# Patient Record
Sex: FEMALE | Race: WHITE | Employment: OTHER | URBAN - METROPOLITAN AREA
[De-identification: names, ages, dates, MRNs, and addresses within clinical notes are randomized per-mention and may not be internally consistent; named-entity substitution may affect disease eponyms.]

---

## 2017-01-26 ENCOUNTER — OFFICE VISIT (OUTPATIENT)
Dept: DERMATOLOGY | Age: 67
End: 2017-01-26

## 2017-01-26 DIAGNOSIS — L30.9 DERMATITIS: Primary | ICD-10-CM

## 2017-01-26 PROCEDURE — 99214 OFFICE O/P EST MOD 30 MIN: CPT | Performed by: DERMATOLOGY

## 2017-02-02 ENCOUNTER — TELEPHONE (OUTPATIENT)
Dept: PULMONOLOGY | Age: 67
End: 2017-02-02

## 2017-03-03 ENCOUNTER — OFFICE VISIT (OUTPATIENT)
Dept: INTERNAL MEDICINE CLINIC | Age: 67
End: 2017-03-03

## 2017-03-03 VITALS
WEIGHT: 145 LBS | BODY MASS INDEX: 28.47 KG/M2 | SYSTOLIC BLOOD PRESSURE: 120 MMHG | RESPIRATION RATE: 14 BRPM | HEIGHT: 60 IN | DIASTOLIC BLOOD PRESSURE: 60 MMHG | HEART RATE: 95 BPM | OXYGEN SATURATION: 95 %

## 2017-03-03 DIAGNOSIS — J44.9 OBSTRUCTIVE CHRONIC BRONCHITIS WITHOUT EXACERBATION (HCC): ICD-10-CM

## 2017-03-03 DIAGNOSIS — F32.9 MAJOR DEPRESSION, CHRONIC: ICD-10-CM

## 2017-03-03 DIAGNOSIS — M85.80 STEROID-INDUCED OSTEOPENIA: ICD-10-CM

## 2017-03-03 DIAGNOSIS — J44.9 CHRONIC OBSTRUCTIVE PULMONARY DISEASE, UNSPECIFIED COPD TYPE (HCC): Primary | ICD-10-CM

## 2017-03-03 DIAGNOSIS — T38.0X5A STEROID-INDUCED OSTEOPENIA: ICD-10-CM

## 2017-03-03 DIAGNOSIS — F33.41 MAJOR DEPRESSIVE DISORDER, RECURRENT, IN PARTIAL REMISSION (HCC): ICD-10-CM

## 2017-03-03 DIAGNOSIS — K21.9 GASTROESOPHAGEAL REFLUX DISEASE WITHOUT ESOPHAGITIS: ICD-10-CM

## 2017-03-03 LAB
A/G RATIO: 1.6 (ref 1.1–2.2)
ALBUMIN SERPL-MCNC: 4.2 G/DL (ref 3.4–5)
ALP BLD-CCNC: 69 U/L (ref 40–129)
ALT SERPL-CCNC: 13 U/L (ref 10–40)
ANION GAP SERPL CALCULATED.3IONS-SCNC: 17 MMOL/L (ref 3–16)
AST SERPL-CCNC: 21 U/L (ref 15–37)
BASOPHILS ABSOLUTE: 0 K/UL (ref 0–0.2)
BASOPHILS RELATIVE PERCENT: 0.4 %
BILIRUB SERPL-MCNC: 0.3 MG/DL (ref 0–1)
BUN BLDV-MCNC: 20 MG/DL (ref 7–20)
CALCIUM SERPL-MCNC: 9.4 MG/DL (ref 8.3–10.6)
CHLORIDE BLD-SCNC: 102 MMOL/L (ref 99–110)
CHOLESTEROL, TOTAL: 184 MG/DL (ref 0–199)
CO2: 25 MMOL/L (ref 21–32)
CREAT SERPL-MCNC: 0.6 MG/DL (ref 0.6–1.2)
EOSINOPHILS ABSOLUTE: 0 K/UL (ref 0–0.6)
EOSINOPHILS RELATIVE PERCENT: 0.3 %
GFR AFRICAN AMERICAN: >60
GFR NON-AFRICAN AMERICAN: >60
GLOBULIN: 2.7 G/DL
GLUCOSE BLD-MCNC: 133 MG/DL (ref 70–99)
HCT VFR BLD CALC: 42.1 % (ref 36–48)
HDLC SERPL-MCNC: 64 MG/DL (ref 40–60)
HEMOGLOBIN: 13.3 G/DL (ref 12–16)
LDL CHOLESTEROL CALCULATED: 96 MG/DL
LYMPHOCYTES ABSOLUTE: 1.2 K/UL (ref 1–5.1)
LYMPHOCYTES RELATIVE PERCENT: 12.8 %
MCH RBC QN AUTO: 29 PG (ref 26–34)
MCHC RBC AUTO-ENTMCNC: 31.6 G/DL (ref 31–36)
MCV RBC AUTO: 91.8 FL (ref 80–100)
MONOCYTES ABSOLUTE: 0.5 K/UL (ref 0–1.3)
MONOCYTES RELATIVE PERCENT: 4.9 %
NEUTROPHILS ABSOLUTE: 7.8 K/UL (ref 1.7–7.7)
NEUTROPHILS RELATIVE PERCENT: 81.6 %
PDW BLD-RTO: 14.7 % (ref 12.4–15.4)
PLATELET # BLD: 230 K/UL (ref 135–450)
PMV BLD AUTO: 9.6 FL (ref 5–10.5)
POTASSIUM SERPL-SCNC: 4.7 MMOL/L (ref 3.5–5.1)
RBC # BLD: 4.59 M/UL (ref 4–5.2)
SODIUM BLD-SCNC: 144 MMOL/L (ref 136–145)
TOTAL PROTEIN: 6.9 G/DL (ref 6.4–8.2)
TRIGL SERPL-MCNC: 119 MG/DL (ref 0–150)
TSH SERPL DL<=0.05 MIU/L-ACNC: 0.76 UIU/ML (ref 0.27–4.2)
URIC ACID, SERUM: 5.9 MG/DL (ref 2.6–6)
VLDLC SERPL CALC-MCNC: 24 MG/DL
WBC # BLD: 9.6 K/UL (ref 4–11)

## 2017-03-03 PROCEDURE — 99214 OFFICE O/P EST MOD 30 MIN: CPT | Performed by: INTERNAL MEDICINE

## 2017-03-03 ASSESSMENT — ENCOUNTER SYMPTOMS
NAUSEA: 0
COUGH: 0
ABDOMINAL PAIN: 0
WHEEZING: 0
BACK PAIN: 1
VOMITING: 0
EYE DISCHARGE: 0
RHINORRHEA: 0
SHORTNESS OF BREATH: 1

## 2017-03-06 ENCOUNTER — OFFICE VISIT (OUTPATIENT)
Dept: NEUROLOGY | Age: 67
End: 2017-03-06

## 2017-03-06 VITALS
SYSTOLIC BLOOD PRESSURE: 110 MMHG | BODY MASS INDEX: 28.66 KG/M2 | DIASTOLIC BLOOD PRESSURE: 65 MMHG | OXYGEN SATURATION: 97 % | HEART RATE: 87 BPM | HEIGHT: 60 IN | WEIGHT: 146 LBS

## 2017-03-06 DIAGNOSIS — F32.89 DEPRESSIVE DISORDER, NOT ELSEWHERE CLASSIFIED: ICD-10-CM

## 2017-03-06 DIAGNOSIS — G44.221 CHRONIC TENSION-TYPE HEADACHE, INTRACTABLE: Primary | ICD-10-CM

## 2017-03-06 DIAGNOSIS — F51.01 PRIMARY INSOMNIA: ICD-10-CM

## 2017-03-06 DIAGNOSIS — E78.2 MIXED HYPERLIPIDEMIA: ICD-10-CM

## 2017-03-06 PROCEDURE — 99213 OFFICE O/P EST LOW 20 MIN: CPT | Performed by: PSYCHIATRY & NEUROLOGY

## 2017-03-07 RX ORDER — SIMVASTATIN 20 MG
TABLET ORAL
Qty: 90 TABLET | Refills: 0 | Status: SHIPPED | OUTPATIENT
Start: 2017-03-07 | End: 2017-05-10 | Stop reason: SDUPTHER

## 2017-03-13 RX ORDER — AMOXICILLIN AND CLAVULANATE POTASSIUM 875; 125 MG/1; MG/1
1 TABLET, FILM COATED ORAL 2 TIMES DAILY
Qty: 20 TABLET | Refills: 0 | Status: SHIPPED | OUTPATIENT
Start: 2017-03-13 | End: 2017-03-23

## 2017-03-16 ENCOUNTER — OFFICE VISIT (OUTPATIENT)
Dept: PULMONOLOGY | Age: 67
End: 2017-03-16

## 2017-03-16 ENCOUNTER — HOSPITAL ENCOUNTER (OUTPATIENT)
Dept: OTHER | Age: 67
Discharge: HOME | End: 2017-03-16
Attending: INTERNAL MEDICINE | Admitting: INTERNAL MEDICINE

## 2017-03-16 VITALS
OXYGEN SATURATION: 94 % | HEART RATE: 74 BPM | BODY MASS INDEX: 28.54 KG/M2 | TEMPERATURE: 94.5 F | WEIGHT: 145.4 LBS | SYSTOLIC BLOOD PRESSURE: 102 MMHG | DIASTOLIC BLOOD PRESSURE: 62 MMHG | RESPIRATION RATE: 16 BRPM | HEIGHT: 60 IN

## 2017-03-16 DIAGNOSIS — R06.02 SOB (SHORTNESS OF BREATH): ICD-10-CM

## 2017-03-16 DIAGNOSIS — J44.9 COPD, VERY SEVERE (HCC): ICD-10-CM

## 2017-03-16 DIAGNOSIS — R91.8 PULMONARY NODULES: ICD-10-CM

## 2017-03-16 DIAGNOSIS — J44.1 COPD WITH ACUTE EXACERBATION (HCC): Primary | ICD-10-CM

## 2017-03-16 DIAGNOSIS — J44.1 COPD WITH ACUTE EXACERBATION (HCC): ICD-10-CM

## 2017-03-16 PROCEDURE — 99214 OFFICE O/P EST MOD 30 MIN: CPT | Performed by: INTERNAL MEDICINE

## 2017-03-16 RX ORDER — PREDNISONE 10 MG/1
TABLET ORAL
Qty: 30 TABLET | Refills: 0 | Status: SHIPPED | OUTPATIENT
Start: 2017-03-16 | End: 2017-03-26

## 2017-03-16 RX ORDER — PREDNISONE 10 MG/1
10 TABLET ORAL DAILY
Qty: 30 TABLET | Refills: 6 | Status: SHIPPED | OUTPATIENT
Start: 2017-03-16 | End: 2017-06-05 | Stop reason: SDUPTHER

## 2017-03-22 ENCOUNTER — TELEPHONE (OUTPATIENT)
Dept: PULMONOLOGY | Age: 67
End: 2017-03-22

## 2017-03-22 DIAGNOSIS — J44.9 CHRONIC OBSTRUCTIVE PULMONARY DISEASE, UNSPECIFIED COPD TYPE (HCC): Primary | ICD-10-CM

## 2017-03-29 ENCOUNTER — HOSPITAL ENCOUNTER (OUTPATIENT)
Dept: PULMONOLOGY | Age: 67
Discharge: HOME | End: 2017-03-29
Attending: INTERNAL MEDICINE | Admitting: INTERNAL MEDICINE

## 2017-03-29 DIAGNOSIS — R91.8 OTHER NONSPECIFIC ABNORMAL FINDING OF LUNG FIELD: ICD-10-CM

## 2017-03-29 RX ORDER — ALBUTEROL SULFATE 2.5 MG/3ML
2.5 SOLUTION RESPIRATORY (INHALATION) ONCE
Status: COMPLETED | OUTPATIENT
Start: 2017-03-29 | End: 2017-03-29

## 2017-03-29 RX ADMIN — ALBUTEROL SULFATE 2.5 MG: 2.5 SOLUTION RESPIRATORY (INHALATION) at 12:05

## 2017-04-19 ENCOUNTER — TELEPHONE (OUTPATIENT)
Dept: PULMONOLOGY | Age: 67
End: 2017-04-19

## 2017-04-19 DIAGNOSIS — J44.9 CHRONIC OBSTRUCTIVE PULMONARY DISEASE, UNSPECIFIED COPD TYPE (HCC): Primary | ICD-10-CM

## 2017-04-20 DIAGNOSIS — J44.9 CHRONIC OBSTRUCTIVE PULMONARY DISEASE, UNSPECIFIED COPD TYPE (HCC): Primary | ICD-10-CM

## 2017-05-10 RX ORDER — SIMVASTATIN 20 MG
TABLET ORAL
Qty: 90 TABLET | Refills: 0 | Status: SHIPPED | OUTPATIENT
Start: 2017-05-10 | End: 2017-09-11 | Stop reason: SDUPTHER

## 2017-06-05 RX ORDER — VENLAFAXINE HYDROCHLORIDE 75 MG/1
CAPSULE, EXTENDED RELEASE ORAL
Qty: 90 CAPSULE | Refills: 0 | Status: SHIPPED | OUTPATIENT
Start: 2017-06-05 | End: 2017-06-19 | Stop reason: SDUPTHER

## 2017-06-05 RX ORDER — TOPIRAMATE 25 MG/1
TABLET ORAL
Qty: 360 TABLET | Refills: 0 | Status: SHIPPED | OUTPATIENT
Start: 2017-06-05 | End: 2017-06-19 | Stop reason: SDUPTHER

## 2017-06-05 RX ORDER — TIOTROPIUM BROMIDE 18 UG/1
CAPSULE ORAL; RESPIRATORY (INHALATION)
Qty: 90 CAPSULE | Refills: 0 | Status: SHIPPED | OUTPATIENT
Start: 2017-06-05 | End: 2017-06-19 | Stop reason: SDUPTHER

## 2017-06-05 RX ORDER — PREDNISONE 10 MG/1
TABLET ORAL
Qty: 90 TABLET | Refills: 0 | Status: SHIPPED | OUTPATIENT
Start: 2017-06-05 | End: 2017-08-09

## 2017-06-19 ENCOUNTER — OFFICE VISIT (OUTPATIENT)
Dept: INTERNAL MEDICINE CLINIC | Age: 67
End: 2017-06-19

## 2017-06-19 VITALS
HEIGHT: 60 IN | HEART RATE: 80 BPM | BODY MASS INDEX: 29.64 KG/M2 | WEIGHT: 151 LBS | DIASTOLIC BLOOD PRESSURE: 80 MMHG | RESPIRATION RATE: 14 BRPM | SYSTOLIC BLOOD PRESSURE: 130 MMHG

## 2017-06-19 DIAGNOSIS — J44.9 CHRONIC OBSTRUCTIVE PULMONARY DISEASE, UNSPECIFIED COPD TYPE (HCC): Primary | ICD-10-CM

## 2017-06-19 DIAGNOSIS — F33.41 MAJOR DEPRESSIVE DISORDER, RECURRENT, IN PARTIAL REMISSION (HCC): ICD-10-CM

## 2017-06-19 DIAGNOSIS — E78.2 MIXED HYPERLIPIDEMIA: ICD-10-CM

## 2017-06-19 DIAGNOSIS — F32.89 DEPRESSIVE DISORDER, NOT ELSEWHERE CLASSIFIED: ICD-10-CM

## 2017-06-19 DIAGNOSIS — R60.0 LOCALIZED EDEMA: ICD-10-CM

## 2017-06-19 DIAGNOSIS — K21.9 GASTROESOPHAGEAL REFLUX DISEASE WITHOUT ESOPHAGITIS: ICD-10-CM

## 2017-06-19 DIAGNOSIS — F51.01 PRIMARY INSOMNIA: ICD-10-CM

## 2017-06-19 DIAGNOSIS — F32.9 MAJOR DEPRESSION, CHRONIC: ICD-10-CM

## 2017-06-19 PROCEDURE — 99214 OFFICE O/P EST MOD 30 MIN: CPT | Performed by: INTERNAL MEDICINE

## 2017-06-19 RX ORDER — TOPIRAMATE 25 MG/1
TABLET ORAL
Qty: 360 TABLET | Refills: 0 | Status: SHIPPED | OUTPATIENT
Start: 2017-06-19 | End: 2017-08-24 | Stop reason: SDUPTHER

## 2017-06-19 RX ORDER — ALBUTEROL SULFATE 90 UG/1
AEROSOL, METERED RESPIRATORY (INHALATION)
Qty: 54 G | Refills: 0 | Status: SHIPPED | OUTPATIENT
Start: 2017-06-19 | End: 2017-08-12 | Stop reason: SDUPTHER

## 2017-06-19 RX ORDER — VENLAFAXINE HYDROCHLORIDE 75 MG/1
CAPSULE, EXTENDED RELEASE ORAL
Qty: 90 CAPSULE | Refills: 0 | Status: SHIPPED | OUTPATIENT
Start: 2017-06-19 | End: 2017-08-22 | Stop reason: SDUPTHER

## 2017-06-19 ASSESSMENT — ENCOUNTER SYMPTOMS
VOMITING: 0
ABDOMINAL PAIN: 0
BACK PAIN: 1
SHORTNESS OF BREATH: 1
RHINORRHEA: 0
WHEEZING: 0
COUGH: 0
NAUSEA: 0
EYE DISCHARGE: 0

## 2017-07-14 ENCOUNTER — HOSPITAL ENCOUNTER (OUTPATIENT)
Dept: CT IMAGING | Age: 67
Discharge: HOME | End: 2017-07-14
Attending: INTERNAL MEDICINE | Admitting: INTERNAL MEDICINE

## 2017-07-14 DIAGNOSIS — R91.8 OTHER NONSPECIFIC ABNORMAL FINDING OF LUNG FIELD: ICD-10-CM

## 2017-07-14 DIAGNOSIS — R91.8 PULMONARY NODULES: ICD-10-CM

## 2017-07-18 ENCOUNTER — TELEPHONE (OUTPATIENT)
Dept: PULMONOLOGY | Age: 67
End: 2017-07-18

## 2017-07-18 RX ORDER — AZITHROMYCIN 250 MG/1
TABLET, FILM COATED ORAL
Qty: 6 TABLET | Refills: 0 | Status: SHIPPED | OUTPATIENT
Start: 2017-07-18 | End: 2017-09-20

## 2017-07-26 RX ORDER — ROFLUMILAST 500 UG/1
TABLET ORAL
Qty: 90 TABLET | Refills: 0 | Status: SHIPPED | OUTPATIENT
Start: 2017-07-26 | End: 2017-10-02 | Stop reason: SDUPTHER

## 2017-08-03 ENCOUNTER — OFFICE VISIT (OUTPATIENT)
Dept: PULMONOLOGY | Age: 67
End: 2017-08-03

## 2017-08-03 VITALS
HEIGHT: 60 IN | TEMPERATURE: 98.1 F | RESPIRATION RATE: 16 BRPM | BODY MASS INDEX: 29.45 KG/M2 | WEIGHT: 150 LBS | DIASTOLIC BLOOD PRESSURE: 75 MMHG | OXYGEN SATURATION: 96 % | HEART RATE: 89 BPM | SYSTOLIC BLOOD PRESSURE: 127 MMHG

## 2017-08-03 DIAGNOSIS — J44.9 COPD, VERY SEVERE (HCC): Primary | ICD-10-CM

## 2017-08-03 DIAGNOSIS — R09.02 HYPOXIA: ICD-10-CM

## 2017-08-03 DIAGNOSIS — R91.8 PULMONARY NODULES: ICD-10-CM

## 2017-08-03 DIAGNOSIS — R93.89 ABNORMAL CT SCAN, CHEST: ICD-10-CM

## 2017-08-03 PROCEDURE — 99214 OFFICE O/P EST MOD 30 MIN: CPT | Performed by: INTERNAL MEDICINE

## 2017-08-03 RX ORDER — PREDNISONE 10 MG/1
TABLET ORAL
Qty: 30 TABLET | Refills: 0 | Status: SHIPPED | OUTPATIENT
Start: 2017-08-03 | End: 2017-08-09 | Stop reason: SDUPTHER

## 2017-08-09 RX ORDER — PREDNISONE 10 MG/1
TABLET ORAL
Qty: 90 TABLET | Refills: 0 | Status: SHIPPED | OUTPATIENT
Start: 2017-08-09 | End: 2017-10-02 | Stop reason: SDUPTHER

## 2017-08-22 RX ORDER — VENLAFAXINE HYDROCHLORIDE 75 MG/1
CAPSULE, EXTENDED RELEASE ORAL
Qty: 90 CAPSULE | Refills: 0 | Status: SHIPPED | OUTPATIENT
Start: 2017-08-22 | End: 2017-09-20 | Stop reason: SDUPTHER

## 2017-08-22 RX ORDER — TIOTROPIUM BROMIDE 18 UG/1
CAPSULE ORAL; RESPIRATORY (INHALATION)
Qty: 90 CAPSULE | Refills: 0 | Status: SHIPPED | OUTPATIENT
Start: 2017-08-22 | End: 2017-11-20 | Stop reason: SDUPTHER

## 2017-08-24 RX ORDER — TOPIRAMATE 25 MG/1
TABLET ORAL
Qty: 360 TABLET | Refills: 0 | Status: SHIPPED | OUTPATIENT
Start: 2017-08-24 | End: 2017-10-31 | Stop reason: SDUPTHER

## 2017-09-20 ENCOUNTER — OFFICE VISIT (OUTPATIENT)
Dept: INTERNAL MEDICINE CLINIC | Age: 67
End: 2017-09-20

## 2017-09-20 VITALS
WEIGHT: 152 LBS | DIASTOLIC BLOOD PRESSURE: 80 MMHG | HEIGHT: 60 IN | RESPIRATION RATE: 14 BRPM | HEART RATE: 70 BPM | BODY MASS INDEX: 29.84 KG/M2 | SYSTOLIC BLOOD PRESSURE: 130 MMHG

## 2017-09-20 DIAGNOSIS — Z00.00 ROUTINE GENERAL MEDICAL EXAMINATION AT A HEALTH CARE FACILITY: ICD-10-CM

## 2017-09-20 DIAGNOSIS — E78.2 MIXED HYPERLIPIDEMIA: ICD-10-CM

## 2017-09-20 DIAGNOSIS — Z23 NEED FOR INFLUENZA VACCINATION: Primary | ICD-10-CM

## 2017-09-20 LAB
BASOPHILS ABSOLUTE: 0 K/UL (ref 0–0.2)
BASOPHILS RELATIVE PERCENT: 0.3 %
EOSINOPHILS ABSOLUTE: 0.1 K/UL (ref 0–0.6)
EOSINOPHILS RELATIVE PERCENT: 0.6 %
HCT VFR BLD CALC: 41.7 % (ref 36–48)
HEMOGLOBIN: 13.4 G/DL (ref 12–16)
LYMPHOCYTES ABSOLUTE: 1.2 K/UL (ref 1–5.1)
LYMPHOCYTES RELATIVE PERCENT: 11.7 %
MCH RBC QN AUTO: 30.2 PG (ref 26–34)
MCHC RBC AUTO-ENTMCNC: 32 G/DL (ref 31–36)
MCV RBC AUTO: 94.4 FL (ref 80–100)
MONOCYTES ABSOLUTE: 0.6 K/UL (ref 0–1.3)
MONOCYTES RELATIVE PERCENT: 5.7 %
NEUTROPHILS ABSOLUTE: 8.1 K/UL (ref 1.7–7.7)
NEUTROPHILS RELATIVE PERCENT: 81.7 %
PDW BLD-RTO: 15.4 % (ref 12.4–15.4)
PLATELET # BLD: 220 K/UL (ref 135–450)
PMV BLD AUTO: 9.8 FL (ref 5–10.5)
RBC # BLD: 4.42 M/UL (ref 4–5.2)
WBC # BLD: 9.9 K/UL (ref 4–11)

## 2017-09-20 PROCEDURE — 90662 IIV NO PRSV INCREASED AG IM: CPT | Performed by: INTERNAL MEDICINE

## 2017-09-20 PROCEDURE — G0008 ADMIN INFLUENZA VIRUS VAC: HCPCS | Performed by: INTERNAL MEDICINE

## 2017-09-20 PROCEDURE — G0438 PPPS, INITIAL VISIT: HCPCS | Performed by: INTERNAL MEDICINE

## 2017-09-20 RX ORDER — AZITHROMYCIN 250 MG/1
TABLET, FILM COATED ORAL
Qty: 1 PACKET | Refills: 0 | Status: SHIPPED | OUTPATIENT
Start: 2017-09-20 | End: 2017-09-30

## 2017-09-20 RX ORDER — VENLAFAXINE HYDROCHLORIDE 75 MG/1
CAPSULE, EXTENDED RELEASE ORAL
Qty: 90 CAPSULE | Refills: 0 | Status: SHIPPED | OUTPATIENT
Start: 2017-09-20 | End: 2017-11-20 | Stop reason: SDUPTHER

## 2017-09-20 ASSESSMENT — ANXIETY QUESTIONNAIRES: GAD7 TOTAL SCORE: 4

## 2017-09-20 ASSESSMENT — LIFESTYLE VARIABLES: HOW OFTEN DO YOU HAVE A DRINK CONTAINING ALCOHOL: 0

## 2017-09-21 ENCOUNTER — TELEPHONE (OUTPATIENT)
Dept: INTERNAL MEDICINE CLINIC | Age: 67
End: 2017-09-21

## 2017-09-21 DIAGNOSIS — R73.09 HIGH GLUCOSE: Primary | ICD-10-CM

## 2017-09-21 LAB
A/G RATIO: 1.3 (ref 1.1–2.2)
ALBUMIN SERPL-MCNC: 3.9 G/DL (ref 3.4–5)
ALP BLD-CCNC: 97 U/L (ref 40–129)
ALT SERPL-CCNC: 18 U/L (ref 10–40)
ANION GAP SERPL CALCULATED.3IONS-SCNC: 16 MMOL/L (ref 3–16)
AST SERPL-CCNC: 16 U/L (ref 15–37)
BILIRUB SERPL-MCNC: <0.2 MG/DL (ref 0–1)
BUN BLDV-MCNC: 25 MG/DL (ref 7–20)
CALCIUM SERPL-MCNC: 9.9 MG/DL (ref 8.3–10.6)
CHLORIDE BLD-SCNC: 102 MMOL/L (ref 99–110)
CO2: 26 MMOL/L (ref 21–32)
CREAT SERPL-MCNC: 0.8 MG/DL (ref 0.6–1.2)
ESTIMATED AVERAGE GLUCOSE: 197.3 MG/DL
GFR AFRICAN AMERICAN: >60
GFR NON-AFRICAN AMERICAN: >60
GLOBULIN: 3 G/DL
GLUCOSE BLD-MCNC: 397 MG/DL (ref 70–99)
HBA1C MFR BLD: 8.5 %
POTASSIUM SERPL-SCNC: 4.2 MMOL/L (ref 3.5–5.1)
SODIUM BLD-SCNC: 144 MMOL/L (ref 136–145)
TOTAL PROTEIN: 6.9 G/DL (ref 6.4–8.2)
URIC ACID, SERUM: 5 MG/DL (ref 2.6–6)

## 2017-09-28 RX ORDER — ALBUTEROL SULFATE 2.5 MG/3ML
SOLUTION RESPIRATORY (INHALATION)
Qty: 360 VIAL | Refills: 3 | Status: SHIPPED | OUTPATIENT
Start: 2017-09-28 | End: 2018-08-29 | Stop reason: SDUPTHER

## 2017-10-03 RX ORDER — ALENDRONATE SODIUM 35 MG/1
TABLET ORAL
Qty: 12 TABLET | Refills: 1 | Status: SHIPPED | OUTPATIENT
Start: 2017-10-03 | End: 2018-02-15 | Stop reason: SDUPTHER

## 2017-10-03 RX ORDER — DESVENLAFAXINE 100 MG/1
TABLET, EXTENDED RELEASE ORAL
Qty: 90 TABLET | Refills: 0 | Status: SHIPPED | OUTPATIENT
Start: 2017-10-03 | End: 2017-11-21

## 2017-10-03 RX ORDER — FUROSEMIDE 40 MG/1
TABLET ORAL
Qty: 90 TABLET | Refills: 0 | Status: SHIPPED | OUTPATIENT
Start: 2017-10-03 | End: 2017-11-20 | Stop reason: SDUPTHER

## 2017-10-03 RX ORDER — PREDNISONE 10 MG/1
TABLET ORAL
Qty: 90 TABLET | Refills: 0 | Status: SHIPPED | OUTPATIENT
Start: 2017-10-03 | End: 2018-03-20

## 2017-10-03 RX ORDER — ROFLUMILAST 500 UG/1
TABLET ORAL
Qty: 90 TABLET | Refills: 0 | Status: SHIPPED | OUTPATIENT
Start: 2017-10-03 | End: 2017-12-13 | Stop reason: SDUPTHER

## 2017-10-03 RX ORDER — NAPROXEN 375 MG/1
TABLET ORAL
Qty: 180 TABLET | Refills: 0 | Status: SHIPPED | OUTPATIENT
Start: 2017-10-03 | End: 2017-11-20 | Stop reason: SDUPTHER

## 2017-10-05 ENCOUNTER — TELEPHONE (OUTPATIENT)
Dept: PULMONOLOGY | Age: 67
End: 2017-10-05

## 2017-10-05 NOTE — TELEPHONE ENCOUNTER
Patient called stating that she is not able to receive her portable oxygen concentrator. Spoke with [facility], office notes from 8/3/17 need to state that patient is mobile within the home, per medicare guidelines. Assessment:8/3/17      · Very severe COPD   · Abnormal CT chest 7/14/2017 with ASD- treated for PNA   · Hypoxia on exertion   · Pulmonary nodules   · 20 pack years. Quit smoking 1996. · Chronic steroid use since 12/2015                           Plan:       · Prednisone taper to 10 mg/day -  Risks, benefits and side effects were discussed with patient. · O2 2LPM on exertion. Advised to titrate O2 using her pulse oximeter- target O2 sat 90-92%. · Order for portable oxygen concentrator    · Fosamax 35 mg once a week, Vit D 800 IU daily and Ca 1200 mg daily   · Advair 250/50 BID, Spiriva INH daily and Albuterol INH/Neb PRN  · Daliresp daily   · CT chest 10/2017   · Flonase 2 sprays/nostril daily PRN  · Patient is up to date with Pneumococcal vaccine and influenza vaccine    · Acapella QID to mobilize respiratory secretions.

## 2017-10-06 RX ORDER — METFORMIN HYDROCHLORIDE 500 MG/1
TABLET, EXTENDED RELEASE ORAL
Qty: 360 TABLET | Refills: 0 | Status: SHIPPED | OUTPATIENT
Start: 2017-10-06 | End: 2018-02-09 | Stop reason: SDUPTHER

## 2017-10-06 RX ORDER — METFORMIN HYDROCHLORIDE 500 MG/1
1000 TABLET, EXTENDED RELEASE ORAL 2 TIMES DAILY
Qty: 120 TABLET | Refills: 2 | Status: SHIPPED | OUTPATIENT
Start: 2017-10-06 | End: 2017-10-06 | Stop reason: SDUPTHER

## 2017-10-17 ENCOUNTER — HOSPITAL ENCOUNTER (OUTPATIENT)
Dept: CT IMAGING | Age: 67
Discharge: HOME | End: 2017-10-17
Attending: INTERNAL MEDICINE | Admitting: INTERNAL MEDICINE

## 2017-10-17 DIAGNOSIS — R91.8 OTHER NONSPECIFIC ABNORMAL FINDING OF LUNG FIELD: ICD-10-CM

## 2017-10-17 DIAGNOSIS — R91.8 OTHER NONSPECIFIC ABNORMAL FINDING OF LUNG FIELD (CODE): ICD-10-CM

## 2017-10-17 DIAGNOSIS — R93.89 ABNORMAL CT SCAN, CHEST: ICD-10-CM

## 2017-10-17 DIAGNOSIS — R91.8 PULMONARY NODULES: ICD-10-CM

## 2017-10-18 ENCOUNTER — TELEPHONE (OUTPATIENT)
Dept: PULMONOLOGY | Age: 67
End: 2017-10-18

## 2017-10-18 ENCOUNTER — OFFICE VISIT (OUTPATIENT)
Dept: PULMONOLOGY | Age: 67
End: 2017-10-18

## 2017-10-18 VITALS
WEIGHT: 147 LBS | OXYGEN SATURATION: 93 % | DIASTOLIC BLOOD PRESSURE: 80 MMHG | HEART RATE: 81 BPM | BODY MASS INDEX: 28.86 KG/M2 | RESPIRATION RATE: 16 BRPM | HEIGHT: 60 IN | SYSTOLIC BLOOD PRESSURE: 131 MMHG | TEMPERATURE: 97.7 F

## 2017-10-18 DIAGNOSIS — J40 TRACHEOBRONCHITIS: ICD-10-CM

## 2017-10-18 DIAGNOSIS — R09.02 HYPOXIA: ICD-10-CM

## 2017-10-18 DIAGNOSIS — R93.89 ABNORMAL CT OF THE CHEST: ICD-10-CM

## 2017-10-18 DIAGNOSIS — J44.1 COPD WITH ACUTE EXACERBATION (HCC): Primary | ICD-10-CM

## 2017-10-18 PROCEDURE — 99214 OFFICE O/P EST MOD 30 MIN: CPT | Performed by: INTERNAL MEDICINE

## 2017-10-18 RX ORDER — PREDNISONE 10 MG/1
TABLET ORAL
Qty: 30 TABLET | Refills: 0 | Status: SHIPPED | OUTPATIENT
Start: 2017-10-18 | End: 2017-10-28

## 2017-10-18 RX ORDER — LEVOFLOXACIN 500 MG/1
500 TABLET, FILM COATED ORAL DAILY
Qty: 7 TABLET | Refills: 0 | Status: SHIPPED | OUTPATIENT
Start: 2017-10-18 | End: 2017-10-25

## 2017-10-18 NOTE — PROGRESS NOTES
P Pulmonary, Critical Care [facility] Sleep Specialists                                                            Outpatient Follow Up Note      CHIEF COMPLAINT: Follow up COPD        HPI:   CT chest reviewed by me and noted below.  Results were dicussed with patient and multiple good questions were answered    Cough with green sputum several times/day for few days     No smoking     On Prednisone 10 mg/day    Past Medical History:   Diagnosis Date    Chronic airway obstruction, not elsewhere classified 3/4/08    Chronic kidney disease     incontinent    COPD (chronic obstructive pulmonary disease) ([address]     Depression     Displacement of lumbar intervertebral disc without myelopathy 8/13/07    Edema 9/13/07    Emphysema of lung ([address]     Enthesopathy of hip region     Enthesopathy of hip region 3/5/07    Esophageal reflux 3/4/08    Hemorrhage of rectum and anus 11/14/07    Major depression, chronic 8/12/2015    Osteoporosis, unspecified 12/4/07    Other and unspecified hyperlipidemia 3/4/08    Pain in joint, shoulder region 3/4/08    Pneumonia     Pulmonary nodule     Rheumatic fever     S/P ileostomy ([address] 8/20/2011    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 Ryley Oneil; right ear pressure point cyst removal    EYE SURGERY Left 6/18/13    cataract with lens implant    HERNIA REPAIR  6/27/14    Open Vental Hernia Repair    HIP SURGERY      HYSTERECTOMY 1973    subtotal    NECK SURGERY      incision of windpipe, repair of windpipe defect    OTHER SURGICAL HISTORY  8/19/2011    hand assisted laparoscopic ileostomy reversal    OVARY REMOVAL      TRACHEAL SURGERY  2005    hx of trach     UMBILICAL HERNIA REPAIR  6/27/2014       Allergies:  has No Known Allergies. Social History:    TOBACCO:   reports that she quit smoking about 21 years ago. Her smoking use included Cigarettes. She has a 30.00 pack-year smoking history. She has never used smokeless tobacco.  ETOH:   reports that she does not drink alcohol.       Family History:       Problem Relation Age of Onset    Asthma Daughter     Diabetes Daughter     Cancer Daughter      Uterine    Asthma Daughter     Diabetes Mother     Heart Failure Mother     Hypertension Mother     Heart Failure Brother     Heart Failure Sister     Diabetes Sister     Heart Failure Brother     Emphysema Neg Hx        Current Medications:    Current Outpatient Prescriptions:     metFORMIN (GLUCOPHAGE-XR) 500 MG extended release tablet, TAKE 2 TABLETS BY MOUTH TWICE DAILY, Disp: 360 tablet, Rfl: 0    desvenlafaxine succinate (PRISTIQ) 100 MG TB24 extended release tablet, TAKE 1 TABLET EVERY DAY, Disp: 90 tablet, Rfl: 0    alendronate (FOSAMAX) 35 MG tablet, TAKE 1 TABLET BY MOUTH EVERY 7 DAYS, Disp: 12 tablet, Rfl: 1    naproxen (NAPROSYN) 375 MG tablet, TAKE 1 TABLET TWICE DAILY, Disp: 180 tablet, Rfl: 0    furosemide (LASIX) 40 MG tablet, TAKE 1 TABLET EVERY DAY, Disp: 90 tablet, Rfl: 0    DALIRESP 500 MCG tablet, TAKE 1 TABLET EVERY DAY, Disp: 90 tablet, Rfl: 0    predniSONE (DELTASONE) 10 MG tablet, TAKE 1 TABLET EVERY DAY, Disp: 90 tablet, Rfl: 0    VENTOLIN  (90 Base) MCG/ACT inhaler, INHALE 2 PUFFS EVERY 6 HOURS AS NEEDED FOR WHEEZING, Disp: 54 g, Rfl: 0    albuterol (PROVENTIL) (2.5 MG/3ML) 0.083% nebulizer solution, INHALE THE CONTENTS OF 1 VIAL (3 MLS) BY NEBULIZATION EVERY 6 HOURS AS NEEDED FOR WHEEZING OR SHORTNESS OF BREATH, Disp: 360 vial, Rfl: 3    venlafaxine (EFFEXOR XR) 75 MG extended release capsule, TAKE 1 CAPSULE EVERY DAY, Disp: 90 capsule, Rfl: 0    potassium chloride (KLOR-CON M) 20 MEQ extended release tablet, TAKE 1 TABLET EVERY DAY, Disp: 90 tablet, Rfl: 0    simvastatin (ZOCOR) 20 MG tablet, TAKE 1 TABLET EVERY EVENING, Disp: 90 tablet, Rfl: 0    esomeprazole (NEXIUM) 40 MG delayed release capsule, TAKE 1 CAPSULE BY MOUTH EVERY MORNING BEFORE BREAKFAST, Disp: 90 capsule, Rfl: 0    topiramate (TOPAMAX) 25 MG tablet, TAKE 2 TABLETS TWICE DAILY, Disp: 360 tablet, Rfl: 0    SPIRIVA HANDIHALER 18 MCG inhalation capsule, INHALE THE CONTENTS OF 1 CAPSULE EVERY DAY, Disp: 90 capsule, Rfl: 0    fluticasone-salmeterol (ADVAIR DISKUS) 250-50 MCG/DOSE AEPB, Inhale 1 puff into the lungs every 12 hours, Disp: 180 each, Rfl: 3    theophylline (THEOCHRON) 200 MG extended release tablet, Take 1 tablet by mouth 2 times daily, Disp: 180 tablet, Rfl: 0    QUEtiapine (SEROQUEL) 25 MG tablet, TAKE 1 TO 2 TABLETS BY MOUTH AT BEDTIME, Disp: 180 tablet, Rfl: 2    vitamin D (CHOLECALCIFEROL) 400 UNITS TABS tablet, Take 2 tablets by mouth daily, Disp: 180 tablet, Rfl: 1    calcium carbonate (CALCIUM 600) 600 MG TABS tablet, Take 2 tablets by mouth daily, Disp: 180 tablet, Rfl: 1    Respiratory Therapy Supplies (NEBULIZER/TUBING/MOUTHPIECE) KIT, 1 kit by Does not apply route daily as needed DX COPD J44.9, Disp: 1 kit, Rfl: 0    Nebulizers (COMPRESSOR/NEBULIZER) MISC, 1 Device by Does not apply route as needed DX COPD J44.9, Disp: 1 each, Rfl: 0    fluticasone (FLONASE) 50 MCG/ACT nasal spray, 2 sprays by Nasal route daily. , Disp: 3 Bottle, Rfl: 0    Misc. Devices (ACAPELLA) MISC, by Does not apply route 4 times daily. , Disp: 1 each, Rfl: 0           Objective:     /80 (Site: Left Arm, Position: Sitting, Cuff Size: Medium Adult)   Pulse 81   Temp 97.7 °F (36.5 °C) (Oral)   Resp 16   Ht 5'

## 2017-10-19 ENCOUNTER — TELEPHONE (OUTPATIENT)
Dept: PULMONOLOGY | Age: 67
End: 2017-10-19

## 2017-10-19 DIAGNOSIS — R16.0 LIVER MASS: Primary | ICD-10-CM

## 2017-10-21 ENCOUNTER — HOSPITAL ENCOUNTER (OUTPATIENT)
Dept: CT IMAGING | Age: 67
Discharge: HOME | End: 2017-10-21
Attending: INTERNAL MEDICINE | Admitting: INTERNAL MEDICINE

## 2017-10-21 DIAGNOSIS — R93.89 ABNORMAL FINDINGS ON DIAGNOSTIC IMAGING OF OTHER SPECIFIED BODY STRUCTURES: ICD-10-CM

## 2017-10-21 DIAGNOSIS — R16.0 LIVER MASS: ICD-10-CM

## 2017-10-21 LAB
CREAT SERPL-MCNC: 0.8 MG/DL (ref 0.6–1.2)
GFR AFRICAN AMERICAN: >60
GFR NON-AFRICAN AMERICAN: >60

## 2017-10-23 ENCOUNTER — HOSPITAL ENCOUNTER (OUTPATIENT)
Dept: SPEECH THERAPY | Age: 67
Discharge: HOME | End: 2017-10-31
Admitting: INTERNAL MEDICINE

## 2017-10-23 NOTE — PROGRESS NOTES
Speech Language Pathology  Facility/Department: [facility] SPEECH THERAPY   BEDSIDE SWALLOW EVALUATION    NAME: Marianne Dancer  : 1950  MRN: [id number]    ADMISSION DATE: 10/20/2017    Visit Diagnoses    None. Past Medical History:  has a past medical history of Chronic airway obstruction, not elsewhere classified; Chronic kidney disease; COPD (chronic obstructive pulmonary disease) ([address] Depression; Displacement of lumbar intervertebral disc without myelopathy; Edema; Emphysema of lung ([address] Enthesopathy of hip region; Enthesopathy of hip region; Esophageal reflux; Hemorrhage of rectum and anus; Major depression, chronic; Osteoporosis, unspecified; Other and unspecified hyperlipidemia; Pain in joint, shoulder region; Pneumonia; Pulmonary nodule; Rheumatic fever; S/P [address]ostomy [address] [address] Steroid-induced osteopenia; Tension headache; Unspecified asthma(493.90); Unspecified chronic bronchitis; and Ventral hernia. Past Surgical History:  has a past surgical history that includes Hysterectomy (); Ovary removal; Carpal tunnel release; hip surgery; Neck surgery; Tracheal surgery (); Abdomen surgery (11); other surgical history (2011); colectomy; eye surgery (Left, 13); Cataract removal with implant (Right, 13); Cataract removal (Bilateral); cyst removal (Right, 14); hernia repair (14); Umbilical hernia repair (2014); and Colonoscopy (, 2011, 10/28/15). Recent Chest Xray/CT of Chest (10/17/17): Treatment Dx (ICD 10 code): Aspiration (S11.7)  Insurance/Certification Information: [facility]  Plan of Care Submitted: Yes; faxed 10/23/17    Date of Eval: 10/23/2017  Evaluating Therapist: Tyler Price    Current Diet level:  Current Diet : Regular  Current Liquid Diet : Thin    Primary Complaint:   Patient Complaint: Pt reports eating softer foods 2/2 edentulous status; she reported occasional dysphagia with saliva.     Pain:   Pain Assessment  Patient Recommendation: Dysphagia III Advanced pending MBSS  Liquid: Liquid Consistency Recommendation: Thin pending MBSS  Medication:Recommended Form of Meds: PO pending MBSS  Therapeutic Interventions: Diet tolerance monitoring, Patient/Family education    Compensatory Swallowing Strategies  Compensatory Swallowing Strategies: Alternate solids and liquids;Small bites/sips;Upright as possible for all oral intake;Remain upright for 30-45 minutes after meals;Eat/Feed slowly    Treatment/Goals  TBD pending results and recs of MBSS    General  Chart Reviewed: Yes  Visit Information  SLP Insurance Information: [employer]  Comments: Dr. Miguel Ángel Skinner ordered SLP Eval 2/2 possible aspiration per recent CXR. Behavior/Cognition  Behavior/Cognition: Alert;Pleasant mood; Cooperative  Respiratory Status: Room air  O2 Device: None (Room air)  Communication Observation: Functional  Follows Directions: Simple  Dentition: Edentulous  Patient Positioning: Upright in chair  Baseline Vocal Quality: Weak (Hoarse)  Volitional Cough: Strong  Prior Dysphagia History: No hx of dysphagia per chart review. Consistencies Trialed: Reg solid;Puree; Thin - cup; Thin - straw    Vision/Hearing  Vision  Vision: Within Functional Limits  Hearing  Hearing: Within functional limits    Oral Motor Deficits  Oral/Motor  Oral Motor: Within functional limits    Oral Phase Dysfunction  Oral Phase  Oral Phase: Exceptions  Oral Phase Dysfunction  Impaired Mastication: Reg Solid  Decreased Anterior to Posterior Transit: Reg solid     Indicators of Pharyngeal Phase Dysfunction   Pharyngeal Phase  Pharyngeal Phase: Exceptions  Indicators of Pharyngeal Phase Dysfunction  Decreased Laryngeal Elevation: All  Throat Clearing - Delayed:  Thin - straw (x1)    Prognosis  Prognosis  Prognosis for safe diet advancement: good  Individuals consulted  Consulted and agree with results and recommendations: Patient    Education  Patient Education: Pt educated on reason for referral, role of , assessment results and recommendations. Patient Education Response: Verbalizes understanding    G-Code  SLP G-Codes  Functional Limitations: Swallowing  Swallow Current Status (): At least 20 percent but less than 40 percent impaired, limited or restricted  Swallow Goal Status ():  At least 1 percent but less than 20 percent impaired, limited or restricted    Therapy Time  SLP Individual Minutes  Time In: 3958  Time Out: 1330  Minutes: 23 minutes; dysphagia kee Ricardo M.S. [id number] [facility]  Speech-language pathologist  [phone]

## 2017-10-25 ENCOUNTER — TELEPHONE (OUTPATIENT)
Dept: INTERNAL MEDICINE CLINIC | Age: 67
End: 2017-10-25

## 2017-10-25 NOTE — TELEPHONE ENCOUNTER
Gualberto sent fax over verifying if patient is taking Pristiq or Venlafaxine. Patient is taking Pristiq only, fax has been filled out and faxed back to [facility].

## 2017-10-31 RX ORDER — TOPIRAMATE 25 MG/1
TABLET ORAL
Qty: 360 TABLET | Refills: 0 | Status: SHIPPED | OUTPATIENT
Start: 2017-10-31 | End: 2017-12-12

## 2017-11-01 ENCOUNTER — HOSPITAL ENCOUNTER (OUTPATIENT)
Dept: OTHER | Age: 67
Discharge: HOME | End: 2017-11-30
Attending: INTERNAL MEDICINE | Admitting: INTERNAL MEDICINE

## 2017-11-06 ENCOUNTER — HOSPITAL ENCOUNTER (OUTPATIENT)
Dept: SPEECH THERAPY | Age: 67
Discharge: HOME | End: 2017-11-06
Attending: INTERNAL MEDICINE | Admitting: INTERNAL MEDICINE

## 2017-11-06 DIAGNOSIS — R13.10 DYSPHAGIA, UNSPECIFIED TYPE: ICD-10-CM

## 2017-11-06 NOTE — PROCEDURES
INSTRUMENTAL SWALLOW REPORT  MODIFIED BARIUM SWALLOW    NAME: Don Alberts   : 1950  MRN: [id number]       Date of Eval: 2017     Ordering Physician: Dr. Cricket Mccarthy  Radiologist: Dr. Omar Hwang  Referring Diagnosis(es): Referring Diagnosis: Dysphagia, unspecified (R13.10), Aspiration (R93.8)    Past Medical History:  has a past medical history of Chronic airway obstruction, not elsewhere classified; Chronic kidney disease; COPD (chronic obstructive pulmonary disease) ([address] Depression; Displacement of lumbar intervertebral disc without myelopathy; Edema; Emphysema of lung ([address] Enthesopathy of hip region; Enthesopathy of hip region; Esophageal reflux; Hemorrhage of rectum and anus; Major depression, chronic; Osteoporosis, unspecified; Other and unspecified hyperlipidemia; Pain in joint, shoulder region; Pneumonia; Pulmonary nodule; Rheumatic fever; S/P ileostomy ([address] Steroid-induced osteopenia; Tension headache; Unspecified asthma(493.90); Unspecified chronic bronchitis; and Ventral hernia. Past Surgical History:  has a past surgical history that includes Hysterectomy (); Ovary removal; Carpal tunnel release; hip surgery; Neck surgery; Tracheal surgery (); Abdomen surgery (11); other surgical history (2011); colectomy; eye surgery (Left, 13); Cataract removal with implant (Right, 13); Cataract removal (Bilateral); cyst removal (Right, 14); hernia repair (14); Umbilical hernia repair (2014); and Colonoscopy (, 2011, 10/28/15).     Current Diet Solid Consistency: Dysphagia III Advanced  Current Diet Liquid Consistency: Nectar    Date of Prior Study: N/A  Type of Study: Initial MBS  Results of Prior Study: N/A     Recent CXR/CT of Chest: N/A    Pain   Patient Currently in Pain: No    Patient Complaints/Reason for Referral:  Don Alberts was referred for a MBS to assess the efficiency of his/her swallow function, rule out aspiration, and to make dysphagia tx. Oral Preparation / Oral Phase  Impaired  Oral Phase - Major Contributing Deficits  · Poor Mastication: Reg solid  · Weak Lingual Manipulation: Reg solid  · Reduced Posterior Propulsion: Reg solid  · Reduced Bolus Control: Nectar cup, Nectar teaspoon, Nectar straw, Thin teaspoon, Thin cup  · Premature Bolus Loss to Pharynx: Nectar cup, Nectar teaspoon, Nectar straw, Thin teaspoon, Thin cup    Pharyngeal Phase  Impaired  Pharyngeal Phase - Major Contributing Deficits  · Delayed Swallow Initiation: All  · Premature Spillage to Pyriform: Nectar cup, Nectar teaspoon, Nectar straw, Thin teaspoon, Thin cup  · Reduced Laryngeal Elevation: All  · Reduced Anterior Laryngeal Movement: All  · Reduced Airway/laryngeal Closure: Thin teaspoon, Thin cup, Nectar straw  · Shallow Penetration Before: Thin cup, Nectar straw  · Complete Self-clearing (shallow): Thin cup, Nectar straw  · Deep Penetration Before: Thin cup, Thin teaspoon  · Complete Retrieval (deep): Thin teaspoon, Thin cup  · No Cough Reflex: Nectar straw, Thin teaspoon, Thin cup    Esophageal Phase  Appears WFL when viewed at the cervical level throughout the duration of the study    Patient Position: Lateral and Patient Degrees: Pt seated upright in chair  Consistencies Trialled: Reg solid, Puree, Thin teaspoon, Thin cup, Nectar cup, Nectar  teaspoon, Nectar straw  Postural Changes and/or Swallow Maneuvers Trialled: Chin tuck    Behavior/Cognition/Vision/Hearing:  Behavior/Cognition: Alert, Cooperative, Pleasant mood  Vision: Within Functional Limits  Hearing: Within functional limits    Dysphagia Outcome Severity Scale: Level 4: Mild moderate dysphagia- Intermittent supervision/cueing. One - two diet consistencies restricted  Penetration-Aspiration Scale (PAS): 2 - Material enters the airway, remains above the vocal folds, and is ejected from the airway    Safe Swallow Protocol:  Supervision: Independent  Compensatory Swallowing Strategies:  Alternate

## 2017-11-16 ENCOUNTER — HOSPITAL ENCOUNTER (OUTPATIENT)
Dept: OTHER | Age: 67
Discharge: HOME | End: 2017-11-16
Attending: INTERNAL MEDICINE | Admitting: INTERNAL MEDICINE

## 2017-11-16 ENCOUNTER — OFFICE VISIT (OUTPATIENT)
Dept: PULMONOLOGY | Age: 67
End: 2017-11-16

## 2017-11-16 VITALS
HEIGHT: 60 IN | TEMPERATURE: 97.9 F | RESPIRATION RATE: 16 BRPM | WEIGHT: 145 LBS | OXYGEN SATURATION: 95 % | HEART RATE: 108 BPM | SYSTOLIC BLOOD PRESSURE: 131 MMHG | DIASTOLIC BLOOD PRESSURE: 83 MMHG | BODY MASS INDEX: 28.47 KG/M2

## 2017-11-16 DIAGNOSIS — R93.89 ABNORMAL CT OF THE CHEST: ICD-10-CM

## 2017-11-16 DIAGNOSIS — J44.1 COPD WITH ACUTE EXACERBATION (HCC): ICD-10-CM

## 2017-11-16 DIAGNOSIS — R09.02 HYPOXIA: ICD-10-CM

## 2017-11-16 DIAGNOSIS — J44.9 STAGE 4 VERY SEVERE COPD BY GOLD CLASSIFICATION (HCC): Primary | ICD-10-CM

## 2017-11-16 DIAGNOSIS — J40 TRACHEOBRONCHITIS: ICD-10-CM

## 2017-11-16 DIAGNOSIS — Z79.52 CURRENT CHRONIC USE OF SYSTEMIC STEROIDS: ICD-10-CM

## 2017-11-16 PROCEDURE — 1090F PRES/ABSN URINE INCON ASSESS: CPT | Performed by: INTERNAL MEDICINE

## 2017-11-16 PROCEDURE — 1036F TOBACCO NON-USER: CPT | Performed by: INTERNAL MEDICINE

## 2017-11-16 PROCEDURE — 1123F ACP DISCUSS/DSCN MKR DOCD: CPT | Performed by: INTERNAL MEDICINE

## 2017-11-16 PROCEDURE — G8417 CALC BMI ABV UP PARAM F/U: HCPCS | Performed by: INTERNAL MEDICINE

## 2017-11-16 PROCEDURE — G8484 FLU IMMUNIZE NO ADMIN: HCPCS | Performed by: INTERNAL MEDICINE

## 2017-11-16 PROCEDURE — 3017F COLORECTAL CA SCREEN DOC REV: CPT | Performed by: INTERNAL MEDICINE

## 2017-11-16 PROCEDURE — G8427 DOCREV CUR MEDS BY ELIG CLIN: HCPCS | Performed by: INTERNAL MEDICINE

## 2017-11-16 PROCEDURE — 4040F PNEUMOC VAC/ADMIN/RCVD: CPT | Performed by: INTERNAL MEDICINE

## 2017-11-16 PROCEDURE — G8926 SPIRO NO PERF OR DOC: HCPCS | Performed by: INTERNAL MEDICINE

## 2017-11-16 PROCEDURE — 3014F SCREEN MAMMO DOC REV: CPT | Performed by: INTERNAL MEDICINE

## 2017-11-16 PROCEDURE — 3023F SPIROM DOC REV: CPT | Performed by: INTERNAL MEDICINE

## 2017-11-16 PROCEDURE — G8399 PT W/DXA RESULTS DOCUMENT: HCPCS | Performed by: INTERNAL MEDICINE

## 2017-11-16 PROCEDURE — 99214 OFFICE O/P EST MOD 30 MIN: CPT | Performed by: INTERNAL MEDICINE

## 2017-11-16 RX ORDER — PREDNISONE 1 MG/1
5 TABLET ORAL DAILY
Qty: 90 TABLET | Refills: 1 | Status: SHIPPED | OUTPATIENT
Start: 2017-11-16 | End: 2018-04-09 | Stop reason: SDUPTHER

## 2017-11-16 NOTE — PROGRESS NOTES
P Pulmonary, Critical Care and Sleep Specialists                                                            Outpatient Follow Up Note      CHIEF COMPLAINT: Follow up COPD        HPI:   Completed Abx and steroid taper. Doing much better. No cough or sputum. No hemoptysis. Patient is compliant with inhaled bronchodilators.       No smoking     Still on Prednisone 10 mg/day    Has not followed up with speech pathology     Past Medical History:   Diagnosis Date    Chronic airway obstruction, not elsewhere classified 3/4/08    Chronic kidney disease     incontinent    COPD (chronic obstructive pulmonary disease) ([address]     Depression     Displacement of lumbar intervertebral disc without myelopathy 8/13/07    Edema 9/13/07    Emphysema of lung ([address]     Enthesopathy of hip region     Enthesopathy of hip region 3/5/07    Esophageal reflux 3/4/08    Hemorrhage of rectum and anus 11/14/07    Major depression, chronic 8/12/2015    Osteoporosis, unspecified 12/4/07    Other and unspecified hyperlipidemia 3/4/08    Pain in joint, shoulder region 3/4/08    Pneumonia     Pulmonary nodule     Rheumatic fever     S/P ileostomy ([address] 8/20/2011    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 Garcia Aid; right ear pressure point cyst removal    EYE SURGERY Left 6/18/13    cataract with lens implant    HERNIA REPAIR  6/27/14    Open Vental Hernia Repair    HIP SURGERY      HYSTERECTOMY  1973    subtotal    NECK SURGERY      incision of windpipe, repair of windpipe defect    OTHER SURGICAL HISTORY  8/19/2011    hand assisted laparoscopic ileostomy reversal    OVARY REMOVAL      TRACHEAL SURGERY  2005    hx of trach     UMBILICAL HERNIA REPAIR  6/27/2014       Allergies:  has No Known Allergies. Social History:    TOBACCO:   reports that she quit smoking about 21 years ago. Her smoking use included Cigarettes. She has a 30.00 pack-year smoking history. She has never used smokeless tobacco.  ETOH:   reports that she does not drink alcohol.       Family History:       Problem Relation Age of Onset    Asthma Daughter     Diabetes Daughter     Cancer Daughter      Uterine    Asthma Daughter     Diabetes Mother     Heart Failure Mother     Hypertension Mother     Heart Failure Brother     Heart Failure Sister     Diabetes Sister     Heart Failure Brother     Emphysema Neg Hx        Current Medications:    Current Outpatient Prescriptions:     topiramate (TOPAMAX) 25 MG tablet, TAKE 2 TABLETS TWICE DAILY, Disp: 360 tablet, Rfl: 0    metFORMIN (GLUCOPHAGE-XR) 500 MG extended release tablet, TAKE 2 TABLETS BY MOUTH TWICE DAILY, Disp: 360 tablet, Rfl: 0    desvenlafaxine succinate (PRISTIQ) 100 MG TB24 extended release tablet, TAKE 1 TABLET EVERY DAY, Disp: 90 tablet, Rfl: 0    alendronate (FOSAMAX) 35 MG tablet, TAKE 1 TABLET BY MOUTH EVERY 7 DAYS, Disp: 12 tablet, Rfl: 1    naproxen (NAPROSYN) 375 MG tablet, TAKE 1 TABLET TWICE DAILY, Disp: 180 tablet, Rfl: 0    furosemide (LASIX) 40 MG tablet, TAKE 1 TABLET EVERY DAY, Disp: 90 tablet, Rfl: 0    DALIRESP 500 MCG tablet, TAKE 1 TABLET EVERY DAY, Disp: 90 tablet, Rfl: 0    predniSONE (DELTASONE) 10 MG tablet, TAKE 1 TABLET EVERY DAY, Disp: 90 tablet, Rfl: 0    VENTOLIN  (90 Base) MCG/ACT inhaler, INHALE 2 PUFFS EVERY 6 HOURS AS NEEDED FOR WHEEZING, Disp: 54 g, Rfl: 0    albuterol (PROVENTIL) (2.5 MG/3ML) 0.083% Ht 5' (1.524 m)   Wt 145 lb (65.8 kg)   SpO2 95% Comment: RA  BMI 28.32 kg/m²    Gen: No distress. Eyes: PERRL. No sclera icterus. No conjunctival injection. ENT: No discharge. Pharynx clear. Scar from prior trach. Neck: Trachea midline. No obvious mass. Resp: No accessory muscle use. No crackles. No wheezes. No rhonchi. No dullness on percussion. Decreased air entry. CV: Regular rate. Regular rhythm. No murmur or rub. No edema. GI: Non-tender. Non-distended. No hernia. Skin: Warm and dry. No nodule on exposed extremities. Lymph: No cervical LAD. No supraclavicular LAD. M/S: No cyanosis. No joint deformity. No clubbing. Neuro: Awake. Alert. Moves all four extremities. Psych: Oriented x 3. No anxiety. DATA reviewed by me:   PFTs 03/29/2017 FEV1 0.55L(27%) TLC 5.21L(117%) DLCO 8.47(42%) 6MW 770 2L exertion   PFTs 07/07/2015 FEV1 0.63L(31%) TLC 5.12L(114%) DLCO 9.6(48%)   PFTs 12/06/2011 FEV1 0.83L(45%) TLC 5.14L(125%) DLCO 8.6 (49%)   PFTs 07/10/2008 FEV1 0.77L           TLC 5.41L            DLCO 7.62  PFTs 01/15/2007 FEV1 0.59L           TLC 5.15L            DLCO 10.82      CT chest 10/17/2017  images reviewed by me and showed: Interval increase in perihilar and dependent opacities bilaterally most compatible with an inflammatory or infectious process. Consider the possibility of aspiration given distribution. Stable noncalcified nodules in the right lung. Heterogeneous appearance of ill-defined low attenuation noted within the liver    CXR 11/16/2017  images reviewed by me and showed: Clear lungs. Mild emphysematous changes. No pulmonary nodule appreciated. No acute abnormality. No significant change from the prior study. DEXA 3/3/16 Osteopenia        Assessment:      · Very severe COPD   · Abnormal CT chest 10/17/2017- favor infection related. We'll follow radiographically for resolution  · Hypoxia on exertion  · 20 pack years. Quit smoking 1996.    · Chronic steroid use since 12/2015       Plan:       · Decrease Prednisone 5 mg/day  · O2 2LPM on exertion. Advised to titrate O2 using her pulse oximeter- target O2 sat 90-92%. · Advair 250/50 BID, Spiriva INH daily and Albuterol INH/Neb PRN  · Daliresp daily   · CT chest 1/2018   · Speech pathology follow up   · Flonase 2 sprays/nostril daily PRN  · Patient is up to date with Pneumococcal vaccine and influenza vaccine   · Acapella QID to mobilize respiratory secretions.

## 2017-11-21 RX ORDER — NAPROXEN 375 MG/1
TABLET ORAL
Qty: 180 TABLET | Refills: 0 | Status: SHIPPED | OUTPATIENT
Start: 2017-11-21 | End: 2018-02-12 | Stop reason: SDUPTHER

## 2017-11-21 RX ORDER — SIMVASTATIN 20 MG
TABLET ORAL
Qty: 90 TABLET | Refills: 0 | Status: SHIPPED | OUTPATIENT
Start: 2017-11-21 | End: 2018-01-30 | Stop reason: SDUPTHER

## 2017-11-21 RX ORDER — FUROSEMIDE 40 MG/1
TABLET ORAL
Qty: 90 TABLET | Refills: 0 | Status: SHIPPED | OUTPATIENT
Start: 2017-11-21 | End: 2018-02-12 | Stop reason: SDUPTHER

## 2017-11-21 RX ORDER — VENLAFAXINE HYDROCHLORIDE 75 MG/1
CAPSULE, EXTENDED RELEASE ORAL
Qty: 90 CAPSULE | Refills: 0 | Status: SHIPPED | OUTPATIENT
Start: 2017-11-21 | End: 2018-02-21 | Stop reason: SDUPTHER

## 2017-11-21 RX ORDER — TIOTROPIUM BROMIDE 18 UG/1
CAPSULE ORAL; RESPIRATORY (INHALATION)
Qty: 90 CAPSULE | Refills: 0 | Status: SHIPPED | OUTPATIENT
Start: 2017-11-21 | End: 2018-01-30 | Stop reason: SDUPTHER

## 2017-11-21 RX ORDER — POTASSIUM CHLORIDE 20 MEQ/1
TABLET, EXTENDED RELEASE ORAL
Qty: 90 TABLET | Refills: 0 | Status: SHIPPED | OUTPATIENT
Start: 2017-11-21 | End: 2018-01-30 | Stop reason: SDUPTHER

## 2017-11-21 RX ORDER — ESOMEPRAZOLE MAGNESIUM 40 MG/1
CAPSULE, DELAYED RELEASE ORAL
Qty: 90 CAPSULE | Refills: 0 | Status: SHIPPED | OUTPATIENT
Start: 2017-11-21 | End: 2018-01-30 | Stop reason: SDUPTHER

## 2017-11-22 ENCOUNTER — HOSPITAL ENCOUNTER (OUTPATIENT)
Dept: SPEECH THERAPY | Age: 67
Discharge: HOME | End: 2017-11-30
Attending: INTERNAL MEDICINE | Admitting: INTERNAL MEDICINE

## 2017-12-01 ENCOUNTER — TELEPHONE (OUTPATIENT)
Dept: INTERNAL MEDICINE CLINIC | Age: 67
End: 2017-12-01

## 2017-12-01 ENCOUNTER — HOSPITAL ENCOUNTER (OUTPATIENT)
Dept: OTHER | Age: 67
Discharge: HOME | End: 2017-12-31
Attending: INTERNAL MEDICINE | Admitting: INTERNAL MEDICINE

## 2017-12-01 RX ORDER — QUETIAPINE FUMARATE 25 MG/1
TABLET, FILM COATED ORAL
Qty: 180 TABLET | Refills: 1 | Status: SHIPPED | OUTPATIENT
Start: 2017-12-01 | End: 2018-12-07 | Stop reason: SDUPTHER

## 2017-12-01 NOTE — TELEPHONE ENCOUNTER
----- Message from Karrie Wolf MD sent at 11/30/2017  9:01 PM EST -----  It was started by Dr Yara Andrews her neurologist  She will have to contact him for the PA  ----- Message -----  From: Jamin Lobo  Sent: 11/30/2017   3:46 PM  To: Karrie Wolf MD    Attempted pa for pt's topiramate. It is stating an appeal would need to be sent. Do you want to do appeal or is there an alternative?

## 2017-12-11 ENCOUNTER — OFFICE VISIT (OUTPATIENT)
Dept: INTERNAL MEDICINE CLINIC | Age: 67
End: 2017-12-11

## 2017-12-11 ENCOUNTER — TELEPHONE (OUTPATIENT)
Dept: NEUROLOGY | Age: 67
End: 2017-12-11

## 2017-12-11 VITALS
HEART RATE: 80 BPM | DIASTOLIC BLOOD PRESSURE: 80 MMHG | BODY MASS INDEX: 28.86 KG/M2 | WEIGHT: 147 LBS | SYSTOLIC BLOOD PRESSURE: 130 MMHG | HEIGHT: 60 IN | RESPIRATION RATE: 14 BRPM

## 2017-12-11 DIAGNOSIS — E11.65 TYPE 2 DIABETES MELLITUS WITH HYPERGLYCEMIA, WITHOUT LONG-TERM CURRENT USE OF INSULIN (HCC): ICD-10-CM

## 2017-12-11 DIAGNOSIS — K21.9 GASTROESOPHAGEAL REFLUX DISEASE WITHOUT ESOPHAGITIS: ICD-10-CM

## 2017-12-11 DIAGNOSIS — G44.221 CHRONIC TENSION-TYPE HEADACHE, INTRACTABLE: ICD-10-CM

## 2017-12-11 DIAGNOSIS — E11.65 TYPE 2 DIABETES MELLITUS WITH HYPERGLYCEMIA, WITHOUT LONG-TERM CURRENT USE OF INSULIN (HCC): Primary | ICD-10-CM

## 2017-12-11 DIAGNOSIS — E78.5 HYPERLIPIDEMIA ASSOCIATED WITH TYPE 2 DIABETES MELLITUS (HCC): ICD-10-CM

## 2017-12-11 DIAGNOSIS — F32.9 MAJOR DEPRESSION, CHRONIC: ICD-10-CM

## 2017-12-11 DIAGNOSIS — F51.01 PRIMARY INSOMNIA: ICD-10-CM

## 2017-12-11 DIAGNOSIS — E11.69 HYPERLIPIDEMIA ASSOCIATED WITH TYPE 2 DIABETES MELLITUS (HCC): ICD-10-CM

## 2017-12-11 DIAGNOSIS — J44.9 CHRONIC OBSTRUCTIVE PULMONARY DISEASE, UNSPECIFIED COPD TYPE (HCC): ICD-10-CM

## 2017-12-11 PROBLEM — E78.2 MIXED HYPERLIPIDEMIA: Status: RESOLVED | Noted: 2017-03-06 | Resolved: 2017-12-11

## 2017-12-11 PROCEDURE — 1036F TOBACCO NON-USER: CPT | Performed by: INTERNAL MEDICINE

## 2017-12-11 PROCEDURE — 3023F SPIROM DOC REV: CPT | Performed by: INTERNAL MEDICINE

## 2017-12-11 PROCEDURE — G8926 SPIRO NO PERF OR DOC: HCPCS | Performed by: INTERNAL MEDICINE

## 2017-12-11 PROCEDURE — 99214 OFFICE O/P EST MOD 30 MIN: CPT | Performed by: INTERNAL MEDICINE

## 2017-12-11 PROCEDURE — 4040F PNEUMOC VAC/ADMIN/RCVD: CPT | Performed by: INTERNAL MEDICINE

## 2017-12-11 PROCEDURE — 81002 URINALYSIS NONAUTO W/O SCOPE: CPT | Performed by: INTERNAL MEDICINE

## 2017-12-11 PROCEDURE — 3017F COLORECTAL CA SCREEN DOC REV: CPT | Performed by: INTERNAL MEDICINE

## 2017-12-11 PROCEDURE — 3014F SCREEN MAMMO DOC REV: CPT | Performed by: INTERNAL MEDICINE

## 2017-12-11 PROCEDURE — G8427 DOCREV CUR MEDS BY ELIG CLIN: HCPCS | Performed by: INTERNAL MEDICINE

## 2017-12-11 PROCEDURE — G8484 FLU IMMUNIZE NO ADMIN: HCPCS | Performed by: INTERNAL MEDICINE

## 2017-12-11 PROCEDURE — 3045F PR MOST RECENT HEMOGLOBIN A1C LEVEL 7.0-9.0%: CPT | Performed by: INTERNAL MEDICINE

## 2017-12-11 PROCEDURE — G8399 PT W/DXA RESULTS DOCUMENT: HCPCS | Performed by: INTERNAL MEDICINE

## 2017-12-11 PROCEDURE — G8417 CALC BMI ABV UP PARAM F/U: HCPCS | Performed by: INTERNAL MEDICINE

## 2017-12-11 PROCEDURE — 1090F PRES/ABSN URINE INCON ASSESS: CPT | Performed by: INTERNAL MEDICINE

## 2017-12-11 PROCEDURE — 1123F ACP DISCUSS/DSCN MKR DOCD: CPT | Performed by: INTERNAL MEDICINE

## 2017-12-11 ASSESSMENT — ENCOUNTER SYMPTOMS
WHEEZING: 0
VOMITING: 0
NAUSEA: 0
ABDOMINAL PAIN: 0
COUGH: 0
RHINORRHEA: 0
EYE DISCHARGE: 0
SHORTNESS OF BREATH: 1
BACK PAIN: 1

## 2017-12-11 NOTE — PROGRESS NOTES
Subjective:      Patient ID: Marilee Frances is a 75 y.o. female. HPI    Patient is here for follow up on her diabetes, asthma, chronic respiratory failure, hyperlipidemia and arthritis. She is back on oxygen. She was diagnosed with diabetes recently. Her sugars are improving on metformin. She has severe COPD and asthma. Recent testing showed low oxygen. She is back on oxygen. She does get some dyspnea off and on. Has intermittent coughing. Her pulmonary nodule is stable. No changes. Her hyperlipidemia is controlled. No myalgias. Her arthritis has been stable. Her tension HA are controlled. She is on Topamax. She has GERD. She is on Nexium. No heartburn. She has depression. She is on Pristiq. It is stable. Review of Systems   Constitutional: Positive for fatigue. Negative for appetite change, chills and fever. HENT: Negative for ear pain, postnasal drip and rhinorrhea. Eyes: Negative for discharge. Respiratory: Positive for shortness of breath. Negative for cough and wheezing. Cardiovascular: Positive for chest pain. Negative for palpitations. Gastrointestinal: Negative for abdominal pain, nausea and vomiting. Endocrine: Negative for polydipsia and polyphagia. Musculoskeletal: Positive for back pain. Skin: Negative for rash. Psychiatric/Behavioral: Positive for sleep disturbance. The patient is nervous/anxious. Current Outpatient Prescriptions on File Prior to Visit   Medication Sig Dispense Refill    fluticasone-salmeterol (ADVAIR DISKUS) 250-50 MCG/DOSE AEPB Inhale 1 puff into the lungs every 12 hours 180 each 3    VENTOLIN  (90 Base) MCG/ACT inhaler INHALE 2 PUFFS EVERY 6 HOURS AS NEEDED FOR WHEEZING 54 g 0    QUEtiapine (SEROQUEL) 25 MG tablet TAKE 1 TO 2 TABLETS BY MOUTH AT BEDTIME.  180 tablet 1    venlafaxine (EFFEXOR XR) 75 MG extended release capsule TAKE 1 CAPSULE EVERY DAY 90 capsule 0    furosemide (LASIX) 40 MG tablet TAKE 1 TABLET EVERY DAY 90 tablet 0    naproxen (NAPROSYN) 375 MG tablet TAKE 1 TABLET TWICE DAILY 180 tablet 0    SPIRIVA HANDIHALER 18 MCG inhalation capsule INHALE THE CONTENTS OF 1 CAPSULE EVERY DAY 90 capsule 0    simvastatin (ZOCOR) 20 MG tablet TAKE 1 TABLET EVERY EVENING 90 tablet 0    esomeprazole (NEXIUM) 40 MG delayed release capsule TAKE 1 CAPSULE EVERY MORNING BEFORE BREAKFAST 90 capsule 0    potassium chloride (KLOR-CON M) 20 MEQ extended release tablet TAKE 1 TABLET EVERY DAY 90 tablet 0    predniSONE (DELTASONE) 5 MG tablet Take 1 tablet by mouth daily 90 tablet 1    topiramate (TOPAMAX) 25 MG tablet TAKE 2 TABLETS TWICE DAILY 360 tablet 0    metFORMIN (GLUCOPHAGE-XR) 500 MG extended release tablet TAKE 2 TABLETS BY MOUTH TWICE DAILY 360 tablet 0    alendronate (FOSAMAX) 35 MG tablet TAKE 1 TABLET BY MOUTH EVERY 7 DAYS 12 tablet 1    DALIRESP 500 MCG tablet TAKE 1 TABLET EVERY DAY 90 tablet 0    predniSONE (DELTASONE) 10 MG tablet TAKE 1 TABLET EVERY DAY 90 tablet 0    albuterol (PROVENTIL) (2.5 MG/3ML) 0.083% nebulizer solution INHALE THE CONTENTS OF 1 VIAL (3 MLS) BY NEBULIZATION EVERY 6 HOURS AS NEEDED FOR WHEEZING OR SHORTNESS OF BREATH 360 vial 3    theophylline (THEOCHRON) 200 MG extended release tablet Take 1 tablet by mouth 2 times daily 180 tablet 0    vitamin D (CHOLECALCIFEROL) 400 UNITS TABS tablet Take 2 tablets by mouth daily 180 tablet 1    calcium carbonate (CALCIUM 600) 600 MG TABS tablet Take 2 tablets by mouth daily 180 tablet 1    Respiratory Therapy Supplies (NEBULIZER/TUBING/MOUTHPIECE) KIT 1 kit by Does not apply route daily as needed DX COPD J44.9 1 kit 0    Nebulizers (COMPRESSOR/NEBULIZER) MISC 1 Device by Does not apply route as needed DX COPD J44.9 1 each 0    fluticasone (FLONASE) 50 MCG/ACT nasal spray 2 sprays by Nasal route daily. 3 Bottle 0    Misc. Devices (ACAPELLA) MISC by Does not apply route 4 times daily.  1 each 0     No current facility-administered medications on file is normal. There is air trapping. 3. DIFFUSING CAPACITY: DLCO is 9.6 mL/min/mmHg, which is 48% of predicted. 4. FLOW VOLUME LOOP: Shows an obstructive pattern. A 6-minute walk test per [email or web address]. The baseline oxygen saturation was  97%. The lowest oxygen saturation was 96%. The patient stopped ambulating  after 3 minutes and 14 seconds due to dizziness. She ambulated a total of  420 feet. ECHO done 12/30/16  Conclusions      Summary   Normal left ventricle systolic function with an estimated ejection fraction   of 55%.   No regional wall motion abnormalities are seen.   Normal left ventricle diastolic filing pressure.     Stress test 12/30/16  Conclusions        Summary    Probably normal myocardial perfusion study.    There is a mild anterior defect consistent with attenuation artifact. There    is no clear evidence for ischemia despite polar map concerns for distal    anterior reversibility.    Left ventricular wall motion and function are normal.    The estimated left ventricular function is 76%.           Assessment:      1. Type 2 diabetes mellitus with hyperglycemia, without long-term current use of insulin (HCC)  Hemoglobin A1C    Microalbumin / Creatinine Urine Ratio    POCT Urinalysis no Micro   2. Chronic obstructive pulmonary disease, unspecified COPD type ([address]     3. Gastroesophageal reflux disease without esophagitis     4. Chronic tension-type headache, intractable     5. Major depression, chronic     6. Hyperlipidemia associated with type 2 diabetes mellitus ([address]     7. Primary insomnia            Plan:        DM type 2: check labs. COPD:  On oxygen again. Dyspnea likely related to her lungs. HA are stable. On topamax. Depression is stable on meds. GERD is stale. Edema is stable. 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 Had recent DEXA. Decrease calorie intake. Exercise,weight loss recommended.   The current medical regimen is effective;  continue present plan and

## 2017-12-12 LAB
CREATININE URINE: 7.2 MG/DL (ref 28–259)
ESTIMATED AVERAGE GLUCOSE: 180 MG/DL
HBA1C MFR BLD: 7.9 %
MICROALBUMIN UR-MCNC: <1.2 MG/DL
MICROALBUMIN/CREAT UR-RTO: ABNORMAL MG/G (ref 0–30)

## 2017-12-12 RX ORDER — SITAGLIPTIN 100 MG/1
100 TABLET, FILM COATED ORAL DAILY
Qty: 90 TABLET | Refills: 0 | Status: SHIPPED | OUTPATIENT
Start: 2017-12-12 | End: 2018-03-09 | Stop reason: SDUPTHER

## 2017-12-12 RX ORDER — TOPIRAMATE 50 MG/1
50 TABLET, FILM COATED ORAL 2 TIMES DAILY
Qty: 60 TABLET | Refills: 3 | Status: SHIPPED | OUTPATIENT
Start: 2017-12-12 | End: 2017-12-12 | Stop reason: SDUPTHER

## 2017-12-12 RX ORDER — TOPIRAMATE 50 MG/1
TABLET, FILM COATED ORAL
Qty: 180 TABLET | Refills: 3 | Status: SHIPPED | OUTPATIENT
Start: 2017-12-12 | End: 2018-09-19 | Stop reason: SDUPTHER

## 2017-12-12 NOTE — TELEPHONE ENCOUNTER
Spoke with patient, she needs to contact PCP. PCP has been the physician prescribing her Topamax since 2015. Patient verbalized understanding.

## 2017-12-12 NOTE — TELEPHONE ENCOUNTER
----- Message from Marco A Wilcox MD sent at 12/12/2017  7:03 AM EST -----  Sugars are still elevated.

## 2017-12-14 RX ORDER — ROFLUMILAST 500 UG/1
TABLET ORAL
Qty: 90 TABLET | Refills: 0 | Status: SHIPPED | OUTPATIENT
Start: 2017-12-14 | End: 2018-02-23 | Stop reason: SDUPTHER

## 2018-01-01 ENCOUNTER — HOSPITAL ENCOUNTER (OUTPATIENT)
Dept: OTHER | Age: 68
Discharge: HOME | End: 2018-01-31
Attending: INTERNAL MEDICINE | Admitting: INTERNAL MEDICINE

## 2018-01-17 ENCOUNTER — HOSPITAL ENCOUNTER (OUTPATIENT)
Dept: SPEECH THERAPY | Age: 68
Discharge: HOME | End: 2018-01-17
Attending: INTERNAL MEDICINE | Admitting: INTERNAL MEDICINE

## 2018-01-17 DIAGNOSIS — R93.89 ABNORMAL CT OF THE CHEST: Primary | ICD-10-CM

## 2018-01-17 DIAGNOSIS — R13.12 OROPHARYNGEAL DYSPHAGIA: ICD-10-CM

## 2018-01-17 NOTE — PLAN OF CARE
[address] [address] Phone: [phone] Hospital Fax: [phone]    To: Dr. Jose Emery                                                   From: [address], [address] [facility]                                          Patient: Troy Arevalo                                  : 1950  Medical Diagnosis: Aspiration                      Date: 18  ICD 10: R93.8  Treatment Diagnosis:  Mild oropharyngeal dysphagia    Speech Therapy Notification of Discharge  Dear Dr. Jose Emery,  The following patient has been evaluated and seen for speech therapy services from 17 to 17 with repeat MBSS completed this date. Pt has met all short-term goals at this time. Per recent MBSS results, a Dysphagia III (advanced) diet with thin liquids/no straws is recommended. The pt will be discharged from [address] [place] [address] at this time. Plan of Care/Treatment to date:   [x] Dysphagia Evaluation/Treatment        [x] Modified Barium Swallowing Study          Frequency/Duration: Pt completed 4 of 4 recommended sessions and repeat MBSS completed on 18. Goal Status: [ X ] Achieved [ ] Partially Achieved [ ] Not Achieved     Patient Status:   [ ] Continue per initial plan of Care   [X] Patient now discharged  [ ] Additional visits requested, Please re-certify for additional visits:      Electronically signed by:    Ethan Villa M.S. [id number] [place] [address]  Speech-language pathologist  [id number]      If you have any questions or concerns, please don't hesitate to call.   Thank you for your referral.      Physician Signature:________________________________Date:__________________  By signing above, therapists plan is approved by physician

## 2018-01-17 NOTE — COMMUNICATION BODY
(advanced) diet with nectar-thick liquids/no straws recommended; pt had deep and shallow penetration before the swallow with thin liquid trials and NTL trials via straw.     Recent CXR/CT of Chest: n/a      Pain   Patient Currently in Pain: No     Patient Complaints/Reason for Referral:  Kacy Kramer was referred for a MBS to assess the efficiency of his/her swallow function, assess for aspiration, and to make recommendations regarding safe dietary consistencies, effective compensatory strategies, and safe eating environment. Patient complaints: n/a     Behavior/Cognition/Vision/Hearing:  Behavior/Cognition: Alert, Pleasant mood, Cooperative  Vision: Within Functional Limits  Hearing: Within functional limits     Recommended Diet:  Solid consistency: Dysphagia III Advanced  Liquid consistency: Thin (Avoid straws, *small sips)  Liquid administration via: Cup, Spoon  Medication administration: PO     Impressions:  Treatment Dx and ICD 10: Dysphagia, abnormal CXR  Pt demonstrates with mild oropharyngeal dysphagia  · Pt's oral deficits characterized by reduced bolus control with all liquid trials resulting in premature bolus loss to the pharynx. Pt had mildly reduced A-P bolus transit with regular solid trials and piecemeal deglutition noted with all solid PO trials. Pt's pharyngeal deficits characterized by delayed swallow initiation, reduced laryngeal elevation, and reduced airway/laryngeal closure with episodes of deep and shallow completely self-clearing penetration before the swallow with both thin and nectar-thick liquid trials. Deep penetration (again completely self-clearing) was noted with TL trials via tsp and straw. When using the chin tuck strategy with thin liquid trials via cup sip, pt had very shallow, completely self-clearing penetration before the swallow. Minimal-no residue noted in the pharynx post-swallow with any consistencies trialed.   · SLP encouraged pt to take small sips, avoid straws, and to use chin tuck strategy when drinking. SLP also encouraged pt to continue to choose softer foods. Further ST is not warranted at this time (pt was seen for OP dysphagia tx from 11/22/17-12/11/17 and pt is able to independently complete all trained exercises).     Oral Preparation / Oral Phase  Impaired  Oral Phase - Major Contributing Deficits  · Reduced Posterior Propulsion: Reg solid  · Reduced Bolus Control: Nectar cup, Thin teaspoon, Thin cup, Thin straw  · Piecemeal Deglutition: Reg solid, Puree  · Premature Bolus Loss to Pharynx: Thin teaspoon, Thin cup, Thin straw, Nectar cup     Pharyngeal Phase  Impaired  Pharyngeal Phase - Major Contributing Deficits  · Delayed Swallow Initiation: All  · Premature Spillage to Pyriform: Thin teaspoon, Thin cup, Thin straw, Nectar cup  · Reduced Laryngeal Elevation: All  · Reduced Airway/laryngeal Closure: Nectar cup, Thin teaspoon, Thin cup, Thin straw  · Shallow Penetration Before: Thin cup, Nectar cup  · Complete Self-clearing (shallow): Thin cup, Nectar cup  · Deep Penetration Before: Thin teaspoon, Thin straw  · Complete Retrieval (deep): Thin teaspoon, Thin straw  · No Cough Reflex: Nectar cup, Thin teaspoon, Thin cup, Thin straw     Esophageal Phase  Appears WFL when viewed at the cervical level throughout the duration of the study     Patient Position: Lateral and Patient Degrees: Pt seated upright in MBSS chair  Consistencies Administered: Reg solid, Puree, Thin straw, Thin teaspoon, Thin cup, Nectar cup     Dysphagia Outcome Severity Scale: Level 5: Mild dysphagia- Distant supervision. May need one diet consistency restricted  Penetration-Aspiration Scale (PAS): 2 - Material enters the airway, remains above the vocal folds, and is ejected from the airway     Previous MBSS results (11/6/17):  · Per SLP report: \"Pt demonstrates mild-moderate oropharyngeal dysphagia.   Pt's oral deficits characterized by reduced bolus control and weak lingual manipulation

## 2018-01-17 NOTE — LETTER
Pt's pharyngeal deficits characterized by delayed swallow initiation, reduced laryngeal elevation, and reduced airway/laryngeal closure with episodes of deep and shallow completely self-clearing penetration before the swallow with both thin and nectar-thick liquid trials. Deep penetration (again completely self-clearing) was noted with TL trials via tsp and straw. When using the chin tuck strategy with thin liquid trials via cup sip, pt had very shallow, completely self-clearing penetration before the swallow. Minimal-no residue noted in the pharynx post-swallow with any consistencies trialed. · SLP encouraged pt to take small sips, avoid straws, and to use chin tuck strategy when drinking. SLP also encouraged pt to continue to choose softer foods. Further ST is not warranted at this time (pt was seen for OP dysphagia tx from 11/22/17-12/11/17 and pt is able to independently complete all trained exercises).     Oral Preparation / Oral Phase  Impaired  Oral Phase - Major Contributing Deficits  · Reduced Posterior Propulsion: Reg solid  · Reduced Bolus Control: Nectar cup, Thin teaspoon, Thin cup, Thin straw  · Piecemeal Deglutition: Reg solid, Puree  · Premature Bolus Loss to Pharynx: Thin teaspoon, Thin cup, Thin straw, Nectar cup     Pharyngeal Phase  Impaired  Pharyngeal Phase - Major Contributing Deficits  · Delayed Swallow Initiation: All  · Premature Spillage to Pyriform: Thin teaspoon, Thin cup, Thin straw, Nectar cup  · Reduced Laryngeal Elevation: All  · Reduced Airway/laryngeal Closure: Nectar cup, Thin teaspoon, Thin cup, Thin straw  · Shallow Penetration Before: Thin cup, Nectar cup  · Complete Self-clearing (shallow): Thin cup, Nectar cup  · Deep Penetration Before: Thin teaspoon, Thin straw  · Complete Retrieval (deep):  Thin teaspoon, Thin straw  · No Cough Reflex: Nectar cup, Thin teaspoon, Thin cup, Thin straw     Esophageal Phase Appears WFL when viewed at the cervical level throughout the duration of the study     Patient Position: Lateral and Patient Degrees: Pt seated upright in MBSS chair  Consistencies Administered: Reg solid, Puree, Thin straw, Thin teaspoon, Thin cup, Nectar cup     Dysphagia Outcome Severity Scale: Level 5: Mild dysphagia- Distant supervision. May need one diet consistency restricted  Penetration-Aspiration Scale (PAS): 2 - Material enters the airway, remains above the vocal folds, and is ejected from the airway     Previous MBSS results (11/6/17):  · Per SLP report: \"Pt demonstrates mild-moderate oropharyngeal dysphagia. Pt's oral deficits characterized by reduced bolus control and weak lingual manipulation resulting in premature bolus loss to the pharynx with both nectar-thick and thin liquid trials (tsp, cup, straw).  Pt is edentulous and had reduced A-P bolus transit with regular solid trial, resulting in mildly prolonged mastication. Pt's pharyngeal deficits characterized by delayed swallow initiation, reduced laryngeal elevation, and reduced airway/laryngeal closure with premature spillage and pooling at valleculae and episodes of shallow and deep, transient penetration before the swallow with thin liquid trials via tsp and cup.  A chin tuck maneuver was trialed with thin liquid trials, however, this did not eliminate episodes of penetration.  With trials of nectar-thick liquid via tsp and cup, no aspiration/penetration observed.  With NTL trials via straw, pt had episodes of shallow, transient penetration before the swallow.  No cough reflex noted with any episodes of penetration this date.  Minimal-no residue noted in the pharynx post-swallow with any consistencies trialed. Pt may benefit from dysphagia tx as an outpatient or through home ST for training of laryngeal/pharyngeal strengthening and bolus control exercises.  Repeat MBSS would be beneficial after 4-6 weeks of dysphagia tx\".

## 2018-01-30 ENCOUNTER — OFFICE VISIT (OUTPATIENT)
Dept: PULMONOLOGY | Age: 68
End: 2018-01-30

## 2018-01-30 ENCOUNTER — HOSPITAL ENCOUNTER (OUTPATIENT)
Dept: CT IMAGING | Age: 68
Discharge: HOME | End: 2018-01-30
Attending: INTERNAL MEDICINE | Admitting: INTERNAL MEDICINE

## 2018-01-30 VITALS
DIASTOLIC BLOOD PRESSURE: 82 MMHG | WEIGHT: 137 LBS | HEART RATE: 74 BPM | HEIGHT: 60 IN | SYSTOLIC BLOOD PRESSURE: 128 MMHG | OXYGEN SATURATION: 93 % | RESPIRATION RATE: 20 BRPM | TEMPERATURE: 98.4 F | BODY MASS INDEX: 26.9 KG/M2

## 2018-01-30 DIAGNOSIS — R93.89 ABNORMAL FINDINGS ON DIAGNOSTIC IMAGING OF OTHER SPECIFIED BODY STRUCTURES: ICD-10-CM

## 2018-01-30 DIAGNOSIS — J44.9 STAGE 3 SEVERE COPD BY GOLD CLASSIFICATION (HCC): Primary | ICD-10-CM

## 2018-01-30 DIAGNOSIS — R09.02 HYPOXIA: ICD-10-CM

## 2018-01-30 DIAGNOSIS — R93.89 ABNORMAL CT OF THE CHEST: ICD-10-CM

## 2018-01-30 PROCEDURE — G8926 SPIRO NO PERF OR DOC: HCPCS | Performed by: INTERNAL MEDICINE

## 2018-01-30 PROCEDURE — 1123F ACP DISCUSS/DSCN MKR DOCD: CPT | Performed by: INTERNAL MEDICINE

## 2018-01-30 PROCEDURE — 99214 OFFICE O/P EST MOD 30 MIN: CPT | Performed by: INTERNAL MEDICINE

## 2018-01-30 PROCEDURE — 4040F PNEUMOC VAC/ADMIN/RCVD: CPT | Performed by: INTERNAL MEDICINE

## 2018-01-30 PROCEDURE — 1090F PRES/ABSN URINE INCON ASSESS: CPT | Performed by: INTERNAL MEDICINE

## 2018-01-30 PROCEDURE — 3023F SPIROM DOC REV: CPT | Performed by: INTERNAL MEDICINE

## 2018-01-30 PROCEDURE — 1036F TOBACCO NON-USER: CPT | Performed by: INTERNAL MEDICINE

## 2018-01-30 PROCEDURE — 3017F COLORECTAL CA SCREEN DOC REV: CPT | Performed by: INTERNAL MEDICINE

## 2018-01-30 PROCEDURE — G8484 FLU IMMUNIZE NO ADMIN: HCPCS | Performed by: INTERNAL MEDICINE

## 2018-01-30 PROCEDURE — G8427 DOCREV CUR MEDS BY ELIG CLIN: HCPCS | Performed by: INTERNAL MEDICINE

## 2018-01-30 PROCEDURE — G8417 CALC BMI ABV UP PARAM F/U: HCPCS | Performed by: INTERNAL MEDICINE

## 2018-01-30 PROCEDURE — G8399 PT W/DXA RESULTS DOCUMENT: HCPCS | Performed by: INTERNAL MEDICINE

## 2018-01-30 PROCEDURE — 3014F SCREEN MAMMO DOC REV: CPT | Performed by: INTERNAL MEDICINE

## 2018-01-30 NOTE — PROGRESS NOTES
0           Objective:     /82 (Site: Left Arm, Position: Sitting, Cuff Size: Small Adult)   Pulse 74   Temp 98.4 °F (36.9 °C) (Oral)   Resp 20   Ht 5' (1.524 m)   Wt 137 lb (62.1 kg)   SpO2 93% Comment: RA  BMI 26.76 kg/m²  RA  Gen: No distress. Eyes: PERRL. No sclera icterus. No conjunctival injection. ENT: No discharge. Pharynx clear. Scar from prior trach. Neck: Trachea midline. No obvious mass. Resp: No accessory muscle use. No crackles. No wheezes. No rhonchi. No dullness on percussion. Decreased air entry. CV: Regular rate. Regular rhythm. No murmur or rub. No edema. GI: Non-tender. Non-distended. No hernia. Skin: Warm and dry. No nodule on exposed extremities. Lymph: No cervical LAD. No supraclavicular LAD. M/S: No cyanosis. No joint deformity. No clubbing. Neuro: Awake. Alert. Moves all four extremities. Psych: Oriented x 3. No anxiety. DATA reviewed by me:   PFTs 03/29/2017 FEV1 0.55L(27%) TLC 5.21L(117%) DLCO 8.47(42%) 6MW 770 2L exertion   PFTs 07/07/2015 FEV1 0.63L(31%) TLC 5.12L(114%) DLCO 9.6(48%)   PFTs 12/06/2011 FEV1 0.83L(45%) TLC 5.14L(125%) DLCO 8.6 (49%)   PFTs 07/10/2008 FEV1 0.77L           TLC 5.41L            DLCO 7.62  PFTs 01/15/2007 FEV1 0.59L           TLC 5.15L            DLCO 10.82      CT chest 1/30/2018  images reviewed by me and showed:   1. 3 pulmonary nodules in the right lung are stable over a period of greater than 2 years and can be considered benign. 2. Improved aeration of the lower lobes from prior imaging indicating a resolved infectious or inflammatory process. 3. Centrilobular emphysema and fibrotic changes are otherwise stable. 4. Prior imaging described heterogeneous attenuation in the liver which is not well visualized on the current study        DEXA 3/3/16 Osteopenia        Assessment:      · Very severe COPD   · Abnormal CT chest 10/17/2017- favor infection related.  Resolved on repeat CT chest   · Hypoxia on exertion  · 20 pack years. Quit smoking 1996. · Chronic steroid use since 12/2015       Plan:       · Prednisone 5 mg/day- change to every other day   · O2 2LPM on exertion. Advised to titrate O2 using her pulse oximeter- target O2 sat 90-92%. · Advair 250/50 BID, Spiriva INH daily and Albuterol INH/Neb PRN  · Daliresp daily   · Flonase 2 sprays/nostril daily PRN  · Patient is up to date with Pneumococcal vaccine and influenza vaccine   · Acapella QID to mobilize respiratory secretions.

## 2018-01-30 NOTE — PATIENT INSTRUCTIONS
Please keep all of your future appointments scheduled by [facility] Pulmonary office.

## 2018-01-31 RX ORDER — ESOMEPRAZOLE MAGNESIUM 40 MG/1
CAPSULE, DELAYED RELEASE ORAL
Qty: 90 CAPSULE | Refills: 0 | Status: SHIPPED | OUTPATIENT
Start: 2018-01-31 | End: 2018-04-09 | Stop reason: SDUPTHER

## 2018-01-31 RX ORDER — POTASSIUM CHLORIDE 20 MEQ/1
TABLET, EXTENDED RELEASE ORAL
Qty: 90 TABLET | Refills: 0 | Status: SHIPPED | OUTPATIENT
Start: 2018-01-31 | End: 2018-04-09 | Stop reason: SDUPTHER

## 2018-01-31 RX ORDER — SIMVASTATIN 20 MG
TABLET ORAL
Qty: 90 TABLET | Refills: 0 | Status: SHIPPED | OUTPATIENT
Start: 2018-01-31 | End: 2018-04-09 | Stop reason: SDUPTHER

## 2018-01-31 RX ORDER — TIOTROPIUM BROMIDE 18 UG/1
CAPSULE ORAL; RESPIRATORY (INHALATION)
Qty: 90 CAPSULE | Refills: 0 | Status: SHIPPED | OUTPATIENT
Start: 2018-01-31 | End: 2018-04-09 | Stop reason: SDUPTHER

## 2018-02-09 RX ORDER — METFORMIN HYDROCHLORIDE 500 MG/1
TABLET, EXTENDED RELEASE ORAL
Qty: 360 TABLET | Refills: 0 | Status: SHIPPED | OUTPATIENT
Start: 2018-02-09 | End: 2018-05-09 | Stop reason: SDUPTHER

## 2018-02-13 RX ORDER — FUROSEMIDE 40 MG/1
TABLET ORAL
Qty: 90 TABLET | Refills: 0 | Status: SHIPPED | OUTPATIENT
Start: 2018-02-13 | End: 2018-04-18 | Stop reason: SDUPTHER

## 2018-02-13 RX ORDER — NAPROXEN 375 MG/1
TABLET ORAL
Qty: 180 TABLET | Refills: 0 | Status: SHIPPED | OUTPATIENT
Start: 2018-02-13 | End: 2018-04-18 | Stop reason: SDUPTHER

## 2018-02-16 RX ORDER — ALENDRONATE SODIUM 35 MG/1
TABLET ORAL
Qty: 12 TABLET | Refills: 1 | Status: SHIPPED | OUTPATIENT
Start: 2018-02-16 | End: 2018-10-29 | Stop reason: SDUPTHER

## 2018-02-21 ENCOUNTER — OFFICE VISIT (OUTPATIENT)
Dept: INTERNAL MEDICINE CLINIC | Age: 68
End: 2018-02-21

## 2018-02-21 ENCOUNTER — HOSPITAL ENCOUNTER (OUTPATIENT)
Dept: OTHER | Age: 68
Discharge: HOME | End: 2018-02-21
Attending: NURSE PRACTITIONER | Admitting: NURSE PRACTITIONER

## 2018-02-21 VITALS
TEMPERATURE: 97.7 F | HEIGHT: 60 IN | RESPIRATION RATE: 16 BRPM | BODY MASS INDEX: 25.13 KG/M2 | HEART RATE: 110 BPM | DIASTOLIC BLOOD PRESSURE: 70 MMHG | WEIGHT: 128 LBS | OXYGEN SATURATION: 94 % | SYSTOLIC BLOOD PRESSURE: 122 MMHG

## 2018-02-21 DIAGNOSIS — J06.9 UPPER RESPIRATORY TRACT INFECTION, UNSPECIFIED TYPE: Primary | ICD-10-CM

## 2018-02-21 LAB
RAPID INFLUENZA  B AGN: NEGATIVE
RAPID INFLUENZA A AGN: NEGATIVE

## 2018-02-21 PROCEDURE — 1090F PRES/ABSN URINE INCON ASSESS: CPT | Performed by: NURSE PRACTITIONER

## 2018-02-21 PROCEDURE — 4040F PNEUMOC VAC/ADMIN/RCVD: CPT | Performed by: NURSE PRACTITIONER

## 2018-02-21 PROCEDURE — G8417 CALC BMI ABV UP PARAM F/U: HCPCS | Performed by: NURSE PRACTITIONER

## 2018-02-21 PROCEDURE — 1123F ACP DISCUSS/DSCN MKR DOCD: CPT | Performed by: NURSE PRACTITIONER

## 2018-02-21 PROCEDURE — 99212 OFFICE O/P EST SF 10 MIN: CPT | Performed by: NURSE PRACTITIONER

## 2018-02-21 PROCEDURE — 3017F COLORECTAL CA SCREEN DOC REV: CPT | Performed by: NURSE PRACTITIONER

## 2018-02-21 PROCEDURE — 1036F TOBACCO NON-USER: CPT | Performed by: NURSE PRACTITIONER

## 2018-02-21 PROCEDURE — 3014F SCREEN MAMMO DOC REV: CPT | Performed by: NURSE PRACTITIONER

## 2018-02-21 PROCEDURE — G8427 DOCREV CUR MEDS BY ELIG CLIN: HCPCS | Performed by: NURSE PRACTITIONER

## 2018-02-21 PROCEDURE — G8399 PT W/DXA RESULTS DOCUMENT: HCPCS | Performed by: NURSE PRACTITIONER

## 2018-02-21 PROCEDURE — G8484 FLU IMMUNIZE NO ADMIN: HCPCS | Performed by: NURSE PRACTITIONER

## 2018-02-21 RX ORDER — VENLAFAXINE HYDROCHLORIDE 75 MG/1
CAPSULE, EXTENDED RELEASE ORAL
Qty: 90 CAPSULE | Refills: 0 | Status: SHIPPED | OUTPATIENT
Start: 2018-02-21 | End: 2018-04-25 | Stop reason: SDUPTHER

## 2018-02-21 RX ORDER — AZITHROMYCIN 250 MG/1
TABLET, FILM COATED ORAL
Qty: 6 TABLET | Refills: 0 | Status: SHIPPED | OUTPATIENT
Start: 2018-02-21 | End: 2018-03-03

## 2018-02-21 ASSESSMENT — ENCOUNTER SYMPTOMS
VOMITING: 1
DIARRHEA: 1
SHORTNESS OF BREATH: 1
WHEEZING: 0
COUGH: 1
HEMOPTYSIS: 0
SORE THROAT: 0
NAUSEA: 1
ABDOMINAL PAIN: 0
RHINORRHEA: 1

## 2018-02-21 NOTE — PROGRESS NOTES
Chief Complaint:   Tai Saavedra is a 75 y.o. female who presents for   Chief Complaint   Patient presents with    Cough       HPI:    Tai Saavedra is a 75 y.o. who presents for initial evaluation of     Cough   This is a new problem. Episode onset: 5 days ago. The problem has been unchanged. The problem occurs every few minutes. The cough is non-productive. Associated symptoms include a fever (low grade), rhinorrhea and shortness of breath. Pertinent negatives include no chest pain, chills, hemoptysis, myalgias, sore throat or wheezing. Exacerbated by: exertion. Treatments tried: Pepto-bismol. The treatment provided no relief. Her past medical history is significant for COPD. She is not throwing up food or water. She is throwing up phlegm. She states she is shaking. Review of Systems  Review of Systems   Constitutional: Positive for appetite change, fatigue and fever (low grade). Negative for chills. HENT: Positive for congestion and rhinorrhea. Negative for sore throat. Respiratory: Positive for cough and shortness of breath. Negative for hemoptysis and wheezing. Cardiovascular: Negative for chest pain. Gastrointestinal: Positive for diarrhea, nausea and vomiting (phlegm). Negative for abdominal pain. Musculoskeletal: Negative for myalgias. Neurological: Positive for weakness. Allergies  Review of patient's allergies indicates no known allergies.       Vitals  /70 (Site: Right Arm, Position: Sitting)   Pulse 110   Temp 97.7 °F (36.5 °C) (Oral)   Resp 16   Ht 5' (1.524 m)   Wt 128 lb (58.1 kg)   SpO2 94%   BMI 25.00 kg/m²     Current Medications  Current Outpatient Prescriptions   Medication Sig Dispense Refill    alendronate (FOSAMAX) 35 MG tablet TAKE 1 TABLET EVERY 7 DAYS 12 tablet 1    furosemide (LASIX) 40 MG tablet TAKE 1 TABLET EVERY DAY 90 tablet 0    naproxen (NAPROSYN) 375 MG tablet TAKE 1 TABLET TWICE DAILY 180 tablet 0    metFORMIN (GLUCOPHAGE-XR) 500

## 2018-02-23 RX ORDER — ROFLUMILAST 500 UG/1
TABLET ORAL
Qty: 90 TABLET | Refills: 0 | Status: SHIPPED | OUTPATIENT
Start: 2018-02-23 | End: 2018-04-25 | Stop reason: SDUPTHER

## 2018-03-01 ENCOUNTER — TELEPHONE (OUTPATIENT)
Dept: INTERNAL MEDICINE CLINIC | Age: 68
End: 2018-03-01

## 2018-03-01 NOTE — TELEPHONE ENCOUNTER
PA for onetouch ultra test strips was denied. Pt states she uses accu-chek. Exactech strips cancelled at pharmacy and accu-chek strips sent in.

## 2018-03-09 RX ORDER — SITAGLIPTIN 100 MG/1
100 TABLET, FILM COATED ORAL DAILY
Qty: 90 TABLET | Refills: 0 | Status: SHIPPED | OUTPATIENT
Start: 2018-03-09 | End: 2018-06-10 | Stop reason: SDUPTHER

## 2018-03-12 ENCOUNTER — HOSPITAL ENCOUNTER (OUTPATIENT)
Dept: OTHER | Age: 68
Discharge: HOME | End: 2018-03-12
Attending: INTERNAL MEDICINE | Admitting: INTERNAL MEDICINE

## 2018-03-12 LAB
ANION GAP SERPL CALCULATED.3IONS-SCNC: 17 MMOL/L (ref 3–16)
BUN BLDV-MCNC: 27 MG/DL (ref 7–20)
CALCIUM SERPL-MCNC: 9.4 MG/DL (ref 8.3–10.6)
CHLORIDE BLD-SCNC: 104 MMOL/L (ref 99–110)
CO2: 24 MMOL/L (ref 21–32)
CREAT SERPL-MCNC: 0.8 MG/DL (ref 0.6–1.2)
GFR AFRICAN AMERICAN: >60
GFR NON-AFRICAN AMERICAN: >60
GLUCOSE BLD-MCNC: 149 MG/DL (ref 70–99)
POTASSIUM SERPL-SCNC: 4.2 MMOL/L (ref 3.5–5.1)
SODIUM BLD-SCNC: 145 MMOL/L (ref 136–145)

## 2018-03-14 LAB — AFP: 5 UG/L

## 2018-03-16 ENCOUNTER — HOSPITAL ENCOUNTER (OUTPATIENT)
Dept: CT IMAGING | Age: 68
Discharge: HOME | End: 2018-03-16
Attending: INTERNAL MEDICINE | Admitting: INTERNAL MEDICINE

## 2018-03-16 DIAGNOSIS — R10.31 RIGHT LOWER QUADRANT PAIN: ICD-10-CM

## 2018-03-16 DIAGNOSIS — R10.31 ABDOMINAL PAIN, RIGHT LOWER QUADRANT: ICD-10-CM

## 2018-03-16 LAB
BUN BLDV-MCNC: 19 MG/DL (ref 7–20)
CREAT SERPL-MCNC: 1 MG/DL (ref 0.6–1.2)
GFR AFRICAN AMERICAN: >60
GFR NON-AFRICAN AMERICAN: 55

## 2018-03-20 ENCOUNTER — OFFICE VISIT (OUTPATIENT)
Dept: NEUROLOGY | Age: 68
End: 2018-03-20

## 2018-03-20 VITALS
SYSTOLIC BLOOD PRESSURE: 117 MMHG | HEIGHT: 60 IN | DIASTOLIC BLOOD PRESSURE: 70 MMHG | BODY MASS INDEX: 24.54 KG/M2 | HEART RATE: 93 BPM | WEIGHT: 125 LBS | OXYGEN SATURATION: 97 %

## 2018-03-20 DIAGNOSIS — G44.221 CHRONIC TENSION-TYPE HEADACHE, INTRACTABLE: Primary | ICD-10-CM

## 2018-03-20 DIAGNOSIS — F34.1 DYSTHYMIA: ICD-10-CM

## 2018-03-20 DIAGNOSIS — E11.65 TYPE 2 DIABETES MELLITUS WITH HYPERGLYCEMIA, WITHOUT LONG-TERM CURRENT USE OF INSULIN (HCC): ICD-10-CM

## 2018-03-20 DIAGNOSIS — F51.01 PRIMARY INSOMNIA: ICD-10-CM

## 2018-03-20 DIAGNOSIS — E78.2 MIXED HYPERLIPIDEMIA: ICD-10-CM

## 2018-03-20 PROCEDURE — 3046F HEMOGLOBIN A1C LEVEL >9.0%: CPT | Performed by: PSYCHIATRY & NEUROLOGY

## 2018-03-20 PROCEDURE — 1036F TOBACCO NON-USER: CPT | Performed by: PSYCHIATRY & NEUROLOGY

## 2018-03-20 PROCEDURE — 1123F ACP DISCUSS/DSCN MKR DOCD: CPT | Performed by: PSYCHIATRY & NEUROLOGY

## 2018-03-20 PROCEDURE — 3014F SCREEN MAMMO DOC REV: CPT | Performed by: PSYCHIATRY & NEUROLOGY

## 2018-03-20 PROCEDURE — G8420 CALC BMI NORM PARAMETERS: HCPCS | Performed by: PSYCHIATRY & NEUROLOGY

## 2018-03-20 PROCEDURE — G8399 PT W/DXA RESULTS DOCUMENT: HCPCS | Performed by: PSYCHIATRY & NEUROLOGY

## 2018-03-20 PROCEDURE — 1090F PRES/ABSN URINE INCON ASSESS: CPT | Performed by: PSYCHIATRY & NEUROLOGY

## 2018-03-20 PROCEDURE — 3017F COLORECTAL CA SCREEN DOC REV: CPT | Performed by: PSYCHIATRY & NEUROLOGY

## 2018-03-20 PROCEDURE — 99214 OFFICE O/P EST MOD 30 MIN: CPT | Performed by: PSYCHIATRY & NEUROLOGY

## 2018-03-20 PROCEDURE — 4040F PNEUMOC VAC/ADMIN/RCVD: CPT | Performed by: PSYCHIATRY & NEUROLOGY

## 2018-03-20 PROCEDURE — G8482 FLU IMMUNIZE ORDER/ADMIN: HCPCS | Performed by: PSYCHIATRY & NEUROLOGY

## 2018-03-20 PROCEDURE — G8427 DOCREV CUR MEDS BY ELIG CLIN: HCPCS | Performed by: PSYCHIATRY & NEUROLOGY

## 2018-03-20 NOTE — PROGRESS NOTES
The patient came today for follow up regarding: Chronic tension headaches    Since the patient's last visit, she continues to take Topamax 50 mg twice daily. No side effect from such medication. Her headaches are infrequent. Degree could be moderate and duration is minutes. Headaches are now once every few weeks but not daily. Location in the holocranial regional bifrontal and can be dull achy pain. No triggers or other associated symptoms. No nausea or vomiting or photophobia or phonophobia. She takes Advil or ibuprofen as needed. The patient was diagnosed with diabetes three month ago. A1c was 7.9. She is trying to lose weight. She takes metformin daily. No changes with her chronic insomnia. No severe EDS. She denies any parasomnia or symptoms of sleep apnea  History of depression on different SSRI. No recent worsening of her depression. No recent falling or injury or blackout. Other review of system was unremarkable.       Past Medical History:   Diagnosis Date    Chronic airway obstruction, not elsewhere classified 3/4/08    Chronic kidney disease     incontinent    COPD (chronic obstructive pulmonary disease) (HCC)     Depression     Displacement of lumbar intervertebral disc without myelopathy 8/13/07    Edema 9/13/07    Emphysema of lung ([address]     Enthesopathy of hip region     Enthesopathy of hip region 3/5/07    Esophageal reflux 3/4/08    Hemorrhage of rectum and anus 11/14/07    Hyperlipidemia associated with type 2 diabetes mellitus ([address] 12/11/2017    Major depression, chronic 8/12/2015    Osteoporosis, unspecified 12/4/07    Other and unspecified hyperlipidemia 3/4/08    Pain in joint, shoulder region 3/4/08    Pneumonia     Pulmonary nodule     Rheumatic fever     S/P ileostomy ([address] 8/20/2011    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    Tension headache     Type 2 diabetes mellitus with hyperglycemia, without long-term current use of insulin ([address]    

## 2018-04-10 RX ORDER — POTASSIUM CHLORIDE 20 MEQ/1
TABLET, EXTENDED RELEASE ORAL
Qty: 90 TABLET | Refills: 0 | Status: SHIPPED | OUTPATIENT
Start: 2018-04-10 | End: 2018-06-19 | Stop reason: SDUPTHER

## 2018-04-10 RX ORDER — TIOTROPIUM BROMIDE 18 UG/1
CAPSULE ORAL; RESPIRATORY (INHALATION)
Qty: 90 CAPSULE | Refills: 0 | Status: SHIPPED | OUTPATIENT
Start: 2018-04-10 | End: 2018-06-19 | Stop reason: SDUPTHER

## 2018-04-10 RX ORDER — SIMVASTATIN 20 MG
TABLET ORAL
Qty: 90 TABLET | Refills: 0 | Status: SHIPPED | OUTPATIENT
Start: 2018-04-10 | End: 2018-06-19 | Stop reason: SDUPTHER

## 2018-04-10 RX ORDER — PREDNISONE 1 MG/1
TABLET ORAL
Qty: 90 TABLET | Refills: 1 | Status: SHIPPED | OUTPATIENT
Start: 2018-04-10 | End: 2018-08-13 | Stop reason: DRUGHIGH

## 2018-04-10 RX ORDER — ESOMEPRAZOLE MAGNESIUM 40 MG/1
CAPSULE, DELAYED RELEASE ORAL
Qty: 90 CAPSULE | Refills: 0 | Status: SHIPPED | OUTPATIENT
Start: 2018-04-10 | End: 2018-06-19 | Stop reason: SDUPTHER

## 2018-04-18 RX ORDER — FUROSEMIDE 40 MG/1
TABLET ORAL
Qty: 90 TABLET | Refills: 0 | Status: SHIPPED | OUTPATIENT
Start: 2018-04-18 | End: 2018-10-24 | Stop reason: SDUPTHER

## 2018-04-18 RX ORDER — NAPROXEN 375 MG/1
TABLET ORAL
Qty: 180 TABLET | Refills: 0 | Status: SHIPPED | OUTPATIENT
Start: 2018-04-18 | End: 2018-10-24 | Stop reason: SDUPTHER

## 2018-04-26 RX ORDER — ROFLUMILAST 500 UG/1
TABLET ORAL
Qty: 90 TABLET | Refills: 0 | Status: SHIPPED | OUTPATIENT
Start: 2018-04-26 | End: 2018-07-03 | Stop reason: SDUPTHER

## 2018-04-26 RX ORDER — VENLAFAXINE HYDROCHLORIDE 75 MG/1
CAPSULE, EXTENDED RELEASE ORAL
Qty: 90 CAPSULE | Refills: 0 | Status: SHIPPED | OUTPATIENT
Start: 2018-04-26 | End: 2018-07-03 | Stop reason: SDUPTHER

## 2018-04-30 ENCOUNTER — TELEPHONE (OUTPATIENT)
Dept: INTERNAL MEDICINE CLINIC | Age: 68
End: 2018-04-30

## 2018-05-01 ENCOUNTER — OFFICE VISIT (OUTPATIENT)
Dept: ORTHOPEDIC SURGERY | Age: 68
End: 2018-05-01

## 2018-05-01 VITALS — HEIGHT: 60 IN | WEIGHT: 125 LBS | BODY MASS INDEX: 24.54 KG/M2

## 2018-05-01 DIAGNOSIS — M25.552 PAIN OF BOTH HIP JOINTS: ICD-10-CM

## 2018-05-01 DIAGNOSIS — M54.50 LUMBAR SPINE PAIN: ICD-10-CM

## 2018-05-01 DIAGNOSIS — M47.816 LUMBAR SPONDYLOSIS: ICD-10-CM

## 2018-05-01 DIAGNOSIS — M70.62 GREATER TROCHANTERIC BURSITIS OF BOTH HIPS: Primary | ICD-10-CM

## 2018-05-01 DIAGNOSIS — M25.551 PAIN OF BOTH HIP JOINTS: ICD-10-CM

## 2018-05-01 DIAGNOSIS — M70.61 GREATER TROCHANTERIC BURSITIS OF BOTH HIPS: Primary | ICD-10-CM

## 2018-05-01 PROCEDURE — 3017F COLORECTAL CA SCREEN DOC REV: CPT | Performed by: ORTHOPAEDIC SURGERY

## 2018-05-01 PROCEDURE — 99203 OFFICE O/P NEW LOW 30 MIN: CPT | Performed by: ORTHOPAEDIC SURGERY

## 2018-05-01 PROCEDURE — 20610 DRAIN/INJ JOINT/BURSA W/O US: CPT | Performed by: ORTHOPAEDIC SURGERY

## 2018-05-01 PROCEDURE — 1090F PRES/ABSN URINE INCON ASSESS: CPT | Performed by: ORTHOPAEDIC SURGERY

## 2018-05-01 PROCEDURE — G8427 DOCREV CUR MEDS BY ELIG CLIN: HCPCS | Performed by: ORTHOPAEDIC SURGERY

## 2018-05-01 PROCEDURE — G8420 CALC BMI NORM PARAMETERS: HCPCS | Performed by: ORTHOPAEDIC SURGERY

## 2018-05-04 ENCOUNTER — OFFICE VISIT (OUTPATIENT)
Dept: INTERNAL MEDICINE CLINIC | Age: 68
End: 2018-05-04

## 2018-05-04 VITALS
BODY MASS INDEX: 23.16 KG/M2 | RESPIRATION RATE: 14 BRPM | WEIGHT: 118 LBS | SYSTOLIC BLOOD PRESSURE: 118 MMHG | HEART RATE: 80 BPM | DIASTOLIC BLOOD PRESSURE: 66 MMHG | HEIGHT: 60 IN

## 2018-05-04 DIAGNOSIS — F33.41 RECURRENT MAJOR DEPRESSIVE DISORDER, IN PARTIAL REMISSION (HCC): ICD-10-CM

## 2018-05-04 DIAGNOSIS — Z72.89 OTHER PROBLEMS RELATED TO LIFESTYLE: ICD-10-CM

## 2018-05-04 DIAGNOSIS — F33.41 RECURRENT MAJOR DEPRESSIVE DISORDER IN PARTIAL REMISSION (HCC): ICD-10-CM

## 2018-05-04 DIAGNOSIS — E78.5 HYPERLIPIDEMIA ASSOCIATED WITH TYPE 2 DIABETES MELLITUS (HCC): ICD-10-CM

## 2018-05-04 DIAGNOSIS — E11.65 TYPE 2 DIABETES MELLITUS WITH HYPERGLYCEMIA, WITHOUT LONG-TERM CURRENT USE OF INSULIN (HCC): ICD-10-CM

## 2018-05-04 DIAGNOSIS — Z00.00 ROUTINE GENERAL MEDICAL EXAMINATION AT A HEALTH CARE FACILITY: ICD-10-CM

## 2018-05-04 DIAGNOSIS — E11.65 TYPE 2 DIABETES MELLITUS WITH HYPERGLYCEMIA, WITHOUT LONG-TERM CURRENT USE OF INSULIN (HCC): Primary | ICD-10-CM

## 2018-05-04 DIAGNOSIS — E11.69 HYPERLIPIDEMIA ASSOCIATED WITH TYPE 2 DIABETES MELLITUS (HCC): ICD-10-CM

## 2018-05-04 PROBLEM — F32.89 DEPRESSIVE DISORDER, NOT ELSEWHERE CLASSIFIED: Status: RESOLVED | Noted: 2017-03-06 | Resolved: 2018-05-04

## 2018-05-04 LAB
A/G RATIO: 2 (ref 1.1–2.2)
ALBUMIN SERPL-MCNC: 4.1 G/DL (ref 3.4–5)
ALP BLD-CCNC: 44 U/L (ref 40–129)
ALT SERPL-CCNC: 7 U/L (ref 10–40)
ANION GAP SERPL CALCULATED.3IONS-SCNC: 14 MMOL/L (ref 3–16)
AST SERPL-CCNC: 10 U/L (ref 15–37)
BASOPHILS ABSOLUTE: 0.1 K/UL (ref 0–0.2)
BASOPHILS RELATIVE PERCENT: 0.4 %
BILIRUB SERPL-MCNC: 0.3 MG/DL (ref 0–1)
BUN BLDV-MCNC: 23 MG/DL (ref 7–20)
CALCIUM SERPL-MCNC: 9.3 MG/DL (ref 8.3–10.6)
CHLORIDE BLD-SCNC: 109 MMOL/L (ref 99–110)
CHOLESTEROL, TOTAL: 164 MG/DL (ref 0–199)
CO2: 22 MMOL/L (ref 21–32)
CREAT SERPL-MCNC: 0.7 MG/DL (ref 0.6–1.2)
EOSINOPHILS ABSOLUTE: 0.1 K/UL (ref 0–0.6)
EOSINOPHILS RELATIVE PERCENT: 0.8 %
GFR AFRICAN AMERICAN: >60
GFR NON-AFRICAN AMERICAN: >60
GLOBULIN: 2.1 G/DL
GLUCOSE BLD-MCNC: 89 MG/DL (ref 70–99)
HCT VFR BLD CALC: 40 % (ref 36–48)
HDLC SERPL-MCNC: 55 MG/DL (ref 40–60)
HEMOGLOBIN: 12.9 G/DL (ref 12–16)
HEPATITIS C ANTIBODY INTERPRETATION: NORMAL
LDL CHOLESTEROL CALCULATED: 82 MG/DL
LYMPHOCYTES ABSOLUTE: 1.8 K/UL (ref 1–5.1)
LYMPHOCYTES RELATIVE PERCENT: 16.3 %
MCH RBC QN AUTO: 30.8 PG (ref 26–34)
MCHC RBC AUTO-ENTMCNC: 32.2 G/DL (ref 31–36)
MCV RBC AUTO: 95.7 FL (ref 80–100)
MONOCYTES ABSOLUTE: 1 K/UL (ref 0–1.3)
MONOCYTES RELATIVE PERCENT: 9.3 %
NEUTROPHILS ABSOLUTE: 8.2 K/UL (ref 1.7–7.7)
NEUTROPHILS RELATIVE PERCENT: 73.2 %
PDW BLD-RTO: 14.8 % (ref 12.4–15.4)
PLATELET # BLD: 225 K/UL (ref 135–450)
PMV BLD AUTO: 10.4 FL (ref 5–10.5)
POTASSIUM SERPL-SCNC: 4.1 MMOL/L (ref 3.5–5.1)
RBC # BLD: 4.18 M/UL (ref 4–5.2)
SODIUM BLD-SCNC: 145 MMOL/L (ref 136–145)
TOTAL PROTEIN: 6.2 G/DL (ref 6.4–8.2)
TRIGL SERPL-MCNC: 135 MG/DL (ref 0–150)
URIC ACID, SERUM: 4.4 MG/DL (ref 2.6–6)
VLDLC SERPL CALC-MCNC: 27 MG/DL
WBC # BLD: 11.2 K/UL (ref 4–11)

## 2018-05-04 PROCEDURE — 4040F PNEUMOC VAC/ADMIN/RCVD: CPT | Performed by: INTERNAL MEDICINE

## 2018-05-04 PROCEDURE — 99214 OFFICE O/P EST MOD 30 MIN: CPT | Performed by: INTERNAL MEDICINE

## 2018-05-04 PROCEDURE — 3046F HEMOGLOBIN A1C LEVEL >9.0%: CPT | Performed by: INTERNAL MEDICINE

## 2018-05-04 ASSESSMENT — ANXIETY QUESTIONNAIRES: GAD7 TOTAL SCORE: 3

## 2018-05-04 ASSESSMENT — LIFESTYLE VARIABLES: HOW OFTEN DO YOU HAVE A DRINK CONTAINING ALCOHOL: 0

## 2018-05-05 LAB
ESTIMATED AVERAGE GLUCOSE: 111.2 MG/DL
HBA1C MFR BLD: 5.5 %

## 2018-05-10 RX ORDER — METFORMIN HYDROCHLORIDE 500 MG/1
TABLET, EXTENDED RELEASE ORAL
Qty: 360 TABLET | Refills: 0 | Status: SHIPPED | OUTPATIENT
Start: 2018-05-10 | End: 2018-08-05 | Stop reason: SDUPTHER

## 2018-05-16 ENCOUNTER — HOSPITAL ENCOUNTER (OUTPATIENT)
Dept: MRI IMAGING | Age: 68
Discharge: HOME | End: 2018-05-16
Attending: ORTHOPAEDIC SURGERY | Admitting: ORTHOPAEDIC SURGERY

## 2018-05-16 DIAGNOSIS — M54.50 LUMBAR SPINE PAIN: ICD-10-CM

## 2018-05-16 DIAGNOSIS — M25.552 PAIN OF BOTH HIP JOINTS: ICD-10-CM

## 2018-05-16 DIAGNOSIS — M25.551 PAIN IN RIGHT HIP: ICD-10-CM

## 2018-05-16 DIAGNOSIS — M25.551 PAIN OF BOTH HIP JOINTS: ICD-10-CM

## 2018-05-22 ENCOUNTER — OFFICE VISIT (OUTPATIENT)
Dept: ORTHOPEDIC SURGERY | Age: 68
End: 2018-05-22

## 2018-05-22 ENCOUNTER — TELEPHONE (OUTPATIENT)
Dept: ORTHOPEDIC SURGERY | Age: 68
End: 2018-05-22

## 2018-05-22 VITALS
BODY MASS INDEX: 22.58 KG/M2 | HEIGHT: 60 IN | DIASTOLIC BLOOD PRESSURE: 68 MMHG | WEIGHT: 115 LBS | SYSTOLIC BLOOD PRESSURE: 127 MMHG

## 2018-05-22 DIAGNOSIS — M70.61 TROCHANTERIC BURSITIS OF BOTH HIPS: ICD-10-CM

## 2018-05-22 DIAGNOSIS — M70.62 TROCHANTERIC BURSITIS OF BOTH HIPS: ICD-10-CM

## 2018-05-22 DIAGNOSIS — M54.16 LUMBAR RADICULOPATHY: ICD-10-CM

## 2018-05-22 DIAGNOSIS — M67.431 GANGLION CYST OF WRIST, RIGHT: ICD-10-CM

## 2018-05-22 DIAGNOSIS — M48.061 LUMBAR FORAMINAL STENOSIS: Primary | ICD-10-CM

## 2018-05-22 PROCEDURE — G8420 CALC BMI NORM PARAMETERS: HCPCS | Performed by: PHYSICAL MEDICINE & REHABILITATION

## 2018-05-22 PROCEDURE — 1090F PRES/ABSN URINE INCON ASSESS: CPT | Performed by: PHYSICAL MEDICINE & REHABILITATION

## 2018-05-22 PROCEDURE — 99204 OFFICE O/P NEW MOD 45 MIN: CPT | Performed by: PHYSICAL MEDICINE & REHABILITATION

## 2018-05-22 PROCEDURE — G8427 DOCREV CUR MEDS BY ELIG CLIN: HCPCS | Performed by: PHYSICAL MEDICINE & REHABILITATION

## 2018-05-22 PROCEDURE — 3017F COLORECTAL CA SCREEN DOC REV: CPT | Performed by: PHYSICAL MEDICINE & REHABILITATION

## 2018-05-25 ENCOUNTER — TELEPHONE (OUTPATIENT)
Dept: ORTHOPEDIC SURGERY | Age: 68
End: 2018-05-25

## 2018-05-29 RX ORDER — BLOOD SUGAR DIAGNOSTIC
STRIP MISCELLANEOUS
Qty: 100 STRIP | Refills: 2 | Status: SHIPPED | OUTPATIENT
Start: 2018-05-29 | End: 2020-05-26

## 2018-06-04 ENCOUNTER — OFFICE VISIT (OUTPATIENT)
Dept: ORTHOPEDIC SURGERY | Age: 68
End: 2018-06-04

## 2018-06-04 VITALS
HEIGHT: 60 IN | WEIGHT: 115.08 LBS | SYSTOLIC BLOOD PRESSURE: 100 MMHG | BODY MASS INDEX: 22.59 KG/M2 | HEART RATE: 95 BPM | DIASTOLIC BLOOD PRESSURE: 64 MMHG

## 2018-06-04 DIAGNOSIS — M25.531 RIGHT WRIST PAIN: Primary | ICD-10-CM

## 2018-06-04 DIAGNOSIS — D17.21 LIPOMA OF RIGHT UPPER EXTREMITY: ICD-10-CM

## 2018-06-04 DIAGNOSIS — M67.431 GANGLION CYST OF VOLAR ASPECT OF RIGHT WRIST: ICD-10-CM

## 2018-06-04 PROCEDURE — 99213 OFFICE O/P EST LOW 20 MIN: CPT | Performed by: ORTHOPAEDIC SURGERY

## 2018-06-04 PROCEDURE — 1090F PRES/ABSN URINE INCON ASSESS: CPT | Performed by: ORTHOPAEDIC SURGERY

## 2018-06-04 PROCEDURE — 1036F TOBACCO NON-USER: CPT | Performed by: ORTHOPAEDIC SURGERY

## 2018-06-04 PROCEDURE — G8427 DOCREV CUR MEDS BY ELIG CLIN: HCPCS | Performed by: ORTHOPAEDIC SURGERY

## 2018-06-04 PROCEDURE — G8399 PT W/DXA RESULTS DOCUMENT: HCPCS | Performed by: ORTHOPAEDIC SURGERY

## 2018-06-04 PROCEDURE — 3017F COLORECTAL CA SCREEN DOC REV: CPT | Performed by: ORTHOPAEDIC SURGERY

## 2018-06-04 PROCEDURE — G8420 CALC BMI NORM PARAMETERS: HCPCS | Performed by: ORTHOPAEDIC SURGERY

## 2018-06-04 PROCEDURE — 4040F PNEUMOC VAC/ADMIN/RCVD: CPT | Performed by: ORTHOPAEDIC SURGERY

## 2018-06-04 PROCEDURE — 1123F ACP DISCUSS/DSCN MKR DOCD: CPT | Performed by: ORTHOPAEDIC SURGERY

## 2018-06-08 ENCOUNTER — PAT TELEPHONE (OUTPATIENT)
Dept: PREADMISSION TESTING | Age: 68
End: 2018-06-08

## 2018-06-12 ENCOUNTER — HOSPITAL ENCOUNTER (OUTPATIENT)
Dept: PAIN MANAGEMENT | Age: 68
Discharge: HOME | End: 2018-06-12
Attending: PHYSICAL MEDICINE & REHABILITATION | Admitting: PHYSICAL MEDICINE & REHABILITATION

## 2018-06-12 VITALS
HEART RATE: 87 BPM | WEIGHT: 115 LBS | RESPIRATION RATE: 16 BRPM | SYSTOLIC BLOOD PRESSURE: 128 MMHG | OXYGEN SATURATION: 97 % | HEIGHT: 60 IN | BODY MASS INDEX: 22.58 KG/M2 | TEMPERATURE: 97.2 F | DIASTOLIC BLOOD PRESSURE: 77 MMHG

## 2018-06-12 LAB
GLUCOSE BLD-MCNC: 85 MG/DL (ref 70–99)
PERFORMED ON: NORMAL

## 2018-06-12 ASSESSMENT — ACTIVITIES OF DAILY LIVING (ADL): EFFECT OF PAIN ON DAILY ACTIVITIES: STANDING

## 2018-06-12 ASSESSMENT — PAIN DESCRIPTION - DESCRIPTORS: DESCRIPTORS: ACHING

## 2018-06-12 ASSESSMENT — PAIN - FUNCTIONAL ASSESSMENT: PAIN_FUNCTIONAL_ASSESSMENT: 0-10

## 2018-06-19 RX ORDER — SIMVASTATIN 20 MG
TABLET ORAL
Qty: 90 TABLET | Refills: 0 | Status: SHIPPED | OUTPATIENT
Start: 2018-06-19 | End: 2018-08-22 | Stop reason: SDUPTHER

## 2018-06-19 RX ORDER — ESOMEPRAZOLE MAGNESIUM 40 MG/1
CAPSULE, DELAYED RELEASE ORAL
Qty: 90 CAPSULE | Refills: 0 | Status: SHIPPED | OUTPATIENT
Start: 2018-06-19 | End: 2018-08-22 | Stop reason: SDUPTHER

## 2018-06-19 RX ORDER — TIOTROPIUM BROMIDE 18 UG/1
CAPSULE ORAL; RESPIRATORY (INHALATION)
Qty: 90 CAPSULE | Refills: 0 | Status: SHIPPED | OUTPATIENT
Start: 2018-06-19 | End: 2018-08-22 | Stop reason: SDUPTHER

## 2018-06-19 RX ORDER — POTASSIUM CHLORIDE 1500 MG/1
TABLET, FILM COATED, EXTENDED RELEASE ORAL
Qty: 90 TABLET | Refills: 0 | Status: SHIPPED | OUTPATIENT
Start: 2018-06-19 | End: 2018-10-25

## 2018-06-26 ENCOUNTER — OFFICE VISIT (OUTPATIENT)
Dept: ORTHOPEDIC SURGERY | Age: 68
End: 2018-06-26

## 2018-06-26 VITALS
BODY MASS INDEX: 21.99 KG/M2 | DIASTOLIC BLOOD PRESSURE: 84 MMHG | SYSTOLIC BLOOD PRESSURE: 134 MMHG | WEIGHT: 112 LBS | HEIGHT: 60 IN | HEART RATE: 94 BPM

## 2018-06-26 DIAGNOSIS — M70.62 TROCHANTERIC BURSITIS OF BOTH HIPS: ICD-10-CM

## 2018-06-26 DIAGNOSIS — M48.061 LUMBAR FORAMINAL STENOSIS: Primary | ICD-10-CM

## 2018-06-26 DIAGNOSIS — M70.61 TROCHANTERIC BURSITIS OF BOTH HIPS: ICD-10-CM

## 2018-06-26 PROCEDURE — 3017F COLORECTAL CA SCREEN DOC REV: CPT | Performed by: PHYSICAL MEDICINE & REHABILITATION

## 2018-06-26 PROCEDURE — 1090F PRES/ABSN URINE INCON ASSESS: CPT | Performed by: PHYSICAL MEDICINE & REHABILITATION

## 2018-06-26 PROCEDURE — 99213 OFFICE O/P EST LOW 20 MIN: CPT | Performed by: PHYSICAL MEDICINE & REHABILITATION

## 2018-06-26 PROCEDURE — 1123F ACP DISCUSS/DSCN MKR DOCD: CPT | Performed by: PHYSICAL MEDICINE & REHABILITATION

## 2018-06-26 PROCEDURE — G8420 CALC BMI NORM PARAMETERS: HCPCS | Performed by: PHYSICAL MEDICINE & REHABILITATION

## 2018-06-26 PROCEDURE — G8427 DOCREV CUR MEDS BY ELIG CLIN: HCPCS | Performed by: PHYSICAL MEDICINE & REHABILITATION

## 2018-06-26 PROCEDURE — G8399 PT W/DXA RESULTS DOCUMENT: HCPCS | Performed by: PHYSICAL MEDICINE & REHABILITATION

## 2018-06-26 PROCEDURE — 4040F PNEUMOC VAC/ADMIN/RCVD: CPT | Performed by: PHYSICAL MEDICINE & REHABILITATION

## 2018-06-26 PROCEDURE — 1036F TOBACCO NON-USER: CPT | Performed by: PHYSICAL MEDICINE & REHABILITATION

## 2018-06-29 ENCOUNTER — OFFICE VISIT (OUTPATIENT)
Dept: INTERNAL MEDICINE CLINIC | Age: 68
End: 2018-06-29

## 2018-06-29 VITALS
BODY MASS INDEX: 21.99 KG/M2 | WEIGHT: 112 LBS | RESPIRATION RATE: 14 BRPM | DIASTOLIC BLOOD PRESSURE: 80 MMHG | HEART RATE: 80 BPM | HEIGHT: 60 IN | SYSTOLIC BLOOD PRESSURE: 110 MMHG

## 2018-06-29 DIAGNOSIS — F33.41 RECURRENT MAJOR DEPRESSIVE DISORDER, IN PARTIAL REMISSION (HCC): ICD-10-CM

## 2018-06-29 DIAGNOSIS — E11.69 HYPERLIPIDEMIA ASSOCIATED WITH TYPE 2 DIABETES MELLITUS (HCC): ICD-10-CM

## 2018-06-29 DIAGNOSIS — E11.65 TYPE 2 DIABETES MELLITUS WITH HYPERGLYCEMIA, WITHOUT LONG-TERM CURRENT USE OF INSULIN (HCC): ICD-10-CM

## 2018-06-29 DIAGNOSIS — E78.5 HYPERLIPIDEMIA ASSOCIATED WITH TYPE 2 DIABETES MELLITUS (HCC): ICD-10-CM

## 2018-06-29 DIAGNOSIS — Z23 NEED FOR PNEUMOCOCCAL VACCINATION: ICD-10-CM

## 2018-06-29 DIAGNOSIS — J44.9 CHRONIC OBSTRUCTIVE PULMONARY DISEASE, UNSPECIFIED COPD TYPE (HCC): Primary | ICD-10-CM

## 2018-06-29 PROCEDURE — G0009 ADMIN PNEUMOCOCCAL VACCINE: HCPCS | Performed by: INTERNAL MEDICINE

## 2018-06-29 PROCEDURE — G8399 PT W/DXA RESULTS DOCUMENT: HCPCS | Performed by: INTERNAL MEDICINE

## 2018-06-29 PROCEDURE — 1036F TOBACCO NON-USER: CPT | Performed by: INTERNAL MEDICINE

## 2018-06-29 PROCEDURE — 1123F ACP DISCUSS/DSCN MKR DOCD: CPT | Performed by: INTERNAL MEDICINE

## 2018-06-29 PROCEDURE — 2022F DILAT RTA XM EVC RTNOPTHY: CPT | Performed by: INTERNAL MEDICINE

## 2018-06-29 PROCEDURE — G8420 CALC BMI NORM PARAMETERS: HCPCS | Performed by: INTERNAL MEDICINE

## 2018-06-29 PROCEDURE — G8428 CUR MEDS NOT DOCUMENT: HCPCS | Performed by: INTERNAL MEDICINE

## 2018-06-29 PROCEDURE — 4040F PNEUMOC VAC/ADMIN/RCVD: CPT | Performed by: INTERNAL MEDICINE

## 2018-06-29 PROCEDURE — 3044F HG A1C LEVEL LT 7.0%: CPT | Performed by: INTERNAL MEDICINE

## 2018-06-29 PROCEDURE — 90732 PPSV23 VACC 2 YRS+ SUBQ/IM: CPT | Performed by: INTERNAL MEDICINE

## 2018-06-29 PROCEDURE — 3017F COLORECTAL CA SCREEN DOC REV: CPT | Performed by: INTERNAL MEDICINE

## 2018-06-29 PROCEDURE — G8926 SPIRO NO PERF OR DOC: HCPCS | Performed by: INTERNAL MEDICINE

## 2018-06-29 PROCEDURE — 1090F PRES/ABSN URINE INCON ASSESS: CPT | Performed by: INTERNAL MEDICINE

## 2018-06-29 PROCEDURE — 99213 OFFICE O/P EST LOW 20 MIN: CPT | Performed by: INTERNAL MEDICINE

## 2018-06-29 PROCEDURE — 3023F SPIROM DOC REV: CPT | Performed by: INTERNAL MEDICINE

## 2018-06-29 ASSESSMENT — ENCOUNTER SYMPTOMS
SHORTNESS OF BREATH: 0
NAUSEA: 0
ABDOMINAL PAIN: 0
WHEEZING: 0
RHINORRHEA: 0
VOMITING: 0

## 2018-07-03 RX ORDER — ROFLUMILAST 500 UG/1
TABLET ORAL
Qty: 90 TABLET | Refills: 0 | Status: SHIPPED | OUTPATIENT
Start: 2018-07-03 | End: 2018-09-10 | Stop reason: SDUPTHER

## 2018-07-03 RX ORDER — VENLAFAXINE HYDROCHLORIDE 75 MG/1
CAPSULE, EXTENDED RELEASE ORAL
Qty: 90 CAPSULE | Refills: 0 | Status: SHIPPED | OUTPATIENT
Start: 2018-07-03 | End: 2018-09-10 | Stop reason: SDUPTHER

## 2018-08-06 ENCOUNTER — TELEPHONE (OUTPATIENT)
Dept: INTERNAL MEDICINE CLINIC | Age: 68
End: 2018-08-06

## 2018-08-06 RX ORDER — METFORMIN HYDROCHLORIDE 500 MG/1
TABLET, EXTENDED RELEASE ORAL
Qty: 360 TABLET | Refills: 0 | Status: SHIPPED | OUTPATIENT
Start: 2018-08-06 | End: 2018-10-31 | Stop reason: SDUPTHER

## 2018-08-06 NOTE — TELEPHONE ENCOUNTER
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

## 2018-08-13 ENCOUNTER — OFFICE VISIT (OUTPATIENT)
Dept: PULMONOLOGY | Age: 68
End: 2018-08-13

## 2018-08-13 VITALS
HEIGHT: 60 IN | BODY MASS INDEX: 21.87 KG/M2 | DIASTOLIC BLOOD PRESSURE: 72 MMHG | SYSTOLIC BLOOD PRESSURE: 110 MMHG | OXYGEN SATURATION: 96 % | HEART RATE: 89 BPM | TEMPERATURE: 97.6 F | WEIGHT: 111.4 LBS | RESPIRATION RATE: 20 BRPM

## 2018-08-13 DIAGNOSIS — R09.02 HYPOXIA: ICD-10-CM

## 2018-08-13 DIAGNOSIS — R93.89 ABNORMAL CT OF THE CHEST: ICD-10-CM

## 2018-08-13 DIAGNOSIS — R91.8 PULMONARY NODULES: ICD-10-CM

## 2018-08-13 DIAGNOSIS — J44.9 COPD, VERY SEVERE (HCC): Primary | ICD-10-CM

## 2018-08-13 PROCEDURE — 3017F COLORECTAL CA SCREEN DOC REV: CPT | Performed by: INTERNAL MEDICINE

## 2018-08-13 PROCEDURE — G8427 DOCREV CUR MEDS BY ELIG CLIN: HCPCS | Performed by: INTERNAL MEDICINE

## 2018-08-13 PROCEDURE — G8420 CALC BMI NORM PARAMETERS: HCPCS | Performed by: INTERNAL MEDICINE

## 2018-08-13 PROCEDURE — G8926 SPIRO NO PERF OR DOC: HCPCS | Performed by: INTERNAL MEDICINE

## 2018-08-13 PROCEDURE — 1123F ACP DISCUSS/DSCN MKR DOCD: CPT | Performed by: INTERNAL MEDICINE

## 2018-08-13 PROCEDURE — G8399 PT W/DXA RESULTS DOCUMENT: HCPCS | Performed by: INTERNAL MEDICINE

## 2018-08-13 PROCEDURE — 99214 OFFICE O/P EST MOD 30 MIN: CPT | Performed by: INTERNAL MEDICINE

## 2018-08-13 PROCEDURE — 1101F PT FALLS ASSESS-DOCD LE1/YR: CPT | Performed by: INTERNAL MEDICINE

## 2018-08-13 PROCEDURE — 3023F SPIROM DOC REV: CPT | Performed by: INTERNAL MEDICINE

## 2018-08-13 PROCEDURE — 4040F PNEUMOC VAC/ADMIN/RCVD: CPT | Performed by: INTERNAL MEDICINE

## 2018-08-13 PROCEDURE — 1036F TOBACCO NON-USER: CPT | Performed by: INTERNAL MEDICINE

## 2018-08-13 PROCEDURE — 1090F PRES/ABSN URINE INCON ASSESS: CPT | Performed by: INTERNAL MEDICINE

## 2018-08-13 RX ORDER — GUAIFENESIN 600 MG/1
600 TABLET, EXTENDED RELEASE ORAL 2 TIMES DAILY PRN
Qty: 60 TABLET | Refills: 2 | Status: ON HOLD | OUTPATIENT
Start: 2018-08-13 | End: 2020-01-06 | Stop reason: HOSPADM

## 2018-08-13 RX ORDER — PREDNISONE 1 MG/1
TABLET ORAL
Qty: 42 TABLET | Refills: 0 | Status: SHIPPED | OUTPATIENT
Start: 2018-08-13 | End: 2018-11-05 | Stop reason: ALTCHOICE

## 2018-08-13 NOTE — PROGRESS NOTES
DAY, Disp: 90 tablet, Rfl: 0    esomeprazole (NEXIUM) 40 MG delayed release capsule, TAKE 1 CAPSULE EVERY MORNING BEFORE BREAKFAST, Disp: 90 capsule, Rfl: 0    simvastatin (ZOCOR) 20 MG tablet, TAKE 1 TABLET EVERY EVENING, Disp: 90 tablet, Rfl: 0    SPIRIVA HANDIHALER 18 MCG inhalation capsule, INHALE THE CONTENTS OF 1 CAPSULE EVERY DAY, Disp: 90 capsule, Rfl: 0    JANUVIA 100 MG tablet, TAKE 1 TABLET BY MOUTH DAILY, Disp: 90 tablet, Rfl: 0    ACCU-CHEK JOSIE PLUS strip, USE TO TEST DAILY, Disp: 100 strip, Rfl: 2    furosemide (LASIX) 40 MG tablet, TAKE 1 TABLET EVERY DAY, Disp: 90 tablet, Rfl: 0    naproxen (NAPROSYN) 375 MG tablet, TAKE 1 TABLET TWICE DAILY, Disp: 180 tablet, Rfl: 0    predniSONE (DELTASONE) 5 MG tablet, TAKE 1 TABLET EVERY DAY, Disp: 90 tablet, Rfl: 1    alendronate (FOSAMAX) 35 MG tablet, TAKE 1 TABLET EVERY 7 DAYS, Disp: 12 tablet, Rfl: 1    topiramate (TOPAMAX) 50 MG tablet, TAKE 1 TABLET BY MOUTH TWICE DAILY, Disp: 180 tablet, Rfl: 3    QUEtiapine (SEROQUEL) 25 MG tablet, TAKE 1 TO 2 TABLETS BY MOUTH AT BEDTIME., Disp: 180 tablet, Rfl: 1    albuterol (PROVENTIL) (2.5 MG/3ML) 0.083% nebulizer solution, INHALE THE CONTENTS OF 1 VIAL (3 MLS) BY NEBULIZATION EVERY 6 HOURS AS NEEDED FOR WHEEZING OR SHORTNESS OF BREATH, Disp: 360 vial, Rfl: 3    theophylline (THEOCHRON) 200 MG extended release tablet, Take 1 tablet by mouth 2 times daily, Disp: 180 tablet, Rfl: 0    vitamin D (CHOLECALCIFEROL) 400 UNITS TABS tablet, Take 2 tablets by mouth daily, Disp: 180 tablet, Rfl: 1    calcium carbonate (CALCIUM 600) 600 MG TABS tablet, Take 2 tablets by mouth daily, Disp: 180 tablet, Rfl: 1    Respiratory Therapy Supplies (NEBULIZER/TUBING/MOUTHPIECE) KIT, 1 kit by Does not apply route daily as needed DX COPD J44.9, Disp: 1 kit, Rfl: 0    Nebulizers (COMPRESSOR/NEBULIZER) MISC, 1 Device by Does not apply route as needed DX COPD J44.9, Disp: 1 each, Rfl: 0    fluticasone (FLONASE) 50 MCG/ACT nasal spray, 2 sprays by Nasal route daily. , Disp: 3 Bottle, Rfl: 0    Misc. Devices (ACAPELLA) MISC, by Does not apply route 4 times daily. , Disp: 1 each, Rfl: 0           Objective:     /72 (Site: Right Arm, Position: Sitting, Cuff Size: Small Adult)   Pulse 89   Temp 97.6 °F (36.4 °C) (Oral)   Resp 20   Ht 5' (1.524 m)   Wt 111 lb 6.4 oz (50.5 kg)   SpO2 96% Comment: RA  BMI 21.76 kg/m²  RA  Gen: No distress. Pleasant  Eyes: PERRL. No sclera icterus. No conjunctival injection. ENT: No discharge. Pharynx clear. Scar from prior trach. Neck: Trachea midline. No obvious mass. Resp: No accessory muscle use. No crackles. No wheezes. No rhonchi. No dullness on percussion. Decreased air entry. CV: Regular rate. Regular rhythm. No murmur or rub. No edema. GI: Non-tender. Non-distended. No hernia. Skin: Warm and dry. No nodule on exposed extremities. Lymph: No cervical LAD. No supraclavicular LAD. M/S: No cyanosis. No joint deformity. No clubbing. Neuro: Awake. Alert. Moves all four extremities. Psych: Oriented x 3. No anxiety. DATA reviewed by me:   PFTs 03/29/2017 FEV1 0.55L(27%) TLC 5.21L(117%) DLCO 8.47(42%) 6MW 770 2L exertion   PFTs 07/07/2015 FEV1 0.63L(31%) TLC 5.12L(114%) DLCO 9.6(48%)   PFTs 12/06/2011 FEV1 0.83L(45%) TLC 5.14L(125%) DLCO 8.6 (49%)   PFTs 07/10/2008 FEV1 0.77L           TLC 5.41L            DLCO 7.62  PFTs 01/15/2007 FEV1 0.59L           TLC 5.15L            DLCO 10.82      CT chest 1/30/2018    1. 3 pulmonary nodules in the right lung are stable over a period of greater than 2 years and can be considered benign. 2. Improved aeration of the lower lobes from prior imaging indicating a resolved infectious or inflammatory process. 3. Centrilobular emphysema and fibrotic changes are otherwise stable.    4. Prior imaging described heterogeneous attenuation in the liver which is not well visualized on the current study        DEXA 3/3/16 Osteopenia

## 2018-08-14 ENCOUNTER — OFFICE VISIT (OUTPATIENT)
Dept: INTERNAL MEDICINE CLINIC | Age: 68
End: 2018-08-14

## 2018-08-14 VITALS
HEIGHT: 60 IN | BODY MASS INDEX: 21.6 KG/M2 | RESPIRATION RATE: 14 BRPM | DIASTOLIC BLOOD PRESSURE: 80 MMHG | HEART RATE: 80 BPM | SYSTOLIC BLOOD PRESSURE: 120 MMHG | WEIGHT: 110 LBS

## 2018-08-14 DIAGNOSIS — M85.80 STEROID-INDUCED OSTEOPENIA: ICD-10-CM

## 2018-08-14 DIAGNOSIS — F32.9 MAJOR DEPRESSION, CHRONIC: ICD-10-CM

## 2018-08-14 DIAGNOSIS — E11.69 HYPERLIPIDEMIA ASSOCIATED WITH TYPE 2 DIABETES MELLITUS (HCC): ICD-10-CM

## 2018-08-14 DIAGNOSIS — K21.9 GASTROESOPHAGEAL REFLUX DISEASE WITHOUT ESOPHAGITIS: ICD-10-CM

## 2018-08-14 DIAGNOSIS — Z12.39 BREAST CANCER SCREENING: ICD-10-CM

## 2018-08-14 DIAGNOSIS — E78.5 HYPERLIPIDEMIA ASSOCIATED WITH TYPE 2 DIABETES MELLITUS (HCC): ICD-10-CM

## 2018-08-14 DIAGNOSIS — E11.65 TYPE 2 DIABETES MELLITUS WITH HYPERGLYCEMIA, WITHOUT LONG-TERM CURRENT USE OF INSULIN (HCC): Primary | ICD-10-CM

## 2018-08-14 DIAGNOSIS — J44.9 CHRONIC OBSTRUCTIVE PULMONARY DISEASE, UNSPECIFIED COPD TYPE (HCC): ICD-10-CM

## 2018-08-14 DIAGNOSIS — F33.41 RECURRENT MAJOR DEPRESSIVE DISORDER, IN PARTIAL REMISSION (HCC): ICD-10-CM

## 2018-08-14 DIAGNOSIS — T38.0X5A STEROID-INDUCED OSTEOPENIA: ICD-10-CM

## 2018-08-14 PROCEDURE — G8427 DOCREV CUR MEDS BY ELIG CLIN: HCPCS | Performed by: INTERNAL MEDICINE

## 2018-08-14 PROCEDURE — 1036F TOBACCO NON-USER: CPT | Performed by: INTERNAL MEDICINE

## 2018-08-14 PROCEDURE — 1090F PRES/ABSN URINE INCON ASSESS: CPT | Performed by: INTERNAL MEDICINE

## 2018-08-14 PROCEDURE — 3023F SPIROM DOC REV: CPT | Performed by: INTERNAL MEDICINE

## 2018-08-14 PROCEDURE — G8399 PT W/DXA RESULTS DOCUMENT: HCPCS | Performed by: INTERNAL MEDICINE

## 2018-08-14 PROCEDURE — G8420 CALC BMI NORM PARAMETERS: HCPCS | Performed by: INTERNAL MEDICINE

## 2018-08-14 PROCEDURE — 2022F DILAT RTA XM EVC RTNOPTHY: CPT | Performed by: INTERNAL MEDICINE

## 2018-08-14 PROCEDURE — 1123F ACP DISCUSS/DSCN MKR DOCD: CPT | Performed by: INTERNAL MEDICINE

## 2018-08-14 PROCEDURE — 1101F PT FALLS ASSESS-DOCD LE1/YR: CPT | Performed by: INTERNAL MEDICINE

## 2018-08-14 PROCEDURE — 3044F HG A1C LEVEL LT 7.0%: CPT | Performed by: INTERNAL MEDICINE

## 2018-08-14 PROCEDURE — 3017F COLORECTAL CA SCREEN DOC REV: CPT | Performed by: INTERNAL MEDICINE

## 2018-08-14 PROCEDURE — 4040F PNEUMOC VAC/ADMIN/RCVD: CPT | Performed by: INTERNAL MEDICINE

## 2018-08-14 PROCEDURE — 99214 OFFICE O/P EST MOD 30 MIN: CPT | Performed by: INTERNAL MEDICINE

## 2018-08-14 PROCEDURE — G8926 SPIRO NO PERF OR DOC: HCPCS | Performed by: INTERNAL MEDICINE

## 2018-08-14 ASSESSMENT — ENCOUNTER SYMPTOMS
BACK PAIN: 1
VOMITING: 0
EYE DISCHARGE: 0
WHEEZING: 0
RHINORRHEA: 0
ABDOMINAL PAIN: 0
NAUSEA: 0
COUGH: 0
SHORTNESS OF BREATH: 1

## 2018-08-14 NOTE — PROGRESS NOTES
MCG/ACT inhaler INHALE 2 PUFFS EVERY 6 HOURS AS NEEDED FOR WHEEZING 54 g 0    fluticasone-salmeterol (ADVAIR DISKUS) 250-50 MCG/DOSE AEPB Inhale 1 puff into the lungs every 12 hours 180 each 1    venlafaxine (EFFEXOR XR) 75 MG extended release capsule TAKE 1 CAPSULE EVERY DAY 90 capsule 0    DALIRESP 500 MCG tablet TAKE 1 TABLET EVERY DAY 90 tablet 0    potassium chloride (KLOR-CON M) 20 MEQ TBCR extended release tablet TAKE 1 TABLET EVERY DAY 90 tablet 0    esomeprazole (NEXIUM) 40 MG delayed release capsule TAKE 1 CAPSULE EVERY MORNING BEFORE BREAKFAST 90 capsule 0    simvastatin (ZOCOR) 20 MG tablet TAKE 1 TABLET EVERY EVENING 90 tablet 0    SPIRIVA HANDIHALER 18 MCG inhalation capsule INHALE THE CONTENTS OF 1 CAPSULE EVERY DAY 90 capsule 0    JANUVIA 100 MG tablet TAKE 1 TABLET BY MOUTH DAILY 90 tablet 0    ACCU-CHEK JOSIE PLUS strip USE TO TEST DAILY 100 strip 2    furosemide (LASIX) 40 MG tablet TAKE 1 TABLET EVERY DAY 90 tablet 0    naproxen (NAPROSYN) 375 MG tablet TAKE 1 TABLET TWICE DAILY 180 tablet 0    alendronate (FOSAMAX) 35 MG tablet TAKE 1 TABLET EVERY 7 DAYS 12 tablet 1    topiramate (TOPAMAX) 50 MG tablet TAKE 1 TABLET BY MOUTH TWICE DAILY 180 tablet 3    QUEtiapine (SEROQUEL) 25 MG tablet TAKE 1 TO 2 TABLETS BY MOUTH AT BEDTIME.  180 tablet 1    albuterol (PROVENTIL) (2.5 MG/3ML) 0.083% nebulizer solution INHALE THE CONTENTS OF 1 VIAL (3 MLS) BY NEBULIZATION EVERY 6 HOURS AS NEEDED FOR WHEEZING OR SHORTNESS OF BREATH 360 vial 3    theophylline (THEOCHRON) 200 MG extended release tablet Take 1 tablet by mouth 2 times daily 180 tablet 0    vitamin D (CHOLECALCIFEROL) 400 UNITS TABS tablet Take 2 tablets by mouth daily 180 tablet 1    calcium carbonate (CALCIUM 600) 600 MG TABS tablet Take 2 tablets by mouth daily 180 tablet 1    Respiratory Therapy Supplies (NEBULIZER/TUBING/MOUTHPIECE) KIT 1 kit by Does not apply route daily as needed DX COPD J44.9 1 kit 0    Nebulizers Psychiatric: She has a normal mood and affect. Her behavior is normal. Judgment and thought content normal.     PFT  1. SPIROMETRY: The FEV1 is 0.63 L which is 31% of predicted. The FEV1/FVC  ratio is reduced. Inhaled bronchodilators are given. There is no  significant improvement. 2. LUNG VOLUMES: Total lung capacity is normal. There is air trapping. 3. DIFFUSING CAPACITY: DLCO is 9.6 mL/min/mmHg, which is 48% of predicted. 4. FLOW VOLUME LOOP: Shows an obstructive pattern. A 6-minute walk test per [employer]. The baseline oxygen saturation was  97%. The lowest oxygen saturation was 96%. The patient stopped ambulating  after 3 minutes and 14 seconds due to dizziness. She ambulated a total of  420 feet. ECHO done 12/30/16  Conclusions      Summary   Normal left ventricle systolic function with an estimated ejection fraction   of 55%.   No regional wall motion abnormalities are seen.   Normal left ventricle diastolic filing pressure.     Stress test 12/30/16  Conclusions        Summary    Probably normal myocardial perfusion study.    There is a mild anterior defect consistent with attenuation artifact. There    is no clear evidence for ischemia despite polar map concerns for distal    anterior reversibility.    Left ventricular wall motion and function are normal.    The estimated left ventricular function is 76%.           Assessment:       Diagnosis Orders   1. Type 2 diabetes mellitus with hyperglycemia, without long-term current use of insulin ([address]     2. Chronic obstructive pulmonary disease, unspecified COPD type ([address]     3. Hyperlipidemia associated with type 2 diabetes mellitus ([address]     4. Recurrent major depressive disorder, in partial remission ([address]     5. Gastroesophageal reflux disease without esophagitis     6. Major depression, chronic     7. Steroid-induced osteopenia     8.  Breast cancer screening  Mammography screening bilateral          Plan:        DM type 2: well depression

## 2018-08-16 ENCOUNTER — HOSPITAL ENCOUNTER (OUTPATIENT)
Dept: CT IMAGING | Age: 68
Discharge: HOME | End: 2018-08-16
Payer: MEDICARE

## 2018-08-16 DIAGNOSIS — R91.8 PULMONARY NODULES: ICD-10-CM

## 2018-08-16 DIAGNOSIS — R93.89 ABNORMAL CT OF THE CHEST: ICD-10-CM

## 2018-08-16 PROCEDURE — 71250 CT THORAX DX C-: CPT

## 2018-08-23 RX ORDER — POTASSIUM CHLORIDE 1500 MG/1
TABLET, EXTENDED RELEASE ORAL
Qty: 90 TABLET | Refills: 0 | Status: SHIPPED | OUTPATIENT
Start: 2018-08-23 | End: 2018-10-24 | Stop reason: SDUPTHER

## 2018-08-23 RX ORDER — ESOMEPRAZOLE MAGNESIUM 40 MG/1
CAPSULE, DELAYED RELEASE ORAL
Qty: 90 CAPSULE | Refills: 0 | Status: SHIPPED | OUTPATIENT
Start: 2018-08-23 | End: 2018-10-24 | Stop reason: SDUPTHER

## 2018-08-23 RX ORDER — TIOTROPIUM BROMIDE 18 UG/1
CAPSULE ORAL; RESPIRATORY (INHALATION)
Qty: 90 CAPSULE | Refills: 0 | Status: SHIPPED | OUTPATIENT
Start: 2018-08-23 | End: 2018-10-24 | Stop reason: SDUPTHER

## 2018-08-23 RX ORDER — SIMVASTATIN 20 MG
TABLET ORAL
Qty: 90 TABLET | Refills: 0 | Status: SHIPPED | OUTPATIENT
Start: 2018-08-23 | End: 2018-10-24 | Stop reason: SDUPTHER

## 2018-08-28 ENCOUNTER — HOSPITAL ENCOUNTER (OUTPATIENT)
Dept: MAMMOGRAPHY | Age: 68
Discharge: HOME | End: 2018-08-28
Payer: MEDICARE

## 2018-08-28 DIAGNOSIS — Z12.39 BREAST CANCER SCREENING: ICD-10-CM

## 2018-08-28 PROCEDURE — 77067 SCR MAMMO BI INCL CAD: CPT

## 2018-08-29 NOTE — TELEPHONE ENCOUNTER
Patient called with message left for patient to call back to office. Patient confirm with patient/pharmacy request on Prednisone. Per 's note 8/13/18 Prednisone 2 mg/day every other day for 4 weeks then 1 mg every other day for 4 weeks then stop.

## 2018-08-30 RX ORDER — PREDNISONE 1 MG/1
TABLET ORAL
Qty: 90 TABLET | Refills: 1 | Status: SHIPPED | OUTPATIENT
Start: 2018-08-30 | End: 2018-09-19

## 2018-08-30 RX ORDER — ALBUTEROL SULFATE 2.5 MG/3ML
SOLUTION RESPIRATORY (INHALATION)
Qty: 1080 ML | Refills: 3 | Status: SHIPPED | OUTPATIENT
Start: 2018-08-30 | End: 2020-02-12

## 2018-09-05 ENCOUNTER — HOSPITAL ENCOUNTER (OUTPATIENT)
Dept: CARDIAC REHAB | Age: 68
Setting detail: THERAPIES SERIES
Discharge: HOME | End: 2018-09-05
Payer: MEDICARE

## 2018-09-05 PROCEDURE — G0424 PULMONARY REHAB W EXER: HCPCS

## 2018-09-07 ENCOUNTER — HOSPITAL ENCOUNTER (OUTPATIENT)
Dept: CARDIAC REHAB | Age: 68
Setting detail: THERAPIES SERIES
Discharge: HOME | End: 2018-09-07
Payer: MEDICARE

## 2018-09-07 PROCEDURE — G0424 PULMONARY REHAB W EXER: HCPCS

## 2018-09-10 ENCOUNTER — HOSPITAL ENCOUNTER (OUTPATIENT)
Dept: CARDIAC REHAB | Age: 68
Setting detail: THERAPIES SERIES
Discharge: HOME | End: 2018-09-10
Payer: MEDICARE

## 2018-09-10 PROCEDURE — G0424 PULMONARY REHAB W EXER: HCPCS

## 2018-09-11 RX ORDER — ROFLUMILAST 500 UG/1
TABLET ORAL
Qty: 90 TABLET | Refills: 0 | Status: SHIPPED | OUTPATIENT
Start: 2018-09-11 | End: 2018-11-14 | Stop reason: SDUPTHER

## 2018-09-11 RX ORDER — VENLAFAXINE HYDROCHLORIDE 75 MG/1
CAPSULE, EXTENDED RELEASE ORAL
Qty: 90 CAPSULE | Refills: 0 | Status: SHIPPED | OUTPATIENT
Start: 2018-09-11 | End: 2018-11-12 | Stop reason: SDUPTHER

## 2018-09-17 ENCOUNTER — HOSPITAL ENCOUNTER (OUTPATIENT)
Dept: CARDIAC REHAB | Age: 68
Setting detail: THERAPIES SERIES
Discharge: HOME | End: 2018-09-17
Payer: MEDICARE

## 2018-09-17 PROCEDURE — G0424 PULMONARY REHAB W EXER: HCPCS

## 2018-09-19 ENCOUNTER — HOSPITAL ENCOUNTER (OUTPATIENT)
Dept: CARDIAC REHAB | Age: 68
Setting detail: THERAPIES SERIES
Discharge: HOME | End: 2018-09-19
Payer: MEDICARE

## 2018-09-19 ENCOUNTER — OFFICE VISIT (OUTPATIENT)
Dept: NEUROLOGY | Age: 68
End: 2018-09-19

## 2018-09-19 VITALS
DIASTOLIC BLOOD PRESSURE: 65 MMHG | SYSTOLIC BLOOD PRESSURE: 106 MMHG | OXYGEN SATURATION: 97 % | BODY MASS INDEX: 21.79 KG/M2 | WEIGHT: 111 LBS | HEART RATE: 90 BPM | HEIGHT: 60 IN

## 2018-09-19 DIAGNOSIS — E11.65 TYPE 2 DIABETES MELLITUS WITH HYPERGLYCEMIA, WITHOUT LONG-TERM CURRENT USE OF INSULIN (HCC): ICD-10-CM

## 2018-09-19 DIAGNOSIS — E78.2 MIXED HYPERLIPIDEMIA: ICD-10-CM

## 2018-09-19 DIAGNOSIS — F34.1 DYSTHYMIA: ICD-10-CM

## 2018-09-19 DIAGNOSIS — F51.01 PRIMARY INSOMNIA: ICD-10-CM

## 2018-09-19 DIAGNOSIS — G44.221 CHRONIC TENSION-TYPE HEADACHE, INTRACTABLE: Primary | ICD-10-CM

## 2018-09-19 PROCEDURE — 2022F DILAT RTA XM EVC RTNOPTHY: CPT | Performed by: PSYCHIATRY & NEUROLOGY

## 2018-09-19 PROCEDURE — 3017F COLORECTAL CA SCREEN DOC REV: CPT | Performed by: PSYCHIATRY & NEUROLOGY

## 2018-09-19 PROCEDURE — G0424 PULMONARY REHAB W EXER: HCPCS

## 2018-09-19 PROCEDURE — 4040F PNEUMOC VAC/ADMIN/RCVD: CPT | Performed by: PSYCHIATRY & NEUROLOGY

## 2018-09-19 PROCEDURE — 99213 OFFICE O/P EST LOW 20 MIN: CPT | Performed by: PSYCHIATRY & NEUROLOGY

## 2018-09-19 PROCEDURE — 1036F TOBACCO NON-USER: CPT | Performed by: PSYCHIATRY & NEUROLOGY

## 2018-09-19 PROCEDURE — 1123F ACP DISCUSS/DSCN MKR DOCD: CPT | Performed by: PSYCHIATRY & NEUROLOGY

## 2018-09-19 PROCEDURE — G8427 DOCREV CUR MEDS BY ELIG CLIN: HCPCS | Performed by: PSYCHIATRY & NEUROLOGY

## 2018-09-19 PROCEDURE — G8420 CALC BMI NORM PARAMETERS: HCPCS | Performed by: PSYCHIATRY & NEUROLOGY

## 2018-09-19 PROCEDURE — 1101F PT FALLS ASSESS-DOCD LE1/YR: CPT | Performed by: PSYCHIATRY & NEUROLOGY

## 2018-09-19 PROCEDURE — 1090F PRES/ABSN URINE INCON ASSESS: CPT | Performed by: PSYCHIATRY & NEUROLOGY

## 2018-09-19 PROCEDURE — G8399 PT W/DXA RESULTS DOCUMENT: HCPCS | Performed by: PSYCHIATRY & NEUROLOGY

## 2018-09-19 PROCEDURE — 3044F HG A1C LEVEL LT 7.0%: CPT | Performed by: PSYCHIATRY & NEUROLOGY

## 2018-09-19 RX ORDER — TOPIRAMATE 50 MG/1
TABLET, FILM COATED ORAL
Qty: 180 TABLET | Refills: 1 | Status: SHIPPED | OUTPATIENT
Start: 2018-09-19 | End: 2019-03-15 | Stop reason: SDUPTHER

## 2018-09-19 NOTE — PROGRESS NOTES
The patient came today for follow up regarding: chronic headaches    Since the patient's last visit, she was doing well with less headaches till last month when she ran out of Topamax. She is now having more headaches. Could be few times per week. Pain can be throbbing or sharp holocranial pain. No triggers. Degree is moderate and can last for hours. No other associated symptoms. She takes OTC PRN for these headaches. No recent falling or LOC. No SE from Topamax. She used to take 50 mg bid. Hx of DM. Her BSL has been controlled. Last A1c was 5.5. She denies recent falling or injury. History of hyperlipidemia on statin. She takes aspirin daily. History of depression on Effexor. No subsided ideation or thoughts. No other new symptoms today. Other review of system was unremarkable.       Past Medical History:   Diagnosis Date    Chronic airway obstruction, not elsewhere classified 3/4/08    Chronic kidney disease     incontinent    COPD (chronic obstructive pulmonary disease) (HCC)     Depression     Displacement of lumbar intervertebral disc without myelopathy 8/13/07    Edema 9/13/07    Emphysema of lung ([address]     Enthesopathy of hip region     Enthesopathy of hip region 3/5/07    Esophageal reflux 3/4/08    Hemorrhage of rectum and anus 11/14/07    Hyperlipidemia associated with type 2 diabetes mellitus ([address] 12/11/2017    Lumbar spondylosis 5/1/2018    Major depression, chronic 8/12/2015    Osteoporosis, unspecified 12/4/07    Other and unspecified hyperlipidemia 3/4/08    Pain in joint, shoulder region 3/4/08    Pneumonia     Pulmonary nodule     Recurrent major depressive disorder, in partial remission ([address] 5/4/2018    Rheumatic fever     S/P ileostomy ([address] 8/20/2011    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    Tension headache     Type 2 diabetes mellitus with hyperglycemia, without long-term current use of insulin ([address]     Unspecified asthma(493.90) 8/13/07    Unspecified ACCU-CHEK JOSIE PLUS strip USE TO TEST DAILY Yes Brigitte Gutierrez MD   furosemide (LASIX) 40 MG tablet TAKE 1 TABLET EVERY DAY Yes Brigitte Gutierrez MD   naproxen (NAPROSYN) 375 MG tablet TAKE 1 TABLET TWICE DAILY Yes Brigitte Gutierrez MD   alendronate (FOSAMAX) 35 MG tablet TAKE 1 TABLET EVERY 7 DAYS Yes Sabrnia Angeles MD   QUEtiapine (SEROQUEL) 25 MG tablet TAKE 1 TO 2 TABLETS BY MOUTH AT BEDTIME. Yes Brigitte Gutierrez MD   theophylline (THEOCHRON) 200 MG extended release tablet Take 1 tablet by mouth 2 times daily Yes Brigitte Gutierrez MD   vitamin D (CHOLECALCIFEROL) 400 UNITS TABS tablet Take 2 tablets by mouth daily Yes Sabrina Angeles MD   calcium carbonate (CALCIUM 600) 600 MG TABS tablet Take 2 tablets by mouth daily Yes Sabrina Angeles MD   Respiratory Therapy Supplies (NEBULIZER/TUBING/MOUTHPIECE) KIT 1 kit by Does not apply route daily as needed DX COPD J44.9 Yes Sabrina Angeles MD   Nebulizers (COMPRESSOR/NEBULIZER) MISC 1 Device by Does not apply route as needed DX COPD J44.9 Yes Sabrina Angeles MD   fluticasone (FLONASE) 50 MCG/ACT nasal spray 2 sprays by Nasal route daily. Yes Brigitte Gutierrez MD   Misc. Devices (ACAPELLA) MISC by Does not apply route 4 times daily.  Yes Sabrina Angeles MD     No Known Allergies  Social History   Substance Use Topics    Smoking status: Former Smoker     Packs/day: 1.00     Years: 30.00     Types: Cigarettes     Quit date: 1/1/1996    Smokeless tobacco: Never Used    Alcohol use No     Family History   Problem Relation Age of Onset    Asthma Daughter     Diabetes Daughter     Cancer Daughter         Uterine    Asthma Daughter     Diabetes Mother     Heart Failure Mother     Hypertension Mother     Heart Failure Brother     Diabetes Sister     Heart Failure Brother     Emphysema Neg Hx      Past Surgical History:   Procedure Laterality Date    ABDOMEN SURGERY  5-19-11    total abdominal colectomy iliostomy    CARPAL TUNNEL RELEASE right    CATARACT REMOVAL Bilateral     L 6/18/2013, R 07/01/2013    CATARACT REMOVAL WITH IMPLANT Right 7/1/13    COLECTOMY      and reversal     COLONOSCOPY  2009, 11/4/2011, 10/28/15    normal    CYST REMOVAL Right 6/20/14    Dr. Darius Ga; right ear pressure point cyst removal    EYE SURGERY Left 6/18/13    cataract with lens implant    HERNIA REPAIR  6/27/14    Open Vental Hernia Repair    HIP SURGERY      HYSTERECTOMY  1973    subtotal    NECK SURGERY      incision of windpipe, repair of windpipe defect    OTHER SURGICAL HISTORY  8/19/2011    hand assisted laparoscopic ileostomy reversal    OVARY REMOVAL      TRACHEAL SURGERY  2005    hx of trach     UMBILICAL HERNIA REPAIR  6/27/2014         Exam:   Constitutional:   Vitals:    09/19/18 1030   BP: 106/65   Pulse: 90   SpO2: 97%   Weight: 111 lb (50.3 kg)   Height: 5' (1.524 m)       General appearance: well-nourished. Eye: No icterus. No blurring of optic disc. Neck: supple  Cardiovascular: No carotid bruit. Heart: S1, S2         No lower leg edema with good pulsation. Mental Status: Oriented to person, place, problem, and time. Fluent speech. Good fund of knowledge. Normal attention span and concentration. Cranial Nerves:   II: Visual fields: Full to confrontation  III: Pupils: equal, round, reactive to light  III,IV,VI: Extra Ocular Movements are intact. No nystagmus  V: Facial sensation is intact to pin prick and light touch  VII: Facial strength and movements: intact and symmetric  VIII: Hearing: Intact to finger rub bilaterally  IX: Palate elevation is symmetric  XI: Shoulder shrug is intact  XII: Tongue movements are normal  Musculoskeletal: 5/5 in all 4 extremities. Normal tone. Reflexes: Bilateral biceps 2/4, triceps 2/4, brachial radialis 2/4, knee 2/4 and ankle 2/4. Planters: flexor bilaterally. Coordination: no pronator drift, no dysmetria with FNF testing. No tremors.   Sensation: normal to all

## 2018-09-21 ENCOUNTER — HOSPITAL ENCOUNTER (OUTPATIENT)
Dept: CARDIAC REHAB | Age: 68
Setting detail: THERAPIES SERIES
Discharge: HOME | End: 2018-09-21
Payer: MEDICARE

## 2018-09-21 PROCEDURE — G0424 PULMONARY REHAB W EXER: HCPCS

## 2018-09-24 ENCOUNTER — HOSPITAL ENCOUNTER (OUTPATIENT)
Dept: CARDIAC REHAB | Age: 68
Setting detail: THERAPIES SERIES
Discharge: HOME | End: 2018-09-24
Payer: MEDICARE

## 2018-09-24 PROCEDURE — G0424 PULMONARY REHAB W EXER: HCPCS

## 2018-09-26 ENCOUNTER — HOSPITAL ENCOUNTER (OUTPATIENT)
Dept: CARDIAC REHAB | Age: 68
Setting detail: THERAPIES SERIES
Discharge: HOME | End: 2018-09-26
Payer: MEDICARE

## 2018-09-26 PROCEDURE — G0424 PULMONARY REHAB W EXER: HCPCS

## 2018-09-28 ENCOUNTER — HOSPITAL ENCOUNTER (OUTPATIENT)
Dept: CARDIAC REHAB | Age: 68
Setting detail: THERAPIES SERIES
Discharge: HOME | End: 2018-09-28
Payer: MEDICARE

## 2018-09-28 PROCEDURE — G0424 PULMONARY REHAB W EXER: HCPCS

## 2018-10-01 ENCOUNTER — HOSPITAL ENCOUNTER (OUTPATIENT)
Dept: CARDIAC REHAB | Age: 68
Setting detail: THERAPIES SERIES
Discharge: HOME | End: 2018-10-01
Payer: MEDICARE

## 2018-10-01 PROCEDURE — G0424 PULMONARY REHAB W EXER: HCPCS

## 2018-10-03 ENCOUNTER — HOSPITAL ENCOUNTER (OUTPATIENT)
Dept: CARDIAC REHAB | Age: 68
Setting detail: THERAPIES SERIES
Discharge: HOME | End: 2018-10-03
Payer: MEDICARE

## 2018-10-03 PROCEDURE — G0424 PULMONARY REHAB W EXER: HCPCS

## 2018-10-05 ENCOUNTER — HOSPITAL ENCOUNTER (OUTPATIENT)
Dept: CARDIAC REHAB | Age: 68
Setting detail: THERAPIES SERIES
Discharge: HOME | End: 2018-10-05
Payer: MEDICARE

## 2018-10-05 PROCEDURE — G0424 PULMONARY REHAB W EXER: HCPCS

## 2018-10-08 ENCOUNTER — HOSPITAL ENCOUNTER (OUTPATIENT)
Dept: CARDIAC REHAB | Age: 68
Setting detail: THERAPIES SERIES
Discharge: HOME | End: 2018-10-08
Payer: MEDICARE

## 2018-10-08 PROCEDURE — G0424 PULMONARY REHAB W EXER: HCPCS

## 2018-10-10 ENCOUNTER — TELEPHONE (OUTPATIENT)
Dept: INTERNAL MEDICINE CLINIC | Age: 68
End: 2018-10-10

## 2018-10-10 RX ORDER — AZITHROMYCIN 250 MG/1
TABLET, FILM COATED ORAL
Qty: 1 PACKET | Refills: 0 | Status: SHIPPED | OUTPATIENT
Start: 2018-10-10 | End: 2018-11-05 | Stop reason: ALTCHOICE

## 2018-10-15 ENCOUNTER — HOSPITAL ENCOUNTER (OUTPATIENT)
Dept: CARDIAC REHAB | Age: 68
Setting detail: THERAPIES SERIES
Discharge: HOME | End: 2018-10-15
Payer: MEDICARE

## 2018-10-15 PROCEDURE — G0424 PULMONARY REHAB W EXER: HCPCS

## 2018-10-17 ENCOUNTER — HOSPITAL ENCOUNTER (OUTPATIENT)
Dept: CARDIAC REHAB | Age: 68
Setting detail: THERAPIES SERIES
Discharge: HOME | End: 2018-10-17
Payer: MEDICARE

## 2018-10-17 PROCEDURE — G0424 PULMONARY REHAB W EXER: HCPCS

## 2018-10-19 ENCOUNTER — HOSPITAL ENCOUNTER (OUTPATIENT)
Dept: CARDIAC REHAB | Age: 68
Setting detail: THERAPIES SERIES
Discharge: HOME | End: 2018-10-19
Payer: MEDICARE

## 2018-10-19 PROCEDURE — G0424 PULMONARY REHAB W EXER: HCPCS

## 2018-10-25 RX ORDER — FUROSEMIDE 40 MG/1
TABLET ORAL
Qty: 90 TABLET | Refills: 0 | Status: SHIPPED | OUTPATIENT
Start: 2018-10-25 | End: 2019-01-07 | Stop reason: SDUPTHER

## 2018-10-25 RX ORDER — POTASSIUM CHLORIDE 1500 MG/1
TABLET, EXTENDED RELEASE ORAL
Qty: 90 TABLET | Refills: 0 | Status: SHIPPED | OUTPATIENT
Start: 2018-10-25 | End: 2019-01-07 | Stop reason: SDUPTHER

## 2018-10-25 RX ORDER — ESOMEPRAZOLE MAGNESIUM 40 MG/1
CAPSULE, DELAYED RELEASE ORAL
Qty: 90 CAPSULE | Refills: 0 | Status: SHIPPED | OUTPATIENT
Start: 2018-10-25 | End: 2019-01-07 | Stop reason: SDUPTHER

## 2018-10-25 RX ORDER — TIOTROPIUM BROMIDE 18 UG/1
CAPSULE ORAL; RESPIRATORY (INHALATION)
Qty: 90 CAPSULE | Refills: 0 | Status: SHIPPED | OUTPATIENT
Start: 2018-10-25 | End: 2019-01-07 | Stop reason: SDUPTHER

## 2018-10-25 RX ORDER — NAPROXEN 375 MG/1
TABLET ORAL
Qty: 180 TABLET | Refills: 0 | Status: ON HOLD | OUTPATIENT
Start: 2018-10-25 | End: 2018-11-06 | Stop reason: HOSPADM

## 2018-10-25 RX ORDER — SIMVASTATIN 20 MG
TABLET ORAL
Qty: 90 TABLET | Refills: 0 | Status: SHIPPED | OUTPATIENT
Start: 2018-10-25 | End: 2019-01-07 | Stop reason: SDUPTHER

## 2018-10-29 RX ORDER — ALENDRONATE SODIUM 35 MG/1
TABLET ORAL
Qty: 12 TABLET | Refills: 0 | Status: SHIPPED | OUTPATIENT
Start: 2018-10-29 | End: 2018-12-27 | Stop reason: SDUPTHER

## 2018-10-31 ENCOUNTER — APPOINTMENT (OUTPATIENT)
Dept: CARDIAC REHAB | Age: 68
End: 2018-10-31
Payer: MEDICARE

## 2018-10-31 RX ORDER — METFORMIN HYDROCHLORIDE 500 MG/1
TABLET, EXTENDED RELEASE ORAL
Qty: 360 TABLET | Refills: 0 | Status: SHIPPED | OUTPATIENT
Start: 2018-10-31 | End: 2019-01-26 | Stop reason: SDUPTHER

## 2018-11-05 ENCOUNTER — HOSPITAL ENCOUNTER (OUTPATIENT)
Age: 68
Setting detail: OBSERVATION
Discharge: HOME | End: 2018-11-06
Attending: EMERGENCY MEDICINE | Admitting: INTERNAL MEDICINE
Payer: MEDICARE

## 2018-11-05 ENCOUNTER — APPOINTMENT (OUTPATIENT)
Dept: GENERAL RADIOLOGY | Age: 68
End: 2018-11-05
Payer: MEDICARE

## 2018-11-05 DIAGNOSIS — R07.9 CHEST PAIN, UNSPECIFIED TYPE: Primary | ICD-10-CM

## 2018-11-05 DIAGNOSIS — B02.9 HERPES ZOSTER WITHOUT COMPLICATION: ICD-10-CM

## 2018-11-05 PROBLEM — E11.9 TYPE 2 DIABETES MELLITUS WITHOUT COMPLICATION, WITHOUT LONG-TERM CURRENT USE OF INSULIN (HCC): Status: ACTIVE | Noted: 2017-12-11

## 2018-11-05 LAB
A/G RATIO: 1.5 (ref 1.1–2.2)
ALBUMIN SERPL-MCNC: 4.3 G/DL (ref 3.4–5)
ALP BLD-CCNC: 62 U/L (ref 40–129)
ALT SERPL-CCNC: <5 U/L (ref 10–40)
ANION GAP SERPL CALCULATED.3IONS-SCNC: 9 MMOL/L (ref 3–16)
AST SERPL-CCNC: 12 U/L (ref 15–37)
BASOPHILS ABSOLUTE: 0.1 K/UL (ref 0–0.2)
BASOPHILS RELATIVE PERCENT: 0.7 %
BILIRUB SERPL-MCNC: <0.2 MG/DL (ref 0–1)
BILIRUBIN URINE: NEGATIVE
BLOOD, URINE: NEGATIVE
BUN BLDV-MCNC: 25 MG/DL (ref 7–20)
CALCIUM SERPL-MCNC: 9.2 MG/DL (ref 8.3–10.6)
CHLORIDE BLD-SCNC: 109 MMOL/L (ref 99–110)
CLARITY: CLEAR
CO2: 23 MMOL/L (ref 21–32)
COLOR: NORMAL
CREAT SERPL-MCNC: 0.8 MG/DL (ref 0.6–1.2)
D DIMER: 220 NG/ML DDU (ref 0–229)
EKG ATRIAL RATE: 107 BPM
EKG DIAGNOSIS: NORMAL
EKG P AXIS: 77 DEGREES
EKG P-R INTERVAL: 168 MS
EKG Q-T INTERVAL: 322 MS
EKG QRS DURATION: 80 MS
EKG QTC CALCULATION (BAZETT): 429 MS
EKG R AXIS: 84 DEGREES
EKG T AXIS: 61 DEGREES
EKG VENTRICULAR RATE: 107 BPM
EOSINOPHILS ABSOLUTE: 0.2 K/UL (ref 0–0.6)
EOSINOPHILS RELATIVE PERCENT: 2.4 %
GFR AFRICAN AMERICAN: >60
GFR NON-AFRICAN AMERICAN: >60
GLOBULIN: 2.9 G/DL
GLUCOSE BLD-MCNC: 105 MG/DL (ref 70–99)
GLUCOSE BLD-MCNC: 74 MG/DL (ref 70–99)
GLUCOSE BLD-MCNC: 95 MG/DL (ref 70–99)
GLUCOSE URINE: NEGATIVE MG/DL
HCT VFR BLD CALC: 42.1 % (ref 36–48)
HEMOGLOBIN: 13.7 G/DL (ref 12–16)
KETONES, URINE: NEGATIVE MG/DL
LACTIC ACID, SEPSIS: 1 MMOL/L (ref 0.4–1.9)
LACTIC ACID, SEPSIS: 1.1 MMOL/L (ref 0.4–1.9)
LEUKOCYTE ESTERASE, URINE: NEGATIVE
LYMPHOCYTES ABSOLUTE: 1.2 K/UL (ref 1–5.1)
LYMPHOCYTES RELATIVE PERCENT: 12.6 %
MCH RBC QN AUTO: 30 PG (ref 26–34)
MCHC RBC AUTO-ENTMCNC: 32.6 G/DL (ref 31–36)
MCV RBC AUTO: 92 FL (ref 80–100)
MICROSCOPIC EXAMINATION: NORMAL
MONOCYTES ABSOLUTE: 0.7 K/UL (ref 0–1.3)
MONOCYTES RELATIVE PERCENT: 7.7 %
NEUTROPHILS ABSOLUTE: 7 K/UL (ref 1.7–7.7)
NEUTROPHILS RELATIVE PERCENT: 76.6 %
NITRITE, URINE: NEGATIVE
PDW BLD-RTO: 14.4 % (ref 12.4–15.4)
PERFORMED ON: NORMAL
PERFORMED ON: NORMAL
PH UA: 6.5
PLATELET # BLD: 277 K/UL (ref 135–450)
PMV BLD AUTO: 9.3 FL (ref 5–10.5)
POTASSIUM SERPL-SCNC: 4.2 MMOL/L (ref 3.5–5.1)
PRO-BNP: 68 PG/ML (ref 0–124)
PROTEIN UA: NEGATIVE MG/DL
RBC # BLD: 4.57 M/UL (ref 4–5.2)
SODIUM BLD-SCNC: 141 MMOL/L (ref 136–145)
SPECIFIC GRAVITY UA: 1.01
TOTAL PROTEIN: 7.2 G/DL (ref 6.4–8.2)
TROPONIN: <0.01 NG/ML
URINE REFLEX TO CULTURE: NORMAL
URINE TYPE: NORMAL
UROBILINOGEN, URINE: 0.2 E.U./DL
WBC # BLD: 9.1 K/UL (ref 4–11)

## 2018-11-05 PROCEDURE — 93005 ELECTROCARDIOGRAM TRACING: CPT | Performed by: NURSE PRACTITIONER

## 2018-11-05 PROCEDURE — 96365 THER/PROPH/DIAG IV INF INIT: CPT

## 2018-11-05 PROCEDURE — 99285 EMERGENCY DEPT VISIT HI MDM: CPT

## 2018-11-05 PROCEDURE — G0378 HOSPITAL OBSERVATION PER HR: HCPCS

## 2018-11-05 PROCEDURE — 85025 COMPLETE CBC W/AUTO DIFF WBC: CPT

## 2018-11-05 PROCEDURE — 6360000002 HC RX W HCPCS: Performed by: NURSE PRACTITIONER

## 2018-11-05 PROCEDURE — 85379 FIBRIN DEGRADATION QUANT: CPT

## 2018-11-05 PROCEDURE — 83605 ASSAY OF LACTIC ACID: CPT

## 2018-11-05 PROCEDURE — 2060000000 HC ICU INTERMEDIATE R&B

## 2018-11-05 PROCEDURE — 94761 N-INVAS EAR/PLS OXIMETRY MLT: CPT

## 2018-11-05 PROCEDURE — 96367 TX/PROPH/DG ADDL SEQ IV INF: CPT

## 2018-11-05 PROCEDURE — 6370000000 HC RX 637 (ALT 250 FOR IP): Performed by: INTERNAL MEDICINE

## 2018-11-05 PROCEDURE — 71045 X-RAY EXAM CHEST 1 VIEW: CPT

## 2018-11-05 PROCEDURE — 2580000003 HC RX 258: Performed by: NURSE PRACTITIONER

## 2018-11-05 PROCEDURE — 84484 ASSAY OF TROPONIN QUANT: CPT

## 2018-11-05 PROCEDURE — 36415 COLL VENOUS BLD VENIPUNCTURE: CPT

## 2018-11-05 PROCEDURE — 6370000000 HC RX 637 (ALT 250 FOR IP): Performed by: NURSE PRACTITIONER

## 2018-11-05 PROCEDURE — 96372 THER/PROPH/DIAG INJ SC/IM: CPT

## 2018-11-05 PROCEDURE — 2580000003 HC RX 258: Performed by: INTERNAL MEDICINE

## 2018-11-05 PROCEDURE — 87040 BLOOD CULTURE FOR BACTERIA: CPT

## 2018-11-05 PROCEDURE — 6360000002 HC RX W HCPCS: Performed by: INTERNAL MEDICINE

## 2018-11-05 PROCEDURE — 83880 ASSAY OF NATRIURETIC PEPTIDE: CPT

## 2018-11-05 PROCEDURE — 81003 URINALYSIS AUTO W/O SCOPE: CPT

## 2018-11-05 PROCEDURE — 94664 DEMO&/EVAL PT USE INHALER: CPT

## 2018-11-05 PROCEDURE — 80053 COMPREHEN METABOLIC PANEL: CPT

## 2018-11-05 PROCEDURE — 99219 PR INITIAL OBSERVATION CARE/DAY 50 MINUTES: CPT | Performed by: INTERNAL MEDICINE

## 2018-11-05 PROCEDURE — 93010 ELECTROCARDIOGRAM REPORT: CPT | Performed by: INTERNAL MEDICINE

## 2018-11-05 PROCEDURE — 94640 AIRWAY INHALATION TREATMENT: CPT

## 2018-11-05 RX ORDER — ONDANSETRON 2 MG/ML
4 INJECTION INTRAMUSCULAR; INTRAVENOUS EVERY 6 HOURS PRN
Status: DISCONTINUED | OUTPATIENT
Start: 2018-11-05 | End: 2018-11-06 | Stop reason: HOSPADM

## 2018-11-05 RX ORDER — THEOPHYLLINE ANHYDROUS 200 MG
200 TABLET, EXTENDED RELEASE 12 HR ORAL 2 TIMES DAILY
Status: DISCONTINUED | OUTPATIENT
Start: 2018-11-05 | End: 2018-11-05 | Stop reason: SDUPTHER

## 2018-11-05 RX ORDER — SODIUM CHLORIDE 0.9 % (FLUSH) 0.9 %
10 SYRINGE (ML) INJECTION PRN
Status: DISCONTINUED | OUTPATIENT
Start: 2018-11-05 | End: 2018-11-05

## 2018-11-05 RX ORDER — TOPIRAMATE 25 MG/1
50 TABLET ORAL 2 TIMES DAILY
Status: DISCONTINUED | OUTPATIENT
Start: 2018-11-05 | End: 2018-11-06 | Stop reason: HOSPADM

## 2018-11-05 RX ORDER — SODIUM CHLORIDE 0.9 % (FLUSH) 0.9 %
10 SYRINGE (ML) INJECTION PRN
Status: DISCONTINUED | OUTPATIENT
Start: 2018-11-05 | End: 2018-11-06 | Stop reason: HOSPADM

## 2018-11-05 RX ORDER — ASPIRIN 81 MG/1
81 TABLET, CHEWABLE ORAL DAILY
Status: DISCONTINUED | OUTPATIENT
Start: 2018-11-06 | End: 2018-11-06 | Stop reason: HOSPADM

## 2018-11-05 RX ORDER — SIMVASTATIN 10 MG
20 TABLET ORAL NIGHTLY
Status: DISCONTINUED | OUTPATIENT
Start: 2018-11-05 | End: 2018-11-06 | Stop reason: HOSPADM

## 2018-11-05 RX ORDER — GABAPENTIN 100 MG/1
200 CAPSULE ORAL 3 TIMES DAILY
Status: DISCONTINUED | OUTPATIENT
Start: 2018-11-05 | End: 2018-11-06 | Stop reason: HOSPADM

## 2018-11-05 RX ORDER — NITROGLYCERIN 0.4 MG/1
0.4 TABLET SUBLINGUAL EVERY 5 MIN PRN
Status: DISCONTINUED | OUTPATIENT
Start: 2018-11-05 | End: 2018-11-06 | Stop reason: HOSPADM

## 2018-11-05 RX ORDER — DEXTROSE MONOHYDRATE 50 MG/ML
100 INJECTION, SOLUTION INTRAVENOUS PRN
Status: DISCONTINUED | OUTPATIENT
Start: 2018-11-05 | End: 2018-11-06 | Stop reason: HOSPADM

## 2018-11-05 RX ORDER — ATORVASTATIN CALCIUM 40 MG/1
40 TABLET, FILM COATED ORAL NIGHTLY
Status: DISCONTINUED | OUTPATIENT
Start: 2018-11-05 | End: 2018-11-05

## 2018-11-05 RX ORDER — SODIUM CHLORIDE 0.9 % (FLUSH) 0.9 %
10 SYRINGE (ML) INJECTION EVERY 12 HOURS SCHEDULED
Status: DISCONTINUED | OUTPATIENT
Start: 2018-11-05 | End: 2018-11-05

## 2018-11-05 RX ORDER — HYDROCODONE BITARTRATE AND ACETAMINOPHEN 5; 325 MG/1; MG/1
1 TABLET ORAL EVERY 6 HOURS PRN
Status: DISCONTINUED | OUTPATIENT
Start: 2018-11-05 | End: 2018-11-06 | Stop reason: HOSPADM

## 2018-11-05 RX ORDER — POTASSIUM CHLORIDE 20 MEQ/1
20 TABLET, EXTENDED RELEASE ORAL
Status: DISCONTINUED | OUTPATIENT
Start: 2018-11-06 | End: 2018-11-06 | Stop reason: HOSPADM

## 2018-11-05 RX ORDER — SODIUM CHLORIDE 0.9 % (FLUSH) 0.9 %
10 SYRINGE (ML) INJECTION EVERY 12 HOURS SCHEDULED
Status: DISCONTINUED | OUTPATIENT
Start: 2018-11-05 | End: 2018-11-06 | Stop reason: HOSPADM

## 2018-11-05 RX ORDER — VENLAFAXINE HYDROCHLORIDE 75 MG/1
75 CAPSULE, EXTENDED RELEASE ORAL
Status: DISCONTINUED | OUTPATIENT
Start: 2018-11-06 | End: 2018-11-06 | Stop reason: HOSPADM

## 2018-11-05 RX ORDER — QUETIAPINE FUMARATE 25 MG/1
25 TABLET, FILM COATED ORAL NIGHTLY
Status: DISCONTINUED | OUTPATIENT
Start: 2018-11-05 | End: 2018-11-06 | Stop reason: HOSPADM

## 2018-11-05 RX ORDER — 0.9 % SODIUM CHLORIDE 0.9 %
30 INTRAVENOUS SOLUTION INTRAVENOUS ONCE
Status: COMPLETED | OUTPATIENT
Start: 2018-11-05 | End: 2018-11-05

## 2018-11-05 RX ORDER — FUROSEMIDE 40 MG/1
40 TABLET ORAL DAILY
Status: DISCONTINUED | OUTPATIENT
Start: 2018-11-05 | End: 2018-11-06 | Stop reason: HOSPADM

## 2018-11-05 RX ORDER — PANTOPRAZOLE SODIUM 40 MG/1
40 TABLET, DELAYED RELEASE ORAL
Status: DISCONTINUED | OUTPATIENT
Start: 2018-11-06 | End: 2018-11-06 | Stop reason: HOSPADM

## 2018-11-05 RX ORDER — NICOTINE POLACRILEX 4 MG
15 LOZENGE BUCCAL PRN
Status: DISCONTINUED | OUTPATIENT
Start: 2018-11-05 | End: 2018-11-06 | Stop reason: HOSPADM

## 2018-11-05 RX ORDER — DEXTROSE MONOHYDRATE 25 G/50ML
12.5 INJECTION, SOLUTION INTRAVENOUS PRN
Status: DISCONTINUED | OUTPATIENT
Start: 2018-11-05 | End: 2018-11-06 | Stop reason: HOSPADM

## 2018-11-05 RX ORDER — ACYCLOVIR 800 MG/1
800 TABLET ORAL
Status: DISCONTINUED | OUTPATIENT
Start: 2018-11-05 | End: 2018-11-05

## 2018-11-05 RX ORDER — THEOPHYLLINE 400 MG/1
200 TABLET, EXTENDED RELEASE ORAL 2 TIMES DAILY
Status: DISCONTINUED | OUTPATIENT
Start: 2018-11-05 | End: 2018-11-06 | Stop reason: HOSPADM

## 2018-11-05 RX ADMIN — TOPIRAMATE 50 MG: 25 TABLET, FILM COATED ORAL at 21:44

## 2018-11-05 RX ADMIN — PIPERACILLIN SODIUM AND TAZOBACTAM SODIUM 3.38 G: 3; .375 INJECTION, POWDER, LYOPHILIZED, FOR SOLUTION INTRAVENOUS at 12:12

## 2018-11-05 RX ADMIN — SIMVASTATIN 20 MG: 10 TABLET, FILM COATED ORAL at 21:44

## 2018-11-05 RX ADMIN — QUETIAPINE FUMARATE 25 MG: 25 TABLET ORAL at 21:44

## 2018-11-05 RX ADMIN — ACYCLOVIR 800 MG: 800 TABLET ORAL at 15:04

## 2018-11-05 RX ADMIN — GABAPENTIN 200 MG: 100 CAPSULE ORAL at 17:09

## 2018-11-05 RX ADMIN — SODIUM CHLORIDE 1416 ML: 9 INJECTION, SOLUTION INTRAVENOUS at 12:12

## 2018-11-05 RX ADMIN — Medication 2 PUFF: at 19:19

## 2018-11-05 RX ADMIN — ACYCLOVIR 800 MG: 800 TABLET ORAL at 12:47

## 2018-11-05 RX ADMIN — ENOXAPARIN SODIUM 40 MG: 40 INJECTION SUBCUTANEOUS at 16:10

## 2018-11-05 RX ADMIN — THEOPHYLLINE 200 MG: 400 TABLET, EXTENDED RELEASE ORAL at 21:44

## 2018-11-05 RX ADMIN — Medication 10 ML: at 21:43

## 2018-11-05 RX ADMIN — GABAPENTIN 200 MG: 100 CAPSULE ORAL at 21:44

## 2018-11-05 RX ADMIN — VANCOMYCIN HYDROCHLORIDE 750 MG: 1 INJECTION, POWDER, LYOPHILIZED, FOR SOLUTION INTRAVENOUS at 12:28

## 2018-11-05 ASSESSMENT — ENCOUNTER SYMPTOMS
SHORTNESS OF BREATH: 0
COUGH: 1
ABDOMINAL PAIN: 0

## 2018-11-05 ASSESSMENT — PAIN DESCRIPTION - PAIN TYPE
TYPE: ACUTE PAIN

## 2018-11-05 ASSESSMENT — PAIN DESCRIPTION - LOCATION
LOCATION: CHEST
LOCATION: CHEST
LOCATION: BACK;CHEST

## 2018-11-05 ASSESSMENT — PAIN DESCRIPTION - DESCRIPTORS
DESCRIPTORS: SHARP;SHOOTING
DESCRIPTORS: SHOOTING

## 2018-11-05 ASSESSMENT — PAIN SCALES - GENERAL
PAINLEVEL_OUTOF10: 4
PAINLEVEL_OUTOF10: 0
PAINLEVEL_OUTOF10: 7

## 2018-11-05 ASSESSMENT — PAIN DESCRIPTION - DIRECTION
RADIATING_TOWARDS: RIGHT SIDE
RADIATING_TOWARDS: TO RIGHT SIDE

## 2018-11-05 ASSESSMENT — PAIN DESCRIPTION - ONSET: ONSET: ON-GOING

## 2018-11-05 ASSESSMENT — HEART SCORE: ECG: 0

## 2018-11-05 ASSESSMENT — PAIN DESCRIPTION - FREQUENCY
FREQUENCY: CONTINUOUS
FREQUENCY: CONTINUOUS

## 2018-11-05 ASSESSMENT — PAIN DESCRIPTION - ORIENTATION
ORIENTATION: OUTER
ORIENTATION: OUTER

## 2018-11-05 ASSESSMENT — PAIN DESCRIPTION - PROGRESSION: CLINICAL_PROGRESSION: NOT CHANGED

## 2018-11-05 NOTE — ED PROVIDER NOTES
diagnostic, treatment, and disposition decisions were made by myself in conjunction with the MARIA ISABEL/resident. For further details of the patient's emergency department visit, please see MARIA ISABEL/resident documentation. Initial history and physical exam information was obtained by the MARIA ISABEL/resident, also dictated a record of this visit. I independently examined and evaluated this patient and participated in the diagnostic, treatment and disposition decisions. The note was completed using Dragon voice recognition transcription. Every effort was made to ensure accuracy; however, inadvertent transcription errors may be present despite my best efforts to edit errors.     Munira Llanes MD  [address] [place] [address]        Munira Llanes MD  11/06/18 2730

## 2018-11-05 NOTE — ED NOTES
Bedside report given to PCU RN, Pt transported to PCU via wheelchair. Pt vitals stable at this time.       Deven Back RN  11/05/18 0045

## 2018-11-05 NOTE — ED PROVIDER NOTES
[address] ED  eMERGENCY dEPARTMENT eNCOUnter        Pt Name: Eric Lebron  MRN: [id number]  [place] 1950  Date of evaluation: 11/5/2018  Provider: SAUL Hollingsworth - DARLEEN  PCP: Taylor Ndiaye MD  ED Attending: No att. providers found    [facility]       Chief Complaint   Patient presents with    Chest Pain     pain right chest to right shoulder and into back started last thursdah       HISTORY OF PRESENT ILLNESS   (Location/Symptom, Timing/Onset, Context/Setting, Quality, Duration, Modifying Factors, Severity)  Note limiting factors. Eric Lebron is a 70 y.o. female for Right-sided chest pain. Onset was 4 days ago. Durationhas been progressively getting worse since the onset. Context includes patient reports that she started having right-sided chest pain that radiates to her shoulder blades on Thursday. Patient states that it is worse with movement and deep breath. It is reproducible. Alleviating factors include nothing. Aggravating factors include nothing. Pain is 7/10. Nothing has been used for pain today. Nursing Notes were all reviewed and agreed with or any disagreements were addressed  in the HPI. REVIEW OF SYSTEMS  (2-9 systems for level 4, 10 or more for level 5)     Review of Systems   Constitutional: Negative for fever. Respiratory: Positive for cough. Negative for shortness of breath. Cardiovascular: Positive for chest pain. Gastrointestinal: Negative for abdominal pain. Genitourinary: Negative for difficulty urinating. All other systems reviewed and are negative. Positivesand Pertinent negatives as per HPI. Except as noted above in the ROS, all other systems were reviewed and negative.        PAST MEDICAL HISTORY     Past Medical History:   Diagnosis Date    Chronic airway obstruction, not elsewhere classified 3/4/08    Chronic kidney disease     incontinent    COPD (chronic obstructive pulmonary disease) [facility]     had improvement of her heart. Patient does have a rash to her privates consistent with shingles. Patient was given IV fluids acyclovir and IV antibiotics. Left eye is Prateek Moll reviewed and interpreted. Consult was placed to the hospitalist for admission. Hospitalist initially declined admitting the patient however Dr. Tawanda Cuevas talked to the hospitalist and he has not accepted the patient. Patient's care was transitioned to the hospitalist at this time. The patient tolerated their visit well. They were seen and evaluated by the attendingphysician, No att. providers found who agreed with the assessment and plan. The patient and / or the family were informed of the results of any tests, a time was given to answer questions, a plan was proposed and they agreed Adam Wilson. FINAL IMPRESSION      1. Chest pain, unspecified type    2. Herpes zoster without complication          DISPOSITION/PLAN   DISPOSITION Admitted 11/05/2018 02:14:32 PM      PATIENT REFERRED TO:  Arnel Scott MD  [address], [address] [facility]  [phone]            DISCHARGE MEDICATIONS:  New Prescriptions    No medications on file       DISCONTINUED MEDICATIONS:  Discontinued Medications    AZITHROMYCIN (ZITHROMAX) 250 MG TABLET    Take 2 tabs (500 mg) on Day 1, and take 1 tab (250 mg) on days 2 through 5.     PREDNISONE (DELTASONE) 1 MG TABLET    Prednisone 2 mg/day every other day for 4 weeks then 1 mg every other day for 4 weeks then stop              (Please note that portions of this note were completed with a voice recognition program.  Efforts were made to edit the dictations but occasionally words are mis-transcribed.)    SAUL Wise CNP (electronically signed)     SAUL Wise CNP  11/05/18 8393

## 2018-11-06 VITALS
BODY MASS INDEX: 20.47 KG/M2 | HEIGHT: 60 IN | OXYGEN SATURATION: 96 % | WEIGHT: 104.28 LBS | HEART RATE: 86 BPM | RESPIRATION RATE: 16 BRPM | SYSTOLIC BLOOD PRESSURE: 122 MMHG | DIASTOLIC BLOOD PRESSURE: 78 MMHG | TEMPERATURE: 97.6 F

## 2018-11-06 LAB
CHOLESTEROL, TOTAL: 118 MG/DL (ref 0–199)
EKG ATRIAL RATE: 91 BPM
EKG DIAGNOSIS: NORMAL
EKG P AXIS: 78 DEGREES
EKG P-R INTERVAL: 202 MS
EKG Q-T INTERVAL: 346 MS
EKG QRS DURATION: 92 MS
EKG QTC CALCULATION (BAZETT): 425 MS
EKG R AXIS: 79 DEGREES
EKG T AXIS: 66 DEGREES
EKG VENTRICULAR RATE: 91 BPM
GLUCOSE BLD-MCNC: 86 MG/DL (ref 70–99)
GLUCOSE BLD-MCNC: 93 MG/DL (ref 70–99)
HCT VFR BLD CALC: 35.3 % (ref 36–48)
HDLC SERPL-MCNC: 38 MG/DL (ref 40–60)
HEMOGLOBIN: 11.3 G/DL (ref 12–16)
LDL CHOLESTEROL CALCULATED: 58 MG/DL
MCH RBC QN AUTO: 29.9 PG (ref 26–34)
MCHC RBC AUTO-ENTMCNC: 32 G/DL (ref 31–36)
MCV RBC AUTO: 93.3 FL (ref 80–100)
PDW BLD-RTO: 14.5 % (ref 12.4–15.4)
PERFORMED ON: NORMAL
PERFORMED ON: NORMAL
PLATELET # BLD: 212 K/UL (ref 135–450)
PMV BLD AUTO: 9.6 FL (ref 5–10.5)
RBC # BLD: 3.79 M/UL (ref 4–5.2)
TRIGL SERPL-MCNC: 110 MG/DL (ref 0–150)
VLDLC SERPL CALC-MCNC: 22 MG/DL
WBC # BLD: 6.4 K/UL (ref 4–11)

## 2018-11-06 PROCEDURE — 93005 ELECTROCARDIOGRAM TRACING: CPT | Performed by: INTERNAL MEDICINE

## 2018-11-06 PROCEDURE — 2580000003 HC RX 258: Performed by: INTERNAL MEDICINE

## 2018-11-06 PROCEDURE — 94761 N-INVAS EAR/PLS OXIMETRY MLT: CPT

## 2018-11-06 PROCEDURE — 96372 THER/PROPH/DIAG INJ SC/IM: CPT

## 2018-11-06 PROCEDURE — 6370000000 HC RX 637 (ALT 250 FOR IP): Performed by: INTERNAL MEDICINE

## 2018-11-06 PROCEDURE — 94640 AIRWAY INHALATION TREATMENT: CPT

## 2018-11-06 PROCEDURE — 99217 PR OBSERVATION CARE DISCHARGE MANAGEMENT: CPT | Performed by: INTERNAL MEDICINE

## 2018-11-06 PROCEDURE — G0378 HOSPITAL OBSERVATION PER HR: HCPCS

## 2018-11-06 PROCEDURE — 6360000002 HC RX W HCPCS: Performed by: INTERNAL MEDICINE

## 2018-11-06 PROCEDURE — 36415 COLL VENOUS BLD VENIPUNCTURE: CPT

## 2018-11-06 PROCEDURE — 93010 ELECTROCARDIOGRAM REPORT: CPT | Performed by: INTERNAL MEDICINE

## 2018-11-06 PROCEDURE — 80061 LIPID PANEL: CPT

## 2018-11-06 PROCEDURE — 85027 COMPLETE CBC AUTOMATED: CPT

## 2018-11-06 RX ORDER — GABAPENTIN 100 MG/1
200 CAPSULE ORAL 3 TIMES DAILY
Qty: 60 CAPSULE | Refills: 0 | Status: SHIPPED | OUTPATIENT
Start: 2018-11-06 | End: 2019-09-19

## 2018-11-06 RX ORDER — ACYCLOVIR 800 MG/1
800 TABLET ORAL
Status: DISCONTINUED | OUTPATIENT
Start: 2018-11-06 | End: 2018-11-06 | Stop reason: HOSPADM

## 2018-11-06 RX ORDER — ACYCLOVIR 800 MG/1
800 TABLET ORAL
Qty: 25 TABLET | Refills: 0 | Status: SHIPPED | OUTPATIENT
Start: 2018-11-06 | End: 2018-11-11

## 2018-11-06 RX ADMIN — VENLAFAXINE HYDROCHLORIDE 75 MG: 75 CAPSULE, EXTENDED RELEASE ORAL at 08:59

## 2018-11-06 RX ADMIN — PANTOPRAZOLE SODIUM 40 MG: 40 TABLET, DELAYED RELEASE ORAL at 05:55

## 2018-11-06 RX ADMIN — ASPIRIN 81 MG 81 MG: 81 TABLET ORAL at 11:56

## 2018-11-06 RX ADMIN — LINAGLIPTIN 5 MG: 5 TABLET, FILM COATED ORAL at 08:59

## 2018-11-06 RX ADMIN — ENOXAPARIN SODIUM 40 MG: 40 INJECTION SUBCUTANEOUS at 08:59

## 2018-11-06 RX ADMIN — TIOTROPIUM BROMIDE 18 MCG: 18 CAPSULE ORAL; RESPIRATORY (INHALATION) at 08:16

## 2018-11-06 RX ADMIN — POTASSIUM CHLORIDE 20 MEQ: 20 TABLET, EXTENDED RELEASE ORAL at 08:59

## 2018-11-06 RX ADMIN — GABAPENTIN 200 MG: 100 CAPSULE ORAL at 13:50

## 2018-11-06 RX ADMIN — ROFLUMILAST 500 MCG: 500 TABLET ORAL at 09:00

## 2018-11-06 RX ADMIN — ACYCLOVIR 800 MG: 800 TABLET ORAL at 11:54

## 2018-11-06 RX ADMIN — THEOPHYLLINE 200 MG: 400 TABLET, EXTENDED RELEASE ORAL at 09:00

## 2018-11-06 RX ADMIN — ACYCLOVIR 800 MG: 800 TABLET ORAL at 13:50

## 2018-11-06 RX ADMIN — TOPIRAMATE 50 MG: 25 TABLET, FILM COATED ORAL at 08:59

## 2018-11-06 RX ADMIN — Medication 10 ML: at 08:59

## 2018-11-06 RX ADMIN — Medication 2 PUFF: at 08:16

## 2018-11-06 RX ADMIN — GABAPENTIN 200 MG: 100 CAPSULE ORAL at 08:58

## 2018-11-06 RX ADMIN — FUROSEMIDE 40 MG: 40 TABLET ORAL at 09:01

## 2018-11-06 ASSESSMENT — PAIN SCALES - GENERAL: PAINLEVEL_OUTOF10: 0

## 2018-11-06 NOTE — DISCHARGE SUMMARY
tablet  Commonly known as:  CHOLECALCIFEROL  Take 2 tablets by mouth daily        * This list has 2 medication(s) that are the same as other medications prescribed for you. Read the directions carefully, and ask your doctor or other care provider to review them with you. STOP taking these medications    azithromycin 250 MG tablet  Commonly known as:  ZITHROMAX     naproxen 375 MG tablet  Commonly known as:  NAPROSYN     predniSONE 1 MG tablet  Commonly known as:  Sandy Cure           Where to Get Your Medications      These medications were sent to [facility], [address] [place] [address]  [id number], [facility]    Phone:  [phone]   · acyclovir 800 MG tablet  · gabapentin 100 MG capsule           Discharged in stable condition to home. Follow Up: Follow up with PCP in 1 week. ADELE Conde.

## 2018-11-06 NOTE — PROGRESS NOTES
Bedside shift report given to [facility] transferred

## 2018-11-06 NOTE — PROGRESS NOTES
Patient awake and quiet in bed. No s/s of distress noted. Patient rates chest pain when assessed a 4/10 & states it is tolerable, pt states \"the same pain she has had since admission\". Call light within reach. Shift assessment completed at this time. Will continue to monitor.

## 2018-11-07 ENCOUNTER — CARE COORDINATION (OUTPATIENT)
Dept: CASE MANAGEMENT | Age: 68
End: 2018-11-07

## 2018-11-10 LAB
BLOOD CULTURE, ROUTINE: NORMAL
CULTURE, BLOOD 2: NORMAL

## 2018-11-13 ENCOUNTER — CARE COORDINATION (OUTPATIENT)
Dept: CASE MANAGEMENT | Age: 68
End: 2018-11-13

## 2018-11-13 RX ORDER — VENLAFAXINE HYDROCHLORIDE 75 MG/1
CAPSULE, EXTENDED RELEASE ORAL
Qty: 90 CAPSULE | Refills: 0 | Status: SHIPPED | OUTPATIENT
Start: 2018-11-13 | End: 2019-01-14 | Stop reason: SDUPTHER

## 2018-11-13 NOTE — CARE COORDINATION
[facility] [facility] Transitions Follow Up Call    2018    Patient: Stefanie Stallworth  Patient : 1950   MRN: [id number]  Reason for Admission: CP  Discharge Date: 18 RARS: Readmission Risk Score: 23       Spoke with: Walter Hicks (patient)    Care Transitions Subsequent and Final Call    Schedule Follow Up Appointment with PCP:  Completed  Subsequent and Final Calls  Do you have any ongoing symptoms?:  No  Have your medications changed?:  No  Do you have any questions related to your medications?:  No  Do you currently have any active services?:  No  Do you have any needs or concerns that I can assist you with?:  No  Identified Barriers:  None  Care Transitions Interventions  No Identified Needs  Other Interventions:          Denies CP, palpitations, SOB. She c/o soreness but feels she is doing well. Sees PCP on Monday. Denies needs. Care transition will follow up after TCM visit.     Follow Up  Future Appointments  Date Time Provider Estefani Ng   2018 3:00 PM MD [facility] None   2019 9:30 AM MD CHERY Keene [facility]   3/21/2019 10:00 AM Bhumika Woodruff MD [facility]       Shreya Darling RN

## 2018-11-15 RX ORDER — ROFLUMILAST 500 UG/1
TABLET ORAL
Qty: 90 TABLET | Refills: 0 | Status: SHIPPED | OUTPATIENT
Start: 2018-11-15 | End: 2019-01-17 | Stop reason: SDUPTHER

## 2018-11-21 ENCOUNTER — CARE COORDINATION (OUTPATIENT)
Dept: CASE MANAGEMENT | Age: 68
End: 2018-11-21

## 2018-11-30 RX ORDER — SITAGLIPTIN 100 MG/1
100 TABLET, FILM COATED ORAL DAILY
Qty: 90 TABLET | Refills: 0 | Status: SHIPPED | OUTPATIENT
Start: 2018-11-30 | End: 2019-02-24 | Stop reason: SDUPTHER

## 2018-12-04 RX ORDER — GABAPENTIN 300 MG/1
300 CAPSULE ORAL 3 TIMES DAILY
Qty: 90 CAPSULE | Refills: 0 | Status: SHIPPED | OUTPATIENT
Start: 2018-12-04 | End: 2019-04-25 | Stop reason: SDUPTHER

## 2018-12-07 RX ORDER — QUETIAPINE FUMARATE 25 MG/1
TABLET, FILM COATED ORAL
Qty: 180 TABLET | Refills: 0 | Status: SHIPPED | OUTPATIENT
Start: 2018-12-07 | End: 2019-03-05 | Stop reason: SDUPTHER

## 2018-12-27 RX ORDER — ALENDRONATE SODIUM 35 MG/1
TABLET ORAL
Qty: 12 TABLET | Refills: 0 | Status: SHIPPED | OUTPATIENT
Start: 2018-12-27 | End: 2019-02-27 | Stop reason: SDUPTHER

## 2019-01-08 RX ORDER — TIOTROPIUM BROMIDE 18 UG/1
CAPSULE ORAL; RESPIRATORY (INHALATION)
Qty: 90 CAPSULE | Refills: 0 | Status: SHIPPED | OUTPATIENT
Start: 2019-01-08 | End: 2019-03-11 | Stop reason: SDUPTHER

## 2019-01-08 RX ORDER — NAPROXEN 375 MG/1
TABLET ORAL
Qty: 180 TABLET | Refills: 0 | Status: SHIPPED | OUTPATIENT
Start: 2019-01-08 | End: 2019-03-11 | Stop reason: SDUPTHER

## 2019-01-08 RX ORDER — ESOMEPRAZOLE MAGNESIUM 40 MG/1
CAPSULE, DELAYED RELEASE ORAL
Qty: 90 CAPSULE | Refills: 0 | Status: SHIPPED | OUTPATIENT
Start: 2019-01-08 | End: 2019-03-11 | Stop reason: SDUPTHER

## 2019-01-08 RX ORDER — POTASSIUM CHLORIDE 1500 MG/1
TABLET, EXTENDED RELEASE ORAL
Qty: 90 TABLET | Refills: 0 | Status: SHIPPED | OUTPATIENT
Start: 2019-01-08 | End: 2019-03-11 | Stop reason: SDUPTHER

## 2019-01-08 RX ORDER — FUROSEMIDE 40 MG/1
TABLET ORAL
Qty: 90 TABLET | Refills: 0 | Status: SHIPPED | OUTPATIENT
Start: 2019-01-08 | End: 2019-03-11 | Stop reason: SDUPTHER

## 2019-01-08 RX ORDER — SIMVASTATIN 20 MG
TABLET ORAL
Qty: 90 TABLET | Refills: 0 | Status: SHIPPED | OUTPATIENT
Start: 2019-01-08 | End: 2019-03-11 | Stop reason: SDUPTHER

## 2019-01-12 RX ORDER — LANCING DEVICE/LANCETS
KIT MISCELLANEOUS
Qty: 1 KIT | Refills: 2 | Status: SHIPPED | OUTPATIENT
Start: 2019-01-12

## 2019-01-12 RX ORDER — BLOOD-GLUCOSE METER
EACH MISCELLANEOUS
Qty: 1 KIT | Refills: 0 | Status: SHIPPED | OUTPATIENT
Start: 2019-01-12

## 2019-01-12 RX ORDER — ALBUTEROL SULFATE 90 UG/1
AEROSOL, METERED RESPIRATORY (INHALATION)
Qty: 3 INHALER | Refills: 3 | Status: SHIPPED | OUTPATIENT
Start: 2019-01-12 | End: 2019-09-21 | Stop reason: SDUPTHER

## 2019-01-16 ENCOUNTER — OFFICE VISIT (OUTPATIENT)
Dept: INTERNAL MEDICINE CLINIC | Age: 69
End: 2019-01-16

## 2019-01-16 VITALS
WEIGHT: 100 LBS | BODY MASS INDEX: 19.63 KG/M2 | DIASTOLIC BLOOD PRESSURE: 60 MMHG | RESPIRATION RATE: 14 BRPM | HEART RATE: 70 BPM | HEIGHT: 60 IN | SYSTOLIC BLOOD PRESSURE: 110 MMHG

## 2019-01-16 DIAGNOSIS — R63.4 WEIGHT LOSS: ICD-10-CM

## 2019-01-16 DIAGNOSIS — E78.5 HYPERLIPIDEMIA ASSOCIATED WITH TYPE 2 DIABETES MELLITUS (HCC): ICD-10-CM

## 2019-01-16 DIAGNOSIS — E11.9 TYPE 2 DIABETES MELLITUS WITHOUT COMPLICATION, WITHOUT LONG-TERM CURRENT USE OF INSULIN (HCC): ICD-10-CM

## 2019-01-16 DIAGNOSIS — E11.9 TYPE 2 DIABETES MELLITUS WITHOUT COMPLICATION, WITHOUT LONG-TERM CURRENT USE OF INSULIN (HCC): Primary | ICD-10-CM

## 2019-01-16 DIAGNOSIS — K21.9 GASTROESOPHAGEAL REFLUX DISEASE WITHOUT ESOPHAGITIS: ICD-10-CM

## 2019-01-16 DIAGNOSIS — E11.69 HYPERLIPIDEMIA ASSOCIATED WITH TYPE 2 DIABETES MELLITUS (HCC): ICD-10-CM

## 2019-01-16 DIAGNOSIS — J44.9 CHRONIC OBSTRUCTIVE PULMONARY DISEASE, UNSPECIFIED COPD TYPE (HCC): ICD-10-CM

## 2019-01-16 DIAGNOSIS — F51.01 PRIMARY INSOMNIA: ICD-10-CM

## 2019-01-16 DIAGNOSIS — F33.41 RECURRENT MAJOR DEPRESSIVE DISORDER, IN PARTIAL REMISSION (HCC): ICD-10-CM

## 2019-01-16 DIAGNOSIS — G44.221 CHRONIC TENSION-TYPE HEADACHE, INTRACTABLE: ICD-10-CM

## 2019-01-16 PROCEDURE — 3017F COLORECTAL CA SCREEN DOC REV: CPT | Performed by: INTERNAL MEDICINE

## 2019-01-16 PROCEDURE — 1036F TOBACCO NON-USER: CPT | Performed by: INTERNAL MEDICINE

## 2019-01-16 PROCEDURE — 1101F PT FALLS ASSESS-DOCD LE1/YR: CPT | Performed by: INTERNAL MEDICINE

## 2019-01-16 PROCEDURE — 1123F ACP DISCUSS/DSCN MKR DOCD: CPT | Performed by: INTERNAL MEDICINE

## 2019-01-16 PROCEDURE — 3023F SPIROM DOC REV: CPT | Performed by: INTERNAL MEDICINE

## 2019-01-16 PROCEDURE — 3046F HEMOGLOBIN A1C LEVEL >9.0%: CPT | Performed by: INTERNAL MEDICINE

## 2019-01-16 PROCEDURE — G8399 PT W/DXA RESULTS DOCUMENT: HCPCS | Performed by: INTERNAL MEDICINE

## 2019-01-16 PROCEDURE — G8427 DOCREV CUR MEDS BY ELIG CLIN: HCPCS | Performed by: INTERNAL MEDICINE

## 2019-01-16 PROCEDURE — G8926 SPIRO NO PERF OR DOC: HCPCS | Performed by: INTERNAL MEDICINE

## 2019-01-16 PROCEDURE — 99214 OFFICE O/P EST MOD 30 MIN: CPT | Performed by: INTERNAL MEDICINE

## 2019-01-16 PROCEDURE — G8484 FLU IMMUNIZE NO ADMIN: HCPCS | Performed by: INTERNAL MEDICINE

## 2019-01-16 PROCEDURE — 1090F PRES/ABSN URINE INCON ASSESS: CPT | Performed by: INTERNAL MEDICINE

## 2019-01-16 PROCEDURE — 2022F DILAT RTA XM EVC RTNOPTHY: CPT | Performed by: INTERNAL MEDICINE

## 2019-01-16 PROCEDURE — 4040F PNEUMOC VAC/ADMIN/RCVD: CPT | Performed by: INTERNAL MEDICINE

## 2019-01-16 PROCEDURE — G8420 CALC BMI NORM PARAMETERS: HCPCS | Performed by: INTERNAL MEDICINE

## 2019-01-16 RX ORDER — VENLAFAXINE HYDROCHLORIDE 75 MG/1
CAPSULE, EXTENDED RELEASE ORAL
Qty: 90 CAPSULE | Refills: 0 | Status: SHIPPED | OUTPATIENT
Start: 2019-01-16 | End: 2019-03-18 | Stop reason: SDUPTHER

## 2019-01-16 ASSESSMENT — ENCOUNTER SYMPTOMS
WHEEZING: 0
EYE DISCHARGE: 0
ABDOMINAL PAIN: 0
BACK PAIN: 1
COUGH: 0
NAUSEA: 0
SHORTNESS OF BREATH: 1
VOMITING: 0
RHINORRHEA: 0

## 2019-01-17 LAB
ESTIMATED AVERAGE GLUCOSE: 108.3 MG/DL
HBA1C MFR BLD: 5.4 %
TSH SERPL DL<=0.05 MIU/L-ACNC: 2.19 UIU/ML (ref 0.27–4.2)

## 2019-01-18 RX ORDER — ROFLUMILAST 500 UG/1
TABLET ORAL
Qty: 90 TABLET | Refills: 0 | Status: SHIPPED | OUTPATIENT
Start: 2019-01-18 | End: 2019-02-13 | Stop reason: SINTOL

## 2019-01-28 RX ORDER — METFORMIN HYDROCHLORIDE 500 MG/1
TABLET, EXTENDED RELEASE ORAL
Qty: 360 TABLET | Refills: 0 | Status: SHIPPED | OUTPATIENT
Start: 2019-01-28 | End: 2019-04-25 | Stop reason: SDUPTHER

## 2019-02-13 ENCOUNTER — OFFICE VISIT (OUTPATIENT)
Dept: PULMONOLOGY | Age: 69
End: 2019-02-13
Payer: MEDICARE

## 2019-02-13 VITALS
OXYGEN SATURATION: 93 % | RESPIRATION RATE: 16 BRPM | SYSTOLIC BLOOD PRESSURE: 116 MMHG | HEART RATE: 65 BPM | BODY MASS INDEX: 19.63 KG/M2 | HEIGHT: 60 IN | TEMPERATURE: 97.7 F | WEIGHT: 100 LBS | DIASTOLIC BLOOD PRESSURE: 62 MMHG

## 2019-02-13 DIAGNOSIS — J44.1 COPD WITH ACUTE EXACERBATION (HCC): ICD-10-CM

## 2019-02-13 DIAGNOSIS — R09.02 HYPOXIA: ICD-10-CM

## 2019-02-13 DIAGNOSIS — R63.4 WEIGHT LOSS: Primary | ICD-10-CM

## 2019-02-13 PROCEDURE — G8420 CALC BMI NORM PARAMETERS: HCPCS | Performed by: INTERNAL MEDICINE

## 2019-02-13 PROCEDURE — G8399 PT W/DXA RESULTS DOCUMENT: HCPCS | Performed by: INTERNAL MEDICINE

## 2019-02-13 PROCEDURE — 4040F PNEUMOC VAC/ADMIN/RCVD: CPT | Performed by: INTERNAL MEDICINE

## 2019-02-13 PROCEDURE — G8484 FLU IMMUNIZE NO ADMIN: HCPCS | Performed by: INTERNAL MEDICINE

## 2019-02-13 PROCEDURE — G8427 DOCREV CUR MEDS BY ELIG CLIN: HCPCS | Performed by: INTERNAL MEDICINE

## 2019-02-13 PROCEDURE — 99214 OFFICE O/P EST MOD 30 MIN: CPT | Performed by: INTERNAL MEDICINE

## 2019-02-13 PROCEDURE — 3017F COLORECTAL CA SCREEN DOC REV: CPT | Performed by: INTERNAL MEDICINE

## 2019-02-13 PROCEDURE — 1036F TOBACCO NON-USER: CPT | Performed by: INTERNAL MEDICINE

## 2019-02-13 PROCEDURE — 1123F ACP DISCUSS/DSCN MKR DOCD: CPT | Performed by: INTERNAL MEDICINE

## 2019-02-13 PROCEDURE — 1090F PRES/ABSN URINE INCON ASSESS: CPT | Performed by: INTERNAL MEDICINE

## 2019-02-13 PROCEDURE — 1101F PT FALLS ASSESS-DOCD LE1/YR: CPT | Performed by: INTERNAL MEDICINE

## 2019-02-13 RX ORDER — PREDNISONE 10 MG/1
TABLET ORAL
Qty: 30 TABLET | Refills: 0 | Status: SHIPPED | OUTPATIENT
Start: 2019-02-13 | End: 2019-02-23

## 2019-02-14 ENCOUNTER — HOSPITAL ENCOUNTER (OUTPATIENT)
Dept: CT IMAGING | Age: 69
Discharge: HOME | End: 2019-02-14
Payer: MEDICARE

## 2019-02-14 ENCOUNTER — TELEPHONE (OUTPATIENT)
Dept: PULMONOLOGY | Age: 69
End: 2019-02-14

## 2019-02-14 ENCOUNTER — HOSPITAL ENCOUNTER (OUTPATIENT)
Age: 69
Discharge: HOME | End: 2019-02-14
Payer: MEDICARE

## 2019-02-14 DIAGNOSIS — R60.9 EDEMA, UNSPECIFIED TYPE: Primary | ICD-10-CM

## 2019-02-14 DIAGNOSIS — J44.9 CHRONIC OBSTRUCTIVE PULMONARY DISEASE, UNSPECIFIED COPD TYPE (HCC): ICD-10-CM

## 2019-02-14 DIAGNOSIS — R60.9 EDEMA, UNSPECIFIED TYPE: ICD-10-CM

## 2019-02-14 DIAGNOSIS — R63.4 WEIGHT LOSS: ICD-10-CM

## 2019-02-14 LAB
ALBUMIN SERPL-MCNC: 4.1 G/DL (ref 3.4–5)
ANION GAP SERPL CALCULATED.3IONS-SCNC: 9 MMOL/L (ref 3–16)
BUN BLDV-MCNC: 26 MG/DL (ref 7–20)
CALCIUM SERPL-MCNC: 9.3 MG/DL (ref 8.3–10.6)
CHLORIDE BLD-SCNC: 105 MMOL/L (ref 99–110)
CO2: 27 MMOL/L (ref 21–32)
CREAT SERPL-MCNC: 1 MG/DL (ref 0.6–1.2)
GFR AFRICAN AMERICAN: >60
GFR NON-AFRICAN AMERICAN: 55
GLUCOSE BLD-MCNC: 88 MG/DL (ref 70–99)
PHOSPHORUS: 3.6 MG/DL (ref 2.5–4.9)
POTASSIUM SERPL-SCNC: 3.7 MMOL/L (ref 3.5–5.1)
SODIUM BLD-SCNC: 141 MMOL/L (ref 136–145)

## 2019-02-14 PROCEDURE — 80069 RENAL FUNCTION PANEL: CPT

## 2019-02-14 PROCEDURE — 74178 CT ABD&PLV WO CNTR FLWD CNTR: CPT

## 2019-02-14 PROCEDURE — 6360000004 HC RX CONTRAST MEDICATION: Performed by: INTERNAL MEDICINE

## 2019-02-14 PROCEDURE — 36415 COLL VENOUS BLD VENIPUNCTURE: CPT

## 2019-02-14 RX ADMIN — IOHEXOL 50 ML: 240 INJECTION, SOLUTION INTRATHECAL; INTRAVASCULAR; INTRAVENOUS; ORAL at 11:27

## 2019-02-14 RX ADMIN — IOPAMIDOL 75 ML: 755 INJECTION, SOLUTION INTRAVENOUS at 11:27

## 2019-02-25 RX ORDER — SITAGLIPTIN 100 MG/1
100 TABLET, FILM COATED ORAL DAILY
Qty: 90 TABLET | Refills: 0 | Status: SHIPPED | OUTPATIENT
Start: 2019-02-25 | End: 2019-05-12 | Stop reason: SDUPTHER

## 2019-02-27 ENCOUNTER — TELEPHONE (OUTPATIENT)
Dept: PULMONOLOGY | Age: 69
End: 2019-02-27

## 2019-02-27 RX ORDER — ALENDRONATE SODIUM 35 MG/1
TABLET ORAL
Qty: 12 TABLET | Refills: 0 | Status: SHIPPED | OUTPATIENT
Start: 2019-02-27 | End: 2020-02-19

## 2019-02-27 RX ORDER — DOXYCYCLINE HYCLATE 100 MG
100 TABLET ORAL 2 TIMES DAILY
Qty: 10 TABLET | Refills: 0 | Status: SHIPPED | OUTPATIENT
Start: 2019-02-27 | End: 2019-03-04

## 2019-02-27 RX ORDER — PREDNISONE 10 MG/1
TABLET ORAL
Qty: 30 TABLET | Refills: 0 | Status: CANCELLED | OUTPATIENT
Start: 2019-02-27

## 2019-03-05 RX ORDER — QUETIAPINE FUMARATE 25 MG/1
TABLET, FILM COATED ORAL
Qty: 180 TABLET | Refills: 0 | Status: SHIPPED | OUTPATIENT
Start: 2019-03-05 | End: 2019-06-03 | Stop reason: SDUPTHER

## 2019-03-12 RX ORDER — ESOMEPRAZOLE MAGNESIUM 40 MG/1
CAPSULE, DELAYED RELEASE ORAL
Qty: 90 CAPSULE | Refills: 0 | Status: SHIPPED | OUTPATIENT
Start: 2019-03-12 | End: 2019-05-13 | Stop reason: SDUPTHER

## 2019-03-12 RX ORDER — POTASSIUM CHLORIDE 1500 MG/1
TABLET, EXTENDED RELEASE ORAL
Qty: 90 TABLET | Refills: 0 | Status: SHIPPED | OUTPATIENT
Start: 2019-03-12 | End: 2019-05-13 | Stop reason: SDUPTHER

## 2019-03-12 RX ORDER — NAPROXEN 375 MG/1
TABLET ORAL
Qty: 180 TABLET | Refills: 0 | Status: SHIPPED | OUTPATIENT
Start: 2019-03-12 | End: 2019-05-13 | Stop reason: SDUPTHER

## 2019-03-12 RX ORDER — TIOTROPIUM BROMIDE 18 UG/1
CAPSULE ORAL; RESPIRATORY (INHALATION)
Qty: 90 CAPSULE | Refills: 0 | Status: SHIPPED | OUTPATIENT
Start: 2019-03-12 | End: 2019-05-13 | Stop reason: SDUPTHER

## 2019-03-12 RX ORDER — SIMVASTATIN 20 MG
TABLET ORAL
Qty: 90 TABLET | Refills: 0 | Status: SHIPPED | OUTPATIENT
Start: 2019-03-12 | End: 2019-05-13 | Stop reason: SDUPTHER

## 2019-03-12 RX ORDER — FUROSEMIDE 40 MG/1
TABLET ORAL
Qty: 90 TABLET | Refills: 0 | Status: SHIPPED | OUTPATIENT
Start: 2019-03-12 | End: 2019-05-13 | Stop reason: SDUPTHER

## 2019-03-15 DIAGNOSIS — G44.221 CHRONIC TENSION-TYPE HEADACHE, INTRACTABLE: ICD-10-CM

## 2019-03-15 RX ORDER — TOPIRAMATE 50 MG/1
TABLET, FILM COATED ORAL
Qty: 180 TABLET | Refills: 0 | Status: SHIPPED | OUTPATIENT
Start: 2019-03-15 | End: 2019-06-11 | Stop reason: SDUPTHER

## 2019-03-19 RX ORDER — VENLAFAXINE HYDROCHLORIDE 75 MG/1
CAPSULE, EXTENDED RELEASE ORAL
Qty: 90 CAPSULE | Refills: 0 | Status: SHIPPED | OUTPATIENT
Start: 2019-03-19 | End: 2019-05-27 | Stop reason: SDUPTHER

## 2019-03-20 RX ORDER — ROFLUMILAST 500 UG/1
TABLET ORAL
Qty: 90 TABLET | Refills: 0 | Status: SHIPPED | OUTPATIENT
Start: 2019-03-20 | End: 2019-05-21 | Stop reason: SINTOL

## 2019-03-21 ENCOUNTER — OFFICE VISIT (OUTPATIENT)
Dept: NEUROLOGY | Age: 69
End: 2019-03-21
Payer: MEDICARE

## 2019-03-21 VITALS
BODY MASS INDEX: 18.88 KG/M2 | SYSTOLIC BLOOD PRESSURE: 126 MMHG | DIASTOLIC BLOOD PRESSURE: 74 MMHG | HEIGHT: 61 IN | HEART RATE: 93 BPM | WEIGHT: 100 LBS | OXYGEN SATURATION: 95 %

## 2019-03-21 DIAGNOSIS — E11.65 TYPE 2 DIABETES MELLITUS WITH HYPERGLYCEMIA, WITHOUT LONG-TERM CURRENT USE OF INSULIN (HCC): ICD-10-CM

## 2019-03-21 DIAGNOSIS — F34.1 DYSTHYMIA: ICD-10-CM

## 2019-03-21 DIAGNOSIS — E78.2 MIXED HYPERLIPIDEMIA: ICD-10-CM

## 2019-03-21 DIAGNOSIS — F51.01 PRIMARY INSOMNIA: ICD-10-CM

## 2019-03-21 DIAGNOSIS — G44.221 CHRONIC TENSION-TYPE HEADACHE, INTRACTABLE: Primary | ICD-10-CM

## 2019-03-21 PROCEDURE — G8399 PT W/DXA RESULTS DOCUMENT: HCPCS | Performed by: PSYCHIATRY & NEUROLOGY

## 2019-03-21 PROCEDURE — 1101F PT FALLS ASSESS-DOCD LE1/YR: CPT | Performed by: PSYCHIATRY & NEUROLOGY

## 2019-03-21 PROCEDURE — 1123F ACP DISCUSS/DSCN MKR DOCD: CPT | Performed by: PSYCHIATRY & NEUROLOGY

## 2019-03-21 PROCEDURE — 99213 OFFICE O/P EST LOW 20 MIN: CPT | Performed by: PSYCHIATRY & NEUROLOGY

## 2019-03-21 PROCEDURE — 1090F PRES/ABSN URINE INCON ASSESS: CPT | Performed by: PSYCHIATRY & NEUROLOGY

## 2019-03-21 PROCEDURE — G8484 FLU IMMUNIZE NO ADMIN: HCPCS | Performed by: PSYCHIATRY & NEUROLOGY

## 2019-03-21 PROCEDURE — 3044F HG A1C LEVEL LT 7.0%: CPT | Performed by: PSYCHIATRY & NEUROLOGY

## 2019-03-21 PROCEDURE — G8420 CALC BMI NORM PARAMETERS: HCPCS | Performed by: PSYCHIATRY & NEUROLOGY

## 2019-03-21 PROCEDURE — 3017F COLORECTAL CA SCREEN DOC REV: CPT | Performed by: PSYCHIATRY & NEUROLOGY

## 2019-03-21 PROCEDURE — 2022F DILAT RTA XM EVC RTNOPTHY: CPT | Performed by: PSYCHIATRY & NEUROLOGY

## 2019-03-21 PROCEDURE — 1036F TOBACCO NON-USER: CPT | Performed by: PSYCHIATRY & NEUROLOGY

## 2019-03-21 PROCEDURE — G8427 DOCREV CUR MEDS BY ELIG CLIN: HCPCS | Performed by: PSYCHIATRY & NEUROLOGY

## 2019-03-21 PROCEDURE — 4040F PNEUMOC VAC/ADMIN/RCVD: CPT | Performed by: PSYCHIATRY & NEUROLOGY

## 2019-04-19 ENCOUNTER — OFFICE VISIT (OUTPATIENT)
Dept: INTERNAL MEDICINE CLINIC | Age: 69
End: 2019-04-19

## 2019-04-19 VITALS
DIASTOLIC BLOOD PRESSURE: 60 MMHG | HEART RATE: 70 BPM | HEIGHT: 60 IN | WEIGHT: 98 LBS | SYSTOLIC BLOOD PRESSURE: 90 MMHG | BODY MASS INDEX: 19.24 KG/M2 | RESPIRATION RATE: 14 BRPM

## 2019-04-19 DIAGNOSIS — Z00.00 ROUTINE GENERAL MEDICAL EXAMINATION AT A HEALTH CARE FACILITY: ICD-10-CM

## 2019-04-19 DIAGNOSIS — E11.9 TYPE 2 DIABETES MELLITUS WITHOUT COMPLICATION, WITHOUT LONG-TERM CURRENT USE OF INSULIN (HCC): ICD-10-CM

## 2019-04-19 DIAGNOSIS — R63.4 WEIGHT LOSS: ICD-10-CM

## 2019-04-19 DIAGNOSIS — Z00.00 ENCOUNTER FOR INITIAL PREVENTIVE PHYSICAL EXAMINATION COVERED BY MEDICARE: Primary | ICD-10-CM

## 2019-04-19 LAB
BASOPHILS ABSOLUTE: 0 K/UL (ref 0–0.2)
BASOPHILS RELATIVE PERCENT: 0.5 %
EOSINOPHILS ABSOLUTE: 0.2 K/UL (ref 0–0.6)
EOSINOPHILS RELATIVE PERCENT: 2.7 %
HCT VFR BLD CALC: 40.1 % (ref 36–48)
HEMOGLOBIN: 12.8 G/DL (ref 12–16)
LYMPHOCYTES ABSOLUTE: 1.2 K/UL (ref 1–5.1)
LYMPHOCYTES RELATIVE PERCENT: 17.2 %
MCH RBC QN AUTO: 29.9 PG (ref 26–34)
MCHC RBC AUTO-ENTMCNC: 31.9 G/DL (ref 31–36)
MCV RBC AUTO: 93.8 FL (ref 80–100)
MONOCYTES ABSOLUTE: 0.9 K/UL (ref 0–1.3)
MONOCYTES RELATIVE PERCENT: 13.4 %
NEUTROPHILS ABSOLUTE: 4.6 K/UL (ref 1.7–7.7)
NEUTROPHILS RELATIVE PERCENT: 66.2 %
PDW BLD-RTO: 16.5 % (ref 12.4–15.4)
PLATELET # BLD: 251 K/UL (ref 135–450)
PMV BLD AUTO: 9.8 FL (ref 5–10.5)
RBC # BLD: 4.27 M/UL (ref 4–5.2)
URIC ACID, SERUM: 6.8 MG/DL (ref 2.6–6)
VITAMIN B-12: 238 PG/ML (ref 211–911)
WBC # BLD: 7 K/UL (ref 4–11)

## 2019-04-19 PROCEDURE — 3017F COLORECTAL CA SCREEN DOC REV: CPT | Performed by: INTERNAL MEDICINE

## 2019-04-19 PROCEDURE — 3044F HG A1C LEVEL LT 7.0%: CPT | Performed by: INTERNAL MEDICINE

## 2019-04-19 PROCEDURE — G0439 PPPS, SUBSEQ VISIT: HCPCS | Performed by: INTERNAL MEDICINE

## 2019-04-19 PROCEDURE — 1123F ACP DISCUSS/DSCN MKR DOCD: CPT | Performed by: INTERNAL MEDICINE

## 2019-04-19 PROCEDURE — 81002 URINALYSIS NONAUTO W/O SCOPE: CPT | Performed by: INTERNAL MEDICINE

## 2019-04-19 PROCEDURE — 4040F PNEUMOC VAC/ADMIN/RCVD: CPT | Performed by: INTERNAL MEDICINE

## 2019-04-19 ASSESSMENT — PATIENT HEALTH QUESTIONNAIRE - PHQ9
SUM OF ALL RESPONSES TO PHQ QUESTIONS 1-9: 2
SUM OF ALL RESPONSES TO PHQ QUESTIONS 1-9: 2

## 2019-04-19 ASSESSMENT — LIFESTYLE VARIABLES: HOW OFTEN DO YOU HAVE A DRINK CONTAINING ALCOHOL: 0

## 2019-04-19 ASSESSMENT — ANXIETY QUESTIONNAIRES: GAD7 TOTAL SCORE: 2

## 2019-04-19 NOTE — PATIENT INSTRUCTIONS
Personalized Preventive Plan for Manoj Aus - 4/19/2019  Medicare offers a range of preventive health benefits. Some of the tests and screenings are paid in full while other may be subject to a deductible, co-insurance, and/or copay. Some of these benefits include a comprehensive review of your medical history including lifestyle, illnesses that may run in your family, and various assessments and screenings as appropriate. After reviewing your medical record and screening and assessments performed today your provider may have ordered immunizations, labs, imaging, and/or referrals for you. A list of these orders (if applicable) as well as your Preventive Care list are included within your After Visit Summary for your review. Other Preventive Recommendations:    · A preventive eye exam performed by an eye specialist is recommended every 1-2 years to screen for glaucoma; cataracts, macular degeneration, and other eye disorders. · A preventive dental visit is recommended every 6 months. · Try to get at least 150 minutes of exercise per week or 10,000 steps per day on a pedometer . · Order or download the FREE \"Exercise & Physical Activity: Your Everyday Guide\" from [employer]. Call 9-416.515.6159 or search [employer] online. · You need 8008-4438 mg of calcium and 1006-3155 IU of vitamin D per day. It is possible to meet your calcium requirement with diet alone, but a vitamin D supplement is usually necessary to meet this goal.  · When exposed to the sun, use a sunscreen that protects against both UVA and UVB radiation with an SPF of 30 or greater. Reapply every 2 to 3 hours or after sweating, drying off with a towel, or swimming. · Always wear a seat belt when traveling in a car. Always wear a helmet when riding a bicycle or motorcycle.     Keep Your Memory Lucina Dick       Many factors can affect your ability to remembera hectic lifestyle, aging, stress, chronic disease, and certain medicines. But, there are steps you can take to sharpen your mind and help preserve your memory. Challenge Your Brain   Regularly challenging your mind may help keeps it in top shape. Good mental exercises include:   Crossword puzzlesUse a dictionary if you need it; you will learn more that way. Brainteasers Try some! Crafts, such as wood working and sewing   Hobbies, such as gardening and building model airplanes   SocializingVisit old friends or join groups to meet new ones. Reading   Learning a new language   Taking a class, whether it be art history or moe chi   TravelingExperience the food, history, and culture of your destination   Learning to use a computer   Going to museums, the theater, or thought-provoking movies   Changing things in your daily life, such as reversing your pattern in the grocery store or brushing your teeth using your nondominant hand   Use Memory Aids   There is no need to remember every detail on your own. These memory aids can help:   Calendars and day planners   Electronic organizers to store all sorts of helpful informationThese devices can \"beep\" to remind you of appointments. A book of days to record birthdays, anniversaries, and other occasions that occur on the same date every year   Detailed \"to-do\" lists and strategically placed sticky notes   Quick \"study\" sessionsBefore a gathering, review who will be there so their names will be fresh in your mind. Establish routinesFor example, keep your keys, wallet, and umbrella in the same place all the time or take medicine with your 8:00 AM glass of juice   Live a Healthy Life   Many actions that will keep your body strong will do the same for your mind. For example:   Talk to Your Doctor About Herbs and Supplements    Malnutrition and vitamin deficiencies can impair your mental function. For example, vitamin B12 deficiency can cause a range of symptoms, including confusion.  But, what if your nutritional needs are being met? Can herbs and supplements still offer a benefit? Researchers have investigated a range of natural remedies, such as ginkgo , ginseng , and the supplement phosphatidylserine (PS). So far, though, the evidence is inconsistent as to whether these products can improve memory or thinking. If you are interested in taking herbs and supplements, talk to your doctor first because they may interact with other medicines that you are taking. Exercise Regularly    Among the many benefits of regular exercise are increased blood flow to the brain and decreased risk of certain diseases that can interfere with memory function. One study found that even moderate exercise has a beneficial effect. Examples of \"moderate\" exercise include:   Playing 18 holes of golf once a week, without a cart   Playing tennis twice a week   Walking one mile per day   Manage Stress    It can be tough to remember what is important when your mind is cluttered. Make time for relaxation. Choose activities that calm you down, and make it routine. Manage Chronic Conditions    Side effects of high blood pressure , diabetes, and heart disease can interfere with mental function. Many of the lifestyle steps discussed here can help manage these conditions. Strive to eat a healthy diet, exercise regularly, get stress under control, and follow your doctor's advice for your condition. Minimize Medications    Talk to your doctor about the medicines that you take. Some may be unnecessary. Also, healthy lifestyle habits may lower the need for certain drugs. Last Reviewed: April 2010 Anthony Noonan MD   Updated: 4/13/2010   ·     [address]       As we get older, changes in balance, gait, strength, vision, hearing, and cognition make even the most youthful senior more prone to accidents. Falls are one of the leading health risks for older people.  This increased risk of falling is related to:   Aging process (eg, cannot see clearly, you are more likely to fall. Important questions to ask yourself include:   Are lamp, electric, extension, and telephone cords placed out of the flow of traffic and maintained in good condition? Have frayed cords been replaced? Are all small rugs and runners slip resistant? If not, you can secure them to the floor with a special double-sided carpet tape. Are smoke detectors properly locatedone on every floor of your home and one outside of every sleeping area? Are they in good working order? Are batteries replaced at least once a year? Do you have a well-maintained carbon monoxide detector outside every sleeping are in your home? Does your furniture layout leave plenty of space to maneuver between and around chairs, tables, beds, and sofas? Are hallways, stairs and passages between rooms well lit? Can you reach a lamp without getting out of bed? Are floor surfaces well maintained? Shag rugs, high-pile carpeting, tile floors, and polished wood floors can be particularly slippery. Stairs should always have handrails and be carpeted or fitted with a non-skid tread. Is your telephone easily reachable. Is the cord safely tucked away? Room by Room   According to the Association of Aging, bathrooms and krishna are the two most potentially hazardous rooms in your home. In the Kitchen    Be sure your stove is in proper working order and always make sure burners and the oven are off before you go out or go to sleep. Keep pots on the back burners, turn handles away from the front of the stove, and keep stove clean and free of grease build-up. Kitchen ventilation systems and range exhausts should be working properly. Keep flammable objects such as towels and pot holders away from the cooking area except when in use. Make sure kitchen curtains are tied back. Move cords and appliances away from the sink and hot surfaces.  If extension cords are needed, install wiring guides so S. A.F.E. (Smoke Alarms for Every) [facility], senior citizens are one of the two highest risk groups for death and serious injuries due to residential fires. When cooking, wear short-sleeved items, never a bulky long-sleeved robe. The [facility]'s Safety Checklist for Older Consumers emphasizes the importance of checking basements, garages, workshops and storage areas for fire hazards, such as volatile liquids, piles of old rags or clothing and overloaded circuits. Never smoke in bed or when lying down on a couch or recliner chair. Small portable electric or kerosene heaters are responsible for many home fires and should be used cautiously if at all. If you do use one, be sure to keep them away from flammable materials. In case of fire, make sure you have a pre-established emergency exit plan. Have a professional check your fireplace and other fuel-burning appliances yearly. Hel[employer]   Baby boomers entering the soni years will continue to see the development of new products to help older adults live safely and independently in spite of age-related changes. Making Life More Livable  , by Leigh Riojas, lists over 1,000 products for \"living well in the mature years,\" such as bathing and mobility aids, household security devices, ergonomically designed knives and peelers, and faucet valves and knobs for temperature control. Medical supply stores and organizations are good sources of information about products that improve your quality of life and insure your safety.      Last Reviewed: November 2009 Clifton Mendez MD   Updated: 3/7/2011     ·

## 2019-04-19 NOTE — PROGRESS NOTES
Medicare Annual Wellness Visit  Name: Andie Wallace Date: 2019   MRN: [id number] Sex: Female   Age: 70 y.o. Ethnicity: Non-/Non    : 1950 Race: Nat Harden is here for Medicare AWV    Screenings for behavioral, psychosocial and functional/safety risks, and cognitive dysfunction are all negative except as indicated below. These results, as well as other patient data from the [address] [facility], [place], operated by [facility] form, are documented in Flowsheets linked to this Encounter. No Known Allergies  Prior to Visit Medications    Medication Sig Taking? Authorizing Provider   DALIRESP 500 MCG tablet TAKE 1 TABLET EVERY DAY  Devante Berrios MD   venlafaxine (EFFEXOR XR) 75 MG extended release capsule TAKE 1 CAPSULE EVERY DAY  Devante Berrios MD   topiramate (TOPAMAX) 50 MG tablet TAKE 1 TABLET BY MOUTH TWICE DAILY  Bhumika Jimenez MD   Abbie Mallet 18 MCG inhalation capsule INHALE THE CONTENTS OF 1 Gabby Poole MD   simvastatin (ZOCOR) 20 MG tablet TAKE 1 TABLET EVERY EVENING  Devante Berrios MD   esomeprazole ([address]) 40 MG delayed release capsule TAKE [address]  Devante Berrios MD   KLOR-CON M20 20 MEQ extended release tablet TAKE 1 Tye Duarte MD   naproxen (NAPROSYN) 375 MG tablet TAKE 1 TABLET TWICE DAILY  Devante Berrios MD   furosemide (LASIX) 40 MG tablet TAKE 1 TABLET EVERY DAY  Devante Berriso MD   QUEtiapine (SEROQUEL) 25 MG tablet TAKE 1 TO 2 TABLETS BY MOUTH AT BEDTIME.   Devante Berrios MD   alendronate (FOSAMAX) 35 MG tablet TAKE 1 TABLET EVERY 7 DAYS  Devante Berrios MD   JANUVIA 100 MG tablet TAKE 1 TABLET BY MOUTH DAILY  Devante Berrios MD   metFORMIN (GLUCOPHAGE-XR) 500 MG extended release tablet TAKE 2 TABLETS BY MOUTH TWICE DAILY  Devante Berrios MD   ADVAIR DISKUS 250-50 MCG/DOSE AEPB INHALE 1 PUFF INTO THE LUNGS EVERY 12 HOURS  SAUL [place] - CNP   albuterol sulfate HFA (VENTOLIN HFA) 108 (90 Base) MCG/ACT inhaler INHALE 2 PUFFS EVERY 6 HOURS AS NEEDED FOR WHEEZING  Santino Mccallum MD   Blood Glucose Monitoring Suppl (ACCU-CHEK GAURANG SMARTVIEW) w/Device KIT Use to test once daily. DX:E11.9  Santino Mccallum MD   blood glucose test strips (ACCU-CHEK SMARTVIEW) strip Use to test once daily. DX:E11.9  Santino Mccallum MD   Lancets Misc. (ACCU-CHEK FASTCLIX LANCET) KIT Use to test once daily. DX:E11.9  Santino Mccallum MD   gabapentin (NEURONTIN) 300 MG capsule Take 1 capsule by mouth 3 times daily for 30 days. Intended supply: 90 days. Santino Mccallum MD   gabapentin (NEURONTIN) 100 MG capsule Take 2 capsules by mouth 3 times daily for 10 days. Kimi Encarnacion MD   albuterol (PROVENTIL) (2.5 MG/3ML) 0.083% nebulizer solution INHALE THE CONTENTS OF 1 VIAL VIA NEBULIZER EVERY 6 HOURS AS NEEDED FOR SHORTNESS OF BREATH OR WHEEZING  Manas Solis MD   guaiFENesin (MUCINEX) 600 MG extended release tablet Take 1 tablet by mouth 2 times daily as needed for Congestion  Manas Solis MD   ACCU-CHEK JOSIE PLUS strip USE TO TEST DAILY  Santino Mccallum MD   theophylline (THEOCHRON) 200 MG extended release tablet Take 1 tablet by mouth 2 times daily  Santino Mccallum MD   vitamin D (CHOLECALCIFEROL) 400 UNITS TABS tablet Take 2 tablets by mouth daily  Manas Solis MD   calcium carbonate (CALCIUM 600) 600 MG TABS tablet Take 2 tablets by mouth daily  Manas Solis MD   Respiratory Therapy Supplies (NEBULIZER/TUBING/MOUTHPIECE) KIT 1 kit by Does not apply route daily as needed DX COPD J44.9  Manas Solis MD   Nebulizers (COMPRESSOR/NEBULIZER) MISC 1 Device by Does not apply route as needed DX COPD J44.9  Mansa Solis MD   fluticasone (FLONASE) 50 MCG/ACT nasal spray 2 sprays by Nasal route daily. Santino Mccallum MD   [facility].  Devices (Marybeth Amaral) [facility] by Does not apply route 4 times daily.   Florentino Hastings MD     Past Medical History:   Diagnosis Date    Chronic airway obstruction, not elsewhere classified 3/4/08    oxygen 2L/min at HS and PRN through day    Chronic kidney disease     incontinent    COPD (chronic obstructive pulmonary disease) ([address]     Depression     Displacement of lumbar intervertebral disc without myelopathy 8/13/07    Edema 9/13/07    Emphysema of lung ([address]     Enthesopathy of hip region     Enthesopathy of hip region 3/5/07    Esophageal reflux 3/4/08    Hemorrhage of rectum and anus 11/14/07    Herpes zoster without complication     Hyperlipidemia associated with type 2 diabetes mellitus ([address] 12/11/2017    Lumbar spondylosis 5/1/2018    Major depression, chronic 8/12/2015    Osteoporosis, unspecified 12/4/07    Other and unspecified hyperlipidemia 3/4/08    Pain in joint, shoulder region 3/4/08    Pneumonia     Pulmonary nodule     Recurrent major depressive disorder, in partial remission ([address] 5/4/2018    Rheumatic fever     S/P ileostomy ([address] 8/20/2011    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    Tension headache     Type 2 diabetes mellitus with hyperglycemia, without long-term current use of insulin ([address]     Unspecified asthma(493.90) 8/13/07    Unspecified chronic bronchitis ([address]     Ventral hernia 6/29/2014     Past Surgical History:   Procedure Laterality Date    ABDOMEN SURGERY  5-19-11    total abdominal colectomy iliostomy    CARPAL TUNNEL RELEASE      right    CATARACT REMOVAL Bilateral     L 6/18/2013, R 07/01/2013    CATARACT REMOVAL WITH IMPLANT Right 7/1/13    COLECTOMY      and reversal     COLONOSCOPY  2009, 11/4/2011, 10/28/15    normal    CYST REMOVAL Right 6/20/14    Dr. Aminta Little; right ear pressure point cyst removal    EYE SURGERY Left 6/18/13    cataract with lens implant    HERNIA REPAIR  6/27/14    Open Vental Hernia Repair    HIP SURGERY      HYSTERECTOMY  1973    subtotal    NECK SURGERY      incision of windpipe, repair of windpipe defect    OTHER SURGICAL HISTORY  8/19/2011    hand assisted laparoscopic ileostomy reversal    OVARY REMOVAL      TRACHEAL SURGERY  2005    hx of trach     UMBILICAL HERNIA REPAIR  6/27/2014     Family History   Problem Relation Age of Onset    Asthma Daughter     Diabetes Daughter     Cancer Daughter         Uterine    Asthma Daughter     Diabetes Mother     Heart Failure Mother     Hypertension Mother     Heart Failure Brother     Diabetes Sister     Heart Failure Brother     Emphysema Neg Hx        CareTeam (Including outside providers/suppliers regularly involved in providing care):   Patient Care Team:  Tung Petersen MD as PCP - Kelby Maya MD as PCP - S Attributed Provider  Brittanie Ford MD as Consulting Physician (Pulmonology)  Jestine Castleman, MD (Orthopedic Surgery)    Wt Readings from Last 3 Encounters:   04/19/19 98 lb (44.5 kg)   03/21/19 100 lb (45.4 kg)   02/13/19 100 lb (45.4 kg)     Vitals:    04/19/19 1400   BP: 90/60   Site: Right Upper Arm   Position: Sitting   Cuff Size: Medium Adult   Pulse: 70   Resp: 14   Weight: 98 lb (44.5 kg)   Height: 5' (1.524 m)     Body mass index is 19.14 kg/m². Based upon direct observation of the patient, evaluation of cognition reveals recent and remote memory intact.     General Appearance: alert and oriented to person, place and time, well developed and well- nourished, in no acute distress  Skin: warm and dry, no rash or erythema  Head: normocephalic and atraumatic  Eyes: pupils equal, round, and reactive to light, extraocular eye movements intact, conjunctivae normal  ENT: tympanic membrane, external ear and ear canal normal bilaterally, nose without deformity, nasal mucosa and turbinates normal without polyps  Neck: supple and non-tender without mass, no thyromegaly or thyroid nodules, no cervical lymphadenopathy  Pulmonary/Chest: clear to auscultation bilaterally- no wheezes, rales or rhonchi, normal air movement, no respiratory distress  Cardiovascular: normal rate, regular rhythm, normal S1 and S2, no murmurs, rubs, clicks, or gallops, distal pulses intact, no carotid bruits  Abdomen: soft, non-tender, non-distended, normal bowel sounds, no masses or organomegaly  Extremities: no cyanosis, clubbing or edema  Musculoskeletal: normal range of motion, no joint swelling, deformity or tenderness  Neurologic: reflexes normal and symmetric, no cranial nerve deficit, gait, coordination and speech normal    Patient's complete Health Risk Assessment and screening values have been reviewed and are found in Flowsheets. The following problems were reviewed today and where indicated follow up appointments were made and/or referrals ordered. Positive Risk Factor Screenings with Interventions:     General Health:  General  In general, how would you say your health is?: Fair  In the past 7 days, have you experienced any of the following? New or Increased Pain, New or Increased Fatigue, Loneliness, Social Isolation, Stress or Anger?: (!) New or Increased Pain  Do you get the social and emotional support that you need?: Yes  Do you have a Living Will?: Yes  General Health Risk Interventions:  · Pain issues: abdominal pain, I ordered  referral ordered for evaluation/treatment of abdominal pain with GI    Health Habits/Nutrition:  Health Habits/Nutrition  Do you exercise for at least 20 minutes 2-3 times per week?: (!) No  Have you lost any weight without trying in the past 3 months?: (!) Yes  Do you eat fewer than 2 meals per day?: (!) Yes(sometimes)  Have you seen a dentist within the past year?: (!) No(no teeth)  Body mass index is 19.14 kg/m².   Health Habits/Nutrition Interventions:  · Inadequate physical activity:  patient is not ready to increase his/her physical activity level at this time  · Nutritional issues:  refer to Oncology    Personalized Preventive Plan   Current Health Maintenance Status  Immunization History   Administered Date(s) Administered    Influenza A (H1N1) Vaccine IM 01/20/2010    Influenza Virus Vaccine 09/22/2011, 10/17/2013, 09/30/2014, 11/09/2015    Influenza Whole 10/15/2008    Influenza, High Dose (Fluzone 65 yrs and older) 11/28/2016, 09/20/2017    Pneumococcal 13-valent Conjugate ([id number]) 11/09/2015    Pneumococcal Conjugate 7-valent 10/03/2001, 01/01/2004    Pneumococcal Polysaccharide ([id number]) 05/05/2013, 06/29/2018        Health Maintenance   Topic Date Due    DTaP/Tdap/Td vaccine (1 - Tdap) 08[birth year]10/1968    Shingles Vaccine (1 of 2) 08/10/1999    Diabetic microalbuminuria test  12/11/2018    Diabetic foot exam  05/04/2019    Flu vaccine (Season Ended) 09/01/2019    Lipid screen  11/06/2019    A1C test (Diabetic or Prediabetic)  01/16/2020    Potassium monitoring  02/14/2020    Creatinine monitoring  02/14/2020    Breast cancer screen  08/28/2020    Diabetic retinal exam  02/11/2021    Colon cancer screen colonoscopy  10/28/2025    DEXA (modify frequency per FRAX score)  Completed    Pneumococcal 65+ years Vaccine  Completed    Hepatitis C screen  Completed     Recommendations for Preventive Services Due: see orders and patient instructions/AVS.  . Recommended screening schedule for the next 5-10 years is provided to the patient in written form: see Patient Instructions/AVS.    I will refer to GI and Oncology. Her weight loss started after she was taken off prednisone. She also takes Topamax.

## 2019-04-20 LAB
ESTIMATED AVERAGE GLUCOSE: 108.3 MG/DL
HBA1C MFR BLD: 5.4 %

## 2019-04-25 RX ORDER — GABAPENTIN 300 MG/1
CAPSULE ORAL
Qty: 90 CAPSULE | Refills: 0 | Status: SHIPPED | OUTPATIENT
Start: 2019-04-25 | End: 2020-02-19 | Stop reason: SDUPTHER

## 2019-04-25 RX ORDER — METFORMIN HYDROCHLORIDE 500 MG/1
TABLET, EXTENDED RELEASE ORAL
Qty: 360 TABLET | Refills: 0 | Status: SHIPPED | OUTPATIENT
Start: 2019-04-25 | End: 2019-05-12 | Stop reason: SDUPTHER

## 2019-05-13 ENCOUNTER — APPOINTMENT (OUTPATIENT)
Dept: MRI IMAGING | Age: 69
End: 2019-05-13
Payer: MEDICARE

## 2019-05-13 ENCOUNTER — HOSPITAL ENCOUNTER (OUTPATIENT)
Dept: MRI IMAGING | Age: 69
Discharge: HOME | End: 2019-05-13
Payer: MEDICARE

## 2019-05-13 ENCOUNTER — HOSPITAL ENCOUNTER (OUTPATIENT)
Age: 69
Discharge: HOME | End: 2019-05-13
Payer: MEDICARE

## 2019-05-13 DIAGNOSIS — R10.9 ACUTE ABDOMINAL PAIN: ICD-10-CM

## 2019-05-13 LAB
A/G RATIO: 1.4 (ref 1.1–2.2)
ALBUMIN SERPL-MCNC: 3.9 G/DL (ref 3.4–5)
ALP BLD-CCNC: 46 U/L (ref 40–129)
ALT SERPL-CCNC: 6 U/L (ref 10–40)
ANION GAP SERPL CALCULATED.3IONS-SCNC: 8 MMOL/L (ref 3–16)
AST SERPL-CCNC: 11 U/L (ref 15–37)
BILIRUB SERPL-MCNC: <0.2 MG/DL (ref 0–1)
BUN BLDV-MCNC: 20 MG/DL (ref 7–20)
CALCIUM SERPL-MCNC: 9.3 MG/DL (ref 8.3–10.6)
CHLORIDE BLD-SCNC: 112 MMOL/L (ref 99–110)
CO2: 26 MMOL/L (ref 21–32)
CREAT SERPL-MCNC: 0.7 MG/DL (ref 0.6–1.2)
GFR AFRICAN AMERICAN: >60
GFR NON-AFRICAN AMERICAN: >60
GLOBULIN: 2.8 G/DL
GLUCOSE BLD-MCNC: 91 MG/DL (ref 70–99)
POTASSIUM SERPL-SCNC: 4.1 MMOL/L (ref 3.5–5.1)
SODIUM BLD-SCNC: 146 MMOL/L (ref 136–145)
TOTAL PROTEIN: 6.7 G/DL (ref 6.4–8.2)

## 2019-05-13 PROCEDURE — 6360000004 HC RX CONTRAST MEDICATION: Performed by: INTERNAL MEDICINE

## 2019-05-13 PROCEDURE — A9579 GAD-BASE MR CONTRAST NOS,1ML: HCPCS | Performed by: INTERNAL MEDICINE

## 2019-05-13 PROCEDURE — 36415 COLL VENOUS BLD VENIPUNCTURE: CPT

## 2019-05-13 PROCEDURE — 80053 COMPREHEN METABOLIC PANEL: CPT

## 2019-05-13 PROCEDURE — 74183 MRI ABD W/O CNTR FLWD CNTR: CPT

## 2019-05-13 RX ORDER — METFORMIN HYDROCHLORIDE 500 MG/1
TABLET, EXTENDED RELEASE ORAL
Qty: 360 TABLET | Refills: 0 | Status: SHIPPED | OUTPATIENT
Start: 2019-05-13 | End: 2019-11-08 | Stop reason: SDUPTHER

## 2019-05-13 RX ORDER — SITAGLIPTIN 100 MG/1
100 TABLET, FILM COATED ORAL DAILY
Qty: 90 TABLET | Refills: 0 | Status: SHIPPED | OUTPATIENT
Start: 2019-05-13 | End: 2019-08-10 | Stop reason: SDUPTHER

## 2019-05-13 RX ORDER — GABAPENTIN 300 MG/1
CAPSULE ORAL
Qty: 90 CAPSULE | Refills: 0 | OUTPATIENT
Start: 2019-05-13 | End: 2019-06-12

## 2019-05-13 RX ADMIN — GADOTERIDOL 9 ML: 279.3 INJECTION, SOLUTION INTRAVENOUS at 17:12

## 2019-05-14 RX ORDER — TIOTROPIUM BROMIDE 18 UG/1
CAPSULE ORAL; RESPIRATORY (INHALATION)
Qty: 90 CAPSULE | Refills: 0 | Status: SHIPPED | OUTPATIENT
Start: 2019-05-14 | End: 2019-11-21 | Stop reason: SDUPTHER

## 2019-05-14 RX ORDER — ESOMEPRAZOLE MAGNESIUM 40 MG/1
CAPSULE, DELAYED RELEASE ORAL
Qty: 90 CAPSULE | Refills: 0 | Status: SHIPPED | OUTPATIENT
Start: 2019-05-14 | End: 2020-02-19

## 2019-05-14 RX ORDER — POTASSIUM CHLORIDE 20 MEQ/1
TABLET, EXTENDED RELEASE ORAL
Qty: 90 TABLET | Refills: 0 | Status: SHIPPED | OUTPATIENT
Start: 2019-05-14 | End: 2020-01-28

## 2019-05-14 RX ORDER — SIMVASTATIN 20 MG
TABLET ORAL
Qty: 90 TABLET | Refills: 0 | Status: SHIPPED | OUTPATIENT
Start: 2019-05-14 | End: 2019-11-14 | Stop reason: SDUPTHER

## 2019-05-14 RX ORDER — NAPROXEN 375 MG/1
TABLET ORAL
Qty: 180 TABLET | Refills: 0 | Status: SHIPPED | OUTPATIENT
Start: 2019-05-14 | End: 2021-11-21 | Stop reason: SINTOL

## 2019-05-14 RX ORDER — FUROSEMIDE 40 MG/1
TABLET ORAL
Qty: 90 TABLET | Refills: 0 | Status: SHIPPED | OUTPATIENT
Start: 2019-05-14 | End: 2020-02-19

## 2019-05-21 ENCOUNTER — OFFICE VISIT (OUTPATIENT)
Dept: PULMONOLOGY | Age: 69
End: 2019-05-21
Payer: MEDICARE

## 2019-05-21 VITALS
OXYGEN SATURATION: 94 % | BODY MASS INDEX: 19.32 KG/M2 | HEART RATE: 86 BPM | RESPIRATION RATE: 20 BRPM | HEIGHT: 60 IN | DIASTOLIC BLOOD PRESSURE: 66 MMHG | WEIGHT: 98.4 LBS | SYSTOLIC BLOOD PRESSURE: 104 MMHG

## 2019-05-21 DIAGNOSIS — J44.9 COPD, VERY SEVERE (HCC): Primary | ICD-10-CM

## 2019-05-21 DIAGNOSIS — R63.4 WEIGHT LOSS: ICD-10-CM

## 2019-05-21 DIAGNOSIS — R93.89 ABNORMAL CT OF THE CHEST: ICD-10-CM

## 2019-05-21 DIAGNOSIS — R91.8 PULMONARY NODULES: ICD-10-CM

## 2019-05-21 PROCEDURE — 1090F PRES/ABSN URINE INCON ASSESS: CPT | Performed by: INTERNAL MEDICINE

## 2019-05-21 PROCEDURE — G8427 DOCREV CUR MEDS BY ELIG CLIN: HCPCS | Performed by: INTERNAL MEDICINE

## 2019-05-21 PROCEDURE — 1036F TOBACCO NON-USER: CPT | Performed by: INTERNAL MEDICINE

## 2019-05-21 PROCEDURE — G8399 PT W/DXA RESULTS DOCUMENT: HCPCS | Performed by: INTERNAL MEDICINE

## 2019-05-21 PROCEDURE — G8420 CALC BMI NORM PARAMETERS: HCPCS | Performed by: INTERNAL MEDICINE

## 2019-05-21 PROCEDURE — 1123F ACP DISCUSS/DSCN MKR DOCD: CPT | Performed by: INTERNAL MEDICINE

## 2019-05-21 PROCEDURE — 3017F COLORECTAL CA SCREEN DOC REV: CPT | Performed by: INTERNAL MEDICINE

## 2019-05-21 PROCEDURE — 3023F SPIROM DOC REV: CPT | Performed by: INTERNAL MEDICINE

## 2019-05-21 PROCEDURE — G8926 SPIRO NO PERF OR DOC: HCPCS | Performed by: INTERNAL MEDICINE

## 2019-05-21 PROCEDURE — 99214 OFFICE O/P EST MOD 30 MIN: CPT | Performed by: INTERNAL MEDICINE

## 2019-05-21 PROCEDURE — 4040F PNEUMOC VAC/ADMIN/RCVD: CPT | Performed by: INTERNAL MEDICINE

## 2019-05-21 NOTE — PROGRESS NOTES
[facility]                                                            Outpatient Follow Up Note      CHIEF COMPLAINT: Follow up COPD        HPI:   Doing good  some cough with milky sputum  No hemoptysis   Off prednisone   Uses O2 at night 2LPM   O2 PRN during the day   No smoking   She is maintaining her weight- eating with good appetite     Past Medical History:   Diagnosis Date    Chronic airway obstruction, not elsewhere classified 3/4/08    oxygen 2L/min at HS and PRN through day    Chronic kidney disease     incontinent    COPD (chronic obstructive pulmonary disease) ([address]     Depression     Displacement of lumbar intervertebral disc without myelopathy 8/13/07    Edema 9/13/07    Emphysema of lung ([address]     Enthesopathy of hip region     Enthesopathy of hip region 3/5/07    Esophageal reflux 3/4/08    Hemorrhage of rectum and anus 11/14/07    Herpes zoster without complication     Hyperlipidemia associated with type 2 diabetes mellitus ([address] 12/11/2017    Lumbar spondylosis 5/1/2018    Major depression, chronic 8/12/2015    Osteoporosis, unspecified 12/4/07    Other and unspecified hyperlipidemia 3/4/08    Pain in joint, shoulder region 3/4/08    Pneumonia     Pulmonary nodule     Recurrent major depressive disorder, in partial remission ([address] 5/4/2018    Rheumatic fever     S/P ileostomy ([address] 8/20/2011    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    Tension headache     Type 2 diabetes mellitus with hyperglycemia, without long-term current use of insulin ([address]     Unspecified asthma(493.90) 8/13/07    Unspecified chronic bronchitis ([address]     Ventral hernia 6/29/2014       Past Surgical History:        Procedure Laterality Date    ABDOMEN SURGERY  5-19-11    total abdominal colectomy iliostomy    CARPAL TUNNEL RELEASE      right    CATARACT REMOVAL Bilateral     L 6/18/2013, R 07/01/2013    CATARACT REMOVAL MG tablet, TAKE 1 TABLET BY MOUTH DAILY, Disp: 90 tablet, Rfl: 0    metFORMIN (GLUCOPHAGE-XR) 500 MG extended release tablet, TAKE 2 TABLETS BY MOUTH TWICE DAILY, Disp: 360 tablet, Rfl: 0    gabapentin (NEURONTIN) 300 MG capsule, TAKE 1 CAPSULE BY MOUTH THREE TIMES DAILY FOR 30 DAYS, Disp: 90 capsule, Rfl: 0    DALIRESP 500 MCG tablet, TAKE 1 TABLET EVERY DAY, Disp: 90 tablet, Rfl: 0    venlafaxine (EFFEXOR XR) 75 MG extended release capsule, TAKE 1 CAPSULE EVERY DAY, Disp: 90 capsule, Rfl: 0    topiramate (TOPAMAX) 50 MG tablet, TAKE 1 TABLET BY MOUTH TWICE DAILY, Disp: 180 tablet, Rfl: 0    QUEtiapine (SEROQUEL) 25 MG tablet, TAKE 1 TO 2 TABLETS BY MOUTH AT BEDTIME., Disp: 180 tablet, Rfl: 0    alendronate (FOSAMAX) 35 MG tablet, TAKE 1 TABLET EVERY 7 DAYS, Disp: 12 tablet, Rfl: 0    ADVAIR DISKUS 250-50 MCG/DOSE AEPB, INHALE 1 PUFF INTO THE LUNGS EVERY 12 HOURS, Disp: 1 Inhaler, Rfl: 5    albuterol sulfate HFA (VENTOLIN HFA) 108 (90 Base) MCG/ACT inhaler, INHALE 2 PUFFS EVERY 6 HOURS AS NEEDED FOR WHEEZING, Disp: 3 Inhaler, Rfl: 3    albuterol (PROVENTIL) (2.5 MG/3ML) 0.083% nebulizer solution, INHALE THE CONTENTS OF 1 VIAL VIA NEBULIZER EVERY 6 HOURS AS NEEDED FOR SHORTNESS OF BREATH OR WHEEZING, Disp: 1080 mL, Rfl: 3    guaiFENesin (MUCINEX) 600 MG extended release tablet, Take 1 tablet by mouth 2 times daily as needed for Congestion, Disp: 60 tablet, Rfl: 2    theophylline (THEOCHRON) 200 MG extended release tablet, Take 1 tablet by mouth 2 times daily, Disp: 180 tablet, Rfl: 0    vitamin D (CHOLECALCIFEROL) 400 UNITS TABS tablet, Take 2 tablets by mouth daily, Disp: 180 tablet, Rfl: 1    calcium carbonate (CALCIUM 600) 600 MG TABS tablet, Take 2 tablets by mouth daily, Disp: 180 tablet, Rfl: 1    fluticasone (FLONASE) 50 MCG/ACT nasal spray, 2 sprays by Nasal route daily. , Disp: 3 Bottle, Rfl: 0    Blood Glucose Monitoring Suppl (ACCU-CHEK GAURANG SMARTVIEW) w/Device KIT, Use to test once daily. DX: E11.9, Disp: 1 kit, Rfl: 0    blood glucose test strips (ACCU-CHEK SMARTVIEW) strip, Use to test once daily. DX:E11.9, Disp: 100 each, Rfl: 11    Lancets Misc. (ACCU-CHEK FASTCLIX LANCET) KIT, Use to test once daily. DX:E11.9, Disp: 1 kit, Rfl: 2    gabapentin (NEURONTIN) 100 MG capsule, Take 2 capsules by mouth 3 times daily for 10 days. ., Disp: 60 capsule, Rfl: 0    ACCU-CHEK JOSIE PLUS strip, USE TO TEST DAILY, Disp: 100 strip, Rfl: 2    Respiratory Therapy Supplies (NEBULIZER/TUBING/MOUTHPIECE) KIT, 1 kit by Does not apply route daily as needed DX COPD J44.9, Disp: 1 kit, Rfl: 0    Nebulizers (COMPRESSOR/NEBULIZER) MISC, 1 Device by Does not apply route as needed DX COPD J44.9, Disp: 1 each, Rfl: 0    Misc. Devices (ACAPELLA) MISC, by Does not apply route 4 times daily. , Disp: 1 each, Rfl: 0           Objective:     /66   Pulse 86   Resp 20   Ht 5' (1.524 m)   Wt 98 lb 6.4 oz (44.6 kg)   SpO2 94% Comment: on RA  BMI 19.22 kg/m²  RA  Gen: No distress. Pleasant. Ill-appearing  Eyes: PERRL. No sclera icterus. No conjunctival injection. ENT: No discharge. Pharynx clear. Scar from prior trach. Neck: Trachea midline. No obvious mass. Resp: No accessory muscle use. No crackles. No wheezes. No rhonchi. No dullness on percussion. Decreased air entry. CV: Regular rate. Regular rhythm. No murmur or rub. No edema. GI: Non-tender. Non-distended. No hernia. Skin: Warm and dry. No nodule on exposed extremities. Lymph: No cervical LAD. No supraclavicular LAD. M/S: No cyanosis. No joint deformity. No clubbing. Neuro: Awake. Alert. Moves all four extremities. Psych: Oriented x 3. No anxiety.        DATA reviewed by me:   PFTs 03/29/2017 FEV1 0.55L(27%) TLC 5.21L(117%) DLCO 8.47(42%) 6MW 770 2L exertion   PFTs 07/07/2015 FEV1 0.63L(31%) TLC 5.12L(114%) DLCO 9.6(48%)   PFTs 12/06/2011 FEV1 0.83L(45%) TLC 5.14L(125%) DLCO 8.6 (49%)   PFTs 07/10/2008 FEV1 0.77L           TLC 5.41L DLCO 7.62  PFTs 01/15/2007 FEV1 0.59L           TLC 5.15L            DLCO 10.82        CT chest 8/16/18   Patent airways  Centrilobular emphysema  Biapical pleural-parenchymal scarring  Right lung nodules largest 6 mm stable  No new nodules    DEXA 3/3/16 Osteopenia        Assessment:      · Very severe COPD   · Weight loss- unclear etiology. Could be the Daliresp. Stabilized   · Abnormal CT chest with Biapical fibrotic changes- worse on the left on CT 1/30/2018  · R pulmonary nodules. Stable over 2 years . Most recent CT chest 8/16/18  · Hypoxia on exertion  · 20 pack years. Quit smoking 1996. · Chronic steroid use since 12/2015       Plan:       · Continue Advair 250/50 BID, Spiriva INH daily and Albuterol INH/Neb PRN  · Off Daliresp given weight loss   · O2 2LPM on exertion. Advised to titrate O2 using her pulse oximeter- target O2 sat 90-92%.   · Patient is up to date with Pneumococcal vaccine  · Advised to take influenza vaccine every year   · Pulmonary rehab referral   · Acapella and Mucinex

## 2019-05-23 RX ORDER — ROFLUMILAST 500 UG/1
TABLET ORAL
Qty: 90 TABLET | Refills: 0 | Status: SHIPPED | OUTPATIENT
Start: 2019-05-23 | End: 2019-07-24 | Stop reason: SDUPTHER

## 2019-05-28 RX ORDER — VENLAFAXINE HYDROCHLORIDE 75 MG/1
CAPSULE, EXTENDED RELEASE ORAL
Qty: 90 CAPSULE | Refills: 0 | Status: SHIPPED | OUTPATIENT
Start: 2019-05-28 | End: 2019-07-31 | Stop reason: SDUPTHER

## 2019-06-03 RX ORDER — QUETIAPINE FUMARATE 25 MG/1
TABLET, FILM COATED ORAL
Qty: 180 TABLET | Refills: 0 | Status: SHIPPED | OUTPATIENT
Start: 2019-06-03 | End: 2019-09-01 | Stop reason: SDUPTHER

## 2019-06-11 DIAGNOSIS — G44.221 CHRONIC TENSION-TYPE HEADACHE, INTRACTABLE: ICD-10-CM

## 2019-06-11 RX ORDER — TOPIRAMATE 50 MG/1
TABLET, FILM COATED ORAL
Qty: 180 TABLET | Refills: 1 | Status: SHIPPED | OUTPATIENT
Start: 2019-06-11 | End: 2019-12-08 | Stop reason: SDUPTHER

## 2019-06-11 NOTE — TELEPHONE ENCOUNTER
Last seen 03.21.19    Last office note states to RTO 6 months/09.2019    Next appointment scheduled for 09.19.19

## 2019-06-25 ENCOUNTER — OFFICE VISIT (OUTPATIENT)
Dept: ORTHOPEDIC SURGERY | Age: 69
End: 2019-06-25
Payer: MEDICARE

## 2019-06-25 VITALS — HEIGHT: 60 IN | BODY MASS INDEX: 19.63 KG/M2 | RESPIRATION RATE: 14 BRPM | WEIGHT: 100 LBS

## 2019-06-25 DIAGNOSIS — M70.61 GREATER TROCHANTERIC BURSITIS OF BOTH HIPS: Primary | ICD-10-CM

## 2019-06-25 DIAGNOSIS — M48.061 LUMBAR FORAMINAL STENOSIS: ICD-10-CM

## 2019-06-25 DIAGNOSIS — M70.62 GREATER TROCHANTERIC BURSITIS OF BOTH HIPS: Primary | ICD-10-CM

## 2019-06-25 DIAGNOSIS — M54.16 LUMBAR RADICULOPATHY: ICD-10-CM

## 2019-06-25 PROCEDURE — 4040F PNEUMOC VAC/ADMIN/RCVD: CPT | Performed by: PHYSICIAN ASSISTANT

## 2019-06-25 PROCEDURE — 1036F TOBACCO NON-USER: CPT | Performed by: PHYSICIAN ASSISTANT

## 2019-06-25 PROCEDURE — 99214 OFFICE O/P EST MOD 30 MIN: CPT | Performed by: PHYSICIAN ASSISTANT

## 2019-06-25 PROCEDURE — 20610 DRAIN/INJ JOINT/BURSA W/O US: CPT | Performed by: PHYSICIAN ASSISTANT

## 2019-06-25 PROCEDURE — G8399 PT W/DXA RESULTS DOCUMENT: HCPCS | Performed by: PHYSICIAN ASSISTANT

## 2019-06-25 PROCEDURE — 1123F ACP DISCUSS/DSCN MKR DOCD: CPT | Performed by: PHYSICIAN ASSISTANT

## 2019-06-25 PROCEDURE — 1090F PRES/ABSN URINE INCON ASSESS: CPT | Performed by: PHYSICIAN ASSISTANT

## 2019-06-25 PROCEDURE — 3017F COLORECTAL CA SCREEN DOC REV: CPT | Performed by: PHYSICIAN ASSISTANT

## 2019-06-25 PROCEDURE — G8427 DOCREV CUR MEDS BY ELIG CLIN: HCPCS | Performed by: PHYSICIAN ASSISTANT

## 2019-06-25 PROCEDURE — G8420 CALC BMI NORM PARAMETERS: HCPCS | Performed by: PHYSICIAN ASSISTANT

## 2019-06-25 NOTE — LETTER
Please schedule the following with:     Date:   2019 @ 8:45    Account: [id number]  Patient: Altagracia Vinson    : 1950  Address:  [place]  [address] [place] [address]    Phone (H):  [phone] (home)      ----------------------------------------------------------------------------------------------  Diagnosis:     ICD-10-CM    1. Greater trochanteric bursitis of both hips M70.61 IA TRIAMCINOLONE ACETONIDE INJ    M70.62 IA ARTHROCENTESIS ASPIR&/INJ MAJOR JT/BURSA W/O US     Ultrasound guided needle placement   2. Lumbar radiculopathy M54.16    3. Lumbar foraminal stenosis M99.83        Procedure: Transforaminal Epidural Steroid Injection     Levels: L4  Side: Bilateral   CPT Codes 28509    ----------------------------------------------------------------------------------------------  Injection [email or web address]    Attending Physician       Sammy Moncada.  Aaron Randhawa MD.      ----------------------------------------------------------------------------------------------  Injection Scheduled For:    At:    1st Insurance [employer]    Pre-Cert#    2nd Insurance     Pre-Cert#    Comments:    · Infection control  · Tested positive for MRSA in past 12 months:  no  · Tested positive for MSSA \"staph infection\" in past 12 months: no  · Tested positive for VRE (Vancomycin Resistant Enterococci) in past 12 months:   no  · Currently on any antibiotics for an infection: no  · Anticoagulants:  · On a blood thinner:  no   · Any history of bleeding disorder: no   · Advanced Liver disease: no   · Advanced Renal disease: no   · Glaucoma: no   · Diabetes: yes     Sedation:  No  -----------------------------------------------------------------------------------------------  No Known Allergies

## 2019-06-25 NOTE — PROGRESS NOTES
Follow up: [address] [facility],Third Floor  1950  [id number]         Chief Complaint   Patient presents with    Lower Back Pain    Hip Pain     Bilateral          HISTORY OF PRESENT ILLNESS:  Ms. Rao Li is a 70 y.o. female returns for a follow up visit for multiple medical problems. Her current presenting problems are   1. Greater trochanteric bursitis of both hips    2. Lumbar radiculopathy    3. Lumbar foraminal stenosis    . As per information/history obtained from the PADT(patient assessment and documentation tool) - She complains of pain in the lower back with radiation to the hips Bilateral She rates the pain 8/10 and describes it as sharp. Pain is made worse by: movement. She denies side effects from the current pain regimen. Patient reports that since the last follow up visit the physical functioning is unchanged, family/social relationships are unchanged, mood is unchanged and sleep patterns are unchanged, and that the overall functioning is unchanged. Patient denies neurological bowel or bladder. Patient presents in office for lower back pain that radiates into bilateral hips and legs. She states that her pain is an 8/10 in today's visit. She states that it is a constant sharp pain. She states that it hurts even to the touch. She states that she has a hard time sleeping no matter the position that she sleeps. She has been seen most recently in the office a year ago on 6/26/2018 for a follow-up visit to a bilateral L4 epidural steroid injection which was performed on 6/12/2018. She describes that she had good relief after this injection. She describes that she started having increased pain over the past 2 months with her left greater than right hip causing pain. She denies any new injuries or triggers.      Associated signs and symptoms:   Neurogenic bowel or bladder symptoms:  no   Perceived weakness:  no   Difficulty walking:  yes              Past Medical History:   Past Medical History: Diagnosis Date    Chronic airway obstruction, not elsewhere classified 3/4/08    oxygen 2L/min at HS and PRN through day    Chronic kidney disease     incontinent    COPD (chronic obstructive pulmonary disease) ([address]     Depression     Displacement of lumbar intervertebral disc without myelopathy 8/13/07    Edema 9/13/07    Emphysema of lung ([address]     Enthesopathy of hip region     Enthesopathy of hip region 3/5/07    Esophageal reflux 3/4/08    Hemorrhage of rectum and anus 11/14/07    Herpes zoster without complication     Hyperlipidemia associated with type 2 diabetes mellitus ([address] 12/11/2017    Lumbar spondylosis 5/1/2018    Major depression, chronic 8/12/2015    Osteoporosis, unspecified 12/4/07    Other and unspecified hyperlipidemia 3/4/08    Pain in joint, shoulder region 3/4/08    Pneumonia     Pulmonary nodule     Recurrent major depressive disorder, in partial remission ([address] 5/4/2018    Rheumatic fever     S/P ileostomy ([address] 8/20/2011    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    Tension headache     Type 2 diabetes mellitus with hyperglycemia, without long-term current use of insulin ([address]     Unspecified asthma(493.90) 8/13/07    Unspecified chronic bronchitis ([address]     Ventral hernia 6/29/2014      Past Surgical History:     Past Surgical History:   Procedure Laterality Date    ABDOMEN SURGERY  5-19-11    total abdominal colectomy iliostomy    CARPAL TUNNEL RELEASE      right    CATARACT REMOVAL Bilateral     L 6/18/2013, R 07/01/2013    CATARACT REMOVAL WITH IMPLANT Right 7/1/13    COLECTOMY      and reversal     COLONOSCOPY  2009, 11/4/2011, 10/28/15    normal    CYST REMOVAL Right 6/20/14    Dr. Cora Quick; right ear pressure point cyst removal    EYE SURGERY Left 6/18/13    cataract with lens implant    HERNIA REPAIR  6/27/14    Open Vental Hernia Repair    HIP SURGERY      HYSTERECTOMY  1973    subtotal    NECK SURGERY      incision of windpipe, repair of windpipe defect    OTHER SURGICAL HISTORY  8/19/2011    hand assisted laparoscopic ileostomy reversal    OVARY REMOVAL      TRACHEAL SURGERY  2005    hx of trach     UMBILICAL HERNIA REPAIR  6/27/2014     Current Medications:     Current Outpatient Medications:     fluticasone-salmeterol (ADVAIR) 250-50 MCG/DOSE AEPB, INHALE 1 PUFF INTO THE LUNGS EVERY 12 HOURS, Disp: 180 each, Rfl: 1    topiramate (TOPAMAX) 50 MG tablet, TAKE 1 TABLET BY MOUTH TWICE DAILY, Disp: 180 tablet, Rfl: 1    QUEtiapine (SEROQUEL) 25 MG tablet, TAKE 1 TO 2 TABLETS BY MOUTH AT BEDTIME., Disp: 180 tablet, Rfl: 0    venlafaxine (EFFEXOR XR) 75 MG extended release capsule, TAKE 1 CAPSULE EVERY DAY, Disp: 90 capsule, Rfl: 0    DALIRESP 500 MCG tablet, TAKE 1 TABLET EVERY DAY, Disp: 90 tablet, Rfl: 0    SPIRIVA HANDIHALER 18 MCG inhalation capsule, INHALE THE CONTENTS OF 1 CAPSULE EVERY DAY, Disp: 90 capsule, Rfl: 0    naproxen (NAPROSYN) 375 MG tablet, TAKE 1 TABLET TWICE DAILY, Disp: 180 tablet, Rfl: 0    furosemide (LASIX) 40 MG tablet, TAKE 1 TABLET EVERY DAY, Disp: 90 tablet, Rfl: 0    esomeprazole (NEXIUM) 40 MG delayed release capsule, TAKE 1 CAPSULE EVERY MORNING BEFORE BREAKFAST, Disp: 90 capsule, Rfl: 0    simvastatin (ZOCOR) 20 MG tablet, TAKE 1 TABLET EVERY EVENING, Disp: 90 tablet, Rfl: 0    potassium chloride (KLOR-CON M) 20 MEQ extended release tablet, TAKE 1 TABLET EVERY DAY, Disp: 90 tablet, Rfl: 0    JANUVIA 100 MG tablet, TAKE 1 TABLET BY MOUTH DAILY, Disp: 90 tablet, Rfl: 0    metFORMIN (GLUCOPHAGE-XR) 500 MG extended release tablet, TAKE 2 TABLETS BY MOUTH TWICE DAILY, Disp: 360 tablet, Rfl: 0    alendronate (FOSAMAX) 35 MG tablet, TAKE 1 TABLET EVERY 7 DAYS, Disp: 12 tablet, Rfl: 0    albuterol sulfate HFA (VENTOLIN HFA) 108 (90 Base) MCG/ACT inhaler, INHALE 2 PUFFS EVERY 6 HOURS AS NEEDED FOR WHEEZING, Disp: 3 Inhaler, Rfl: 3    Blood Glucose Monitoring Suppl (ACCU-CHEK GAURANG SMARTVIEW) w/Device KIT, Use to test once daily. DX:E11.9, Disp: 1 kit, Rfl: 0    blood glucose test strips (ACCU-CHEK SMARTVIEW) strip, Use to test once daily. DX:E11.9, Disp: 100 each, Rfl: 11    Lancets Misc. (ACCU-CHEK FASTCLIX LANCET) KIT, Use to test once daily. DX:E11.9, Disp: 1 kit, Rfl: 2    albuterol (PROVENTIL) (2.5 MG/3ML) 0.083% nebulizer solution, INHALE THE CONTENTS OF 1 VIAL VIA NEBULIZER EVERY 6 HOURS AS NEEDED FOR SHORTNESS OF BREATH OR WHEEZING, Disp: 1080 mL, Rfl: 3    guaiFENesin (MUCINEX) 600 MG extended release tablet, Take 1 tablet by mouth 2 times daily as needed for Congestion, Disp: 60 tablet, Rfl: 2    ACCU-CHEK JOSIE PLUS strip, USE TO TEST DAILY, Disp: 100 strip, Rfl: 2    theophylline (THEOCHRON) 200 MG extended release tablet, Take 1 tablet by mouth 2 times daily, Disp: 180 tablet, Rfl: 0    vitamin D (CHOLECALCIFEROL) 400 UNITS TABS tablet, Take 2 tablets by mouth daily, Disp: 180 tablet, Rfl: 1    calcium carbonate (CALCIUM 600) 600 MG TABS tablet, Take 2 tablets by mouth daily, Disp: 180 tablet, Rfl: 1    Respiratory Therapy Supplies (NEBULIZER/TUBING/MOUTHPIECE) KIT, 1 kit by Does not apply route daily as needed DX COPD J44.9, Disp: 1 kit, Rfl: 0    Nebulizers (COMPRESSOR/NEBULIZER) MISC, 1 Device by Does not apply route as needed DX COPD J44.9, Disp: 1 each, Rfl: 0    fluticasone (FLONASE) 50 MCG/ACT nasal spray, 2 sprays by Nasal route daily. , Disp: 3 Bottle, Rfl: 0    Misc. Devices (ACAPELLA) MISC, by Does not apply route 4 times daily. , Disp: 1 each, Rfl: 0    gabapentin (NEURONTIN) 300 MG capsule, TAKE 1 CAPSULE BY MOUTH THREE TIMES DAILY FOR 30 DAYS, Disp: 90 capsule, Rfl: 0    gabapentin (NEURONTIN) 100 MG capsule, Take 2 capsules by mouth 3 times daily for 10 days. ., Disp: 60 capsule, Rfl: 0  Allergies:  Patient has no known allergies. Social History:    reports that she quit smoking about 23 years ago. Her smoking use included cigarettes. She has a 30.00 pack-year smoking history.  She has never used smokeless tobacco. She reports that she does not drink alcohol or use drugs. Family History:   Family History   Problem Relation Age of Onset    Asthma Daughter     Diabetes Daughter     Cancer Daughter         Uterine    Asthma Daughter     Diabetes Mother     Heart Failure Mother     Hypertension Mother     Heart Failure Brother     Diabetes Sister     Heart Failure Brother     Emphysema Neg Hx        REVIEW OF SYSTEMS:   CONSTITUTIONAL: Denies unexplained weight loss, fevers, chills or fatigue  NEUROLOGICAL: Denies unsteady gait or progressive weakness  MUSCULOSKELETAL: Denies joint swelling or redness  GI: Denies nausea, vomiting, diarrhea   : Denies bowel or bladder issues       PHYSICAL EXAM:    Vitals: Resp. rate 14, height 5' (1.524 m), weight 100 lb (45.4 kg), not currently breastfeeding. GENERAL EXAM:  · General Apparence: Patient is adequately groomed with no evidence of malnutrition. · Psychiatric: Orientation: The patient is oriented to time, place and person. The patient's mood and affect are appropriate   · Vascular: Examination reveals no swelling and palpation reveals no tenderness in upper or lower extremities. Good capillary refill. · The lymphatic examination of the neck, axillae and groin reveals all areas to be without enlargement or induration  · Sensation is intact without deficit in the upper and lower extremities to light touch and pinprick  · Coordination of the upper and lower extremities are normal.  · RIGHT UPPER EXTREMITY:  Inspection/examination of the right upper extremity does not show any tenderness, deformity or injury. Range of motion is unremarkable and pain-free. There is no gross instability. There are no rashes, ulcerations or lesions. Strength and tone are normal. No atrophy or abnormal movements are noted. · LEFT UPPER EXTREMITY: Inspection/examination of the left upper extremity does not show any tenderness, deformity or injury.  Range of motion is unremarkable and pain-free. There is no gross instability. There are no rashes, ulcerations or lesions. Strength and tone are normal. No atrophy or abnormal movements are noted. LUMBAR/SACRAL EXAMINATION:  · Inspection: Local inspection shows no step-off or bruising. Lumbar alignment is normal. No instability is noted. · Palpation:   No evidence of tenderness at the midline. Lumbar paraspinal tenderness: Mild L4/5 and L5/S1 tenderness  Bursal tenderness bilateral.  There is no paraspinal spasm. · Range of Motion: very limited due to pain  · Strength:   Strength testing is 5/5 in all muscle groups tested, except with hip flexion at a 4/5 bilaterally. · Special Tests:   Straight leg raise left positive right negative and crossed SLR negative. · Skin: There are no rashes, ulcerations or lesions. · Reflexes: Reflexes are symmetrically 2+ at the patellar and ankle tendons. Clonus absent bilaterally at the feet. · Gait & station: normal, patient ambulates without assistance and no ataxia  · Additional Examinations:  · RIGHT LOWER EXTREMITY: Inspection/examination of the right lower extremity does not show any tenderness, deformity or injury. Range of motion is normal and pain-free. There is no gross instability. There are no rashes, ulcerations or lesions. Strength and tone are normal. No atrophy or abnormal movements are noted. · LEFT LOWER EXTREMITY:  Inspection/examination of the left lower extremity does not show any tenderness, deformity or injury. Range of motion is normal and pain-free. There is no gross instability. There are no rashes, ulcerations or lesions. Strength and tone are normal. No atrophy or abnormal movements are noted. Diagnostic Testing:    No new diagnostics.   Results for orders placed or performed during the hospital encounter of 05/13/19   Comprehensive Metabolic Panel   Result Value Ref Range    Sodium 146 (H) 136 - 145 mmol/L    Potassium 4.1 3.5 - 5.1 mmol/L Chloride 112 (H) 99 - 110 mmol/L    CO2 26 21 - 32 mmol/L    Anion Gap 8 3 - 16    Glucose 91 70 - 99 mg/dL    BUN 20 7 - 20 mg/dL    CREATININE 0.7 0.6 - 1.2 mg/dL    GFR Non-African American >60 >60    GFR African American >60 >60    Calcium 9.3 8.3 - 10.6 mg/dL    Total Protein 6.7 6.4 - 8.2 g/dL    Alb 3.9 3.4 - 5.0 g/dL    Albumin/Globulin Ratio 1.4 1.1 - 2.2    Total Bilirubin <0.2 0.0 - 1.0 mg/dL    Alkaline Phosphatase 46 40 - 129 U/L    ALT 6 (L) 10 - 40 U/L    AST 11 (L) 15 - 37 U/L    Globulin 2.8 g/dL     Impression:       1. Greater trochanteric bursitis of both hips    2. Lumbar radiculopathy    3. Lumbar foraminal stenosis        Plan:  Clinical Course: Above diagnoses are worsening    I discussed the diagnosis and the treatment options with Dang Briceno today. In Summary:  The various treatment options were outlined and discussed with Dang Briceno including:  Conservative care options: physical therapy, ice, medications, bracing, and activity modification. The indications for therapeutic injections. The indications for additional imaging/laboratory studies. The indications for (possible future) interventions. After considering the various options discussed, Dang Briceno elected to pursue a course of treatment that includes the followin. Medications:  No further recommendations for new medications. 2. PT:  Encouraged to continue with HEP. 3. Further studies:  No further studies. 4. Interventional:  We discussed pursuing a Bilateral L4 TF epidural steroid injection to address the pain. Radiologic imaging and symptoms confirm the pain etiology. Risks, benefits and alternatives of interventional options were discussed. These include and are not limited to bleeding, infection, spinal headache, nerve injury and lack of pain relief. The patient verbalized understanding and would like to proceed. The patient will be scheduled accordingly.     After risks benefits alternatives discussed a right greater trochanter bursa injection was undertaken the bedside. The skin overlying the right hip was prepped with ChloraPrep ×3. A mixture of 80 mg of Kenalog with 4 ml of 1% lidocaine was drawn up and instilled over the most tender point of the right greater trochanter with a 22-gauge needle using a Sonosite curvilinear transducer using an in plane technique. The needle was removed after the mixture was instilled and a Band-Aid was applied. The identical procedure was repeated on the left side. 5. Follow up:  4-6 weeks      Winferd Bernheim was instructed to call the office if her symptoms worsen or if new symptoms appear prior to the next scheduled visit. She is specifically instructed to contact the office between now & her scheduled appointment if she has concerns related to her condition or if she needs assistance in scheduling the above tests. She is welcome to call for an appointment sooner if she has any additional concerns or questions. Pedro Parish CRMA, am scribing for and in the presence of Indy Zamudio  06/25/19 12:07 PM Lori Liu CRMA. Josphine Libman Iaciofano, PA-C, personally performed the services described in this documentation as scribed by Lori Liu CRMA in my presence and it is both accurate and complete. CHANTELLE Zamudio PA-C  Board Certified by the [employer] on Certification of Physician Assistants  [address] [facility]  Partner of [facility] ([facility])      This dictation was performed with a verbal recognition program [facility]) and it was checked for errors. It is possible that there are still dictated errors within this office note. If so, please bring any errors to my attention for an addendum. All efforts were made to ensure that this office note is accurate.

## 2019-06-25 NOTE — PROGRESS NOTES
6/25/19 10:54 AM         NDC: 4323-4217-78   -   KENALOG    Lot Number: [id number]       Comments: B/L GREATER TROCHANTER BURSA INJECTION           NDC: 1025-3248-89   -   LIDOCAINE 1%    Lot Number: [id number]       Comments: B/L GREATER TROCHANTER BURSA INJECTION

## 2019-07-02 ENCOUNTER — TELEPHONE (OUTPATIENT)
Dept: ORTHOPEDIC SURGERY | Age: 69
End: 2019-07-02

## 2019-07-09 ENCOUNTER — HOSPITAL ENCOUNTER (OUTPATIENT)
Age: 69
Setting detail: OUTPATIENT SURGERY
Discharge: HOME | End: 2019-07-09
Attending: PHYSICAL MEDICINE & REHABILITATION | Admitting: PHYSICAL MEDICINE & REHABILITATION
Payer: MEDICARE

## 2019-07-09 VITALS
HEIGHT: 60 IN | HEART RATE: 89 BPM | SYSTOLIC BLOOD PRESSURE: 140 MMHG | WEIGHT: 100 LBS | RESPIRATION RATE: 16 BRPM | OXYGEN SATURATION: 100 % | DIASTOLIC BLOOD PRESSURE: 73 MMHG | BODY MASS INDEX: 19.63 KG/M2 | TEMPERATURE: 98 F

## 2019-07-09 LAB
GLUCOSE BLD-MCNC: 88 MG/DL (ref 70–99)
PERFORMED ON: NORMAL

## 2019-07-09 PROCEDURE — 7100000010 HC PHASE II RECOVERY - FIRST 15 MIN: Performed by: PHYSICAL MEDICINE & REHABILITATION

## 2019-07-09 PROCEDURE — 2500000003 HC RX 250 WO HCPCS: Performed by: PHYSICAL MEDICINE & REHABILITATION

## 2019-07-09 PROCEDURE — 3600000002 HC SURGERY LEVEL 2 BASE: Performed by: PHYSICAL MEDICINE & REHABILITATION

## 2019-07-09 PROCEDURE — 2709999900 HC NON-CHARGEABLE SUPPLY: Performed by: PHYSICAL MEDICINE & REHABILITATION

## 2019-07-09 PROCEDURE — 6360000004 HC RX CONTRAST MEDICATION: Performed by: PHYSICAL MEDICINE & REHABILITATION

## 2019-07-09 PROCEDURE — 6360000002 HC RX W HCPCS: Performed by: PHYSICAL MEDICINE & REHABILITATION

## 2019-07-09 PROCEDURE — 3600000012 HC SURGERY LEVEL 2 ADDTL 15MIN: Performed by: PHYSICAL MEDICINE & REHABILITATION

## 2019-07-09 RX ORDER — LIDOCAINE HYDROCHLORIDE 10 MG/ML
INJECTION, SOLUTION EPIDURAL; INFILTRATION; INTRACAUDAL; PERINEURAL PRN
Status: DISCONTINUED | OUTPATIENT
Start: 2019-07-09 | End: 2019-07-09 | Stop reason: ALTCHOICE

## 2019-07-09 ASSESSMENT — PAIN - FUNCTIONAL ASSESSMENT
PAIN_FUNCTIONAL_ASSESSMENT: PREVENTS OR INTERFERES SOME ACTIVE ACTIVITIES AND ADLS
PAIN_FUNCTIONAL_ASSESSMENT: 0-10

## 2019-07-09 ASSESSMENT — PAIN SCALES - GENERAL: PAINLEVEL_OUTOF10: 0

## 2019-07-09 ASSESSMENT — PAIN DESCRIPTION - DESCRIPTORS: DESCRIPTORS: ACHING;STABBING

## 2019-07-25 RX ORDER — ROFLUMILAST 500 UG/1
TABLET ORAL
Qty: 90 TABLET | Refills: 0 | Status: SHIPPED | OUTPATIENT
Start: 2019-07-25 | End: 2019-08-06

## 2019-07-31 RX ORDER — VENLAFAXINE HYDROCHLORIDE 75 MG/1
CAPSULE, EXTENDED RELEASE ORAL
Qty: 90 CAPSULE | Refills: 0 | Status: SHIPPED | OUTPATIENT
Start: 2019-07-31 | End: 2019-10-08 | Stop reason: SDUPTHER

## 2019-08-06 ENCOUNTER — OFFICE VISIT (OUTPATIENT)
Dept: INTERNAL MEDICINE CLINIC | Age: 69
End: 2019-08-06

## 2019-08-06 VITALS
RESPIRATION RATE: 14 BRPM | WEIGHT: 100 LBS | BODY MASS INDEX: 19.63 KG/M2 | SYSTOLIC BLOOD PRESSURE: 110 MMHG | DIASTOLIC BLOOD PRESSURE: 70 MMHG | HEIGHT: 60 IN | HEART RATE: 70 BPM

## 2019-08-06 DIAGNOSIS — G44.221 CHRONIC TENSION-TYPE HEADACHE, INTRACTABLE: ICD-10-CM

## 2019-08-06 DIAGNOSIS — J44.9 CHRONIC OBSTRUCTIVE PULMONARY DISEASE, UNSPECIFIED COPD TYPE (HCC): ICD-10-CM

## 2019-08-06 DIAGNOSIS — F32.9 MAJOR DEPRESSION, CHRONIC: ICD-10-CM

## 2019-08-06 DIAGNOSIS — E11.69 HYPERLIPIDEMIA ASSOCIATED WITH TYPE 2 DIABETES MELLITUS (HCC): ICD-10-CM

## 2019-08-06 DIAGNOSIS — E78.5 HYPERLIPIDEMIA ASSOCIATED WITH TYPE 2 DIABETES MELLITUS (HCC): ICD-10-CM

## 2019-08-06 DIAGNOSIS — K21.9 GASTROESOPHAGEAL REFLUX DISEASE WITHOUT ESOPHAGITIS: ICD-10-CM

## 2019-08-06 DIAGNOSIS — E11.9 TYPE 2 DIABETES MELLITUS WITHOUT COMPLICATION, WITHOUT LONG-TERM CURRENT USE OF INSULIN (HCC): Primary | ICD-10-CM

## 2019-08-06 LAB
CREATININE URINE: 128.3 MG/DL (ref 28–259)
MICROALBUMIN UR-MCNC: 1.9 MG/DL
MICROALBUMIN/CREAT UR-RTO: 14.8 MG/G (ref 0–30)

## 2019-08-06 PROCEDURE — 3044F HG A1C LEVEL LT 7.0%: CPT | Performed by: INTERNAL MEDICINE

## 2019-08-06 PROCEDURE — G8427 DOCREV CUR MEDS BY ELIG CLIN: HCPCS | Performed by: INTERNAL MEDICINE

## 2019-08-06 PROCEDURE — 3017F COLORECTAL CA SCREEN DOC REV: CPT | Performed by: INTERNAL MEDICINE

## 2019-08-06 PROCEDURE — 1090F PRES/ABSN URINE INCON ASSESS: CPT | Performed by: INTERNAL MEDICINE

## 2019-08-06 PROCEDURE — 4040F PNEUMOC VAC/ADMIN/RCVD: CPT | Performed by: INTERNAL MEDICINE

## 2019-08-06 PROCEDURE — 81002 URINALYSIS NONAUTO W/O SCOPE: CPT | Performed by: INTERNAL MEDICINE

## 2019-08-06 PROCEDURE — G8399 PT W/DXA RESULTS DOCUMENT: HCPCS | Performed by: INTERNAL MEDICINE

## 2019-08-06 PROCEDURE — 3023F SPIROM DOC REV: CPT | Performed by: INTERNAL MEDICINE

## 2019-08-06 PROCEDURE — G8420 CALC BMI NORM PARAMETERS: HCPCS | Performed by: INTERNAL MEDICINE

## 2019-08-06 PROCEDURE — 2022F DILAT RTA XM EVC RTNOPTHY: CPT | Performed by: INTERNAL MEDICINE

## 2019-08-06 PROCEDURE — G8926 SPIRO NO PERF OR DOC: HCPCS | Performed by: INTERNAL MEDICINE

## 2019-08-06 PROCEDURE — 1123F ACP DISCUSS/DSCN MKR DOCD: CPT | Performed by: INTERNAL MEDICINE

## 2019-08-06 PROCEDURE — 99214 OFFICE O/P EST MOD 30 MIN: CPT | Performed by: INTERNAL MEDICINE

## 2019-08-06 PROCEDURE — 1036F TOBACCO NON-USER: CPT | Performed by: INTERNAL MEDICINE

## 2019-08-06 ASSESSMENT — ENCOUNTER SYMPTOMS
EYE DISCHARGE: 0
RHINORRHEA: 0
ABDOMINAL PAIN: 0
SHORTNESS OF BREATH: 1
NAUSEA: 0
COUGH: 0
WHEEZING: 0
BACK PAIN: 1
VOMITING: 0

## 2019-08-06 NOTE — PATIENT INSTRUCTIONS
regular, healthy meals. Use bronchodilators about 1 hour before you eat to make it easier to eat. Eat several small meals instead of three large ones. Drink beverages at the end of the meal. Avoid foods that are hard to chew.     · Eat foods that contain protein so that you do not lose muscle mass.     · Talk with your doctor if you gain too much weight or if you lose weight without trying.    Mental health    · Talk to your family, friends, or a therapist about your feelings. It is normal to feel frightened, angry, hopeless, helpless, and even guilty. Talking openly about bad feelings can help you cope. If these feelings last, talk to your doctor. When should you call for help? Call 911 anytime you think you may need emergency care. For example, call if:    · You have severe trouble breathing.    Call your doctor now or seek immediate medical care if:    · You have new or worse trouble breathing.     · You cough up blood.     · You have a fever.    Watch closely for changes in your health, and be sure to contact your doctor if:    · You cough more deeply or more often, especially if you notice more mucus or a change in the color of your mucus.     · You have new or worse swelling in your legs or belly.     · You are not getting better as expected. Where can you learn more? Go to [email or web address]. org and sign in to your [employer] account. Enter B968 in the [facility] box to learn more about \"Chronic Obstructive Pulmonary Disease (COPD): Care Instructions. \"     If you do not have an account, please click on the \"Sign Up Now\" link. Current as of: September 5, 2018  Content Version: 12.0  © 3938-7234 [employer], Incorporated. Care instructions adapted under license by [facility] ([facility]).  If you have questions about a medical condition or this instruction, always ask your healthcare professional. [address] any warranty or liability for your use of this

## 2019-08-06 NOTE — PROGRESS NOTES
Subjective:      Patient ID: Melody Foss is a 70 y.o. female. HPI  Patient is here for follow up on her diabetes, asthma, chronic respiratory failure, hyperlipidemia and arthritis. Her  of 26 years ago passed away six days ago. Her BS are well controlled. Her weight is stable. Her diabetes is well controlled. She has severe COPD and asthma. Recent testing showed low oxygen. She is off oxygen. She does get some dyspnea off and on. Has intermittent coughing. She is off prednisone. Her pulmonary nodule is stable. No changes. Chest CT scheduled by Dr Yumiko Landaverde. Her hyperlipidemia is controlled. No myalgias. Her arthritis has been stable. Her tension HA are controlled. She is on Topamax. She has GERD. She is on Nexium. No heartburn. She has depression. She is on Effexor. It is stable. Review of Systems   Constitutional: Negative for appetite change, chills, fatigue and fever. HENT: Negative for ear pain, postnasal drip and rhinorrhea. Eyes: Negative for discharge. Respiratory: Positive for shortness of breath. Negative for cough and wheezing. Cardiovascular: Positive for chest pain. Negative for palpitations. Gastrointestinal: Negative for abdominal pain, nausea and vomiting. Endocrine: Negative for polydipsia and polyphagia. Musculoskeletal: Positive for back pain. Skin: Negative for rash. Psychiatric/Behavioral: Positive for sleep disturbance. The patient is nervous/anxious. Current Outpatient Medications on File Prior to Visit   Medication Sig Dispense Refill    venlafaxine (EFFEXOR XR) 75 MG extended release capsule TAKE 1 CAPSULE EVERY DAY 90 capsule 0    fluticasone-salmeterol (ADVAIR) 250-50 MCG/DOSE AEPB INHALE 1 PUFF INTO THE LUNGS EVERY 12 HOURS 180 each 1    topiramate (TOPAMAX) 50 MG tablet TAKE 1 TABLET BY MOUTH TWICE DAILY 180 tablet 1    QUEtiapine (SEROQUEL) 25 MG tablet TAKE 1 TO 2 TABLETS BY MOUTH AT BEDTIME.  180 tablet 300 [facility] (CHOLECALCIFEROL) 400 UNITS TABS tablet Take 2 tablets by mouth daily 180 tablet 1    calcium carbonate (CALCIUM 600) 600 MG TABS tablet Take 2 tablets by mouth daily 180 tablet 1    Respiratory Therapy Supplies (NEBULIZER/TUBING/MOUTHPIECE) KIT 1 kit by Does not apply route daily as needed DX COPD J44.9 1 kit 0    Nebulizers (COMPRESSOR/NEBULIZER) MISC 1 Device by Does not apply route as needed DX COPD J44.9 1 each 0    fluticasone (FLONASE) 50 MCG/ACT nasal spray 2 sprays by Nasal route daily. 3 Bottle 0    Misc. Devices (ACAPELLA) MISC by Does not apply route 4 times daily. 1 each 0     No current facility-administered medications on file prior to visit. There are no changes to past medical history, family history, social history or review of systems(except as noted in the history section) since prior note (all reviewed with patient). /70 (Site: Left Upper Arm, Position: Sitting, Cuff Size: Medium Adult)   Pulse 70   Resp 14   Ht 5' (1.524 m)   Wt 100 lb (45.4 kg)   BMI 19.53 kg/m²      Objective:   Physical Exam   Constitutional: She is oriented to person, place, and time. She appears well-developed and well-nourished. HENT:   Head: Normocephalic. Right Ear: No drainage. Tympanic membrane is not injected. Left Ear: No drainage. Tympanic membrane is not injected. Eyes: Pupils are equal, round, and reactive to light. Conjunctivae and EOM are normal.   Neck: Trachea normal and normal range of motion. Neck supple. No JVD present. Carotid bruit is not present. No thyroid mass and no thyromegaly present. Cardiovascular: Normal rate, regular rhythm and normal heart sounds. Exam reveals no gallop. No murmur heard. Pulmonary/Chest: Effort normal. No respiratory distress. She has decreased breath sounds. She has no wheezes. She has no rales. She exhibits no tenderness. Abdominal: Soft. Bowel sounds are normal. She exhibits no distension and no mass.  There is no splenomegaly or depression       Decrease calorie intake. Exercise,weight loss recommended. The current medical regimen is effective;  continue present plan and medications. See orders.

## 2019-08-12 RX ORDER — SITAGLIPTIN 100 MG/1
100 TABLET, FILM COATED ORAL DAILY
Qty: 90 TABLET | Refills: 0 | Status: SHIPPED | OUTPATIENT
Start: 2019-08-12 | End: 2019-11-08 | Stop reason: SDUPTHER

## 2019-09-03 RX ORDER — QUETIAPINE FUMARATE 25 MG/1
TABLET, FILM COATED ORAL
Qty: 180 TABLET | Refills: 0 | Status: SHIPPED | OUTPATIENT
Start: 2019-09-03 | End: 2019-12-08 | Stop reason: SDUPTHER

## 2019-09-19 ENCOUNTER — OFFICE VISIT (OUTPATIENT)
Dept: NEUROLOGY | Age: 69
End: 2019-09-19
Payer: MEDICARE

## 2019-09-19 VITALS
HEIGHT: 61 IN | BODY MASS INDEX: 19.07 KG/M2 | OXYGEN SATURATION: 96 % | DIASTOLIC BLOOD PRESSURE: 69 MMHG | SYSTOLIC BLOOD PRESSURE: 108 MMHG | HEART RATE: 97 BPM | WEIGHT: 101 LBS

## 2019-09-19 DIAGNOSIS — G44.221 CHRONIC TENSION-TYPE HEADACHE, INTRACTABLE: Primary | ICD-10-CM

## 2019-09-19 DIAGNOSIS — F34.1 DYSTHYMIA: ICD-10-CM

## 2019-09-19 DIAGNOSIS — E11.65 TYPE 2 DIABETES MELLITUS WITH HYPERGLYCEMIA, WITHOUT LONG-TERM CURRENT USE OF INSULIN (HCC): ICD-10-CM

## 2019-09-19 DIAGNOSIS — E78.2 MIXED HYPERLIPIDEMIA: ICD-10-CM

## 2019-09-19 PROCEDURE — 1036F TOBACCO NON-USER: CPT | Performed by: PSYCHIATRY & NEUROLOGY

## 2019-09-19 PROCEDURE — G8420 CALC BMI NORM PARAMETERS: HCPCS | Performed by: PSYCHIATRY & NEUROLOGY

## 2019-09-19 PROCEDURE — G8399 PT W/DXA RESULTS DOCUMENT: HCPCS | Performed by: PSYCHIATRY & NEUROLOGY

## 2019-09-19 PROCEDURE — 2022F DILAT RTA XM EVC RTNOPTHY: CPT | Performed by: PSYCHIATRY & NEUROLOGY

## 2019-09-19 PROCEDURE — 1123F ACP DISCUSS/DSCN MKR DOCD: CPT | Performed by: PSYCHIATRY & NEUROLOGY

## 2019-09-19 PROCEDURE — 1090F PRES/ABSN URINE INCON ASSESS: CPT | Performed by: PSYCHIATRY & NEUROLOGY

## 2019-09-19 PROCEDURE — 3044F HG A1C LEVEL LT 7.0%: CPT | Performed by: PSYCHIATRY & NEUROLOGY

## 2019-09-19 PROCEDURE — G8427 DOCREV CUR MEDS BY ELIG CLIN: HCPCS | Performed by: PSYCHIATRY & NEUROLOGY

## 2019-09-19 PROCEDURE — 99213 OFFICE O/P EST LOW 20 MIN: CPT | Performed by: PSYCHIATRY & NEUROLOGY

## 2019-09-19 PROCEDURE — 4040F PNEUMOC VAC/ADMIN/RCVD: CPT | Performed by: PSYCHIATRY & NEUROLOGY

## 2019-09-19 PROCEDURE — 3017F COLORECTAL CA SCREEN DOC REV: CPT | Performed by: PSYCHIATRY & NEUROLOGY

## 2019-09-19 NOTE — PROGRESS NOTES
The patient came today for follow up regarding: chronic headaches    Since the patient's last visit, she continues to have intermittent tension headache. Frequency could be few times a week and duration is minutes. Degree is moderate. She describes her pain as holocranial and occipital dull achy pain without nausea or vomiting or photophobia or phonophobia. No clear triggers or other associated symptoms. She feels Topamax helps her headaches. She is on 50 mg twice daily. No report of any side effect. She denies any sleep disturbance, insomnia or sleep parasomnia. She is on statin. Known diabetic and her sugar has been waxing and waning. She is on gabapentin for chronic pain. History of depression on different SSRI. No recent worsening. Other review of system was unremarkable.       Past Medical History:   Diagnosis Date    Chronic airway obstruction, not elsewhere classified 3/4/08    oxygen 2L/min at HS and PRN through day    Chronic kidney disease     incontinent    COPD (chronic obstructive pulmonary disease) ([address]     Depression     Displacement of lumbar intervertebral disc without myelopathy 8/13/07    Edema 9/13/07    Emphysema of lung ([address]     Enthesopathy of hip region     Enthesopathy of hip region 3/5/07    Esophageal reflux 3/4/08    Hemorrhage of rectum and anus 11/14/07    Herpes zoster without complication     Hyperlipidemia associated with type 2 diabetes mellitus ([address] 12/11/2017    Lumbar spondylosis 5/1/2018    Major depression, chronic 8/12/2015    Osteoporosis, unspecified 12/4/07    Other and unspecified hyperlipidemia 3/4/08    Pain in joint, shoulder region 3/4/08    Pneumonia     Pulmonary nodule     Recurrent major depressive disorder, in partial remission ([address] 5/4/2018    Rheumatic fever     S/P ileostomy ([address] 8/20/2011    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 Authorizing Provider   QUEtiapine (SEROQUEL) 25 MG tablet TAKE 1 TO 2 TABLETS BY MOUTH AT BEDTIME.  Yes Ciera Davison MD   JANUVIA 100 MG tablet TAKE 1 TABLET BY MOUTH DAILY Yes Ciera Davison MD   venlafaxine (EFFEXOR XR) 75 MG extended release capsule TAKE 1 Gary Coyne Yes Ciera Davison MD   fluticasone-salmeterol (ADVAIR) 250-50 MCG/DOSE AEPB INHALE 1 PUFF INTO THE LUNGS EVERY 12 HOURS Yes Jonny Ivey MD   topiramate (TOPAMAX) 50 MG tablet TAKE 1 TABLET BY MOUTH TWICE DAILY Yes MD Chase Bean Bogus 18 MCG inhalation capsule INHALE THE CONTENTS OF 1 Gary Coyne Yes Ciera Davison MD   naproxen (NAPROSYN) 375 MG tablet TAKE 1 TABLET TWICE DAILY Yes Ciera Davison MD   furosemide (LASIX) 40 MG tablet TAKE 1 TABLET EVERY DAY Yes Ciera Davison MD   esomeprazole (65ProteoSense) 40 MG delayed release capsule TAKE 1 Chelle Nickel Yes Ciera Davison MD   simvastatin (ZOCOR) 20 MG tablet TAKE 1 TABLET EVERY EVENING Yes Ciera Davison MD   potassium chloride (KLOR-CON M) 20 MEQ extended release tablet TAKE 1 TABLET EVERY DAY Yes Ciera Davison MD   metFORMIN (GLUCOPHAGE-XR) 500 MG extended release tablet TAKE 2 TABLETS BY MOUTH TWICE DAILY Yes Ciera Davison MD   gabapentin (NEURONTIN) 300 MG capsule TAKE 1 CAPSULE BY MOUTH THREE TIMES DAILY FOR 30 DAYS Yes Ciera Davison MD   alendronate (FOSAMAX) 35 MG tablet TAKE 1 TABLET EVERY 7 DAYS Yes Ciera Davison MD   albuterol sulfate HFA (VENTOLIN HFA) 108 (90 Base) MCG/ACT inhaler INHALE 2 PUFFS EVERY 6 HOURS AS NEEDED FOR WHEEZING Yes Ciera Davison MD   Blood Glucose Monitoring Suppl (Beula Larch pin prick and light touch  VII: Facial strength and movements: intact and symmetric  VIII: Hearing: Intact to finger rub bilaterally  IX: Palate elevation is symmetric  XI: Shoulder shrug is intact  XII: Tongue movements are normal  Musculoskeletal: 5/5 in all 4 extremities. Normal tone. Reflexes: Bilateral biceps 2/4, triceps 2/4, brachial radialis 2/4, knee 2/4 and ankle 2/4. Coordination: no pronator drift, no dysmetria with FNF testing. No tremors. Sensation: normal to all modalities. Gait/Posture: steady    ROS : A 10-12 system review of constitutional, cardiovascular, respiratory, musculoskeletal, endocrine, hematological, skin, SHEENT, genitourinary, psychiatric and neurologic systems was obtained and updated today which is unremarkable except as mentioned in my HPI  No change    Medical decision making: no change  I personally reviewed and updated social history, past medical history, medications, allergy, surgical history, and family history as documented in the patient's electronic health records. Labs and/or neuroimaging and other test results reviewed and discussed with the patient. Reviewed notes from other physicians. Provided patient education regarding risk, benefits and treatment options as well as adherence to medication regimen and side effect from these medications. Assessment:   Diagnosis Orders   1. Chronic tension-type headache, intractable     2. Type 2 diabetes mellitus with hyperglycemia, without long-term current use of insulin (HCC)     3. Dysthymia     4.  Mixed hyperlipidemia             Plan:  Continue Topamax 50 mg twice daily  Refill for medication when needed  Aspirin for stroke prevention  Statin  Continue current blood pressure medications  Monitor blood pressure at home  Hydration with Topamax  Long discussion regarding side effect from Topamax  Blood sugar monitoring and continue current medication  Follow A1c  Continue current SSRI  Discussed risk of

## 2019-09-23 RX ORDER — ALBUTEROL SULFATE 90 UG/1
AEROSOL, METERED RESPIRATORY (INHALATION)
Qty: 54 G | Refills: 3 | Status: SHIPPED | OUTPATIENT
Start: 2019-09-23 | End: 2021-08-02

## 2019-09-30 ENCOUNTER — OFFICE VISIT (OUTPATIENT)
Dept: INTERNAL MEDICINE CLINIC | Age: 69
End: 2019-09-30

## 2019-09-30 VITALS
TEMPERATURE: 97.1 F | WEIGHT: 98 LBS | DIASTOLIC BLOOD PRESSURE: 68 MMHG | RESPIRATION RATE: 12 BRPM | OXYGEN SATURATION: 97 % | HEART RATE: 98 BPM | BODY MASS INDEX: 19.24 KG/M2 | SYSTOLIC BLOOD PRESSURE: 110 MMHG | HEIGHT: 60 IN

## 2019-09-30 DIAGNOSIS — J44.1 COPD EXACERBATION (HCC): ICD-10-CM

## 2019-09-30 DIAGNOSIS — J20.8 ACUTE BRONCHITIS DUE TO OTHER SPECIFIED ORGANISMS: Primary | ICD-10-CM

## 2019-09-30 PROCEDURE — 1036F TOBACCO NON-USER: CPT | Performed by: PHYSICIAN ASSISTANT

## 2019-09-30 PROCEDURE — G8427 DOCREV CUR MEDS BY ELIG CLIN: HCPCS | Performed by: PHYSICIAN ASSISTANT

## 2019-09-30 PROCEDURE — G8926 SPIRO NO PERF OR DOC: HCPCS | Performed by: PHYSICIAN ASSISTANT

## 2019-09-30 PROCEDURE — G8420 CALC BMI NORM PARAMETERS: HCPCS | Performed by: PHYSICIAN ASSISTANT

## 2019-09-30 PROCEDURE — G8399 PT W/DXA RESULTS DOCUMENT: HCPCS | Performed by: PHYSICIAN ASSISTANT

## 2019-09-30 PROCEDURE — 4040F PNEUMOC VAC/ADMIN/RCVD: CPT | Performed by: PHYSICIAN ASSISTANT

## 2019-09-30 PROCEDURE — 3023F SPIROM DOC REV: CPT | Performed by: PHYSICIAN ASSISTANT

## 2019-09-30 PROCEDURE — 1090F PRES/ABSN URINE INCON ASSESS: CPT | Performed by: PHYSICIAN ASSISTANT

## 2019-09-30 PROCEDURE — 99213 OFFICE O/P EST LOW 20 MIN: CPT | Performed by: PHYSICIAN ASSISTANT

## 2019-09-30 PROCEDURE — 3017F COLORECTAL CA SCREEN DOC REV: CPT | Performed by: PHYSICIAN ASSISTANT

## 2019-09-30 PROCEDURE — 1123F ACP DISCUSS/DSCN MKR DOCD: CPT | Performed by: PHYSICIAN ASSISTANT

## 2019-09-30 RX ORDER — PREDNISONE 10 MG/1
40 TABLET ORAL DAILY
Qty: 20 TABLET | Refills: 0 | Status: SHIPPED | OUTPATIENT
Start: 2019-09-30 | End: 2019-10-05

## 2019-09-30 RX ORDER — DOXYCYCLINE HYCLATE 100 MG
100 TABLET ORAL 2 TIMES DAILY
Qty: 20 TABLET | Refills: 0 | Status: SHIPPED | OUTPATIENT
Start: 2019-09-30 | End: 2019-10-10

## 2019-09-30 ASSESSMENT — ENCOUNTER SYMPTOMS
VOMITING: 0
WHEEZING: 1
SINUS PRESSURE: 0
DIARRHEA: 0
COUGH: 1
SORE THROAT: 0
SHORTNESS OF BREATH: 1
SINUS PAIN: 0

## 2019-10-08 ENCOUNTER — OFFICE VISIT (OUTPATIENT)
Dept: ORTHOPEDIC SURGERY | Age: 69
End: 2019-10-08
Payer: MEDICARE

## 2019-10-08 VITALS — HEART RATE: 82 BPM | BODY MASS INDEX: 19.26 KG/M2 | WEIGHT: 98.11 LBS | HEIGHT: 60 IN

## 2019-10-08 DIAGNOSIS — M25.562 ACUTE PAIN OF LEFT KNEE: ICD-10-CM

## 2019-10-08 DIAGNOSIS — M76.32 ILIOTIBIAL BAND SYNDROME, LEFT: ICD-10-CM

## 2019-10-08 DIAGNOSIS — M25.552 HIP PAIN, ACUTE, LEFT: Primary | ICD-10-CM

## 2019-10-08 PROCEDURE — 1123F ACP DISCUSS/DSCN MKR DOCD: CPT | Performed by: PHYSICAL MEDICINE & REHABILITATION

## 2019-10-08 PROCEDURE — G8420 CALC BMI NORM PARAMETERS: HCPCS | Performed by: PHYSICAL MEDICINE & REHABILITATION

## 2019-10-08 PROCEDURE — G8427 DOCREV CUR MEDS BY ELIG CLIN: HCPCS | Performed by: PHYSICAL MEDICINE & REHABILITATION

## 2019-10-08 PROCEDURE — 1036F TOBACCO NON-USER: CPT | Performed by: PHYSICAL MEDICINE & REHABILITATION

## 2019-10-08 PROCEDURE — 1090F PRES/ABSN URINE INCON ASSESS: CPT | Performed by: PHYSICAL MEDICINE & REHABILITATION

## 2019-10-08 PROCEDURE — 4040F PNEUMOC VAC/ADMIN/RCVD: CPT | Performed by: PHYSICAL MEDICINE & REHABILITATION

## 2019-10-08 PROCEDURE — 3017F COLORECTAL CA SCREEN DOC REV: CPT | Performed by: PHYSICAL MEDICINE & REHABILITATION

## 2019-10-08 PROCEDURE — G8484 FLU IMMUNIZE NO ADMIN: HCPCS | Performed by: PHYSICAL MEDICINE & REHABILITATION

## 2019-10-08 PROCEDURE — 99214 OFFICE O/P EST MOD 30 MIN: CPT | Performed by: PHYSICAL MEDICINE & REHABILITATION

## 2019-10-08 PROCEDURE — G8399 PT W/DXA RESULTS DOCUMENT: HCPCS | Performed by: PHYSICAL MEDICINE & REHABILITATION

## 2019-10-08 RX ORDER — VENLAFAXINE HYDROCHLORIDE 75 MG/1
CAPSULE, EXTENDED RELEASE ORAL
Qty: 90 CAPSULE | Refills: 0 | Status: SHIPPED | OUTPATIENT
Start: 2019-10-08 | End: 2019-12-20

## 2019-10-28 ENCOUNTER — OFFICE VISIT (OUTPATIENT)
Dept: ORTHOPEDIC SURGERY | Age: 69
End: 2019-10-28
Payer: MEDICARE

## 2019-10-28 VITALS — WEIGHT: 98.11 LBS | BODY MASS INDEX: 19.26 KG/M2 | HEIGHT: 60 IN

## 2019-10-28 DIAGNOSIS — M25.552 HIP PAIN, LEFT: Primary | ICD-10-CM

## 2019-10-28 DIAGNOSIS — M70.62 GREATER TROCHANTERIC BURSITIS OF LEFT HIP: ICD-10-CM

## 2019-10-28 PROCEDURE — 1036F TOBACCO NON-USER: CPT | Performed by: ORTHOPAEDIC SURGERY

## 2019-10-28 PROCEDURE — G8484 FLU IMMUNIZE NO ADMIN: HCPCS | Performed by: ORTHOPAEDIC SURGERY

## 2019-10-28 PROCEDURE — 4040F PNEUMOC VAC/ADMIN/RCVD: CPT | Performed by: ORTHOPAEDIC SURGERY

## 2019-10-28 PROCEDURE — 3017F COLORECTAL CA SCREEN DOC REV: CPT | Performed by: ORTHOPAEDIC SURGERY

## 2019-10-28 PROCEDURE — G8399 PT W/DXA RESULTS DOCUMENT: HCPCS | Performed by: ORTHOPAEDIC SURGERY

## 2019-10-28 PROCEDURE — 1090F PRES/ABSN URINE INCON ASSESS: CPT | Performed by: ORTHOPAEDIC SURGERY

## 2019-10-28 PROCEDURE — 1123F ACP DISCUSS/DSCN MKR DOCD: CPT | Performed by: ORTHOPAEDIC SURGERY

## 2019-10-28 PROCEDURE — 99213 OFFICE O/P EST LOW 20 MIN: CPT | Performed by: ORTHOPAEDIC SURGERY

## 2019-10-28 PROCEDURE — G8420 CALC BMI NORM PARAMETERS: HCPCS | Performed by: ORTHOPAEDIC SURGERY

## 2019-10-28 PROCEDURE — G8427 DOCREV CUR MEDS BY ELIG CLIN: HCPCS | Performed by: ORTHOPAEDIC SURGERY

## 2019-11-08 RX ORDER — METFORMIN HYDROCHLORIDE 500 MG/1
TABLET, EXTENDED RELEASE ORAL
Qty: 360 TABLET | Refills: 0 | Status: SHIPPED | OUTPATIENT
Start: 2019-11-08 | End: 2020-03-04

## 2019-11-08 RX ORDER — SITAGLIPTIN 100 MG/1
100 TABLET, FILM COATED ORAL DAILY
Qty: 90 TABLET | Refills: 0 | Status: SHIPPED | OUTPATIENT
Start: 2019-11-08 | End: 2020-05-07

## 2019-11-13 ENCOUNTER — OFFICE VISIT (OUTPATIENT)
Dept: INTERNAL MEDICINE CLINIC | Age: 69
End: 2019-11-13

## 2019-11-13 VITALS
RESPIRATION RATE: 14 BRPM | WEIGHT: 100 LBS | SYSTOLIC BLOOD PRESSURE: 120 MMHG | DIASTOLIC BLOOD PRESSURE: 80 MMHG | HEART RATE: 70 BPM | BODY MASS INDEX: 19.63 KG/M2 | HEIGHT: 60 IN

## 2019-11-13 DIAGNOSIS — J44.9 CHRONIC OBSTRUCTIVE PULMONARY DISEASE, UNSPECIFIED COPD TYPE (HCC): ICD-10-CM

## 2019-11-13 DIAGNOSIS — F32.9 MAJOR DEPRESSION, CHRONIC: ICD-10-CM

## 2019-11-13 DIAGNOSIS — R21 RASH: ICD-10-CM

## 2019-11-13 DIAGNOSIS — E11.69 HYPERLIPIDEMIA ASSOCIATED WITH TYPE 2 DIABETES MELLITUS (HCC): ICD-10-CM

## 2019-11-13 DIAGNOSIS — E11.9 TYPE 2 DIABETES MELLITUS WITHOUT COMPLICATION, WITHOUT LONG-TERM CURRENT USE OF INSULIN (HCC): ICD-10-CM

## 2019-11-13 DIAGNOSIS — G44.221 CHRONIC TENSION-TYPE HEADACHE, INTRACTABLE: ICD-10-CM

## 2019-11-13 DIAGNOSIS — M25.552 LEFT HIP PAIN: ICD-10-CM

## 2019-11-13 DIAGNOSIS — Z23 NEED FOR INFLUENZA VACCINATION: ICD-10-CM

## 2019-11-13 DIAGNOSIS — E78.5 HYPERLIPIDEMIA ASSOCIATED WITH TYPE 2 DIABETES MELLITUS (HCC): ICD-10-CM

## 2019-11-13 DIAGNOSIS — J44.1 COPD EXACERBATION (HCC): ICD-10-CM

## 2019-11-13 DIAGNOSIS — E11.9 TYPE 2 DIABETES MELLITUS WITHOUT COMPLICATION, WITHOUT LONG-TERM CURRENT USE OF INSULIN (HCC): Primary | ICD-10-CM

## 2019-11-13 DIAGNOSIS — K21.9 GASTROESOPHAGEAL REFLUX DISEASE WITHOUT ESOPHAGITIS: ICD-10-CM

## 2019-11-13 LAB
A/G RATIO: 1.3 (ref 1.1–2.2)
ALBUMIN SERPL-MCNC: 4.1 G/DL (ref 3.4–5)
ALP BLD-CCNC: 72 U/L (ref 40–129)
ALT SERPL-CCNC: 6 U/L (ref 10–40)
ANION GAP SERPL CALCULATED.3IONS-SCNC: 13 MMOL/L (ref 3–16)
AST SERPL-CCNC: 15 U/L (ref 15–37)
BASOPHILS ABSOLUTE: 0 K/UL (ref 0–0.2)
BASOPHILS RELATIVE PERCENT: 0.3 %
BILIRUB SERPL-MCNC: <0.2 MG/DL (ref 0–1)
BUN BLDV-MCNC: 25 MG/DL (ref 7–20)
CALCIUM SERPL-MCNC: 10.2 MG/DL (ref 8.3–10.6)
CHLORIDE BLD-SCNC: 102 MMOL/L (ref 99–110)
CHOLESTEROL, TOTAL: 160 MG/DL (ref 0–199)
CO2: 25 MMOL/L (ref 21–32)
CREAT SERPL-MCNC: 0.8 MG/DL (ref 0.6–1.2)
EOSINOPHILS ABSOLUTE: 0.1 K/UL (ref 0–0.6)
EOSINOPHILS RELATIVE PERCENT: 1.1 %
GFR AFRICAN AMERICAN: >60
GFR NON-AFRICAN AMERICAN: >60
GLOBULIN: 3.2 G/DL
GLUCOSE BLD-MCNC: 113 MG/DL (ref 70–99)
HCT VFR BLD CALC: 43.1 % (ref 36–48)
HDLC SERPL-MCNC: 79 MG/DL (ref 40–60)
HEMOGLOBIN: 14.1 G/DL (ref 12–16)
LDL CHOLESTEROL CALCULATED: 63 MG/DL
LYMPHOCYTES ABSOLUTE: 2 K/UL (ref 1–5.1)
LYMPHOCYTES RELATIVE PERCENT: 23.6 %
MCH RBC QN AUTO: 29.9 PG (ref 26–34)
MCHC RBC AUTO-ENTMCNC: 32.7 G/DL (ref 31–36)
MCV RBC AUTO: 91.4 FL (ref 80–100)
MONOCYTES ABSOLUTE: 0.9 K/UL (ref 0–1.3)
MONOCYTES RELATIVE PERCENT: 10.2 %
NEUTROPHILS ABSOLUTE: 5.4 K/UL (ref 1.7–7.7)
NEUTROPHILS RELATIVE PERCENT: 64.8 %
PDW BLD-RTO: 13.8 % (ref 12.4–15.4)
PLATELET # BLD: 266 K/UL (ref 135–450)
PMV BLD AUTO: 9 FL (ref 5–10.5)
POTASSIUM SERPL-SCNC: 5.1 MMOL/L (ref 3.5–5.1)
RBC # BLD: 4.71 M/UL (ref 4–5.2)
SODIUM BLD-SCNC: 140 MMOL/L (ref 136–145)
TOTAL PROTEIN: 7.3 G/DL (ref 6.4–8.2)
TRIGL SERPL-MCNC: 92 MG/DL (ref 0–150)
VLDLC SERPL CALC-MCNC: 18 MG/DL
WBC # BLD: 8.4 K/UL (ref 4–11)

## 2019-11-13 PROCEDURE — 1090F PRES/ABSN URINE INCON ASSESS: CPT | Performed by: INTERNAL MEDICINE

## 2019-11-13 PROCEDURE — 1036F TOBACCO NON-USER: CPT | Performed by: INTERNAL MEDICINE

## 2019-11-13 PROCEDURE — 90662 IIV NO PRSV INCREASED AG IM: CPT | Performed by: INTERNAL MEDICINE

## 2019-11-13 PROCEDURE — 4040F PNEUMOC VAC/ADMIN/RCVD: CPT | Performed by: INTERNAL MEDICINE

## 2019-11-13 PROCEDURE — 99214 OFFICE O/P EST MOD 30 MIN: CPT | Performed by: INTERNAL MEDICINE

## 2019-11-13 PROCEDURE — G8926 SPIRO NO PERF OR DOC: HCPCS | Performed by: INTERNAL MEDICINE

## 2019-11-13 PROCEDURE — 2022F DILAT RTA XM EVC RTNOPTHY: CPT | Performed by: INTERNAL MEDICINE

## 2019-11-13 PROCEDURE — G8399 PT W/DXA RESULTS DOCUMENT: HCPCS | Performed by: INTERNAL MEDICINE

## 2019-11-13 PROCEDURE — 3023F SPIROM DOC REV: CPT | Performed by: INTERNAL MEDICINE

## 2019-11-13 PROCEDURE — G8482 FLU IMMUNIZE ORDER/ADMIN: HCPCS | Performed by: INTERNAL MEDICINE

## 2019-11-13 PROCEDURE — 1123F ACP DISCUSS/DSCN MKR DOCD: CPT | Performed by: INTERNAL MEDICINE

## 2019-11-13 PROCEDURE — G8420 CALC BMI NORM PARAMETERS: HCPCS | Performed by: INTERNAL MEDICINE

## 2019-11-13 PROCEDURE — 3017F COLORECTAL CA SCREEN DOC REV: CPT | Performed by: INTERNAL MEDICINE

## 2019-11-13 PROCEDURE — G0008 ADMIN INFLUENZA VIRUS VAC: HCPCS | Performed by: INTERNAL MEDICINE

## 2019-11-13 PROCEDURE — 3044F HG A1C LEVEL LT 7.0%: CPT | Performed by: INTERNAL MEDICINE

## 2019-11-13 PROCEDURE — G8427 DOCREV CUR MEDS BY ELIG CLIN: HCPCS | Performed by: INTERNAL MEDICINE

## 2019-11-13 ASSESSMENT — ENCOUNTER SYMPTOMS
VOMITING: 0
ABDOMINAL PAIN: 0
RHINORRHEA: 0
SHORTNESS OF BREATH: 1
EYE DISCHARGE: 0
WHEEZING: 0
BACK PAIN: 1
NAUSEA: 0
COUGH: 0

## 2019-11-14 ENCOUNTER — OFFICE VISIT (OUTPATIENT)
Dept: ORTHOPEDIC SURGERY | Age: 69
End: 2019-11-14
Payer: MEDICARE

## 2019-11-14 DIAGNOSIS — M70.62 GREATER TROCHANTERIC BURSITIS OF LEFT HIP: Primary | ICD-10-CM

## 2019-11-14 LAB
ESTIMATED AVERAGE GLUCOSE: 116.9 MG/DL
HBA1C MFR BLD: 5.7 %

## 2019-11-14 PROCEDURE — 1123F ACP DISCUSS/DSCN MKR DOCD: CPT | Performed by: ORTHOPAEDIC SURGERY

## 2019-11-14 PROCEDURE — G8420 CALC BMI NORM PARAMETERS: HCPCS | Performed by: ORTHOPAEDIC SURGERY

## 2019-11-14 PROCEDURE — 4040F PNEUMOC VAC/ADMIN/RCVD: CPT | Performed by: ORTHOPAEDIC SURGERY

## 2019-11-14 PROCEDURE — 99213 OFFICE O/P EST LOW 20 MIN: CPT | Performed by: ORTHOPAEDIC SURGERY

## 2019-11-14 PROCEDURE — 1090F PRES/ABSN URINE INCON ASSESS: CPT | Performed by: ORTHOPAEDIC SURGERY

## 2019-11-14 PROCEDURE — G8482 FLU IMMUNIZE ORDER/ADMIN: HCPCS | Performed by: ORTHOPAEDIC SURGERY

## 2019-11-14 PROCEDURE — 3017F COLORECTAL CA SCREEN DOC REV: CPT | Performed by: ORTHOPAEDIC SURGERY

## 2019-11-14 PROCEDURE — G8399 PT W/DXA RESULTS DOCUMENT: HCPCS | Performed by: ORTHOPAEDIC SURGERY

## 2019-11-14 PROCEDURE — 1036F TOBACCO NON-USER: CPT | Performed by: ORTHOPAEDIC SURGERY

## 2019-11-14 PROCEDURE — G8427 DOCREV CUR MEDS BY ELIG CLIN: HCPCS | Performed by: ORTHOPAEDIC SURGERY

## 2019-11-14 RX ORDER — SIMVASTATIN 20 MG
TABLET ORAL
Qty: 90 TABLET | Refills: 0 | Status: SHIPPED | OUTPATIENT
Start: 2019-11-14 | End: 2020-01-20

## 2019-11-21 ENCOUNTER — OFFICE VISIT (OUTPATIENT)
Dept: PULMONOLOGY | Age: 69
End: 2019-11-21
Payer: MEDICARE

## 2019-11-21 ENCOUNTER — HOSPITAL ENCOUNTER (OUTPATIENT)
Dept: GENERAL RADIOLOGY | Age: 69
Discharge: HOME | End: 2019-11-21
Payer: MEDICARE

## 2019-11-21 ENCOUNTER — HOSPITAL ENCOUNTER (OUTPATIENT)
Age: 69
Discharge: HOME | End: 2019-11-21
Payer: MEDICARE

## 2019-11-21 VITALS
WEIGHT: 102 LBS | HEART RATE: 100 BPM | BODY MASS INDEX: 20.03 KG/M2 | DIASTOLIC BLOOD PRESSURE: 58 MMHG | SYSTOLIC BLOOD PRESSURE: 106 MMHG | HEIGHT: 60 IN | RESPIRATION RATE: 15 BRPM | OXYGEN SATURATION: 91 %

## 2019-11-21 DIAGNOSIS — J44.1 COPD WITH ACUTE EXACERBATION (HCC): Primary | ICD-10-CM

## 2019-11-21 DIAGNOSIS — J44.1 COPD WITH ACUTE EXACERBATION (HCC): ICD-10-CM

## 2019-11-21 DIAGNOSIS — R93.89 ABNORMAL CT OF THE CHEST: ICD-10-CM

## 2019-11-21 DIAGNOSIS — R09.02 HYPOXIA: ICD-10-CM

## 2019-11-21 DIAGNOSIS — R91.8 PULMONARY NODULES: ICD-10-CM

## 2019-11-21 PROCEDURE — G8926 SPIRO NO PERF OR DOC: HCPCS | Performed by: INTERNAL MEDICINE

## 2019-11-21 PROCEDURE — 99214 OFFICE O/P EST MOD 30 MIN: CPT | Performed by: INTERNAL MEDICINE

## 2019-11-21 PROCEDURE — 4040F PNEUMOC VAC/ADMIN/RCVD: CPT | Performed by: INTERNAL MEDICINE

## 2019-11-21 PROCEDURE — 3023F SPIROM DOC REV: CPT | Performed by: INTERNAL MEDICINE

## 2019-11-21 PROCEDURE — G8399 PT W/DXA RESULTS DOCUMENT: HCPCS | Performed by: INTERNAL MEDICINE

## 2019-11-21 PROCEDURE — 1123F ACP DISCUSS/DSCN MKR DOCD: CPT | Performed by: INTERNAL MEDICINE

## 2019-11-21 PROCEDURE — G8482 FLU IMMUNIZE ORDER/ADMIN: HCPCS | Performed by: INTERNAL MEDICINE

## 2019-11-21 PROCEDURE — 71046 X-RAY EXAM CHEST 2 VIEWS: CPT

## 2019-11-21 PROCEDURE — 3017F COLORECTAL CA SCREEN DOC REV: CPT | Performed by: INTERNAL MEDICINE

## 2019-11-21 PROCEDURE — 1036F TOBACCO NON-USER: CPT | Performed by: INTERNAL MEDICINE

## 2019-11-21 PROCEDURE — G8420 CALC BMI NORM PARAMETERS: HCPCS | Performed by: INTERNAL MEDICINE

## 2019-11-21 PROCEDURE — G8427 DOCREV CUR MEDS BY ELIG CLIN: HCPCS | Performed by: INTERNAL MEDICINE

## 2019-11-21 PROCEDURE — 1090F PRES/ABSN URINE INCON ASSESS: CPT | Performed by: INTERNAL MEDICINE

## 2019-11-21 RX ORDER — PREDNISONE 10 MG/1
TABLET ORAL
Qty: 30 TABLET | Refills: 0 | Status: SHIPPED | OUTPATIENT
Start: 2019-11-21 | End: 2019-12-01

## 2019-12-08 DIAGNOSIS — G44.221 CHRONIC TENSION-TYPE HEADACHE, INTRACTABLE: ICD-10-CM

## 2019-12-09 RX ORDER — TOPIRAMATE 50 MG/1
TABLET, FILM COATED ORAL
Qty: 180 TABLET | Refills: 1 | Status: SHIPPED | OUTPATIENT
Start: 2019-12-09 | End: 2020-06-08

## 2019-12-09 RX ORDER — QUETIAPINE FUMARATE 25 MG/1
TABLET, FILM COATED ORAL
Qty: 180 TABLET | Refills: 0 | Status: SHIPPED | OUTPATIENT
Start: 2019-12-09 | End: 2020-03-09

## 2019-12-20 RX ORDER — VENLAFAXINE HYDROCHLORIDE 75 MG/1
CAPSULE, EXTENDED RELEASE ORAL
Qty: 90 CAPSULE | Refills: 0 | Status: SHIPPED | OUTPATIENT
Start: 2019-12-20 | End: 2020-05-19

## 2019-12-30 ENCOUNTER — HOSPITAL ENCOUNTER (OUTPATIENT)
Dept: PHYSICAL THERAPY | Age: 69
Setting detail: THERAPIES SERIES
Discharge: HOME | End: 2019-12-30
Payer: MEDICARE

## 2019-12-30 PROCEDURE — 97110 THERAPEUTIC EXERCISES: CPT | Performed by: SPECIALIST

## 2019-12-30 PROCEDURE — 97161 PT EVAL LOW COMPLEX 20 MIN: CPT | Performed by: SPECIALIST

## 2019-12-30 PROCEDURE — G0283 ELEC STIM OTHER THAN WOUND: HCPCS | Performed by: SPECIALIST

## 2019-12-30 PROCEDURE — 97140 MANUAL THERAPY 1/> REGIONS: CPT | Performed by: SPECIALIST

## 2020-01-03 ENCOUNTER — APPOINTMENT (OUTPATIENT)
Dept: GENERAL RADIOLOGY | Age: 70
DRG: 193 | End: 2020-01-03
Attending: INTERNAL MEDICINE
Payer: MEDICARE

## 2020-01-03 ENCOUNTER — OFFICE VISIT (OUTPATIENT)
Dept: INTERNAL MEDICINE CLINIC | Age: 70
End: 2020-01-03

## 2020-01-03 ENCOUNTER — HOSPITAL ENCOUNTER (INPATIENT)
Age: 70
LOS: 3 days | Discharge: HOME | DRG: 193 | End: 2020-01-06
Attending: INTERNAL MEDICINE | Admitting: INTERNAL MEDICINE
Payer: MEDICARE

## 2020-01-03 VITALS
OXYGEN SATURATION: 99 % | HEART RATE: 120 BPM | WEIGHT: 101 LBS | BODY MASS INDEX: 19.83 KG/M2 | DIASTOLIC BLOOD PRESSURE: 70 MMHG | HEIGHT: 60 IN | SYSTOLIC BLOOD PRESSURE: 140 MMHG | TEMPERATURE: 99.6 F

## 2020-01-03 PROBLEM — J96.90 RESPIRATORY FAILURE (HCC): Status: ACTIVE | Noted: 2020-01-03

## 2020-01-03 LAB
A/G RATIO: 0.9 (ref 1.1–2.2)
ALBUMIN SERPL-MCNC: 3.6 G/DL (ref 3.4–5)
ALP BLD-CCNC: 66 U/L (ref 40–129)
ALT SERPL-CCNC: 8 U/L (ref 10–40)
ANION GAP SERPL CALCULATED.3IONS-SCNC: 11 MMOL/L (ref 3–16)
AST SERPL-CCNC: 14 U/L (ref 15–37)
BASOPHILS ABSOLUTE: 0 K/UL (ref 0–0.2)
BASOPHILS RELATIVE PERCENT: 0.3 %
BILIRUB SERPL-MCNC: <0.2 MG/DL (ref 0–1)
BUN BLDV-MCNC: 8 MG/DL (ref 7–20)
CALCIUM SERPL-MCNC: 9 MG/DL (ref 8.3–10.6)
CHLORIDE BLD-SCNC: 103 MMOL/L (ref 99–110)
CO2: 27 MMOL/L (ref 21–32)
CREAT SERPL-MCNC: 0.7 MG/DL (ref 0.6–1.2)
EKG ATRIAL RATE: 126 BPM
EKG DIAGNOSIS: NORMAL
EKG P AXIS: 84 DEGREES
EKG P-R INTERVAL: 174 MS
EKG Q-T INTERVAL: 292 MS
EKG QRS DURATION: 86 MS
EKG QTC CALCULATION (BAZETT): 422 MS
EKG R AXIS: 82 DEGREES
EKG T AXIS: -6 DEGREES
EKG VENTRICULAR RATE: 126 BPM
EOSINOPHILS ABSOLUTE: 0 K/UL (ref 0–0.6)
EOSINOPHILS RELATIVE PERCENT: 0 %
GFR AFRICAN AMERICAN: >60
GFR NON-AFRICAN AMERICAN: >60
GLOBULIN: 3.8 G/DL
GLUCOSE BLD-MCNC: 129 MG/DL (ref 70–99)
GLUCOSE BLD-MCNC: 174 MG/DL (ref 70–99)
GLUCOSE BLD-MCNC: 208 MG/DL (ref 70–99)
HCT VFR BLD CALC: 36.9 % (ref 36–48)
HEMOGLOBIN: 11.9 G/DL (ref 12–16)
LACTIC ACID: 1.2 MMOL/L (ref 0.4–2)
LYMPHOCYTES ABSOLUTE: 0.7 K/UL (ref 1–5.1)
LYMPHOCYTES RELATIVE PERCENT: 8.8 %
MCH RBC QN AUTO: 28.5 PG (ref 26–34)
MCHC RBC AUTO-ENTMCNC: 32.4 G/DL (ref 31–36)
MCV RBC AUTO: 88.1 FL (ref 80–100)
MONOCYTES ABSOLUTE: 1 K/UL (ref 0–1.3)
MONOCYTES RELATIVE PERCENT: 12.7 %
NEUTROPHILS ABSOLUTE: 6.3 K/UL (ref 1.7–7.7)
NEUTROPHILS RELATIVE PERCENT: 78.2 %
PDW BLD-RTO: 14.7 % (ref 12.4–15.4)
PERFORMED ON: ABNORMAL
PERFORMED ON: ABNORMAL
PLATELET # BLD: 243 K/UL (ref 135–450)
PMV BLD AUTO: 8.4 FL (ref 5–10.5)
POTASSIUM SERPL-SCNC: 3.8 MMOL/L (ref 3.5–5.1)
PROCALCITONIN: 0.48 NG/ML (ref 0–0.15)
RAPID INFLUENZA  B AGN: NEGATIVE
RAPID INFLUENZA A AGN: NEGATIVE
RBC # BLD: 4.18 M/UL (ref 4–5.2)
SODIUM BLD-SCNC: 141 MMOL/L (ref 136–145)
TOTAL PROTEIN: 7.4 G/DL (ref 6.4–8.2)
TROPONIN: <0.01 NG/ML
WBC # BLD: 8.1 K/UL (ref 4–11)

## 2020-01-03 PROCEDURE — 2580000003 HC RX 258: Performed by: INTERNAL MEDICINE

## 2020-01-03 PROCEDURE — 83605 ASSAY OF LACTIC ACID: CPT

## 2020-01-03 PROCEDURE — 99222 1ST HOSP IP/OBS MODERATE 55: CPT | Performed by: INTERNAL MEDICINE

## 2020-01-03 PROCEDURE — 94640 AIRWAY INHALATION TREATMENT: CPT

## 2020-01-03 PROCEDURE — 6360000002 HC RX W HCPCS: Performed by: INTERNAL MEDICINE

## 2020-01-03 PROCEDURE — 2060000000 HC ICU INTERMEDIATE R&B

## 2020-01-03 PROCEDURE — 93010 ELECTROCARDIOGRAM REPORT: CPT | Performed by: INTERNAL MEDICINE

## 2020-01-03 PROCEDURE — 80053 COMPREHEN METABOLIC PANEL: CPT

## 2020-01-03 PROCEDURE — 2700000000 HC OXYGEN THERAPY PER DAY

## 2020-01-03 PROCEDURE — 87040 BLOOD CULTURE FOR BACTERIA: CPT

## 2020-01-03 PROCEDURE — 36415 COLL VENOUS BLD VENIPUNCTURE: CPT

## 2020-01-03 PROCEDURE — 71046 X-RAY EXAM CHEST 2 VIEWS: CPT

## 2020-01-03 PROCEDURE — 83036 HEMOGLOBIN GLYCOSYLATED A1C: CPT

## 2020-01-03 PROCEDURE — 2580000003 HC RX 258: Performed by: PHYSICIAN ASSISTANT

## 2020-01-03 PROCEDURE — 2580000003 HC RX 258

## 2020-01-03 PROCEDURE — 84484 ASSAY OF TROPONIN QUANT: CPT

## 2020-01-03 PROCEDURE — 87804 INFLUENZA ASSAY W/OPTIC: CPT

## 2020-01-03 PROCEDURE — 94761 N-INVAS EAR/PLS OXIMETRY MLT: CPT

## 2020-01-03 PROCEDURE — 85025 COMPLETE CBC W/AUTO DIFF WBC: CPT

## 2020-01-03 PROCEDURE — 6360000002 HC RX W HCPCS: Performed by: PHYSICIAN ASSISTANT

## 2020-01-03 PROCEDURE — 84145 PROCALCITONIN (PCT): CPT

## 2020-01-03 PROCEDURE — 6370000000 HC RX 637 (ALT 250 FOR IP): Performed by: INTERNAL MEDICINE

## 2020-01-03 PROCEDURE — 6370000000 HC RX 637 (ALT 250 FOR IP): Performed by: PHYSICIAN ASSISTANT

## 2020-01-03 PROCEDURE — 99223 1ST HOSP IP/OBS HIGH 75: CPT | Performed by: PHYSICIAN ASSISTANT

## 2020-01-03 PROCEDURE — 93005 ELECTROCARDIOGRAM TRACING: CPT | Performed by: PHYSICIAN ASSISTANT

## 2020-01-03 RX ORDER — METHYLPREDNISOLONE SODIUM SUCCINATE 40 MG/ML
40 INJECTION, POWDER, LYOPHILIZED, FOR SOLUTION INTRAMUSCULAR; INTRAVENOUS EVERY 12 HOURS
Status: COMPLETED | OUTPATIENT
Start: 2020-01-03 | End: 2020-01-05

## 2020-01-03 RX ORDER — SODIUM CHLORIDE 0.9 % (FLUSH) 0.9 %
10 SYRINGE (ML) INJECTION EVERY 12 HOURS SCHEDULED
Status: DISCONTINUED | OUTPATIENT
Start: 2020-01-03 | End: 2020-01-06 | Stop reason: HOSPADM

## 2020-01-03 RX ORDER — IPRATROPIUM BROMIDE AND ALBUTEROL SULFATE 2.5; .5 MG/3ML; MG/3ML
1 SOLUTION RESPIRATORY (INHALATION) EVERY 4 HOURS
Status: DISCONTINUED | OUTPATIENT
Start: 2020-01-03 | End: 2020-01-04

## 2020-01-03 RX ORDER — GABAPENTIN 300 MG/1
300 CAPSULE ORAL 3 TIMES DAILY
Status: DISCONTINUED | OUTPATIENT
Start: 2020-01-03 | End: 2020-01-06 | Stop reason: HOSPADM

## 2020-01-03 RX ORDER — SODIUM CHLORIDE 9 MG/ML
INJECTION, SOLUTION INTRAVENOUS
Status: COMPLETED
Start: 2020-01-03 | End: 2020-01-03

## 2020-01-03 RX ORDER — SIMVASTATIN 10 MG
20 TABLET ORAL NIGHTLY
Status: DISCONTINUED | OUTPATIENT
Start: 2020-01-03 | End: 2020-01-06 | Stop reason: HOSPADM

## 2020-01-03 RX ORDER — POTASSIUM CHLORIDE 20 MEQ/1
20 TABLET, EXTENDED RELEASE ORAL DAILY
Status: DISCONTINUED | OUTPATIENT
Start: 2020-01-04 | End: 2020-01-06 | Stop reason: HOSPADM

## 2020-01-03 RX ORDER — CALCIUM CARBONATE 200(500)MG
1000 TABLET,CHEWABLE ORAL DAILY
Status: DISCONTINUED | OUTPATIENT
Start: 2020-01-04 | End: 2020-01-06 | Stop reason: HOSPADM

## 2020-01-03 RX ORDER — METFORMIN HYDROCHLORIDE 500 MG/1
1000 TABLET, EXTENDED RELEASE ORAL 2 TIMES DAILY WITH MEALS
Status: DISCONTINUED | OUTPATIENT
Start: 2020-01-04 | End: 2020-01-06 | Stop reason: HOSPADM

## 2020-01-03 RX ORDER — TOPIRAMATE 25 MG/1
50 TABLET ORAL 2 TIMES DAILY
Status: DISCONTINUED | OUTPATIENT
Start: 2020-01-03 | End: 2020-01-06 | Stop reason: HOSPADM

## 2020-01-03 RX ORDER — FUROSEMIDE 40 MG/1
40 TABLET ORAL DAILY
Status: DISCONTINUED | OUTPATIENT
Start: 2020-01-04 | End: 2020-01-06 | Stop reason: HOSPADM

## 2020-01-03 RX ORDER — VENLAFAXINE HYDROCHLORIDE 75 MG/1
75 CAPSULE, EXTENDED RELEASE ORAL
Status: DISCONTINUED | OUTPATIENT
Start: 2020-01-04 | End: 2020-01-06 | Stop reason: HOSPADM

## 2020-01-03 RX ORDER — DEXTROSE MONOHYDRATE 25 G/50ML
12.5 INJECTION, SOLUTION INTRAVENOUS PRN
Status: DISCONTINUED | OUTPATIENT
Start: 2020-01-03 | End: 2020-01-06 | Stop reason: HOSPADM

## 2020-01-03 RX ORDER — GUAIFENESIN 600 MG/1
600 TABLET, EXTENDED RELEASE ORAL 2 TIMES DAILY PRN
Status: DISCONTINUED | OUTPATIENT
Start: 2020-01-03 | End: 2020-01-06 | Stop reason: HOSPADM

## 2020-01-03 RX ORDER — NICOTINE POLACRILEX 4 MG
15 LOZENGE BUCCAL PRN
Status: DISCONTINUED | OUTPATIENT
Start: 2020-01-03 | End: 2020-01-06 | Stop reason: HOSPADM

## 2020-01-03 RX ORDER — IPRATROPIUM BROMIDE AND ALBUTEROL SULFATE 2.5; .5 MG/3ML; MG/3ML
1 SOLUTION RESPIRATORY (INHALATION)
Status: DISCONTINUED | OUTPATIENT
Start: 2020-01-03 | End: 2020-01-03

## 2020-01-03 RX ORDER — SODIUM CHLORIDE 0.9 % (FLUSH) 0.9 %
10 SYRINGE (ML) INJECTION PRN
Status: DISCONTINUED | OUTPATIENT
Start: 2020-01-03 | End: 2020-01-06 | Stop reason: HOSPADM

## 2020-01-03 RX ORDER — FLUTICASONE PROPIONATE 50 MCG
2 SPRAY, SUSPENSION (ML) NASAL DAILY
Status: DISCONTINUED | OUTPATIENT
Start: 2020-01-03 | End: 2020-01-06 | Stop reason: HOSPADM

## 2020-01-03 RX ORDER — ACETAMINOPHEN 325 MG/1
650 TABLET ORAL EVERY 4 HOURS PRN
Status: DISCONTINUED | OUTPATIENT
Start: 2020-01-03 | End: 2020-01-06 | Stop reason: HOSPADM

## 2020-01-03 RX ORDER — PANTOPRAZOLE SODIUM 40 MG/1
40 TABLET, DELAYED RELEASE ORAL
Status: DISCONTINUED | OUTPATIENT
Start: 2020-01-04 | End: 2020-01-06 | Stop reason: HOSPADM

## 2020-01-03 RX ORDER — ONDANSETRON 2 MG/ML
4 INJECTION INTRAMUSCULAR; INTRAVENOUS EVERY 6 HOURS PRN
Status: DISCONTINUED | OUTPATIENT
Start: 2020-01-03 | End: 2020-01-06 | Stop reason: HOSPADM

## 2020-01-03 RX ORDER — DEXTROSE MONOHYDRATE 50 MG/ML
100 INJECTION, SOLUTION INTRAVENOUS PRN
Status: DISCONTINUED | OUTPATIENT
Start: 2020-01-03 | End: 2020-01-06 | Stop reason: HOSPADM

## 2020-01-03 RX ORDER — PREDNISONE 20 MG/1
40 TABLET ORAL DAILY
Status: DISCONTINUED | OUTPATIENT
Start: 2020-01-05 | End: 2020-01-06 | Stop reason: HOSPADM

## 2020-01-03 RX ORDER — QUETIAPINE FUMARATE 25 MG/1
25 TABLET, FILM COATED ORAL NIGHTLY
Status: DISCONTINUED | OUTPATIENT
Start: 2020-01-03 | End: 2020-01-06 | Stop reason: HOSPADM

## 2020-01-03 RX ADMIN — CEFTRIAXONE SODIUM 1 G: 1 INJECTION, POWDER, FOR SOLUTION INTRAMUSCULAR; INTRAVENOUS at 16:13

## 2020-01-03 RX ADMIN — GABAPENTIN 300 MG: 300 CAPSULE ORAL at 16:13

## 2020-01-03 RX ADMIN — IPRATROPIUM BROMIDE AND ALBUTEROL SULFATE 1 AMPULE: .5; 3 SOLUTION RESPIRATORY (INHALATION) at 14:52

## 2020-01-03 RX ADMIN — IPRATROPIUM BROMIDE AND ALBUTEROL SULFATE 1 AMPULE: .5; 3 SOLUTION RESPIRATORY (INHALATION) at 23:58

## 2020-01-03 RX ADMIN — GABAPENTIN 300 MG: 300 CAPSULE ORAL at 20:50

## 2020-01-03 RX ADMIN — QUETIAPINE FUMARATE 25 MG: 25 TABLET ORAL at 20:50

## 2020-01-03 RX ADMIN — METHYLPREDNISOLONE SODIUM SUCCINATE 40 MG: 40 INJECTION, POWDER, FOR SOLUTION INTRAMUSCULAR; INTRAVENOUS at 16:13

## 2020-01-03 RX ADMIN — AZITHROMYCIN DIHYDRATE 500 MG: 500 INJECTION, POWDER, LYOPHILIZED, FOR SOLUTION INTRAVENOUS at 18:34

## 2020-01-03 RX ADMIN — SODIUM CHLORIDE 250 ML: 9 INJECTION, SOLUTION INTRAVENOUS at 16:14

## 2020-01-03 RX ADMIN — SIMVASTATIN 20 MG: 10 TABLET, FILM COATED ORAL at 20:50

## 2020-01-03 RX ADMIN — FLUTICASONE PROPIONATE 2 SPRAY: 50 SPRAY, METERED NASAL at 16:14

## 2020-01-03 RX ADMIN — IPRATROPIUM BROMIDE AND ALBUTEROL SULFATE 1 AMPULE: .5; 3 SOLUTION RESPIRATORY (INHALATION) at 20:21

## 2020-01-03 RX ADMIN — TOPIRAMATE 50 MG: 25 TABLET, FILM COATED ORAL at 20:50

## 2020-01-03 RX ADMIN — THEOPHYLLINE ANHYDROUS 400 MG: 200 CAPSULE, EXTENDED RELEASE ORAL at 16:15

## 2020-01-03 RX ADMIN — TOPIRAMATE 50 MG: 25 TABLET, FILM COATED ORAL at 16:13

## 2020-01-03 ASSESSMENT — ENCOUNTER SYMPTOMS
WHEEZING: 1
CONSTIPATION: 0
DIARRHEA: 1
SORE THROAT: 0
VOMITING: 0
SHORTNESS OF BREATH: 1
NAUSEA: 0
RHINORRHEA: 1
COUGH: 1

## 2020-01-03 NOTE — H&P
Onset    Asthma Daughter     Diabetes Daughter     Cancer Daughter         Uterine    Asthma Daughter     Diabetes Mother     Heart Failure Mother     Hypertension Mother     Heart Failure Brother     Diabetes Sister     Heart Failure Brother     Emphysema Neg Hx        REVIEW OF SYSTEMS:       Constitutional: +fever   HENT: Negative for sore throat   Eyes: Negative for redness   Respiratory: + dyspnea, cough   Cardiovascular: Negative for chest pain   Gastrointestinal: Negative for vomiting, diarrhea   Genitourinary: Negative for hematuria   Musculoskeletal: Negative for arthralgias   Skin: Negative for rash   Neurological: Negative for syncope   Hematological: Negative for adenopathy   Psychiatric/Behavorial: Negative for anxiety    PHYSICAL EXAM:    /81   Pulse 121   Temp 98.7 °F (37.1 °C) (Oral)   Resp 19   Ht 5' (1.524 m)   Wt 100 lb 3 oz (45.4 kg)   SpO2 100%   BMI 19.57 kg/m²     Gen: Mild resp distress. Alert. Eyes: PERRL. No sclera icterus. No conjunctival injection. ENT: No discharge. Pharynx clear. Neck: No JVD. No Carotid Bruit. Trachea midline. Resp: No accessory muscle use. No crackles. + wheezes. No rhonchi. Diminished air entry throughout  CV: Regular rate. Regular rhythm. No murmur. No rub. No edema. GI: Non-tender. Non-distended. Normal bowel sounds. Skin: Warm and dry. No nodule on exposed extremities. No rash on exposed extremities. M/S: No cyanosis. No joint deformity. No clubbing. Neuro: Awake. Grossly nonfocal    Psych: Oriented x 3. No anxiety or agitation. CBC:   Recent Labs     01/03/20  1354   WBC 8.1   HGB 11.9*   HCT 36.9   MCV 88.1        BMP:   Recent Labs     01/03/20  1349      K 3.8      CO2 27   BUN 8   CREATININE 0.7     LIVER PROFILE:   Recent Labs     01/03/20  1349   AST 14*   ALT 8*   BILITOT <0.2   ALKPHOS 66     PT/INR: No results for input(s): PROTIME, INR in the last 72 hours.   APTT: No results for input(s): APTT in the last 72 hours. UA:No results for input(s): NITRITE, COLORU, PHUR, LABCAST, WBCUA, RBCUA, MUCUS, TRICHOMONAS, YEAST, BACTERIA, CLARITYU, SPECGRAV, LEUKOCYTESUR, UROBILINOGEN, BILIRUBINUR, BLOODU, GLUCOSEU, AMORPHOUS in the last 72 hours. Invalid input(s): KETONESU       CARDIAC ENZYMES  No results for input(s): CKTOTAL, CKMB, CKMBINDEX, TROPONINI in the last 72 hours. U/A:    Lab Results   Component Value Date    NITRITE negative 05/16/2011    COLORU Straw 11/05/2018    CLARITYU Clear 11/05/2018    SPECGRAV 1.010 11/05/2018    LEUKOCYTESUR Negative 11/05/2018    BLOODU Negative 11/05/2018    GLUCOSEU Negative 11/05/2018    GLUCOSEU NEGATIVE 05/29/2011     CULTURES  Sputum: ordered  Blood: ordered  Rapid flu: ordered      EKG:  I have reviewed the EKG with the following interpretation:   Sinus tachycardia with ventricular rate of 126 bpm T wave inversions in inferior leads    RADIOLOGY  XR CHEST STANDARD (2 VW)   Final Result   Small amount of lingular atelectasis versus pneumonia. ASSESSMENT/PLAN:    #Acute on chronic hypoxic respiratory failure  -Patient uses 2 L/min nasal cannula oxygen with exertion only  -Her oxygen saturation was 76% on room air at rest.  She improved with application of 5 L/min of nasal cannula oxygen. Wean as able  -Treat COPD exacerbation, pneumonia as below. Checked rapid flu- negative     #Pneumonia  - community acquired, suspect a gram positive organism    - Treat with antibiotics Rocephin and Zithromax day #1  - check cultures    #COPD with acute exacerbation  -Steroids  -IBD  - theophylline continued   -Pulmonary consult    #Abnormal EKG  -With T wave inversions in inferior leads. - I suspect this is related to tachycardia and respiratory distress.     - Will check troponin, monitor on telemetry, repeat EKG in the morning    #Diabetes mellitus type 2  -Continue home medications and use sliding scale insulin    #GERD  -Continue

## 2020-01-03 NOTE — CONSULTS
Laterality Date    ABDOMEN SURGERY  5-19-11    total abdominal colectomy iliostomy    CARPAL TUNNEL RELEASE      right    CATARACT REMOVAL Bilateral     L 6/18/2013, R 07/01/2013    CATARACT REMOVAL WITH IMPLANT Right 7/1/13    COLECTOMY      and reversal     COLONOSCOPY  2009, 11/4/2011, 10/28/15    normal    CYST REMOVAL Right 6/20/14    Dr. Maegan Grijalva; right ear pressure point cyst removal    DILATATION, ESOPHAGUS      ENDOSCOPY, COLON, DIAGNOSTIC      EPIDURAL STEROID INJECTION Bilateral 7/9/2019    BILATERAL LUMBAR FOUR TRANSFORAMINAL EPIDURAL STEROID INJECTION SITE CONFIRMED BY FLUOROSCOPY performed by Nabila Marroquin MD at [address] Left 6/18/13    cataract with lens implant    HERNIA REPAIR  6/27/14    Open Vental Hernia Repair    HIP SURGERY      HYSTERECTOMY  1973    subtotal    NECK SURGERY      incision of windpipe, repair of windpipe defect    OTHER SURGICAL HISTORY  8/19/2011    hand assisted laparoscopic ileostomy reversal    OVARY REMOVAL      TRACHEAL SURGERY  2005    hx of trach     UMBILICAL HERNIA REPAIR  6/27/2014       FAMILY HISTORY:  family history includes Asthma in her daughter and daughter; Cancer in her daughter; Diabetes in her daughter, mother, and sister; Heart Failure in her brother, brother, and mother; Hypertension in her mother. SOCIAL HISTORY:   reports that she quit smoking about 24 years ago. Her smoking use included cigarettes. She has a 30.00 pack-year smoking history.  She has never used smokeless tobacco.    Scheduled Meds:   [START ON 1/4/2020] calcium carbonate  1,000 mg Oral Daily    [START ON 1/4/2020] pantoprazole  40 mg Oral QAM AC    fluticasone  2 spray Nasal Daily    [START ON 1/4/2020] furosemide  40 mg Oral Daily    gabapentin  300 mg Oral TID    [START ON 1/4/2020] linagliptin  5 mg Oral Daily    [START ON 1/4/2020] metFORMIN  1,000 mg Oral BID WC    [START ON 1/4/2020] potassium chloride  20 mEq Oral Daily   

## 2020-01-03 NOTE — PROGRESS NOTES
RESPIRATORY THERAPY ASSESSMENT    Name:  Antoine Record Number:  [id number]  Age: 78 y.o. Gender: female  : 1950  Today's Date:  1/3/2020  Room:  /0320-01    Assessment     Is the patient being admitted for a COPD or Asthma exacerbation? Yes   (If yes the patient will be seen every 4 hours for the first 24 hours and then reassessed)    Patient Admission Diagnosis      Allergies  No Known Allergies    Minimum Predicted Vital Capacity:               Actual Vital Capacity:                    Pulmonary History:COPD and Emphysema  Home Oxygen Therapy:  2 liters/min via nasal cannula  Home Respiratory Therapy:Albuterol and Tiotropium Bromide   Current Respiratory Therapy:  Duoneb hhn Q4hwa  Treatment Type: HHN  Medications: Albuterol/Ipratropium    Respiratory Severity Index(RSI)   Patients with orders for inhalation medications, oxygen, or any therapeutic treatment modality will be placed on Respiratory Protocol. They will be assessed with the first treatment and at least every 72 hours thereafter. The following severity scale will be used to determine frequency of treatment intervention.     Smoking History: Mild Exacerbation = 3    Social History  Social History     Tobacco Use    Smoking status: Former Smoker     Packs/day: 1.00     Years: 30.00     Pack years: 30.00     Types: Cigarettes     Last attempt to quit: 1996     Years since quittin.0    Smokeless tobacco: Never Used   Substance Use Topics    Alcohol use: No     Alcohol/week: 0.0 standard drinks    Drug use: No       Recent Surgical History: None = 0  Past Surgical History  Past Surgical History:   Procedure Laterality Date    ABDOMEN SURGERY  11    total abdominal colectomy iliostomy    CARPAL TUNNEL RELEASE      right    CATARACT REMOVAL Bilateral     L 2013, R 2013    CATARACT REMOVAL WITH IMPLANT Right 13    COLECTOMY      and reversal     COLONOSCOPY  , 2011, 10/28/15 is required, the physician will be contacted. 4. If the bronchospasm improves, the frequency of the bronchodilator can be decreased, based on the patient's RSI, but not less than home treatment regimen frequency. 5. Bronchodilator(s) will be discontinued if patient has a RSI less than 9 and has received no scheduled or as needed treatment for 72  Hrs. Patients Ordered on a Mucolytic Agent:    1. Must always be administered with a bronchodilator. 2. Discontinue if patient experiences worsened bronchospasm, or secretions have lessened to the point that the patient is able to clear them with a cough. Anti-inflammatory and Combination Medications:    1. If the patient lacks prior history of lung disease, is not using inhaled anti-inflammatory medication at home, and lacks wheezing by examination or by history for at least 24 hours, contact physician for possible discontinuation.

## 2020-01-04 LAB
ANION GAP SERPL CALCULATED.3IONS-SCNC: 13 MMOL/L (ref 3–16)
BASOPHILS ABSOLUTE: 0 K/UL (ref 0–0.2)
BASOPHILS RELATIVE PERCENT: 0.3 %
BUN BLDV-MCNC: 10 MG/DL (ref 7–20)
C DIFF TOXIN/ANTIGEN: NORMAL
CALCIUM SERPL-MCNC: 8.7 MG/DL (ref 8.3–10.6)
CHLORIDE BLD-SCNC: 103 MMOL/L (ref 99–110)
CO2: 26 MMOL/L (ref 21–32)
CREAT SERPL-MCNC: 0.6 MG/DL (ref 0.6–1.2)
EKG ATRIAL RATE: 121 BPM
EKG DIAGNOSIS: NORMAL
EKG P AXIS: 86 DEGREES
EKG P-R INTERVAL: 154 MS
EKG Q-T INTERVAL: 318 MS
EKG QRS DURATION: 84 MS
EKG QTC CALCULATION (BAZETT): 451 MS
EKG R AXIS: 87 DEGREES
EKG T AXIS: 81 DEGREES
EKG VENTRICULAR RATE: 121 BPM
EOSINOPHILS ABSOLUTE: 0 K/UL (ref 0–0.6)
EOSINOPHILS RELATIVE PERCENT: 0 %
ESTIMATED AVERAGE GLUCOSE: 122.6 MG/DL
GFR AFRICAN AMERICAN: >60
GFR NON-AFRICAN AMERICAN: >60
GLUCOSE BLD-MCNC: 134 MG/DL (ref 70–99)
GLUCOSE BLD-MCNC: 137 MG/DL (ref 70–99)
GLUCOSE BLD-MCNC: 142 MG/DL (ref 70–99)
GLUCOSE BLD-MCNC: 183 MG/DL (ref 70–99)
GLUCOSE BLD-MCNC: 245 MG/DL (ref 70–99)
HBA1C MFR BLD: 5.9 %
HCT VFR BLD CALC: 36.6 % (ref 36–48)
HEMOGLOBIN: 11.7 G/DL (ref 12–16)
LYMPHOCYTES ABSOLUTE: 1 K/UL (ref 1–5.1)
LYMPHOCYTES RELATIVE PERCENT: 14.9 %
MAGNESIUM: 2 MG/DL (ref 1.8–2.4)
MCH RBC QN AUTO: 28.5 PG (ref 26–34)
MCHC RBC AUTO-ENTMCNC: 32 G/DL (ref 31–36)
MCV RBC AUTO: 89.1 FL (ref 80–100)
MONOCYTES ABSOLUTE: 1.3 K/UL (ref 0–1.3)
MONOCYTES RELATIVE PERCENT: 19.3 %
NEUTROPHILS ABSOLUTE: 4.3 K/UL (ref 1.7–7.7)
NEUTROPHILS RELATIVE PERCENT: 65.5 %
PDW BLD-RTO: 14.5 % (ref 12.4–15.4)
PERFORMED ON: ABNORMAL
PLATELET # BLD: 248 K/UL (ref 135–450)
PMV BLD AUTO: 8.5 FL (ref 5–10.5)
POTASSIUM REFLEX MAGNESIUM: 3.5 MMOL/L (ref 3.5–5.1)
RBC # BLD: 4.1 M/UL (ref 4–5.2)
SODIUM BLD-SCNC: 142 MMOL/L (ref 136–145)
WBC # BLD: 6.5 K/UL (ref 4–11)

## 2020-01-04 PROCEDURE — 36415 COLL VENOUS BLD VENIPUNCTURE: CPT

## 2020-01-04 PROCEDURE — 6370000000 HC RX 637 (ALT 250 FOR IP): Performed by: PHYSICIAN ASSISTANT

## 2020-01-04 PROCEDURE — 97161 PT EVAL LOW COMPLEX 20 MIN: CPT

## 2020-01-04 PROCEDURE — 80048 BASIC METABOLIC PNL TOTAL CA: CPT

## 2020-01-04 PROCEDURE — 6360000002 HC RX W HCPCS: Performed by: HOSPITALIST

## 2020-01-04 PROCEDURE — 87449 NOS EACH ORGANISM AG IA: CPT

## 2020-01-04 PROCEDURE — 93010 ELECTROCARDIOGRAM REPORT: CPT | Performed by: INTERNAL MEDICINE

## 2020-01-04 PROCEDURE — 94761 N-INVAS EAR/PLS OXIMETRY MLT: CPT

## 2020-01-04 PROCEDURE — 97165 OT EVAL LOW COMPLEX 30 MIN: CPT

## 2020-01-04 PROCEDURE — 2060000000 HC ICU INTERMEDIATE R&B

## 2020-01-04 PROCEDURE — 94640 AIRWAY INHALATION TREATMENT: CPT

## 2020-01-04 PROCEDURE — 2580000003 HC RX 258: Performed by: PHYSICIAN ASSISTANT

## 2020-01-04 PROCEDURE — 97530 THERAPEUTIC ACTIVITIES: CPT

## 2020-01-04 PROCEDURE — 6360000002 HC RX W HCPCS: Performed by: INTERNAL MEDICINE

## 2020-01-04 PROCEDURE — 94669 MECHANICAL CHEST WALL OSCILL: CPT

## 2020-01-04 PROCEDURE — 83735 ASSAY OF MAGNESIUM: CPT

## 2020-01-04 PROCEDURE — 87324 CLOSTRIDIUM AG IA: CPT

## 2020-01-04 PROCEDURE — 94150 VITAL CAPACITY TEST: CPT

## 2020-01-04 PROCEDURE — 6360000002 HC RX W HCPCS: Performed by: PHYSICIAN ASSISTANT

## 2020-01-04 PROCEDURE — 2700000000 HC OXYGEN THERAPY PER DAY

## 2020-01-04 PROCEDURE — 93005 ELECTROCARDIOGRAM TRACING: CPT | Performed by: PHYSICIAN ASSISTANT

## 2020-01-04 PROCEDURE — 2580000003 HC RX 258: Performed by: INTERNAL MEDICINE

## 2020-01-04 PROCEDURE — 99232 SBSQ HOSP IP/OBS MODERATE 35: CPT | Performed by: INTERNAL MEDICINE

## 2020-01-04 PROCEDURE — 6370000000 HC RX 637 (ALT 250 FOR IP): Performed by: INTERNAL MEDICINE

## 2020-01-04 PROCEDURE — 85025 COMPLETE CBC W/AUTO DIFF WBC: CPT

## 2020-01-04 RX ORDER — LEVALBUTEROL 1.25 MG/.5ML
1.25 SOLUTION, CONCENTRATE RESPIRATORY (INHALATION)
Status: DISCONTINUED | OUTPATIENT
Start: 2020-01-04 | End: 2020-01-06 | Stop reason: HOSPADM

## 2020-01-04 RX ORDER — IPRATROPIUM BROMIDE AND ALBUTEROL SULFATE 2.5; .5 MG/3ML; MG/3ML
1 SOLUTION RESPIRATORY (INHALATION)
Status: DISCONTINUED | OUTPATIENT
Start: 2020-01-04 | End: 2020-01-04

## 2020-01-04 RX ADMIN — IPRATROPIUM BROMIDE AND ALBUTEROL SULFATE 1 AMPULE: .5; 3 SOLUTION RESPIRATORY (INHALATION) at 03:21

## 2020-01-04 RX ADMIN — TOPIRAMATE 50 MG: 25 TABLET, FILM COATED ORAL at 08:16

## 2020-01-04 RX ADMIN — IPRATROPIUM BROMIDE AND ALBUTEROL SULFATE 1 AMPULE: .5; 3 SOLUTION RESPIRATORY (INHALATION) at 10:51

## 2020-01-04 RX ADMIN — THEOPHYLLINE ANHYDROUS 400 MG: 200 CAPSULE, EXTENDED RELEASE ORAL at 08:16

## 2020-01-04 RX ADMIN — Medication 10 ML: at 20:57

## 2020-01-04 RX ADMIN — PANTOPRAZOLE SODIUM 40 MG: 40 TABLET, DELAYED RELEASE ORAL at 05:14

## 2020-01-04 RX ADMIN — METHYLPREDNISOLONE SODIUM SUCCINATE 40 MG: 40 INJECTION, POWDER, FOR SOLUTION INTRAMUSCULAR; INTRAVENOUS at 05:15

## 2020-01-04 RX ADMIN — FLUTICASONE PROPIONATE 2 SPRAY: 50 SPRAY, METERED NASAL at 08:21

## 2020-01-04 RX ADMIN — ANTACID TABLETS 1000 MG: 500 TABLET, CHEWABLE ORAL at 08:16

## 2020-01-04 RX ADMIN — IPRATROPIUM BROMIDE AND ALBUTEROL SULFATE 1 AMPULE: .5; 3 SOLUTION RESPIRATORY (INHALATION) at 07:01

## 2020-01-04 RX ADMIN — LINAGLIPTIN 5 MG: 5 TABLET, FILM COATED ORAL at 08:15

## 2020-01-04 RX ADMIN — METFORMIN HYDROCHLORIDE 1000 MG: 500 TABLET, FILM COATED, EXTENDED RELEASE ORAL at 17:08

## 2020-01-04 RX ADMIN — POTASSIUM CHLORIDE 20 MEQ: 1500 TABLET, EXTENDED RELEASE ORAL at 08:16

## 2020-01-04 RX ADMIN — CEFTRIAXONE SODIUM 1 G: 1 INJECTION, POWDER, FOR SOLUTION INTRAMUSCULAR; INTRAVENOUS at 15:59

## 2020-01-04 RX ADMIN — SIMVASTATIN 20 MG: 10 TABLET, FILM COATED ORAL at 20:57

## 2020-01-04 RX ADMIN — FUROSEMIDE 40 MG: 40 TABLET ORAL at 08:16

## 2020-01-04 RX ADMIN — METHYLPREDNISOLONE SODIUM SUCCINATE 40 MG: 40 INJECTION, POWDER, FOR SOLUTION INTRAMUSCULAR; INTRAVENOUS at 13:45

## 2020-01-04 RX ADMIN — TOPIRAMATE 50 MG: 25 TABLET, FILM COATED ORAL at 20:57

## 2020-01-04 RX ADMIN — GABAPENTIN 300 MG: 300 CAPSULE ORAL at 20:57

## 2020-01-04 RX ADMIN — LEVALBUTEROL 1.25 MG: 1.25 SOLUTION, CONCENTRATE RESPIRATORY (INHALATION) at 23:11

## 2020-01-04 RX ADMIN — VENLAFAXINE HYDROCHLORIDE 75 MG: 75 CAPSULE, EXTENDED RELEASE ORAL at 08:16

## 2020-01-04 RX ADMIN — GABAPENTIN 300 MG: 300 CAPSULE ORAL at 13:45

## 2020-01-04 RX ADMIN — AZITHROMYCIN DIHYDRATE 500 MG: 500 INJECTION, POWDER, LYOPHILIZED, FOR SOLUTION INTRAVENOUS at 17:31

## 2020-01-04 RX ADMIN — Medication 10 ML: at 08:17

## 2020-01-04 RX ADMIN — QUETIAPINE FUMARATE 25 MG: 25 TABLET ORAL at 20:57

## 2020-01-04 RX ADMIN — IPRATROPIUM BROMIDE AND ALBUTEROL SULFATE 1 AMPULE: .5; 3 SOLUTION RESPIRATORY (INHALATION) at 15:03

## 2020-01-04 RX ADMIN — METFORMIN HYDROCHLORIDE 1000 MG: 500 TABLET, FILM COATED, EXTENDED RELEASE ORAL at 08:15

## 2020-01-04 RX ADMIN — ENOXAPARIN SODIUM 30 MG: 30 INJECTION SUBCUTANEOUS at 08:16

## 2020-01-04 RX ADMIN — LEVALBUTEROL 1.25 MG: 1.25 SOLUTION, CONCENTRATE RESPIRATORY (INHALATION) at 18:53

## 2020-01-04 RX ADMIN — GABAPENTIN 300 MG: 300 CAPSULE ORAL at 08:15

## 2020-01-04 NOTE — PROGRESS NOTES
Inpatient Occupational Therapy  Evaluation and Treatment    Unit: PCU  Date:  1/4/2020  Patient Name:    Rico Lopez  Admitting diagnosis:  Respiratory failure [facility]) [J96.90]  Admit Date:  1/3/2020  Precautions/Restrictions/WB Status/ Lines/ Wounds/ Oxygen: IV, supplemental O2 (2L), telemetry, continuous pulse ox and contact plus precautions (R/O c.diff)       History Of Present Illness:  (from the medical record)   The patient is a 69 y.o. female with very severe COPD, asthma, chronic hypoxic respiratory failure (she uses 2 L/min on exertion only), type 2 diabetes mellitus, GERD, depression who presented to internal medicine clinic today for shortness of breath.  She describes a three day history of worsening dyspnea.  Had fever to 101F.  Cough with yellow sputum, no hemoptysis. Treatment Time:  599-397  Treatment Number: 1   Billable Treatment Time: 29 minutes   Total Treatment Time:   39   minutes    Patient Goals for Therapy:  \" to go home \"      Discharge Recommendations: Home with PRN assist (outpatient pulmonary rehab)   DME needs for discharge: Needs Met       Therapy recommendations for staff: Independent bed to BSC/chair  Staff assist within ambulation within room      Home Health S4 Level Recommendation:  NA  AM-PAC Score: Hung scored a 20/24 on the AM-PAC ADL Inpatient form. Current research shows that an AM-PAC score of 18 or greater is associated with a discharge to the patient's home setting. Based on the patient's AM-PAC ADL score and their current functional mobility deficits, it is recommended that the patient have 2-3 sessions per week of Occupational Therapy at d/c to increased the patient's independence. Preadmission Environment    Pt.  Lives with family (daughter and son in law)  Home environment:            one story home with basement  Steps to enter first floor: doorway step steps to enter, ramp leading up to house  Steps to second floor:         Full flight of 12-13 Not Tested    Positioning Needs:   Up in chair, call light and needs in reach. Exercise / Activities Initiated:   N/A    Patient/Family Education:   Role of OT  Recommendations for DC    Assessment of Deficits: Pt seen for Occupational therapy evaluation in acute care setting. Pt demonstrated decreased Activity Tolerance and ADL's. Pt functioning below baseline and will likely benefit from skilled occupational therapy services to maximize safety and independence. Goal(s) : To be met in 3 Visits:  1). Bed to toilet: Independent    To be met in 5 Visits:  2). Upper Body Bathing:  Independent  3). Lower Body Bathing:  Independent  4). Upper Body Dressing: Independent  5). Lower Body Dressing: Independent to don pants   6). Pt to meredith UE exs x 15 reps    Rehabilitation Potential:  Good for goals listed above. Strengths for achieving goals include: Pt motivated, PLOF, Family Support and Pt cooperative  Barriers to achieving goals include:  SOB      Plan: To be seen 3-5 x/wk while in acute care setting for therapeutic exercises, bed mobility, transfers, dressing, bathing, family/patient education with adaptive equipment, breathing technique instruction.      KATHLEEN Cleveland, OTR/L   [id number]           If patient discharges from this facility prior to next visit, this note will serve as the Discharge Summary

## 2020-01-04 NOTE — PROGRESS NOTES
Fully independent with ADLs: Yes  Pt. Required assistance from family for: heavier household tasks  Pt. Fully independent for transfers and gait and walked with No Device  History of falls No     Pain   No  Patient reports increased shortness of breath which limited activity this date      Cognition    A&O x4   Able to follow 2 step commands    Subjective  Patient lying supine in bed with no family present  Pt agreeable to this PT eval & tx. States she feels SOB, when asked how she would manage it at home, she would keep her activity level at minumum. Would like to sit in a chair    Upper Extremity ROM/Strength  Please see OT evaluation. Lower Extremity ROM / Strength    AROM WFL: Yes  ROM limitations:   WFL    Lower Extremity Sensation    NT    Lower Extremity Proprioception:   NT    Coordination and Tone  NT    Balance  Static Sitting:  Normal   Tolerance:   Dynamic Sitting:  Normal  Static Standing: Good    Tolerance:   Dynamic Standing: Good     Bed Mobility   Supine to Sit:   Independent  Sit to Supine:  Not Tested  Rolling:   Not Tested  Scooting at EOB: Independent  Scooting to [facility]:  Not Tested    Transfer Training     Sit to stand:   Independent  Stand to sit: Independent  Bed to Chair:  Independent with use of No AD    Gait gait deferred due to fatigue; pt ambulated 0 ft. Patient declined due to increased shortness of breath    Stair Training deferred, pt unsafe/not appropriate to complete stairs at this time      Activity Tolerance   Pt completed therapy session with SOB noted with at rest, with transfer bed to chair  SpO2: 93 on 2 L  HR: 129  BP: 126/71  Post transfer  HR  133  Spo2 94  127/72  Positioning Needs   Pt sitting up in chair, call light and needs in reach    Exercises Initiated    N/A    Other  None.      Patient/Family Education   Pt educated on role of inpatient PT, POC, importance of continued activity, DC recommendations, safety awareness, transfer techniques and calling for assist with mobility. Patient and PCA instructed to provide assist if patient would like to ambulate within room. Discussed progression back to OP pulmonary rehab after discussing with Dr. Natali Hall. Assessment  Pt seen for Physical Therapy evaluation in acute care setting. Pt demonstrated decreased Activity tolerance and decreased independence with Ambulation. Patient would benefit form skilled intervention to improve endurance and mobility. Recommend home with prn assist and home PT (breif)    Goals : To be met in 3 visits:  1). Independent with LE Ex x 10 reps    To be met in 6 visits:        4). Gait: Ambulate 50 ft.  with  Supervision  and use of No AD  5). Tolerate B LE exercises 3 sets of 10-15 reps  6). Ascend/descend 1 steps with CGA. Rehabilitation Potential: Good  Strengths for achieving goals include:   Pt motivated, PLOF, Family Support and Pt cooperative  Barriers to achieving goals include: Other: current respiratory condition    Plan    To be seen 2-3 x / week  while in acute care setting for therapeutic exercises, bed mobility, transfers, progressive gait training, balance training, and family/patient education. Signature  Kallie Gutierrez, PT #[id number]    If patient discharges from this facility prior to next visit, this note will serve as the Discharge Summary.

## 2020-01-04 NOTE — PROGRESS NOTES
RESPIRATORY THERAPY ASSESSMENT    Name:  Antoine Record Number:  [id number]  Age: 78 y.o. Gender: female  : 1950  Today's Date:  2020  Room:  /0320-01    Assessment     Is the patient being admitted for a COPD or Asthma exacerbation? Yes   (If yes the patient will be seen every 4 hours for the first 24 hours and then reassessed)    Patient Admission Diagnosis      Allergies  No Known Allergies    Minimum Predicted Vital Capacity:     680          Actual Vital Capacity:      1490              Pulmonary History:COPD  Home Oxygen Therapy:  2 liters/min via nasal cannula  Home Respiratory Therapy: Spiriva daily, albuterol hhn and mdi prn   Current Respiratory Therapy:  duoneb q4,acapella prn  Treatment Type: HHN, Vibratory mucous clearing therapy or intervention  Medications: Albuterol/Ipratropium    Respiratory Severity Index(RSI)   Patients with orders for inhalation medications, oxygen, or any therapeutic treatment modality will be placed on Respiratory Protocol. They will be assessed with the first treatment and at least every 72 hours thereafter. The following severity scale will be used to determine frequency of treatment intervention.     Smoking History: Mild Exacerbation = 3    Social History  Social History     Tobacco Use    Smoking status: Former Smoker     Packs/day: 1.00     Years: 30.00     Pack years: 30.00     Types: Cigarettes     Last attempt to quit: 1996     Years since quittin.0    Smokeless tobacco: Never Used   Substance Use Topics    Alcohol use: No     Alcohol/week: 0.0 standard drinks    Drug use: No       Recent Surgical History: None = 0  Past Surgical History  Past Surgical History:   Procedure Laterality Date    ABDOMEN SURGERY  11    total abdominal colectomy iliostomy    CARPAL TUNNEL RELEASE      right    CATARACT REMOVAL Bilateral     L 2013, R 2013    CATARACT REMOVAL WITH IMPLANT Right 13    COLECTOMY      and ?  No available caregiver               ? Other:     Response to education:  Very Good     Is patient being placed on Home Treatment Regimen? No     Does the patient have everything they need prior to discharge? Yes     Comments: patient assessed/interviewed. Plan of Care: q4 w/a and prn. Pt benefits from frequent bronchodilator    Electronically signed by Tedra Nageotte, RCP on 1/4/2020 at 3:05 PM    Respiratory Protocol Guidelines     1. Assessment and treatment by Respiratory Therapy will be initiated for medication and therapeutic interventions upon initiation of aerosolized medication. 2. Physician will be contacted for respiratory rate (RR) greater than 35 breaths per minute. Therapy will be held for heart rate (HR) greater than 140 beats per minute, pending direction from physician. 3. Bronchodilators will be administered via Metered Dose Inhaler (MDI) with spacer when the following criteria are met:  a. Alert and cooperative     b. HR < 140 bpm  c. RR < 30 bpm                d. Can demonstrate a 2-3 second inspiratory hold  4. Bronchodilators will be administered via Hand Held Nebulizer [facility]) to patients when ANY of the following criteria are met  a. Incognizant or uncooperative          b. Patients treated with HHN at Home        c. Unable to demonstrate proper use of MDI with spacer     d. RR > 30 bpm   5. Bronchodilators will be delivered via Metered Dose Inhaler (MDI), HHN, Aerogen to intubated patients on mechanical ventilation. 6. Inhalation medication orders will be delivered and/or substituted as outlined below. Aerosolized Medications Ordering and Administration Guidelines:    1. All Medications will be ordered by a physician, and their frequency and/or modality will be adjusted as defined by the patients Respiratory Severity Index (RSI) score.   2. If the patient does not have documented COPD, consider discontinuing anticholinergics when RSI is less than 9.  3. If the bronchospasm

## 2020-01-04 NOTE — PROGRESS NOTES
C-diff culture negative patient removed from precautions, will continue to monitor.  Evelyn Multani RN

## 2020-01-04 NOTE — FLOWSHEET NOTE
01/04/20 1411   Vital Signs   Temp 96.6 °F (35.9 °C)   Temp Source Oral   Pulse 125   Heart Rate Source Monitor   Resp 16   /80   BP Location Left upper arm   BP Upper/Lower Upper   Patient Position Up in chair   Level of Consciousness 0   MEWS Score 3   Patient Currently in Pain Denies   Oxygen Therapy   SpO2 95 %   Pulse Oximeter Device Mode Continuous   Pulse Oximeter Device Location Left;Finger   O2 Device Nasal cannula   O2 Flow Rate (L/min) 2 L/min       Patient resting in chair, heart rate elevated with breathing treatments. This RN had order changed. Will continue to monitor progress and elevation in HR.  Kd Barnett RN

## 2020-01-04 NOTE — PROGRESS NOTES
Pulmonary Progress Note  CC: Fever, acute respiratory failure, shortness of breath    Subjective: Fever resolved, shortness of breath better    IV line peripheral    EXAM:   /72   Pulse 122   Temp 99.4 °F (37.4 °C) (Oral)   Resp 16   Ht 5' (1.524 m)   Wt 99 lb (44.9 kg)   SpO2 97%   BMI 19.33 kg/m²  on 2 L  Constitutional:  No acute distress   HEENT: no scleral icterus  Neck: No tracheal deviation present. Cardiovascular: Normal heart sounds. Pulmonary/Chest: Few wheezes. No rhonchi. No rales. + decreased breath sounds. No accessory muscle usage or stridor. Abdominal: Soft. Musculoskeletal: No cyanosis. No clubbing. Skin: Skin is warm and dry.      Scheduled Meds:   calcium carbonate  1,000 mg Oral Daily    pantoprazole  40 mg Oral QAM AC    fluticasone  2 spray Nasal Daily    furosemide  40 mg Oral Daily    gabapentin  300 mg Oral TID    linagliptin  5 mg Oral Daily    metFORMIN  1,000 mg Oral BID WC    potassium chloride  20 mEq Oral Daily    QUEtiapine  25 mg Oral Nightly    simvastatin  20 mg Oral Nightly    theophylline  400 mg Oral Daily    topiramate  50 mg Oral BID    venlafaxine  75 mg Oral Daily with breakfast    sodium chloride flush  10 mL Intravenous 2 times per day    methylPREDNISolone  40 mg Intravenous Q12H    Followed by   Sohail Robert ON 1/5/2020] predniSONE  40 mg Oral Daily    insulin lispro  0-6 Units Subcutaneous TID WC    insulin lispro  0-3 Units Subcutaneous Nightly    ipratropium-albuterol  1 ampule Inhalation Q4H    cefTRIAXone (ROCEPHIN) IV  1 g Intravenous Q24H    enoxaparin  30 mg Subcutaneous Daily    azithromycin  500 mg Intravenous Q24H     Continuous Infusions:   dextrose       PRN Meds:  guaiFENesin, sodium chloride flush, magnesium hydroxide, ondansetron, acetaminophen, glucose, dextrose, glucagon (rDNA), dextrose    Labs:  CBC:   Recent Labs     01/03/20  1354 01/04/20  0536   WBC 8.1 6.5   HGB 11.9* 11.7*   HCT 36.9 36.6   MCV 88.1 89.1

## 2020-01-05 LAB
GLUCOSE BLD-MCNC: 137 MG/DL (ref 70–99)
GLUCOSE BLD-MCNC: 154 MG/DL (ref 70–99)
GLUCOSE BLD-MCNC: 168 MG/DL (ref 70–99)
GLUCOSE BLD-MCNC: 212 MG/DL (ref 70–99)
GLUCOSE BLD-MCNC: 255 MG/DL (ref 70–99)
PERFORMED ON: ABNORMAL

## 2020-01-05 PROCEDURE — 6360000002 HC RX W HCPCS: Performed by: INTERNAL MEDICINE

## 2020-01-05 PROCEDURE — 6370000000 HC RX 637 (ALT 250 FOR IP): Performed by: HOSPITALIST

## 2020-01-05 PROCEDURE — 6370000000 HC RX 637 (ALT 250 FOR IP): Performed by: PHYSICIAN ASSISTANT

## 2020-01-05 PROCEDURE — 99232 SBSQ HOSP IP/OBS MODERATE 35: CPT | Performed by: INTERNAL MEDICINE

## 2020-01-05 PROCEDURE — 6360000002 HC RX W HCPCS: Performed by: HOSPITALIST

## 2020-01-05 PROCEDURE — 2580000003 HC RX 258: Performed by: PHYSICIAN ASSISTANT

## 2020-01-05 PROCEDURE — 6360000002 HC RX W HCPCS: Performed by: PHYSICIAN ASSISTANT

## 2020-01-05 PROCEDURE — 2580000003 HC RX 258: Performed by: INTERNAL MEDICINE

## 2020-01-05 PROCEDURE — 2700000000 HC OXYGEN THERAPY PER DAY

## 2020-01-05 PROCEDURE — 94669 MECHANICAL CHEST WALL OSCILL: CPT

## 2020-01-05 PROCEDURE — 94640 AIRWAY INHALATION TREATMENT: CPT

## 2020-01-05 PROCEDURE — 87205 SMEAR GRAM STAIN: CPT

## 2020-01-05 PROCEDURE — 2060000000 HC ICU INTERMEDIATE R&B

## 2020-01-05 PROCEDURE — 94761 N-INVAS EAR/PLS OXIMETRY MLT: CPT

## 2020-01-05 PROCEDURE — 87070 CULTURE OTHR SPECIMN AEROBIC: CPT

## 2020-01-05 RX ORDER — DILTIAZEM HYDROCHLORIDE 60 MG/1
30 TABLET, FILM COATED ORAL EVERY 6 HOURS SCHEDULED
Status: DISCONTINUED | OUTPATIENT
Start: 2020-01-05 | End: 2020-01-06 | Stop reason: HOSPADM

## 2020-01-05 RX ORDER — GUAIFENESIN 600 MG/1
600 TABLET, EXTENDED RELEASE ORAL 2 TIMES DAILY
Status: DISCONTINUED | OUTPATIENT
Start: 2020-01-05 | End: 2020-01-06 | Stop reason: HOSPADM

## 2020-01-05 RX ORDER — POTASSIUM CHLORIDE 20 MEQ/1
40 TABLET, EXTENDED RELEASE ORAL ONCE
Status: COMPLETED | OUTPATIENT
Start: 2020-01-05 | End: 2020-01-05

## 2020-01-05 RX ADMIN — FUROSEMIDE 40 MG: 40 TABLET ORAL at 08:07

## 2020-01-05 RX ADMIN — Medication 10 ML: at 08:11

## 2020-01-05 RX ADMIN — CEFTRIAXONE SODIUM 1 G: 1 INJECTION, POWDER, FOR SOLUTION INTRAMUSCULAR; INTRAVENOUS at 15:22

## 2020-01-05 RX ADMIN — DILTIAZEM HYDROCHLORIDE 30 MG: 60 TABLET, FILM COATED ORAL at 13:49

## 2020-01-05 RX ADMIN — POTASSIUM CHLORIDE 40 MEQ: 1500 TABLET, EXTENDED RELEASE ORAL at 13:49

## 2020-01-05 RX ADMIN — SIMVASTATIN 20 MG: 10 TABLET, FILM COATED ORAL at 20:22

## 2020-01-05 RX ADMIN — DILTIAZEM HYDROCHLORIDE 30 MG: 60 TABLET, FILM COATED ORAL at 18:45

## 2020-01-05 RX ADMIN — FLUTICASONE PROPIONATE 2 SPRAY: 50 SPRAY, METERED NASAL at 08:12

## 2020-01-05 RX ADMIN — GABAPENTIN 300 MG: 300 CAPSULE ORAL at 20:21

## 2020-01-05 RX ADMIN — GABAPENTIN 300 MG: 300 CAPSULE ORAL at 13:49

## 2020-01-05 RX ADMIN — ENOXAPARIN SODIUM 30 MG: 30 INJECTION SUBCUTANEOUS at 08:08

## 2020-01-05 RX ADMIN — TOPIRAMATE 50 MG: 25 TABLET, FILM COATED ORAL at 08:07

## 2020-01-05 RX ADMIN — ANTACID TABLETS 1000 MG: 500 TABLET, CHEWABLE ORAL at 08:08

## 2020-01-05 RX ADMIN — GUAIFENESIN 600 MG: 600 TABLET, EXTENDED RELEASE ORAL at 20:21

## 2020-01-05 RX ADMIN — PREDNISONE 40 MG: 20 TABLET ORAL at 13:49

## 2020-01-05 RX ADMIN — METHYLPREDNISOLONE SODIUM SUCCINATE 40 MG: 40 INJECTION, POWDER, FOR SOLUTION INTRAMUSCULAR; INTRAVENOUS at 01:42

## 2020-01-05 RX ADMIN — THEOPHYLLINE ANHYDROUS 400 MG: 200 CAPSULE, EXTENDED RELEASE ORAL at 08:06

## 2020-01-05 RX ADMIN — QUETIAPINE FUMARATE 25 MG: 25 TABLET ORAL at 20:22

## 2020-01-05 RX ADMIN — METFORMIN HYDROCHLORIDE 1000 MG: 500 TABLET, FILM COATED, EXTENDED RELEASE ORAL at 08:06

## 2020-01-05 RX ADMIN — PANTOPRAZOLE SODIUM 40 MG: 40 TABLET, DELAYED RELEASE ORAL at 06:09

## 2020-01-05 RX ADMIN — LINAGLIPTIN 5 MG: 5 TABLET, FILM COATED ORAL at 08:07

## 2020-01-05 RX ADMIN — GUAIFENESIN 600 MG: 600 TABLET, EXTENDED RELEASE ORAL at 13:49

## 2020-01-05 RX ADMIN — LEVALBUTEROL 1.25 MG: 1.25 SOLUTION, CONCENTRATE RESPIRATORY (INHALATION) at 11:31

## 2020-01-05 RX ADMIN — METFORMIN HYDROCHLORIDE 1000 MG: 500 TABLET, FILM COATED, EXTENDED RELEASE ORAL at 16:45

## 2020-01-05 RX ADMIN — Medication 10 ML: at 20:21

## 2020-01-05 RX ADMIN — POTASSIUM CHLORIDE 20 MEQ: 1500 TABLET, EXTENDED RELEASE ORAL at 08:08

## 2020-01-05 RX ADMIN — LEVALBUTEROL 1.25 MG: 1.25 SOLUTION, CONCENTRATE RESPIRATORY (INHALATION) at 07:17

## 2020-01-05 RX ADMIN — LEVALBUTEROL 1.25 MG: 1.25 SOLUTION, CONCENTRATE RESPIRATORY (INHALATION) at 23:36

## 2020-01-05 RX ADMIN — LEVALBUTEROL 1.25 MG: 1.25 SOLUTION, CONCENTRATE RESPIRATORY (INHALATION) at 15:01

## 2020-01-05 RX ADMIN — TOPIRAMATE 50 MG: 25 TABLET, FILM COATED ORAL at 20:22

## 2020-01-05 RX ADMIN — AZITHROMYCIN DIHYDRATE 500 MG: 500 INJECTION, POWDER, LYOPHILIZED, FOR SOLUTION INTRAVENOUS at 18:46

## 2020-01-05 RX ADMIN — VENLAFAXINE HYDROCHLORIDE 75 MG: 75 CAPSULE, EXTENDED RELEASE ORAL at 08:07

## 2020-01-05 RX ADMIN — IPRATROPIUM BROMIDE 0.5 MG: 0.5 SOLUTION RESPIRATORY (INHALATION) at 23:36

## 2020-01-05 RX ADMIN — GABAPENTIN 300 MG: 300 CAPSULE ORAL at 08:07

## 2020-01-05 NOTE — PROGRESS NOTES
Patient continues with episodes of high HR in the 150's. Physician added diltiazem for control. Will continue to monitor patient.  Meghana Turner RN

## 2020-01-05 NOTE — PROGRESS NOTES
Pulmonary Progress Note  CC: Fever, acute respiratory failure, shortness of breath    Subjective: feels better than admission, similar to yesterday, more shortness of breath and fatigue than baseline    IV line peripheral    EXAM:   /74   Pulse 118   Temp 97 °F (36.1 °C) (Oral)   Resp 16   Ht 5' (1.524 m)   Wt 100 lb 14.4 oz (45.8 kg)   SpO2 97%   BMI 19.71 kg/m²  on 2 L  Constitutional:  Looks about the same, NAD   HEENT: no scleral icterus  Neck: No tracheal deviation present. Cardiovascular: Normal heart sounds. Pulmonary/Chest: bilateral wheezes. No rhonchi. No rales. + decreased breath sounds. No accessory muscle usage or stridor. Abdominal: Soft. Musculoskeletal: No cyanosis. No clubbing. Skin: Skin is warm and dry.      Scheduled Meds:   diltiazem  30 mg Oral 4 times per day    guaiFENesin  600 mg Oral BID    ipratropium  0.5 mg Nebulization 4x daily    levalbuterol  1.25 mg Nebulization Q4H While awake    calcium carbonate  1,000 mg Oral Daily    pantoprazole  40 mg Oral QAM AC    fluticasone  2 spray Nasal Daily    furosemide  40 mg Oral Daily    gabapentin  300 mg Oral TID    linagliptin  5 mg Oral Daily    metFORMIN  1,000 mg Oral BID WC    potassium chloride  20 mEq Oral Daily    QUEtiapine  25 mg Oral Nightly    simvastatin  20 mg Oral Nightly    theophylline  400 mg Oral Daily    topiramate  50 mg Oral BID    venlafaxine  75 mg Oral Daily with breakfast    sodium chloride flush  10 mL Intravenous 2 times per day    predniSONE  40 mg Oral Daily    insulin lispro  0-6 Units Subcutaneous TID WC    insulin lispro  0-3 Units Subcutaneous Nightly    cefTRIAXone (ROCEPHIN) IV  1 g Intravenous Q24H    enoxaparin  30 mg Subcutaneous Daily    azithromycin  500 mg Intravenous Q24H     Continuous Infusions:   dextrose       PRN Meds:  guaiFENesin, sodium chloride flush, magnesium hydroxide, ondansetron, acetaminophen, glucose, dextrose, glucagon (rDNA),

## 2020-01-05 NOTE — FLOWSHEET NOTE
01/05/20 1453   Vital Signs   Temp 97 °F (36.1 °C)   Temp Source Oral   Pulse 118   Heart Rate Source Monitor   Resp 16   /74   BP Location Right upper arm   BP Upper/Lower Upper   Patient Position High fowlers   Level of Consciousness 0   MEWS Score 3   Patient Currently in Pain Denies   Oxygen Therapy   SpO2 96 %   Pulse Oximeter Device Mode Continuous   Pulse Oximeter Device Location Finger   O2 Device Nasal cannula   O2 Flow Rate (L/min) 2 L/min       Patient resting at this time, no complaints at this time, will continue to monitor.  Sia Solano RN'

## 2020-01-06 VITALS
HEART RATE: 114 BPM | WEIGHT: 100.5 LBS | OXYGEN SATURATION: 97 % | RESPIRATION RATE: 20 BRPM | HEIGHT: 60 IN | SYSTOLIC BLOOD PRESSURE: 131 MMHG | DIASTOLIC BLOOD PRESSURE: 75 MMHG | TEMPERATURE: 97.8 F | BODY MASS INDEX: 19.73 KG/M2

## 2020-01-06 LAB
GLUCOSE BLD-MCNC: 143 MG/DL (ref 70–99)
GLUCOSE BLD-MCNC: 184 MG/DL (ref 70–99)
PERFORMED ON: ABNORMAL
PERFORMED ON: ABNORMAL

## 2020-01-06 PROCEDURE — 6370000000 HC RX 637 (ALT 250 FOR IP): Performed by: PHYSICIAN ASSISTANT

## 2020-01-06 PROCEDURE — 6370000000 HC RX 637 (ALT 250 FOR IP): Performed by: HOSPITALIST

## 2020-01-06 PROCEDURE — 99232 SBSQ HOSP IP/OBS MODERATE 35: CPT | Performed by: INTERNAL MEDICINE

## 2020-01-06 PROCEDURE — 2580000003 HC RX 258: Performed by: PHYSICIAN ASSISTANT

## 2020-01-06 PROCEDURE — 94640 AIRWAY INHALATION TREATMENT: CPT

## 2020-01-06 PROCEDURE — 6360000002 HC RX W HCPCS: Performed by: INTERNAL MEDICINE

## 2020-01-06 PROCEDURE — 94669 MECHANICAL CHEST WALL OSCILL: CPT

## 2020-01-06 PROCEDURE — 99238 HOSP IP/OBS DSCHRG MGMT 30/<: CPT | Performed by: INTERNAL MEDICINE

## 2020-01-06 RX ORDER — DILTIAZEM HYDROCHLORIDE 120 MG/1
120 CAPSULE, COATED, EXTENDED RELEASE ORAL DAILY
Qty: 30 CAPSULE | Refills: 3 | Status: SHIPPED | OUTPATIENT
Start: 2020-01-06 | End: 2022-10-11 | Stop reason: SDUPTHER

## 2020-01-06 RX ORDER — CEFUROXIME AXETIL 500 MG/1
500 TABLET ORAL 2 TIMES DAILY
Qty: 14 TABLET | Refills: 0 | Status: ON HOLD | OUTPATIENT
Start: 2020-01-06 | End: 2020-01-13 | Stop reason: SDUPTHER

## 2020-01-06 RX ORDER — PREDNISONE 10 MG/1
TABLET ORAL
Qty: 18 TABLET | Refills: 0 | Status: ON HOLD | OUTPATIENT
Start: 2020-01-06 | End: 2020-01-13 | Stop reason: HOSPADM

## 2020-01-06 RX ORDER — GUAIFENESIN 600 MG/1
600 TABLET, EXTENDED RELEASE ORAL 2 TIMES DAILY
Qty: 60 TABLET | Refills: 1 | Status: SHIPPED | OUTPATIENT
Start: 2020-01-06

## 2020-01-06 RX ADMIN — Medication 10 ML: at 08:36

## 2020-01-06 RX ADMIN — METFORMIN HYDROCHLORIDE 1000 MG: 500 TABLET, FILM COATED, EXTENDED RELEASE ORAL at 08:35

## 2020-01-06 RX ADMIN — IPRATROPIUM BROMIDE 0.5 MG: 0.5 SOLUTION RESPIRATORY (INHALATION) at 10:25

## 2020-01-06 RX ADMIN — IPRATROPIUM BROMIDE 0.5 MG: 0.5 SOLUTION RESPIRATORY (INHALATION) at 06:42

## 2020-01-06 RX ADMIN — DILTIAZEM HYDROCHLORIDE 30 MG: 60 TABLET, FILM COATED ORAL at 11:53

## 2020-01-06 RX ADMIN — TOPIRAMATE 50 MG: 25 TABLET, FILM COATED ORAL at 08:35

## 2020-01-06 RX ADMIN — DILTIAZEM HYDROCHLORIDE 30 MG: 60 TABLET, FILM COATED ORAL at 00:46

## 2020-01-06 RX ADMIN — LEVALBUTEROL 1.25 MG: 1.25 SOLUTION, CONCENTRATE RESPIRATORY (INHALATION) at 06:42

## 2020-01-06 RX ADMIN — PREDNISONE 40 MG: 20 TABLET ORAL at 08:35

## 2020-01-06 RX ADMIN — GUAIFENESIN 600 MG: 600 TABLET, EXTENDED RELEASE ORAL at 08:35

## 2020-01-06 RX ADMIN — ENOXAPARIN SODIUM 30 MG: 30 INJECTION SUBCUTANEOUS at 08:36

## 2020-01-06 RX ADMIN — ANTACID TABLETS 1000 MG: 500 TABLET, CHEWABLE ORAL at 08:35

## 2020-01-06 RX ADMIN — GABAPENTIN 300 MG: 300 CAPSULE ORAL at 13:35

## 2020-01-06 RX ADMIN — DILTIAZEM HYDROCHLORIDE 30 MG: 60 TABLET, FILM COATED ORAL at 06:41

## 2020-01-06 RX ADMIN — PANTOPRAZOLE SODIUM 40 MG: 40 TABLET, DELAYED RELEASE ORAL at 06:42

## 2020-01-06 RX ADMIN — THEOPHYLLINE ANHYDROUS 400 MG: 200 CAPSULE, EXTENDED RELEASE ORAL at 08:35

## 2020-01-06 RX ADMIN — VENLAFAXINE HYDROCHLORIDE 75 MG: 75 CAPSULE, EXTENDED RELEASE ORAL at 08:35

## 2020-01-06 RX ADMIN — LEVALBUTEROL 1.25 MG: 1.25 SOLUTION, CONCENTRATE RESPIRATORY (INHALATION) at 10:25

## 2020-01-06 RX ADMIN — GABAPENTIN 300 MG: 300 CAPSULE ORAL at 08:36

## 2020-01-06 RX ADMIN — LINAGLIPTIN 5 MG: 5 TABLET, FILM COATED ORAL at 08:35

## 2020-01-06 RX ADMIN — FUROSEMIDE 40 MG: 40 TABLET ORAL at 08:36

## 2020-01-06 RX ADMIN — POTASSIUM CHLORIDE 20 MEQ: 1500 TABLET, EXTENDED RELEASE ORAL at 08:35

## 2020-01-06 NOTE — DISCHARGE INSTR - COC
Continuity of Care Form    Patient Name: Robyn Jones   :  [id number]  MRN:  [id number]    Admit date:  1/3/2020  Discharge date:  20    Code Status Order: DNR-CCA   Advance Directives:   [facility] Documentation     Date/Time Healthcare Directive Type of Healthcare Directive Copy in [facility] [address] Agent's Name Healthcare Agent's Phone Number    20 2817  --  --  No, copy requested from family  --  --  [phone]    20 7846  Yes, patient has an advance directive for healthcare treatment  Durable power of  for health care; Health care treatment directive; Living will  --  --  Dominic Mathis  --          Admitting Physician:  Ej uVong MD  PCP: Heike Graves MD    Discharging Nurse: Kerwin Armenta Unit/Room#: /8526-50  Discharging Unit Phone Number: [phone]    Emergency Contact:   Extended Emergency Contact Information  Primary Emergency Contact: Agata Matos   [address] Phone: [phone]  Relation: Child    Past Surgical History:  Past Surgical History:   Procedure Laterality Date    ABDOMEN SURGERY  11    total abdominal colectomy iliostomy    CARPAL TUNNEL RELEASE      right    CATARACT REMOVAL Bilateral     L 2013, R 2013    CATARACT REMOVAL WITH IMPLANT Right 13    COLECTOMY      and reversal     COLONOSCOPY  , 2011, 10/28/15    normal    CYST REMOVAL Right 14    Dr. Vicky Richmond; right ear pressure point cyst removal    DILATATION, ESOPHAGUS      ENDOSCOPY, COLON, DIAGNOSTIC      EPIDURAL STEROID INJECTION Bilateral 2019    BILATERAL LUMBAR FOUR TRANSFORAMINAL EPIDURAL STEROID INJECTION SITE CONFIRMED BY FLUOROSCOPY performed by Lisbet Alexandre MD at [address] [place] [address] Left 13    cataract with lens implant    HERNIA REPAIR  14    Open Vental Hernia Repair    HIP SURGERY      HYSTERECTOMY  1973    subtotal F33.41    Ganglion cyst of volar aspect of right wrist M67.431    Lipoma of right upper extremity D17.21    Chest pain R07.9    Acute hypoxemic respiratory failure (HCC) J96.01    Community acquired pneumonia of left lung J18.9       Isolation/Infection:   Isolation          No Isolation        Patient Infection Status     Infection Onset Added Last Indicated Last Indicated By Review Planned Expiration Resolved Resolved By    None active    Resolved    C-diff Rule Out  01/03/20 01/04/20 Clostridium difficile toxin/antigen (Ordered)   01/04/20 Isolation/Infection  Ip, RN          Nurse Assessment:  Last Vital Signs: /75   Pulse 114   Temp 97.8 °F (36.6 °C) (Oral)   Resp 20   Ht 5' (1.524 m)   Wt 100 lb 8 oz (45.6 kg)   SpO2 97%   BMI 19.63 kg/m²     Last documented pain score (0-10 scale):    Last Weight:   Wt Readings from Last 1 Encounters:   01/06/20 100 lb 8 oz (45.6 kg)     Mental Status:  oriented, alert, coherent, logical, thought processes intact and able to concentrate and follow conversation    IV Access:  - None    Nursing Mobility/ADLs:  Walking   Assisted  Transfer  Assisted  Bathing  Assisted  Dressing  Assisted  Toileting  Assisted  Feeding  [address] [facility] Delivery   whole    Wound Care Documentation and Therapy:        Elimination:  Continence:   · Bowel: Yes  · Bladder: Yes  Urinary Catheter: None   Colostomy/Ileostomy/Ileal Conduit: No       Date of Last BM:     Intake/Output Summary (Last 24 hours) at 1/6/2020 1034  Last data filed at 1/6/2020 0904  Gross per 24 hour   Intake 600 ml   Output 2400 ml   Net -1800 ml     I/O last 3 completed shifts: In: 5 [P.O.:540]  Out: 2400 [Urine:2400]    Safety Concerns:     None    Impairments/Disabilities:      None    Nutrition Therapy:  Current Nutrition Therapy:   - Oral Diet:  Low Sodium (2gm)    Routes of Feeding: Oral  Liquids:  Thin Liquids  Daily Fluid Restriction: no  Last Modified Barium Swallow with

## 2020-01-06 NOTE — DISCHARGE SUMMARY
Physician Discharge Summary     Patient ID:  Mina Broussard  [id number]  78 y.o.  1950    Admit date: 1/3/2020    Discharge date and time: 1/6/2020  2:17 PM     Admitting Physician: Ryland Mars MD     Discharge Physician: Sara Castro    Admission Diagnoses: Respiratory failure [facility]) [J96.90]    Discharge Diagnoses: Principal Problem:    Acute on chronic respiratory failure with hypoxia ([facility]  Active Problems:    COPD exacerbation ([facility] [facility]    Esophageal reflux    Pneumonia due to infectious organism    Type 2 diabetes mellitus without complication, without long-term current use of insulin (HCC)    Recurrent major depressive disorder, in partial remission ([facility]    Acute hypoxemic respiratory failure ([facility]    Community acquired pneumonia of left lung  Resolved Problems:    * No resolved hospital problems. *      Admission Condition: fair    Discharged Condition: stable    Indication for Admission:   Per HPI  The patient is a 78 y.o. female with very severe COPD, asthma, chronic hypoxic respiratory failure (she uses 2 L/min on exertion only), type 2 diabetes mellitus, GERD, depression who presented to internal medicine clinic today for shortness of breath. She describes a three day history of worsening dyspnea.  Had fever to 101F.  Cough with yellow sputum, no hemoptysis.  Back pain with coughing.  She got her flu shot.  She has not had any sick contacts. Her symptoms progressively worsened over the past 3 days and became severe today. On arrival to exam room in the clinic she was in mild respiratory distress. Vitals obtained on arrival to the exam room showed O2 sat of 44% on room air with a heart rate of 156. After resting her oxygen saturation improved to 73% on room air. 5 L nasal cannula oxygen was applied and she improved to 97% on room air and her work of breathing significantly improved.   She was subsequently directly admitted to the hospital for acute hypoxic respiratory failure, COPD exacerbation, possible pneumonia      Hospital Course:       #Acute on chronic hypoxic respiratory failure  - Patient hypoxic at PCPs office,  admitted for acute on chronic respiratory failure  due to pneumonia and COPD exacerbation.    - She has improved with supplemental O2,  IV antibiotics nebulizers and steroids. - Currently O2 sats stable on 2 L   - She will be discharged home with home O2,  prednisone taper. just completed 3 days of Zithromax. I will discharge her on Ceftin for 1 more week. - Encourage Acapella and Mucinex  -  rapid flu antigen negative     #sinus tachy   -Tachycardic Cardizem added to her medication regimen.     #Diabetes mellitus type 2  -Continue home medications and use sliding scale insulin     #GERD     DVT Prophylaxis:hep  Diet: DIET LOW SODIUM 2 GM;  Code Status: DNR-CCA         Consults: pulmonary        Significant Diagnostic Studies:   Data:  CBC:   Recent Labs     01/03/20  1354 01/04/20  0536   WBC 8.1 6.5   RBC 4.18 4.10   HGB 11.9* 11.7*   HCT 36.9 36.6   MCV 88.1 89.1   RDW 14.7 14.5    248     BMP:   Recent Labs     01/03/20  1349 01/04/20  0536    142   K 3.8 3.5    103   CO2 27 26   BUN 8 10   CREATININE 0.7 0.6     CARDIAC ENZYMES:   Recent Labs     01/03/20  1349   TROPONINI <0.01     FASTING LIPID PANEL:  Lab Results   Component Value Date    CHOL 160 11/13/2019    HDL 79 (H) 11/13/2019    TRIG 92 11/13/2019     LIVER PROFILE:   Recent Labs     01/03/20  1349   AST 14*   ALT 8*   BILITOT <0.2   ALKPHOS 66         Treatments: as above    Discharge Exam:  Blood pressure 131/75, pulse 114, temperature 97.8 °F (36.6 °C), temperature source Oral, resp. rate 20, height 5' (1.524 m), weight 100 lb 8 oz (45.6 kg), SpO2 97 %  General appearance: mod resp distress, appears stated age and cooperative. HEENT: Pupils equal, round, and reactive to light. Respiratory:  Normal respiratory effort.  Clear to auscultation, bilaterally decreased /Wheezes/Rhonchi. Cardiovascular: Regular rate and rhythm with normal S1/S2 without murmurs, rubs or gallops. Abdomen: Soft, non-tender, non-distended with normal bowel sounds. Musculoskeletal: No clubbing, cyanosis or edema bilaterally. Full range of motion without deformity. Skin: Skin color, texture, turgor normal.  No rashes or lesions. Neurologic:  Neurovascularly intact without any focal sensory/motor deficits. Cranial nerves: II-XII intact, grossly non-focal.  Psychiatric: Alert and oriented. No anxiety         Disposition: home      Patient Instructions:      Medication List      START taking these medications    cefUROXime 500 MG tablet  Commonly known as:  CEFTIN  Take 1 tablet by mouth 2 times daily for 7 days     diltiazem 120 MG extended release capsule  Commonly known as:  CARDIZEM CD  Take 1 capsule by mouth daily     predniSONE 10 MG tablet  Commonly known as:  DELTASONE  Take 3 tabs a day for 3 days,  Then 2 tabs a day for 3 days ,  Then 1 tab a day for 3 days. CHANGE how you take these medications    guaiFENesin 600 MG extended release tablet  Commonly known as:  MUCINEX  Take 1 tablet by mouth 2 times daily  What changed:    · when to take this  · reasons to take this        CONTINUE taking these medications    ACAPELLA Misc  by Does not apply route 4 times daily. * ACCU-CHEK JOSIE PLUS strip  Generic drug:  blood glucose test strips  USE TO TEST DAILY     * blood glucose test strips strip  Commonly known as:  ACCU-CHEK SMARTVIEW  Use to test once daily. DX:E11.9     ACCU-CHEK FASTCLIX LANCET Kit  Use to test once daily. DX:E11.9     ACCU-CHEK GAURANG SMARTVIEW w/Device Kit  Use to test once daily.  DX:E11.9     * albuterol (2.5 MG/3ML) 0.083% nebulizer solution  Commonly known as:  PROVENTIL  INHALE THE CONTENTS OF 1 VIAL VIA NEBULIZER EVERY 6 HOURS AS NEEDED FOR SHORTNESS OF BREATH OR WHEEZING     * albuterol sulfate  (90 Base) MCG/ACT inhaler  INHALE 2 PUFFS EVERY 6 HOURS AS NEEDED FOR WHEEZING     alendronate 35 MG tablet  Commonly known as:  FOSAMAX  TAKE 1 TABLET EVERY 7 DAYS     calcium carbonate 600 MG Tabs tablet  Commonly known as:  CALCIUM 600  Take 2 tablets by mouth daily     Compressor/Nebulizer Misc  1 Device by Does not apply route as needed DX COPD J44.9     esomeprazole 40 MG delayed release capsule  Commonly known as:  NEXIUM  TAKE 1 CAPSULE EVERY MORNING BEFORE BREAKFAST     fluticasone 50 MCG/ACT nasal spray  Commonly known as:  FLONASE  2 sprays by Nasal route daily.      furosemide 40 MG tablet  Commonly known as:  LASIX  TAKE 1 TABLET EVERY DAY     gabapentin 300 MG capsule  Commonly known as:  NEURONTIN  TAKE 1 CAPSULE BY MOUTH THREE TIMES DAILY FOR 30 DAYS     JANUVIA 100 MG tablet  Generic drug:  SITagliptin  TAKE 1 TABLET BY MOUTH DAILY     metFORMIN 500 MG extended release tablet  Commonly known as:  GLUCOPHAGE-XR  TAKE 2 TABLETS BY MOUTH TWICE DAILY     naproxen 375 MG tablet  Commonly known as:  NAPROSYN  TAKE 1 TABLET TWICE DAILY     Nebulizer/Tubing/Mouthpiece Kit  1 kit by Does not apply route daily as needed DX COPD J44.9     potassium chloride 20 MEQ extended release tablet  Commonly known as:  KLOR-CON M  TAKE 1 TABLET EVERY DAY     QUEtiapine 25 MG tablet  Commonly known as:  SEROQUEL  TAKE 1 TO 2 TABLETS BY MOUTH AT BEDTIME.     simvastatin 20 MG tablet  Commonly known as:  ZOCOR  TAKE 1 TABLET EVERY EVENING     theophylline 200 MG extended release tablet  Commonly known as:  THEOCHRON  Take 1 tablet by mouth 2 times daily     tiotropium 18 MCG inhalation capsule  Commonly known as:  SPIRIVA HANDIHALER  INHALE THE CONTENTS OF 1 CAPSULE EVERY DAY     topiramate 50 MG tablet  Commonly known as:  TOPAMAX  TAKE 1 TABLET BY MOUTH TWICE DAILY     venlafaxine 75 MG extended release capsule  Commonly known as:  EFFEXOR XR  TAKE 1 CAPSULE EVERY DAY     VITAMIN B-12 PO     vitamin D 400 units Tabs tablet  Commonly known as:  CHOLECALCIFEROL  Take 2 tablets by mouth daily     WIXELA INHUB 250-50 MCG/DOSE Aepb  Generic drug:  fluticasone-salmeterol  INHALE 1 PUFF INTO THE LUNGS EVERY 12 HOURS         * This list has 4 medication(s) that are the same as other medications prescribed for you. Read the directions carefully, and ask your doctor or other care provider to review them with you. Where to Get Your Medications      You can get these medications from any pharmacy    Bring a paper prescription for each of these medications  · cefUROXime 500 MG tablet  · diltiazem 120 MG extended release capsule  · guaiFENesin 600 MG extended release tablet  · predniSONE 10 MG tablet       Activity: activity as tolerated  Diet: regular diet  Wound Care: as directed    Follow-up with PCP in 1 week.     Signed:  Aneta Hamilton  1/6/2020  10:35 AM

## 2020-01-06 NOTE — PROGRESS NOTES
Pulmonary Progress Note  CC: shortness of breath     Subjective:    Patient says she feels better and would like to go home. EXAM:   /75   Pulse 114   Temp 97.8 °F (36.6 °C) (Oral)   Resp 20   Ht 5' (1.524 m)   Wt 100 lb 8 oz (45.6 kg)   SpO2 97%   BMI 19.63 kg/m²  on 2L  Constitutional:  No acute distress   HEENT: no scleral icterus  Neck: No tracheal deviation present. Cardiovascular: Normal heart sounds. Pulmonary/Chest: No wheezes. No rhonchi. No rales. No decreased breath sounds. No accessory muscle usage or stridor. Clear to auscultation bilaterally. Abdominal: Soft. Nondistended  Musculoskeletal: No cyanosis. No clubbing. Skin: Skin is warm and dry.      Scheduled Meds:   diltiazem  30 mg Oral 4 times per day    guaiFENesin  600 mg Oral BID    ipratropium  0.5 mg Nebulization Q4H While awake    levalbuterol  1.25 mg Nebulization Q4H While awake    calcium carbonate  1,000 mg Oral Daily    pantoprazole  40 mg Oral QAM AC    fluticasone  2 spray Nasal Daily    furosemide  40 mg Oral Daily    gabapentin  300 mg Oral TID    linagliptin  5 mg Oral Daily    metFORMIN  1,000 mg Oral BID WC    potassium chloride  20 mEq Oral Daily    QUEtiapine  25 mg Oral Nightly    simvastatin  20 mg Oral Nightly    theophylline  400 mg Oral Daily    topiramate  50 mg Oral BID    venlafaxine  75 mg Oral Daily with breakfast    sodium chloride flush  10 mL Intravenous 2 times per day    predniSONE  40 mg Oral Daily    insulin lispro  0-6 Units Subcutaneous TID WC    insulin lispro  0-3 Units Subcutaneous Nightly    cefTRIAXone (ROCEPHIN) IV  1 g Intravenous Q24H    enoxaparin  30 mg Subcutaneous Daily    azithromycin  500 mg Intravenous Q24H     Continuous Infusions:   dextrose       PRN Meds:  guaiFENesin, sodium chloride flush, magnesium hydroxide, ondansetron, acetaminophen, glucose, dextrose, glucagon (rDNA), dextrose    Labs:  CBC:   Recent Labs     01/03/20  1354 01/04/20  0536 WBC 8.1 6.5   HGB 11.9* 11.7*   HCT 36.9 36.6   MCV 88.1 89.1    248     BMP:   Recent Labs     01/03/20  1349 01/04/20  0536    142   K 3.8 3.5    103   CO2 27 26   BUN 8 10   CREATININE 0.7 0.6       Cultures:  Respiratory culture 1/5/2020: No growth to date    Films:  No new    ASSESSMENT:  · Acute respiratory failure with hypoxemia  · COPD with acute exacerbation  · Possible left lingular pneumonia    PLAN:  · Pred taper  · Wean oxygen as able-patient would like portable oxygen concentrator at discharge  · Ceftriaxone and azithromycin day #4, could switch to Levaquin for 3 more days  · Inhaled bronchodilators  · DC is okay from my perspective

## 2020-01-07 ENCOUNTER — CARE COORDINATION (OUTPATIENT)
Dept: CASE MANAGEMENT | Age: 70
End: 2020-01-07

## 2020-01-07 LAB
BLOOD CULTURE, ROUTINE: NORMAL
CULTURE, BLOOD 2: NORMAL
CULTURE, RESPIRATORY: NORMAL
GRAM STAIN RESULT: NORMAL

## 2020-01-07 NOTE — CARE COORDINATION
Ana 45 Transitions Initial Follow Up Call    Call within 2 business days of discharge: Yes    Patient: Chase Bejarano Patient : 1950   MRN: [id number]  Reason for Admission: resp failure 2/2 COPD, PNA  Discharge Date: 20 RARS: Readmission Risk Score: 14      Last Discharge [facility]       Complaint Diagnosis Description Type Department Provider    1/3/20  COPD exacerbation [facility]) Admission (Discharged) [facility] PCU Alyssa Landis MD           Spoke with: Chase Bejarano (patient)    Facility: [facility]    Non-face-to-face services provided:  Scheduled appointment with PCP-as below  Reviewed and followed up on pending diagnostic tests and treatments-NA  Education of patient/family/caregiver/guardian to support self-management-s/s to monitor and report; energy conservation techniques  Assessment and support for treatment adherence and medication management-med review     Chase Bejarano populated to [facility] after discharge. Outreach to patient today. Reports she does not feel improved. Still having \"trouble breathing\". Non productive cough. No chills, fever. Oxygen at 2lpm continuous with SaO2 >90%. Reviewed new medications she is taking as prescribed. Using nebulizer up to 4x daily and neb works well. Reviewed energy conservation techniques, length of recovery for PNA. She was agreeable to schedule TCM visit as below. She will drive herself. Declines home care. Plans to resume her OP therapy when well enough. Canceled today's visit. Care transition following. After outreach to patient, reviewed chart again. Dr Adriana Roa suggested [address] for 3 days. Patient was prescribed Ceftin at discharge. Reviewed with Dr Nehemiah Solis who states discharged medications as prescribed. No changes at this time.        Care Transitions 24 Hour Call    Schedule Follow Up Appointment with PCP:  Completed  Do you have any ongoing symptoms?:  Yes  Patient-reported symptoms:  Fatigue, Cough, Other  Interventions for patient-reported symptoms:  Other (Comment: scheduled TCM visit in 2 days)  Do you have a copy of your discharge instructions?:  Yes  Do you have all of your prescriptions and are they filled?:  Yes  Have you been contacted by a [address]?:  No  Have you scheduled your follow up appointment?:  Yes  How are you going to get to your appointment?:  Car - drive self  Were you discharged with any Home Care or Post Acute Services:  No  Do you feel like you have everything you need to keep you well at home?:  Yes  Care Transitions Interventions     Other Therapy Services:  Declined      Other Services:  Declined                                  Follow Up  Future Appointments   Date Time Provider Estefani Ng   1/9/2020 10:00 AM Katelin Trejo PA-C [facility] None   1/14/2020 11:00 AM Lakesha Wasserman, PT [facility] EG PT [facility]   1/22/2020 11:00 AM Lakesha Wasserman, PT [facility] EG PT [facility]   2/18/2020 10:30 AM Katie Krishnamurthy MD [facility] None   3/11/2020 10:00 AM Abrahan Hanna MD AND NEURO [facility]   5/14/2020  1:00 PM Irlanda Powers MD WELL AND [facility]   5/28/2020  1:15 PM Prabha Fuchs MD [facility]       Leonel Duenas RN

## 2020-01-08 ENCOUNTER — APPOINTMENT (OUTPATIENT)
Dept: GENERAL RADIOLOGY | Age: 70
DRG: 190 | End: 2020-01-08
Payer: MEDICARE

## 2020-01-08 ENCOUNTER — HOSPITAL ENCOUNTER (INPATIENT)
Age: 70
LOS: 5 days | Discharge: HOME | DRG: 190 | End: 2020-01-13
Attending: EMERGENCY MEDICINE | Admitting: INTERNAL MEDICINE
Payer: MEDICARE

## 2020-01-08 ENCOUNTER — APPOINTMENT (OUTPATIENT)
Dept: CT IMAGING | Age: 70
DRG: 190 | End: 2020-01-08
Payer: MEDICARE

## 2020-01-08 PROBLEM — J44.1 COPD WITH ACUTE EXACERBATION (HCC): Status: ACTIVE | Noted: 2020-01-08

## 2020-01-08 LAB
A/G RATIO: 1.1 (ref 1.1–2.2)
ALBUMIN SERPL-MCNC: 4.1 G/DL (ref 3.4–5)
ALP BLD-CCNC: 74 U/L (ref 40–129)
ALT SERPL-CCNC: 14 U/L (ref 10–40)
ANION GAP SERPL CALCULATED.3IONS-SCNC: 10 MMOL/L (ref 3–16)
AST SERPL-CCNC: 14 U/L (ref 15–37)
BASE EXCESS VENOUS: 9.7 MMOL/L (ref -3–3)
BASOPHILS ABSOLUTE: 0 K/UL (ref 0–0.2)
BASOPHILS RELATIVE PERCENT: 0 %
BILIRUB SERPL-MCNC: <0.2 MG/DL (ref 0–1)
BUN BLDV-MCNC: 33 MG/DL (ref 7–20)
CALCIUM SERPL-MCNC: 9.9 MG/DL (ref 8.3–10.6)
CARBOXYHEMOGLOBIN: 1.5 % (ref 0–1.5)
CHLORIDE BLD-SCNC: 93 MMOL/L (ref 99–110)
CO2: 35 MMOL/L (ref 21–32)
CREAT SERPL-MCNC: 0.9 MG/DL (ref 0.6–1.2)
EKG ATRIAL RATE: 114 BPM
EKG DIAGNOSIS: NORMAL
EKG P AXIS: 84 DEGREES
EKG P-R INTERVAL: 178 MS
EKG Q-T INTERVAL: 326 MS
EKG QRS DURATION: 90 MS
EKG QTC CALCULATION (BAZETT): 449 MS
EKG R AXIS: 83 DEGREES
EKG T AXIS: 21 DEGREES
EKG VENTRICULAR RATE: 114 BPM
EOSINOPHILS ABSOLUTE: 0 K/UL (ref 0–0.6)
EOSINOPHILS RELATIVE PERCENT: 0 %
GFR AFRICAN AMERICAN: >60
GFR NON-AFRICAN AMERICAN: >60
GLOBULIN: 3.9 G/DL
GLUCOSE BLD-MCNC: 132 MG/DL (ref 70–99)
GLUCOSE BLD-MCNC: 157 MG/DL (ref 70–99)
HCO3 VENOUS: 39.4 MMOL/L (ref 23–29)
HCT VFR BLD CALC: 40.4 % (ref 36–48)
HEMOGLOBIN: 12.8 G/DL (ref 12–16)
LACTIC ACID, SEPSIS: 1.1 MMOL/L (ref 0.4–1.9)
LYMPHOCYTES ABSOLUTE: 1.8 K/UL (ref 1–5.1)
LYMPHOCYTES RELATIVE PERCENT: 14 %
MCH RBC QN AUTO: 28.3 PG (ref 26–34)
MCHC RBC AUTO-ENTMCNC: 31.8 G/DL (ref 31–36)
MCV RBC AUTO: 89 FL (ref 80–100)
METAMYELOCYTES RELATIVE PERCENT: 2 %
METHEMOGLOBIN VENOUS: 0.3 %
MONOCYTES ABSOLUTE: 0.4 K/UL (ref 0–1.3)
MONOCYTES RELATIVE PERCENT: 3 %
MYELOCYTE PERCENT: 2 %
NEUTROPHILS ABSOLUTE: 10.6 K/UL (ref 1.7–7.7)
NEUTROPHILS RELATIVE PERCENT: 79 %
O2 CONTENT, VEN: 17 VOL %
O2 SAT, VEN: 80 %
O2 THERAPY: ABNORMAL
PCO2, VEN: 80.3 MMHG (ref 40–50)
PDW BLD-RTO: 14.7 % (ref 12.4–15.4)
PERFORMED ON: ABNORMAL
PH VENOUS: 7.31 (ref 7.35–7.45)
PLATELET # BLD: 377 K/UL (ref 135–450)
PLATELET SLIDE REVIEW: ADEQUATE
PMV BLD AUTO: 8 FL (ref 5–10.5)
PO2, VEN: 50.4 MMHG (ref 25–40)
POTASSIUM REFLEX MAGNESIUM: 4.1 MMOL/L (ref 3.5–5.1)
PRO-BNP: 181 PG/ML (ref 0–124)
RAPID INFLUENZA  B AGN: NEGATIVE
RAPID INFLUENZA A AGN: NEGATIVE
RBC # BLD: 4.54 M/UL (ref 4–5.2)
SLIDE REVIEW: ABNORMAL
SODIUM BLD-SCNC: 138 MMOL/L (ref 136–145)
TCO2 CALC VENOUS: 42 MMOL/L
TOTAL PROTEIN: 8 G/DL (ref 6.4–8.2)
TROPONIN: <0.01 NG/ML
WBC # BLD: 12.8 K/UL (ref 4–11)

## 2020-01-08 PROCEDURE — 94640 AIRWAY INHALATION TREATMENT: CPT

## 2020-01-08 PROCEDURE — 2580000003 HC RX 258: Performed by: EMERGENCY MEDICINE

## 2020-01-08 PROCEDURE — 87040 BLOOD CULTURE FOR BACTERIA: CPT

## 2020-01-08 PROCEDURE — 80053 COMPREHEN METABOLIC PANEL: CPT

## 2020-01-08 PROCEDURE — 2060000000 HC ICU INTERMEDIATE R&B

## 2020-01-08 PROCEDURE — 6360000002 HC RX W HCPCS: Performed by: EMERGENCY MEDICINE

## 2020-01-08 PROCEDURE — 96374 THER/PROPH/DIAG INJ IV PUSH: CPT

## 2020-01-08 PROCEDURE — 83605 ASSAY OF LACTIC ACID: CPT

## 2020-01-08 PROCEDURE — 71260 CT THORAX DX C+: CPT

## 2020-01-08 PROCEDURE — 94761 N-INVAS EAR/PLS OXIMETRY MLT: CPT

## 2020-01-08 PROCEDURE — 6370000000 HC RX 637 (ALT 250 FOR IP): Performed by: INTERNAL MEDICINE

## 2020-01-08 PROCEDURE — 85025 COMPLETE CBC W/AUTO DIFF WBC: CPT

## 2020-01-08 PROCEDURE — 2700000000 HC OXYGEN THERAPY PER DAY

## 2020-01-08 PROCEDURE — 93005 ELECTROCARDIOGRAM TRACING: CPT | Performed by: EMERGENCY MEDICINE

## 2020-01-08 PROCEDURE — 6370000000 HC RX 637 (ALT 250 FOR IP): Performed by: EMERGENCY MEDICINE

## 2020-01-08 PROCEDURE — 84484 ASSAY OF TROPONIN QUANT: CPT

## 2020-01-08 PROCEDURE — 87804 INFLUENZA ASSAY W/OPTIC: CPT

## 2020-01-08 PROCEDURE — 82803 BLOOD GASES ANY COMBINATION: CPT

## 2020-01-08 PROCEDURE — 99285 EMERGENCY DEPT VISIT HI MDM: CPT

## 2020-01-08 PROCEDURE — 6360000002 HC RX W HCPCS: Performed by: INTERNAL MEDICINE

## 2020-01-08 PROCEDURE — 2580000003 HC RX 258: Performed by: INTERNAL MEDICINE

## 2020-01-08 PROCEDURE — 87150 DNA/RNA AMPLIFIED PROBE: CPT

## 2020-01-08 PROCEDURE — 83880 ASSAY OF NATRIURETIC PEPTIDE: CPT

## 2020-01-08 PROCEDURE — 36415 COLL VENOUS BLD VENIPUNCTURE: CPT

## 2020-01-08 PROCEDURE — 93010 ELECTROCARDIOGRAM REPORT: CPT | Performed by: INTERNAL MEDICINE

## 2020-01-08 PROCEDURE — 6360000004 HC RX CONTRAST MEDICATION: Performed by: EMERGENCY MEDICINE

## 2020-01-08 RX ORDER — SODIUM CHLORIDE 0.9 % (FLUSH) 0.9 %
10 SYRINGE (ML) INJECTION EVERY 12 HOURS SCHEDULED
Status: DISCONTINUED | OUTPATIENT
Start: 2020-01-08 | End: 2020-01-08

## 2020-01-08 RX ORDER — VENLAFAXINE HYDROCHLORIDE 75 MG/1
75 CAPSULE, EXTENDED RELEASE ORAL
Status: DISCONTINUED | OUTPATIENT
Start: 2020-01-09 | End: 2020-01-13 | Stop reason: HOSPADM

## 2020-01-08 RX ORDER — 0.9 % SODIUM CHLORIDE 0.9 %
500 INTRAVENOUS SOLUTION INTRAVENOUS ONCE
Status: COMPLETED | OUTPATIENT
Start: 2020-01-08 | End: 2020-01-08

## 2020-01-08 RX ORDER — METFORMIN HYDROCHLORIDE 500 MG/1
500 TABLET, EXTENDED RELEASE ORAL 2 TIMES DAILY WITH MEALS
Status: DISCONTINUED | OUTPATIENT
Start: 2020-01-10 | End: 2020-01-09

## 2020-01-08 RX ORDER — SODIUM CHLORIDE 0.9 % (FLUSH) 0.9 %
10 SYRINGE (ML) INJECTION PRN
Status: DISCONTINUED | OUTPATIENT
Start: 2020-01-08 | End: 2020-01-13 | Stop reason: HOSPADM

## 2020-01-08 RX ORDER — ONDANSETRON 2 MG/ML
4 INJECTION INTRAMUSCULAR; INTRAVENOUS EVERY 6 HOURS PRN
Status: DISCONTINUED | OUTPATIENT
Start: 2020-01-08 | End: 2020-01-13 | Stop reason: HOSPADM

## 2020-01-08 RX ORDER — SIMVASTATIN 10 MG
20 TABLET ORAL NIGHTLY
Status: DISCONTINUED | OUTPATIENT
Start: 2020-01-08 | End: 2020-01-13 | Stop reason: HOSPADM

## 2020-01-08 RX ORDER — TOPIRAMATE 25 MG/1
50 TABLET ORAL 2 TIMES DAILY
Status: DISCONTINUED | OUTPATIENT
Start: 2020-01-08 | End: 2020-01-13 | Stop reason: HOSPADM

## 2020-01-08 RX ORDER — PREDNISONE 20 MG/1
40 TABLET ORAL DAILY
Status: DISCONTINUED | OUTPATIENT
Start: 2020-01-11 | End: 2020-01-13

## 2020-01-08 RX ORDER — METHYLPREDNISOLONE SODIUM SUCCINATE 125 MG/2ML
125 INJECTION, POWDER, LYOPHILIZED, FOR SOLUTION INTRAMUSCULAR; INTRAVENOUS ONCE
Status: COMPLETED | OUTPATIENT
Start: 2020-01-08 | End: 2020-01-08

## 2020-01-08 RX ORDER — ACETAMINOPHEN 325 MG/1
650 TABLET ORAL EVERY 4 HOURS PRN
Status: DISCONTINUED | OUTPATIENT
Start: 2020-01-08 | End: 2020-01-13 | Stop reason: HOSPADM

## 2020-01-08 RX ORDER — GABAPENTIN 300 MG/1
300 CAPSULE ORAL 3 TIMES DAILY
Status: DISCONTINUED | OUTPATIENT
Start: 2020-01-08 | End: 2020-01-13 | Stop reason: HOSPADM

## 2020-01-08 RX ORDER — DILTIAZEM HYDROCHLORIDE 120 MG/1
120 CAPSULE, COATED, EXTENDED RELEASE ORAL DAILY
Status: DISCONTINUED | OUTPATIENT
Start: 2020-01-08 | End: 2020-01-13 | Stop reason: HOSPADM

## 2020-01-08 RX ORDER — METHYLPREDNISOLONE SODIUM SUCCINATE 40 MG/ML
40 INJECTION, POWDER, LYOPHILIZED, FOR SOLUTION INTRAMUSCULAR; INTRAVENOUS EVERY 6 HOURS
Status: COMPLETED | OUTPATIENT
Start: 2020-01-08 | End: 2020-01-10

## 2020-01-08 RX ORDER — IPRATROPIUM BROMIDE AND ALBUTEROL SULFATE 2.5; .5 MG/3ML; MG/3ML
1 SOLUTION RESPIRATORY (INHALATION) ONCE
Status: COMPLETED | OUTPATIENT
Start: 2020-01-08 | End: 2020-01-08

## 2020-01-08 RX ORDER — SODIUM CHLORIDE 0.9 % (FLUSH) 0.9 %
10 SYRINGE (ML) INJECTION PRN
Status: DISCONTINUED | OUTPATIENT
Start: 2020-01-08 | End: 2020-01-08

## 2020-01-08 RX ORDER — LEVALBUTEROL 1.25 MG/.5ML
1.26 SOLUTION, CONCENTRATE RESPIRATORY (INHALATION) ONCE
Status: COMPLETED | OUTPATIENT
Start: 2020-01-08 | End: 2020-01-08

## 2020-01-08 RX ORDER — PANTOPRAZOLE SODIUM 40 MG/1
40 TABLET, DELAYED RELEASE ORAL
Status: DISCONTINUED | OUTPATIENT
Start: 2020-01-09 | End: 2020-01-13 | Stop reason: HOSPADM

## 2020-01-08 RX ORDER — FUROSEMIDE 40 MG/1
40 TABLET ORAL DAILY
Status: DISCONTINUED | OUTPATIENT
Start: 2020-01-08 | End: 2020-01-13 | Stop reason: HOSPADM

## 2020-01-08 RX ORDER — CEFUROXIME AXETIL 250 MG/1
500 TABLET ORAL 2 TIMES DAILY
Status: DISCONTINUED | OUTPATIENT
Start: 2020-01-08 | End: 2020-01-09

## 2020-01-08 RX ORDER — SODIUM CHLORIDE FOR INHALATION 0.9 %
VIAL, NEBULIZER (ML) INHALATION
Status: DISCONTINUED
Start: 2020-01-08 | End: 2020-01-08

## 2020-01-08 RX ORDER — QUETIAPINE FUMARATE 25 MG/1
25 TABLET, FILM COATED ORAL NIGHTLY
Status: DISCONTINUED | OUTPATIENT
Start: 2020-01-08 | End: 2020-01-13 | Stop reason: HOSPADM

## 2020-01-08 RX ORDER — IPRATROPIUM BROMIDE AND ALBUTEROL SULFATE 2.5; .5 MG/3ML; MG/3ML
1 SOLUTION RESPIRATORY (INHALATION)
Status: DISCONTINUED | OUTPATIENT
Start: 2020-01-09 | End: 2020-01-09

## 2020-01-08 RX ORDER — SODIUM CHLORIDE 0.9 % (FLUSH) 0.9 %
10 SYRINGE (ML) INJECTION EVERY 12 HOURS SCHEDULED
Status: DISCONTINUED | OUTPATIENT
Start: 2020-01-08 | End: 2020-01-13 | Stop reason: HOSPADM

## 2020-01-08 RX ORDER — METFORMIN HYDROCHLORIDE 500 MG/1
500 TABLET, EXTENDED RELEASE ORAL 2 TIMES DAILY WITH MEALS
Status: DISCONTINUED | OUTPATIENT
Start: 2020-01-08 | End: 2020-01-08

## 2020-01-08 RX ADMIN — DILTIAZEM HYDROCHLORIDE 120 MG: 120 CAPSULE, COATED, EXTENDED RELEASE ORAL at 21:20

## 2020-01-08 RX ADMIN — ENOXAPARIN SODIUM 40 MG: 40 INJECTION SUBCUTANEOUS at 21:20

## 2020-01-08 RX ADMIN — TOPIRAMATE 50 MG: 25 TABLET, FILM COATED ORAL at 21:20

## 2020-01-08 RX ADMIN — LINAGLIPTIN 5 MG: 5 TABLET, FILM COATED ORAL at 21:20

## 2020-01-08 RX ADMIN — SODIUM CHLORIDE 500 ML: 9 INJECTION, SOLUTION INTRAVENOUS at 15:26

## 2020-01-08 RX ADMIN — QUETIAPINE FUMARATE 25 MG: 25 TABLET ORAL at 21:20

## 2020-01-08 RX ADMIN — IPRATROPIUM BROMIDE AND ALBUTEROL SULFATE 1 AMPULE: .5; 3 SOLUTION RESPIRATORY (INHALATION) at 23:14

## 2020-01-08 RX ADMIN — CEFUROXIME AXETIL 500 MG: 250 TABLET ORAL at 21:21

## 2020-01-08 RX ADMIN — IPRATROPIUM BROMIDE AND ALBUTEROL SULFATE 1 AMPULE: .5; 3 SOLUTION RESPIRATORY (INHALATION) at 18:14

## 2020-01-08 RX ADMIN — METHYLPREDNISOLONE SODIUM SUCCINATE 125 MG: 125 INJECTION, POWDER, FOR SOLUTION INTRAMUSCULAR; INTRAVENOUS at 15:26

## 2020-01-08 RX ADMIN — LEVALBUTEROL 1.26 MG: 1.25 SOLUTION, CONCENTRATE RESPIRATORY (INHALATION) at 15:33

## 2020-01-08 RX ADMIN — IOPAMIDOL 85 ML: 755 INJECTION, SOLUTION INTRAVENOUS at 16:52

## 2020-01-08 RX ADMIN — FUROSEMIDE 40 MG: 40 TABLET ORAL at 21:27

## 2020-01-08 RX ADMIN — CEFEPIME 2 G: 2 INJECTION, POWDER, FOR SOLUTION INTRAVENOUS at 18:13

## 2020-01-08 RX ADMIN — VANCOMYCIN HYDROCHLORIDE 1000 MG: 1 INJECTION, POWDER, LYOPHILIZED, FOR SOLUTION INTRAVENOUS at 21:20

## 2020-01-08 RX ADMIN — SIMVASTATIN 20 MG: 10 TABLET, FILM COATED ORAL at 21:20

## 2020-01-08 RX ADMIN — GABAPENTIN 300 MG: 300 CAPSULE ORAL at 21:20

## 2020-01-08 RX ADMIN — METHYLPREDNISOLONE SODIUM SUCCINATE 40 MG: 40 INJECTION, POWDER, FOR SOLUTION INTRAMUSCULAR; INTRAVENOUS at 21:27

## 2020-01-08 RX ADMIN — Medication 10 ML: at 21:20

## 2020-01-08 ASSESSMENT — PAIN SCALES - GENERAL
PAINLEVEL_OUTOF10: 0
PAINLEVEL_OUTOF10: 4

## 2020-01-08 ASSESSMENT — PAIN DESCRIPTION - LOCATION: LOCATION: BACK

## 2020-01-08 ASSESSMENT — PAIN DESCRIPTION - PAIN TYPE: TYPE: CHRONIC PAIN

## 2020-01-08 NOTE — ED PROVIDER NOTES
[facility]  [address]      Pt Name: Kanika Watson  MRN: [id number]  [place] 1950  Date of evaluation: 1/8/2020  Provider: Harris Owen DO    CHIEF COMPLAINT       Chief Complaint   Patient presents with    Shortness of Breath     EMS states pt c/o sob 89% initially, diagnosed with pneumonia, released yest and worsened through the night, 2 L home oxygen, given 1 duoneb enroute         HISTORY OF PRESENT ILLNESS   (Location/Symptom, Timing/Onset, Context/Setting, Quality, Duration, Modifying Factors, Severity)  Note limiting factors. Kanika Watson is a 78 y.o. female who presents to the emergency department with complaint of persistent shortness of breath and dry cough. Patient was recently discharged from hospital for chronic respiratory failure and pneumonia as well as COPD exacerbation. She was treated with IV antibiotics nebulizers and steroids. She was discharged 2 days ago on steroid taper and Ceftin. She reports continuing to feel short of breath and panting since she is been home. Reports he is using her inhalers and taking her medications as prescribed. She discussed with her pulmonologist office yesterday who had no further recommendations and recommended continuing her therapy. She denies smoking cigarettes. Denies history of intubations for COPD. Patient's last echocardiogram appears to be in December 30, 2016 showing an ejection fraction of 55% with normal diastolic filling pressure. Nursing Notes were reviewed.       PAST MEDICAL HISTORY     Past Medical History:   Diagnosis Date    Chronic airway obstruction, not elsewhere classified 3/4/08    oxygen 2L/min at HS and PRN through day    Chronic kidney disease     incontinent    COPD (chronic obstructive pulmonary disease) ([address]     Depression     Displacement of lumbar intervertebral disc without myelopathy 8/13/07    Edema 9/13/07    Emphysema of lung ([address]     Enthesopathy of hip region    Avalos Rule activity: None   Other Topics Concern    None   Social History Narrative    None       SCREENINGS    [place] Coma Scale  Eye Opening: Spontaneous  Best Verbal Response: Oriented  Best Motor Response: Obeys commands  [place] Coma Scale Score: 15          Review of Systems  Constitutional:  Denies fever, chills  Eyes: denies eye problems  HEENT: denies sore throat or ear pain  Respiratory: reports shortness of breath and cough  Cardiovascular: denies chest pain, palpitations  GI: denies abdominal pain, nausea, vomiting, or diarrhea  Musculoskeletal: denies joint pain  Skin: denies rash  Neurologic: denies focal weakness or sensory changes    Except as noted above the remainder of the review of systems was reviewed and negative. PHYSICAL EXAM    (up to 7 for level 4, 8 or more for level 5)     ED Triage Vitals   BP Temp Temp src Pulse Resp SpO2 Height Weight   -- -- -- -- -- -- -- --       General appearance: well-developed, well-nourished, no acute distress, nontoxic appearance  HENT: normocephalic, atraumatic, oropharynx moist, nares patent  Eyes: PERRLA, EOMI, conjunctiva normal  Neck: normal range of motion, no tenderness, trachea midline, no stridor  Respiratory: Mildly diminished breath sounds bilaterally, no accessory muscle use, speaking in clear sentences, no distress  Cardiovascular: normal heart rate, normal rhythm  GI: nontender, bowel sounds normal, soft, nondistended, no pulsatile masses  Musculoskeletal: intact distal pulses, no clubbing, cyanosis, or edema.   Good range of motion  Integument: warm, dry, no erythema, no rash, < 2 second cap refill  Neurologic: alert and oriented ×3, no focal deficits appreciated    DIAGNOSTIC RESULTS     EKG: Sinus tachycardia, rate 114, normal QRS, normal QT, no ST depression or elevation, normal T wave, unchanged when compared to EKG from January 4, 2020    All EKG's are interpreted by the Emergency Department Physician who either signs or Co-signs this chart in the absence of a cardiologist.      RADIOLOGY:   Interpretation per the Radiologist below, if available at the time of this note:    CT Chest Pulmonary Embolism W Contrast   Final Result   1. No evidence for acute pulmonary embolism. 2. New tree-in-bud group of micro nodules in the medial segment middle lobe   upper portion suggestive of bronchiolitis. Consider three-month follow-up. 3. Emphysema unchanged as are a few other subcentimeter nodules as discussed   in detail above.                LABS:  Labs Reviewed   COMPREHENSIVE METABOLIC PANEL W/ REFLEX TO MG FOR LOW K - Abnormal; Notable for the following components:       Result Value    Chloride 93 (*)     CO2 35 (*)     Glucose 132 (*)     BUN 33 (*)     AST 14 (*)     All other components within normal limits    Narrative:     Performed at:  [facility] [employer],  [employer], [facility]   Phone [phone]   BRAIN NATRIURETIC PEPTIDE - Abnormal; Notable for the following components:    Pro- (*)     All other components within normal limits    Narrative:     Performed at:  [facility],  [address], [facility]   Phone [phone]   BLOOD GAS, VENOUS - Abnormal; Notable for the following components:    pH, Jordi 7.309 (*)     pCO2, Jordi 80.3 (*)     pO2, Jordi 50.4 (*)     HCO3, Venous 39.4 (*)     Base Excess, Jordi 9.7 (*)     All other components within normal limits    Narrative:     Performed at:  [facility],  [employer], [facility]   Phone [phone]   CBC WITH AUTO DIFFERENTIAL - Abnormal; Notable for the following components:    WBC 12.8 (*)     Neutrophils Absolute 10.6 (*)     Metamyelocytes Relative 2 (*)     Myelocyte Percent 2 (*)     All other components within normal limits    Narrative:     Performed at:  [facility],  [employer] [facility] [employer]   Phone Troponin negative. VBG shows slight respiratory acidosis. Patient does appear tachypneic but not in overt distress requiring BiPAP at this time. Will give more nebulizer treatments. Lactic acid normal.  Flu negative. CT chest shows a new tree-in-bud group of micronodules with concerns for bronchiolitis. Would back to discussed with patient. Her and her family do not feel safe going home as she is now so weak and short of breath that she is unable to get up and walk around. At this time with her work of breathing, CT chest findings will give antibiotic therapy and admit for further DuoNeb and pulmonary evaluation. CONSULTS:  IP CONSULT TO HOSPITALIST  IP CONSULT TO PULMONOLOGY      FINAL IMPRESSION      1. Acute exacerbation of COPD with asthma ([address]    2.  Pneumonia due to organism          DISPOSITION/PLAN   DISPOSITION        PATIENT REFERRED TO:  Aly Sanchez MD  [facility] [address], [address] [facility]  [phone]            DISCHARGE MEDICATIONS:  Current Discharge Medication List             (Please note that portions of this note were completed with a voice recognition program.  Efforts were made to edit the dictations but occasionally words are mis-transcribed.)    Fani Serna DO (electronically signed)  Attending Emergency Physician      Fani Serna DO  01/09/20 0032

## 2020-01-08 NOTE — ED TRIAGE NOTES
Chief Complaint   Patient presents with    Shortness of Breath     EMS states pt c/o sob 89% initially, diagnosed with pneumonia, released yest and worsened through the night, 2 L home oxygen, given 1 duoneb enroute

## 2020-01-08 NOTE — ED NOTES
Bed: 15  Expected date:   Expected time:   Means of arrival:   Comments:  [facility] [address]  01/08/20 1728

## 2020-01-08 NOTE — ED NOTES
Beatrizve sent to Dr. Cathryn Forbes at [address] [place] [address]  01/08/20 1800    Dana completed with call back from Dr. Cathryn Forbes at [address] [place] [address]  01/08/20 1817

## 2020-01-09 PROBLEM — J18.0 BRONCHOPNEUMONIA: Status: ACTIVE | Noted: 2020-01-09

## 2020-01-09 PROBLEM — J45.901 ACUTE EXACERBATION OF COPD WITH ASTHMA (HCC): Status: ACTIVE | Noted: 2020-01-08

## 2020-01-09 LAB
ANION GAP SERPL CALCULATED.3IONS-SCNC: 10 MMOL/L (ref 3–16)
BASOPHILS ABSOLUTE: 0 K/UL (ref 0–0.2)
BASOPHILS RELATIVE PERCENT: 0 %
BUN BLDV-MCNC: 31 MG/DL (ref 7–20)
CALCIUM SERPL-MCNC: 9.6 MG/DL (ref 8.3–10.6)
CHLORIDE BLD-SCNC: 97 MMOL/L (ref 99–110)
CO2: 29 MMOL/L (ref 21–32)
CREAT SERPL-MCNC: 0.7 MG/DL (ref 0.6–1.2)
EOSINOPHILS ABSOLUTE: 0 K/UL (ref 0–0.6)
EOSINOPHILS RELATIVE PERCENT: 0 %
GFR AFRICAN AMERICAN: >60
GFR NON-AFRICAN AMERICAN: >60
GLUCOSE BLD-MCNC: 131 MG/DL (ref 70–99)
GLUCOSE BLD-MCNC: 149 MG/DL (ref 70–99)
GLUCOSE BLD-MCNC: 159 MG/DL (ref 70–99)
GLUCOSE BLD-MCNC: 217 MG/DL (ref 70–99)
GLUCOSE BLD-MCNC: 464 MG/DL (ref 70–99)
HCT VFR BLD CALC: 39.5 % (ref 36–48)
HEMOGLOBIN: 12.7 G/DL (ref 12–16)
LYMPHOCYTES ABSOLUTE: 1.1 K/UL (ref 1–5.1)
LYMPHOCYTES RELATIVE PERCENT: 11 %
MCH RBC QN AUTO: 28.6 PG (ref 26–34)
MCHC RBC AUTO-ENTMCNC: 32.2 G/DL (ref 31–36)
MCV RBC AUTO: 88.9 FL (ref 80–100)
METAMYELOCYTES RELATIVE PERCENT: 1 %
MONOCYTES ABSOLUTE: 0.3 K/UL (ref 0–1.3)
MONOCYTES RELATIVE PERCENT: 3 %
MYELOCYTE PERCENT: 1 %
NEUTROPHILS ABSOLUTE: 8.9 K/UL (ref 1.7–7.7)
NEUTROPHILS RELATIVE PERCENT: 84 %
PDW BLD-RTO: 14.6 % (ref 12.4–15.4)
PERFORMED ON: ABNORMAL
PLATELET # BLD: 355 K/UL (ref 135–450)
PLATELET SLIDE REVIEW: ADEQUATE
PMV BLD AUTO: 8.1 FL (ref 5–10.5)
POTASSIUM REFLEX MAGNESIUM: 4.1 MMOL/L (ref 3.5–5.1)
RBC # BLD: 4.44 M/UL (ref 4–5.2)
RBC # BLD: NORMAL 10*6/UL
REPORT: NORMAL
SLIDE REVIEW: ABNORMAL
SODIUM BLD-SCNC: 136 MMOL/L (ref 136–145)
WBC # BLD: 10.3 K/UL (ref 4–11)

## 2020-01-09 PROCEDURE — 94640 AIRWAY INHALATION TREATMENT: CPT

## 2020-01-09 PROCEDURE — 6370000000 HC RX 637 (ALT 250 FOR IP): Performed by: INTERNAL MEDICINE

## 2020-01-09 PROCEDURE — 6370000000 HC RX 637 (ALT 250 FOR IP): Performed by: PHYSICIAN ASSISTANT

## 2020-01-09 PROCEDURE — 2580000003 HC RX 258: Performed by: PHYSICIAN ASSISTANT

## 2020-01-09 PROCEDURE — 2580000003 HC RX 258: Performed by: INTERNAL MEDICINE

## 2020-01-09 PROCEDURE — 6360000002 HC RX W HCPCS: Performed by: INTERNAL MEDICINE

## 2020-01-09 PROCEDURE — 94761 N-INVAS EAR/PLS OXIMETRY MLT: CPT

## 2020-01-09 PROCEDURE — 2700000000 HC OXYGEN THERAPY PER DAY

## 2020-01-09 PROCEDURE — 99223 1ST HOSP IP/OBS HIGH 75: CPT | Performed by: INTERNAL MEDICINE

## 2020-01-09 PROCEDURE — 2060000000 HC ICU INTERMEDIATE R&B

## 2020-01-09 PROCEDURE — 94669 MECHANICAL CHEST WALL OSCILL: CPT

## 2020-01-09 PROCEDURE — 36415 COLL VENOUS BLD VENIPUNCTURE: CPT

## 2020-01-09 PROCEDURE — 80048 BASIC METABOLIC PNL TOTAL CA: CPT

## 2020-01-09 PROCEDURE — 85025 COMPLETE CBC W/AUTO DIFF WBC: CPT

## 2020-01-09 RX ORDER — IPRATROPIUM BROMIDE AND ALBUTEROL SULFATE 2.5; .5 MG/3ML; MG/3ML
1 SOLUTION RESPIRATORY (INHALATION) EVERY 4 HOURS PRN
Status: DISCONTINUED | OUTPATIENT
Start: 2020-01-09 | End: 2020-01-10

## 2020-01-09 RX ORDER — DEXTROSE MONOHYDRATE 50 MG/ML
100 INJECTION, SOLUTION INTRAVENOUS PRN
Status: DISCONTINUED | OUTPATIENT
Start: 2020-01-09 | End: 2020-01-10 | Stop reason: SDUPTHER

## 2020-01-09 RX ORDER — DOXYCYCLINE HYCLATE 100 MG
100 TABLET ORAL EVERY 12 HOURS SCHEDULED
Status: DISCONTINUED | OUTPATIENT
Start: 2020-01-09 | End: 2020-01-13 | Stop reason: HOSPADM

## 2020-01-09 RX ORDER — DEXTROSE MONOHYDRATE 25 G/50ML
12.5 INJECTION, SOLUTION INTRAVENOUS PRN
Status: DISCONTINUED | OUTPATIENT
Start: 2020-01-09 | End: 2020-01-10 | Stop reason: SDUPTHER

## 2020-01-09 RX ORDER — SODIUM CHLORIDE 9 MG/ML
INJECTION, SOLUTION INTRAVENOUS CONTINUOUS
Status: DISCONTINUED | OUTPATIENT
Start: 2020-01-09 | End: 2020-01-09

## 2020-01-09 RX ORDER — IPRATROPIUM BROMIDE AND ALBUTEROL SULFATE 2.5; .5 MG/3ML; MG/3ML
1 SOLUTION RESPIRATORY (INHALATION) EVERY 4 HOURS
Status: DISCONTINUED | OUTPATIENT
Start: 2020-01-09 | End: 2020-01-10

## 2020-01-09 RX ORDER — GUAIFENESIN 600 MG/1
600 TABLET, EXTENDED RELEASE ORAL 2 TIMES DAILY
Status: DISCONTINUED | OUTPATIENT
Start: 2020-01-09 | End: 2020-01-13 | Stop reason: HOSPADM

## 2020-01-09 RX ORDER — SODIUM CHLORIDE 9 MG/ML
INJECTION, SOLUTION INTRAVENOUS CONTINUOUS
Status: DISPENSED | OUTPATIENT
Start: 2020-01-09 | End: 2020-01-09

## 2020-01-09 RX ORDER — NICOTINE POLACRILEX 4 MG
15 LOZENGE BUCCAL PRN
Status: DISCONTINUED | OUTPATIENT
Start: 2020-01-09 | End: 2020-01-10 | Stop reason: SDUPTHER

## 2020-01-09 RX ADMIN — INSULIN LISPRO 6 UNITS: 100 INJECTION, SOLUTION INTRAVENOUS; SUBCUTANEOUS at 11:46

## 2020-01-09 RX ADMIN — DOXYCYCLINE HYCLATE 100 MG: 100 TABLET ORAL at 22:38

## 2020-01-09 RX ADMIN — GUAIFENESIN 600 MG: 600 TABLET, EXTENDED RELEASE ORAL at 10:07

## 2020-01-09 RX ADMIN — METHYLPREDNISOLONE SODIUM SUCCINATE 40 MG: 40 INJECTION, POWDER, FOR SOLUTION INTRAMUSCULAR; INTRAVENOUS at 22:38

## 2020-01-09 RX ADMIN — QUETIAPINE FUMARATE 25 MG: 25 TABLET ORAL at 22:38

## 2020-01-09 RX ADMIN — TOPIRAMATE 50 MG: 25 TABLET, FILM COATED ORAL at 22:38

## 2020-01-09 RX ADMIN — GABAPENTIN 300 MG: 300 CAPSULE ORAL at 22:37

## 2020-01-09 RX ADMIN — IPRATROPIUM BROMIDE AND ALBUTEROL SULFATE 1 AMPULE: .5; 3 SOLUTION RESPIRATORY (INHALATION) at 03:28

## 2020-01-09 RX ADMIN — METHYLPREDNISOLONE SODIUM SUCCINATE 40 MG: 40 INJECTION, POWDER, FOR SOLUTION INTRAMUSCULAR; INTRAVENOUS at 15:22

## 2020-01-09 RX ADMIN — GABAPENTIN 300 MG: 300 CAPSULE ORAL at 15:22

## 2020-01-09 RX ADMIN — TOPIRAMATE 50 MG: 25 TABLET, FILM COATED ORAL at 09:03

## 2020-01-09 RX ADMIN — ENOXAPARIN SODIUM 40 MG: 40 INJECTION SUBCUTANEOUS at 09:03

## 2020-01-09 RX ADMIN — SODIUM CHLORIDE: 9 INJECTION, SOLUTION INTRAVENOUS at 09:00

## 2020-01-09 RX ADMIN — FUROSEMIDE 40 MG: 40 TABLET ORAL at 09:03

## 2020-01-09 RX ADMIN — SODIUM CHLORIDE: 900 INJECTION, SOLUTION INTRAVENOUS at 09:12

## 2020-01-09 RX ADMIN — GABAPENTIN 300 MG: 300 CAPSULE ORAL at 09:03

## 2020-01-09 RX ADMIN — INSULIN LISPRO 2 UNITS: 100 INJECTION, SOLUTION INTRAVENOUS; SUBCUTANEOUS at 17:25

## 2020-01-09 RX ADMIN — SIMVASTATIN 20 MG: 10 TABLET, FILM COATED ORAL at 22:38

## 2020-01-09 RX ADMIN — IPRATROPIUM BROMIDE AND ALBUTEROL SULFATE 1 AMPULE: .5; 3 SOLUTION RESPIRATORY (INHALATION) at 10:59

## 2020-01-09 RX ADMIN — DILTIAZEM HYDROCHLORIDE 120 MG: 120 CAPSULE, COATED, EXTENDED RELEASE ORAL at 09:03

## 2020-01-09 RX ADMIN — METHYLPREDNISOLONE SODIUM SUCCINATE 40 MG: 40 INJECTION, POWDER, FOR SOLUTION INTRAMUSCULAR; INTRAVENOUS at 09:03

## 2020-01-09 RX ADMIN — SODIUM CHLORIDE: 900 INJECTION, SOLUTION INTRAVENOUS at 20:00

## 2020-01-09 RX ADMIN — METHYLPREDNISOLONE SODIUM SUCCINATE 40 MG: 40 INJECTION, POWDER, FOR SOLUTION INTRAMUSCULAR; INTRAVENOUS at 04:38

## 2020-01-09 RX ADMIN — Medication 10 ML: at 22:38

## 2020-01-09 RX ADMIN — IPRATROPIUM BROMIDE AND ALBUTEROL SULFATE 1 AMPULE: .5; 3 SOLUTION RESPIRATORY (INHALATION) at 07:19

## 2020-01-09 RX ADMIN — IPRATROPIUM BROMIDE AND ALBUTEROL SULFATE 1 AMPULE: .5; 3 SOLUTION RESPIRATORY (INHALATION) at 16:06

## 2020-01-09 RX ADMIN — GUAIFENESIN 600 MG: 600 TABLET, EXTENDED RELEASE ORAL at 22:38

## 2020-01-09 RX ADMIN — INSULIN LISPRO 1 UNITS: 100 INJECTION, SOLUTION INTRAVENOUS; SUBCUTANEOUS at 22:44

## 2020-01-09 RX ADMIN — IPRATROPIUM BROMIDE AND ALBUTEROL SULFATE 1 AMPULE: .5; 3 SOLUTION RESPIRATORY (INHALATION) at 23:41

## 2020-01-09 RX ADMIN — CEFTRIAXONE SODIUM 1 G: 1 INJECTION, POWDER, FOR SOLUTION INTRAMUSCULAR; INTRAVENOUS at 09:03

## 2020-01-09 RX ADMIN — DOXYCYCLINE HYCLATE 100 MG: 100 TABLET ORAL at 09:03

## 2020-01-09 RX ADMIN — VENLAFAXINE HYDROCHLORIDE 75 MG: 75 CAPSULE, EXTENDED RELEASE ORAL at 09:03

## 2020-01-09 RX ADMIN — IPRATROPIUM BROMIDE AND ALBUTEROL SULFATE 1 AMPULE: .5; 3 SOLUTION RESPIRATORY (INHALATION) at 19:20

## 2020-01-09 RX ADMIN — Medication 10 ML: at 09:12

## 2020-01-09 RX ADMIN — PANTOPRAZOLE SODIUM 40 MG: 40 TABLET, DELAYED RELEASE ORAL at 05:04

## 2020-01-09 ASSESSMENT — PAIN SCALES - GENERAL: PAINLEVEL_OUTOF10: 0

## 2020-01-09 NOTE — ED NOTES
RN called pharmacy about vancomycin d/t no add a vials. Melissa in pharmacy stated they would make it and send to me.       Yessi Sanchez RN  01/08/20 2017

## 2020-01-09 NOTE — H&P
Hospital Medicine History & Physical      PCP: Mingo Rodriges MD    Date of Admission: 1/8/2020    Date of Service: Pt seen/examined on 1/9/2020    Chief Complaint:    Chief Complaint   Patient presents with    Shortness of Breath     EMS states pt c/o sob 89% initially, diagnosed with pneumonia, released yest and worsened through the night, 2 L home oxygen, given 1 duoneb enroute     History Of Present Illness: The patient is a 78 y.o. female with severe COPD, depression, hyperlipidemia, diabetes mellitus who presented to [facility] ED with complaint of shortness of breath. Patient was recently admitted from 1/3- 1/6 for CAP and COPD AE. She was discharged home and reports that she never improved. She continued to have SOB and a persistent, non-productive cough despite feeling like she needed to cough up sputum. She denies any fevers at home. She reports coughing throughout the day and night and feeling very weak and fatigued to the point that it took her 2 hours sitting on the side of her bed before she could get up yesterday. She reports using 2L nc O2 at home and has not had to significantly increase her O2 but feels like she can not get a deep breath at any point.      Past Medical History:        Diagnosis Date    Chronic airway obstruction, not elsewhere classified 3/4/08    oxygen 2L/min at HS and PRN through day    Chronic kidney disease     incontinent    COPD (chronic obstructive pulmonary disease) ([address]     Depression     Displacement of lumbar intervertebral disc without myelopathy 8/13/07    Edema 9/13/07    Emphysema of lung ([address]     Enthesopathy of hip region     Enthesopathy of hip region 3/5/07    Esophageal reflux 3/4/08    Hemorrhage of rectum and anus 11/14/07    Herpes zoster without complication     History of blood transfusion     Hyperlipidemia associated with type 2 diabetes mellitus ([address] 12/11/2017    Lumbar spondylosis 5/1/2018    Major depression, chronic 8/12/2015    Osteoporosis, unspecified 12/4/07    Other and unspecified hyperlipidemia 3/4/08    Pain in joint, shoulder region 3/4/08    Pneumonia     Pulmonary nodule     Recurrent major depressive disorder, in partial remission ([facility] 5/4/2018    Rheumatic fever     S/P ileostomy ([address] 8/20/2011    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    Tension headache     Type 2 diabetes mellitus with hyperglycemia, without long-term current use of insulin ([address]     Unspecified asthma(493.90) 8/13/07    Unspecified chronic bronchitis ([address]     Ventral hernia 6/29/2014       Past Surgical History:        Procedure Laterality Date    ABDOMEN SURGERY  5-19-11    total abdominal colectomy iliostomy    CARPAL TUNNEL RELEASE      right    CATARACT REMOVAL Bilateral     L 6/18/2013, R 07/01/2013    CATARACT REMOVAL WITH IMPLANT Right 7/1/13    COLECTOMY      and reversal     COLONOSCOPY  2009, 11/4/2011, 10/28/15    normal    CYST REMOVAL Right 6/20/14     [employer]; right ear pressure point cyst removal    DILATATION, ESOPHAGUS      ENDOSCOPY, COLON, DIAGNOSTIC      EPIDURAL STEROID INJECTION Bilateral 7/9/2019    BILATERAL LUMBAR FOUR TRANSFORAMINAL EPIDURAL STEROID INJECTION SITE CONFIRMED BY FLUOROSCOPY performed by Brittny Joyce MD at [address] Left 6/18/13    cataract with lens implant    HERNIA REPAIR  6/27/14    Open Vental Hernia Repair    HIP SURGERY      HYSTERECTOMY  1973    subtotal    NECK SURGERY      incision of windpipe, repair of windpipe defect    OTHER SURGICAL HISTORY  8/19/2011    hand assisted laparoscopic ileostomy reversal    OVARY REMOVAL      TRACHEAL SURGERY  2005    hx of trach     UMBILICAL HERNIA REPAIR  6/27/2014       Medications Prior to Admission:    Prior to Admission medications    Medication Sig Start Date End Date Taking?  Authorizing Provider   cefUROXime (CEFTIN) 500 MG tablet Take 1 tablet by mouth 2 times daily for 7 days 1/6/20 1/13/20 Yes Andreas Lou MD   diltiazem (CARDIZEM CD) 120 MG extended release capsule Take 1 capsule by mouth daily 1/6/20  Yes Andreas Lou MD   guaiFENesin (MUCINEX) 600 MG extended release tablet Take 1 tablet by mouth 2 times daily 1/6/20  Yes Andreas Lou MD   predniSONE (DELTASONE) 10 MG tablet Take 3 tabs a day for 3 days,  Then 2 tabs a day for 3 days ,  Then 1 tab a day for 3 days.  1/6/20  Yes Andreas Lou MD   venlafaxine (EFFEXOR XR) 75 MG extended release capsule TAKE 1 CAPSULE EVERY DAY 12/20/19  Yes Millicent Eagle MD   topiramate (TOPAMAX) 50 MG tablet TAKE 1 TABLET BY MOUTH TWICE DAILY 12/9/19  Yes Eliane Lau MD   QUEtiapine (SEROQUEL) 25 MG tablet TAKE 1 TO 2 TABLETS BY MOUTH AT BEDTIME. 12/9/19  Yes Millicent Eagle MD   Cyanocobalamin (VITAMIN B-12 PO) Take by mouth   Yes Amauri Lee MD   tiotropium (Ardean Nipper) 18 MCG inhalation capsule INHALE THE CONTENTS OF 1 CAPSULE EVERY DAY 11/21/19  Yes Neema Rick MD   simvastatin (ZOCOR) 20 MG tablet TAKE 1 TABLET EVERY EVENING 11/14/19  Yes Millicent Eagle MD   JANUVIA 100 MG tablet TAKE 1 TABLET BY MOUTH DAILY 11/8/19  Yes Millicent Eagle MD   metFORMIN (GLUCOPHAGE-XR) 500 MG extended release tablet TAKE 2 TABLETS BY MOUTH TWICE DAILY 11/8/19  Yes Millicent Eagle MD   WIXELA INHUB 250-50 MCG/DOSE AEPB INHALE 1 PUFF INTO THE LUNGS EVERY 12 HOURS 11/5/19  Yes Neema Rick MD   albuterol sulfate  (90 Base) MCG/ACT inhaler INHALE 2 PUFFS EVERY 6 HOURS AS NEEDED FOR WHEEZING 9/23/19  Yes Millicent Eagle MD   naproxen (NAPROSYN) 375 MG tablet TAKE 1 TABLET TWICE DAILY 5/14/19  Yes Millicent Eagle MD   furosemide (LASIX) 40 MG tablet TAKE 1 TABLET EVERY DAY 5/14/19  Yes Millicent Eagle MD   esomeprazole (035 PopJax) 40 MG delayed release capsule TAKE 1 CAPSULE EVERY MORNING BEFORE BREAKFAST 5/14/19  Yes Millicent Eagle MD   potassium chloride (KLOR-CON M) 20 patient currently lives at home    TOBACCO:   reports that she quit smoking about 24 years ago. Her smoking use included cigarettes. She has a 30.00 pack-year smoking history. She has never used smokeless tobacco.  ETOH:   reports no history of alcohol use. Family History:   Positive as follows:        Problem Relation Age of Onset    Asthma Daughter     Diabetes Daughter     Cancer Daughter         Uterine    Asthma Daughter     Diabetes Mother     Heart Failure Mother     Hypertension Mother     Heart Failure Brother     Diabetes Sister     Heart Failure Brother     Emphysema Neg Hx        REVIEW OF SYSTEMS:     Constitutional: + malaise, + fatigue Negative for fever   HENT: Negative for sore throat   Eyes: Negative for redness   Respiratory: + cough, + SOB   Cardiovascular: Negative for chest pain   Gastrointestinal: Negative for vomiting, diarrhea   Genitourinary: Negative for hematuria   Musculoskeletal: Negative for arthralgias   Skin: Negative for rash   Neurological: Negative for syncope   Hematological: Negative for adenopathy   Psychiatric/Behavorial: Negative for anxiety    PHYSICAL EXAM:    /73   Pulse 97   Temp 96.9 °F (36.1 °C) (Oral)   Resp 18   Ht 5' (1.524 m)   Wt 99 lb 1.6 oz (45 kg)   SpO2 98%   BMI 19.35 kg/m²   Gen: Thin, frail appearing female,  Alert. Distressed with active coughing   Eyes: PERRL. No sclera icterus. No conjunctival injection. ENT: No discharge. Pharynx clear. Neck: No JVD. No Carotid Bruit. Trachea midline. Resp: + accessory muscle use. No crackles. + wheezes. Diffuse rhonchi. On 2.5L NC O2  CV: Tachycardia. Regular rhythm. No murmur. No rub. No edema. Capillary Refill: Brisk,< 3 seconds   Peripheral Pulses: +2 palpable, equal bilaterally   GI: Non-tender. Non-distended. . Normal bowel sounds. Skin: Warm and dry. M/S: No cyanosis. No joint deformity. No clubbing. Neuro: Awake. Grossly nonfocal    Psych: Oriented x 3.  No anxiety or agitation. CBC:   Recent Labs     01/08/20  1644 01/09/20  0447   WBC 12.8* 10.3   HGB 12.8 12.7   HCT 40.4 39.5   MCV 89.0 88.9    355     BMP:   Recent Labs     01/08/20  1515      K 4.1   CL 93*   CO2 35*   BUN 33*   CREATININE 0.9     LIVER PROFILE:   Recent Labs     01/08/20  1515   AST 14*   ALT 14   BILITOT <0.2   ALKPHOS 74     CARDIAC ENZYMES  Recent Labs     01/08/20  1515   TROPONINI <0.01     CULTURES  Blood Cx: pending   Rapid Flu: negative     EKG:  I have reviewed the EKG with the following interpretation:   Sinus tachycardia, normal axis, normal interval, biatrial enlargement, no acute ST segment changes concerning for acute ischemia     RADIOLOGY  CT Chest Pulmonary Embolism W Contrast   Final Result   1. No evidence for acute pulmonary embolism. 2. New tree-in-bud group of micro nodules in the medial segment middle lobe   upper portion suggestive of bronchiolitis. Consider three-month follow-up. 3. Emphysema unchanged as are a few other subcentimeter nodules as discussed   in detail above. Pertinent previous results reviewed   None          I Meliton Raymundo have reviewed the chart on [id number] [id number] and personally interviewed and examined patient, reviewed the data (labs and imaging) and after discussion with my PA formulated the plan. Agree with note with the following edits. HPI:    pt with severe COPD, back with increased cough, SOB   I reviewed the patient's Past Medical History, Past Surgical History, Medications, and Allergies. Physical exam:    /75   Pulse 116   Temp 97.6 °F (36.4 °C) (Oral)   Resp 20   Ht 5' (1.524 m)   Wt 99 lb 1.6 oz (45 kg)   SpO2 91%   BMI 19.35 kg/m²   Gen: ill appearing, mild  distress. Alert. oriented  Eyes: PERRL. No sclera icterus. No conjunctival injection. ENT: No discharge. Pharynx clear. Neck: Trachea midline. Normal thyroid. Resp: + accessory muscle use. viridiana rhonchi and  wheezes. CV: Regular rate. Regular rhythm. No murmur or rub. No edema. GI: Non-tender. Non-distended. No masses. No organomegaly. Normal bowel sounds. No hernia. Skin: Warm and dry. No nodule on exposed extremities. No rash on exposed extremities. Lymph: No cervical LAD. No supraclavicular LAD. M/S: No cyanosis. No joint deformity. No clubbing. Neuro: Awake. Reflexes 2+ symmetric bilaterally. Moves all 4 extremities, non focal  Psych: Oriented x 3. No anxiety or agitation. Electronically signed by Janine Cooper MD on 1/9/2020 1:01 PM     ASSESSMENT/PLAN:  Acute on chronic hypoxic respiratory failure  -Patient uses 2 L nasal cannula O2 at home  -She was short of breath and found to be hypoxic on her home supplemental O2  -Now requiring 2-1/2 L to maintain good sats  -Patient recently treated for pneumonia and repeat imaging with bronchiolitis likely is the cause  -PRN supplemental O2 and wean as tolerated    Bronchiolitis  COPD acute exacerbation  -Recent admission for pneumonia with patient currently still on antibiotics  -Patient was treated with ceftriaxone and azithromycin for 4 days during her recent admission and was discharged home with Ceftin x1 week  -Continue inhaled bronchodilators, steroids and CTX and Doxycycline   -Pulmonology consult  - Mucinex and Acapella     Diabetes mellitus type 2  -Glucose well controlled  -Continue SSI   - Hold home PO Rx     GERD  -Continue PPI    Depression  - Continue home regimen     Mild Dehydration   - Elevated BUN   - Suspect 2/2 to poor PO intake with increased SOB and malaise   - IVF  - Repeat labs tomorrow  - Will hold Lasix for tomorrow (already given this morning)     Tachycardia  - Had persistent tachycardia during last admission   - Cardizem was started on discharge?      Lung Nodules  - Noted on CT Chest   - Micronodules  - Can follow up with pulmonology     DVT Prophylaxis: Lovenox   Diet: DIET GENERAL;   Code Status: Full Code    Hospital course and plan of care discussed with Dr. Anuj Ko. Was notified by nursing staff that patient wished to be DNR CC-A, which she was last admission but her paperwork was at home. I discussed with the patient regarding her wishes, and from our discussion, patient reports that she does want CRP/intubation/all life saving measures to be taken but does not want to remain on life supporting measures for prolonged period of time. We discussed that this would mean she prefers to remain a full code and patient confirms that she no longer wishes to be a DNR CC-A. Fortunato Madrid PA-C  1/9/2020 7:49 AM      agree with plan of care as above.  Pt seen and examined    cont O2, HHN, abx, steroids     Electronically signed by Iggy Murphy MD on 1/9/2020 1:01 PM

## 2020-01-09 NOTE — CONSULTS
Inhalation Q4H    cefUROXime  500 mg Oral BID    diltiazem  120 mg Oral Daily    pantoprazole  40 mg Oral QAM AC    furosemide  40 mg Oral Daily    gabapentin  300 mg Oral TID    linagliptin  5 mg Oral Daily    QUEtiapine  25 mg Oral Nightly    venlafaxine  75 mg Oral Daily with breakfast    topiramate  50 mg Oral BID    simvastatin  20 mg Oral Nightly    sodium chloride flush  10 mL Intravenous 2 times per day    enoxaparin  40 mg Subcutaneous Daily    methylPREDNISolone  40 mg Intravenous Q6H    Followed by   Lalito Zarate ON 1/11/2020] predniSONE  40 mg Oral Daily    [START ON 1/10/2020] metFORMIN  500 mg Oral BID WC     Continuous Infusions:    PRN Meds:  ipratropium-albuterol, sodium chloride flush, magnesium hydroxide, ondansetron, acetaminophen    ALLERGIES:  Patient has No Known Allergies. REVIEW OF SYSTEMS:  Constitutional: +  for fever  HENT: Negative for sore throat  Eyes: Negative for redness   Respiratory: +  for dyspnea, + cough  Cardiovascular: Negative for chest pain  Gastrointestinal: Negative for vomiting, diarrhea   Genitourinary: Negative for hematuria   Musculoskeletal: Negative for arthralgias   Skin: Negative for rash  Neurological: Negative for syncope  Hematological: Negative for adenopathy  Psychiatric/Behavorial: Negative for anxiety    PHYSICAL EXAM:  Blood pressure 121/73, pulse 97, temperature 96.9 °F (36.1 °C), temperature source Oral, resp. rate 18, height 5' (1.524 m), weight 99 lb 1.6 oz (45 kg), SpO2 97 %, not currently breastfeeding.' on 2l NC  Gen: Mild distress. Normocephalic, atraumatic. Eyes: PERRL. No sclera icterus. No conjunctival injection. ENT: No discharge. Pharynx clear. Neck: Trachea midline. No obvious mass. Resp: No accessory muscle use. No crackles. few wheezes. No rhonchi. No dullness on percussion. CV: Regular rate. Regular rhythm. No murmur or rub. No edema. Peripheral pulses are 2+. Capillary refill is less than 3 seconds. GI: Non-tender. Non-distended. No hernia. Skin: Warm and dry. No nodule on exposed extremities. Lymph: No cervical LAD. No supraclavicular LAD. M/S: No cyanosis. No joint deformity. No clubbing. Neuro: Awake. Alert. Moves all four extremities. Psych: Oriented x 3. No anxiety. LABS:  CBC:   Recent Labs     01/08/20  1644 01/09/20  0447   WBC 12.8* 10.3   HGB 12.8 12.7   HCT 40.4 39.5   MCV 89.0 88.9    355     BMP:   Recent Labs     01/08/20  1515      K 4.1   CL 93*   CO2 35*   BUN 33*   CREATININE 0.9     LIVER PROFILE:   Recent Labs     01/08/20  1515   AST 14*   ALT 14   BILITOT <0.2   ALKPHOS 74     PT/INR: No results for input(s): PROTIME, INR in the last 72 hours. APTT: No results for input(s): APTT in the last 72 hours. UA:No results for input(s): NITRITE, COLORU, PHUR, LABCAST, WBCUA, RBCUA, MUCUS, TRICHOMONAS, YEAST, BACTERIA, CLARITYU, SPECGRAV, LEUKOCYTESUR, UROBILINOGEN, BILIRUBINUR, BLOODU, GLUCOSEU, AMORPHOUS in the last 72 hours. Invalid input(s): KETONESU  No results for input(s): PHART, MMD7XCD, PO2ART in the last 72 hours. Chest imaging was reviewed by me and showed:  Chest CTA: No PE, Lungs/pleura: Moderate centrilobular emphysema.  Biapical pleural-parenchymal scarring right greater than left.  Unchanged.  4 mm 3 mm medial right lung  apex nodule series 3, image 17 unchanged as is a 4 mm more peripheral nodule  on image 27.  Previously noted 6 mm and 3 mm middle lobe nodules on image 83  and 75 respectively unchanged.  Further cephalad is cluster of tree-in-bud  micro nodules in the upper portion medial segment middle lobe suggestive of  some some bronchiolitis.   No pleural effusion or pneumothorax.         ASSESSMENT:  · Acute respiratory failure with hypoxemia  · COPD with acute exacerbation  · Bronchopneumonia/bronchiolitis- with micronodules on chest CT    PLAN:   IV steroids   Inhaled bronchodilators   Antibiotics (CTX and doxycycline)  Supplemental oxygen to maintain SaO2 >92%; wean as tolerated  Non-invasive positive pressure ventilation, if needed  · Radiology recommended short term f/u for lung micronodules      Thank you for the consult.

## 2020-01-09 NOTE — PROGRESS NOTES
RESPIRATORY THERAPY ASSESSMENT    Name:  Antoine Record Number:  [id number]  Age: 78 y.o. Gender: female  : 1950  Today's Date:  2020  Room:  /0310-02    Assessment     Is the patient being admitted for a COPD or Asthma exacerbation? Yes   (If yes the patient will be seen every 4 hours for the first 24 hours and then reassessed)    Patient Admission Diagnosis      Allergies  No Known Allergies    Minimum Predicted Vital Capacity:     680          Actual Vital Capacity: Too sob to preform               Pulmonary History:COPD  Home Oxygen Therapy:  2 liters/min via NC  Home Respiratory Therapy:Albuterol and Albuterol/Ipratropium Bromide MDI   Current Respiratory Therapy: Duoneb Q4 WA    Treatment Type: HHN  Medications: Albuterol/Ipratropium    Respiratory Severity Index(RSI)   Patients with orders for inhalation medications, oxygen, or any therapeutic treatment modality will be placed on Respiratory Protocol. They will be assessed with the first treatment and at least every 72 hours thereafter. The following severity scale will be used to determine frequency of treatment intervention.     Smoking History: Mild Exacerbation = 3    Social History  Social History     Tobacco Use    Smoking status: Former Smoker     Packs/day: 1.00     Years: 30.00     Pack years: 30.00     Types: Cigarettes     Last attempt to quit: 1996     Years since quittin.0    Smokeless tobacco: Never Used   Substance Use Topics    Alcohol use: No     Alcohol/week: 0.0 standard drinks    Drug use: No       Recent Surgical History: None = 0  Past Surgical History  Past Surgical History:   Procedure Laterality Date    ABDOMEN SURGERY  11    total abdominal colectomy iliostomy    CARPAL TUNNEL RELEASE      right    CATARACT REMOVAL Bilateral     L 2013, R 2013    CATARACT REMOVAL WITH IMPLANT Right 13    COLECTOMY      and reversal     COLONOSCOPY  , 2011, Very Good     Is patient being placed on Home Treatment Regimen? No     Does the patient have everything they need prior to discharge? NA     Comments: pt assessed and plan of care determined    Plan of Care: Duoneb Q4 and Duoneb Q4 PRN     Electronically signed by Marcus Dumont RCP on 1/9/2020 at 12:59 AM    Respiratory Protocol Guidelines     1. Assessment and treatment by Respiratory Therapy will be initiated for medication and therapeutic interventions upon initiation of aerosolized medication. 2. Physician will be contacted for respiratory rate (RR) greater than 35 breaths per minute. Therapy will be held for heart rate (HR) greater than 140 beats per minute, pending direction from physician. 3. Bronchodilators will be administered via Metered Dose Inhaler (MDI) with spacer when the following criteria are met:  a. Alert and cooperative     b. HR < 140 bpm  c. RR < 30 bpm                d. Can demonstrate a 2-3 second inspiratory hold  4. Bronchodilators will be administered via Hand Held Nebulizer [facility]) to patients when ANY of the following criteria are met  a. Incognizant or uncooperative          b. Patients treated with HHN at Home        c. Unable to demonstrate proper use of MDI with spacer     d. RR > 30 bpm   5. Bronchodilators will be delivered via Metered Dose Inhaler (MDI), HHN, Aerogen to intubated patients on mechanical ventilation. 6. Inhalation medication orders will be delivered and/or substituted as outlined below. Aerosolized Medications Ordering and Administration Guidelines:    1. All Medications will be ordered by a physician, and their frequency and/or modality will be adjusted as defined by the patients Respiratory Severity Index (RSI) score.   2. If the patient does not have documented COPD, consider discontinuing anticholinergics when RSI is less than 9.  3. If the bronchospasm worsens (increased RSI), then the bronchodilator frequency can be increased to a maximum of every 4

## 2020-01-10 LAB
ANION GAP SERPL CALCULATED.3IONS-SCNC: 13 MMOL/L (ref 3–16)
BUN BLDV-MCNC: 29 MG/DL (ref 7–20)
CALCIUM SERPL-MCNC: 8.8 MG/DL (ref 8.3–10.6)
CHLORIDE BLD-SCNC: 97 MMOL/L (ref 99–110)
CO2: 28 MMOL/L (ref 21–32)
CREAT SERPL-MCNC: 0.8 MG/DL (ref 0.6–1.2)
GFR AFRICAN AMERICAN: >60
GFR NON-AFRICAN AMERICAN: >60
GLUCOSE BLD-MCNC: 182 MG/DL (ref 70–99)
GLUCOSE BLD-MCNC: 225 MG/DL (ref 70–99)
GLUCOSE BLD-MCNC: 231 MG/DL (ref 70–99)
GLUCOSE BLD-MCNC: 257 MG/DL (ref 70–99)
GLUCOSE BLD-MCNC: 261 MG/DL (ref 70–99)
MAGNESIUM: 2 MG/DL (ref 1.8–2.4)
PERFORMED ON: ABNORMAL
POTASSIUM REFLEX MAGNESIUM: 3.3 MMOL/L (ref 3.5–5.1)
SODIUM BLD-SCNC: 138 MMOL/L (ref 136–145)

## 2020-01-10 PROCEDURE — 6370000000 HC RX 637 (ALT 250 FOR IP): Performed by: INTERNAL MEDICINE

## 2020-01-10 PROCEDURE — 94669 MECHANICAL CHEST WALL OSCILL: CPT

## 2020-01-10 PROCEDURE — 80048 BASIC METABOLIC PNL TOTAL CA: CPT

## 2020-01-10 PROCEDURE — 36415 COLL VENOUS BLD VENIPUNCTURE: CPT

## 2020-01-10 PROCEDURE — 99232 SBSQ HOSP IP/OBS MODERATE 35: CPT | Performed by: INTERNAL MEDICINE

## 2020-01-10 PROCEDURE — 6370000000 HC RX 637 (ALT 250 FOR IP): Performed by: PHYSICIAN ASSISTANT

## 2020-01-10 PROCEDURE — 83735 ASSAY OF MAGNESIUM: CPT

## 2020-01-10 PROCEDURE — 94640 AIRWAY INHALATION TREATMENT: CPT

## 2020-01-10 PROCEDURE — 99233 SBSQ HOSP IP/OBS HIGH 50: CPT | Performed by: INTERNAL MEDICINE

## 2020-01-10 PROCEDURE — 6360000002 HC RX W HCPCS: Performed by: INTERNAL MEDICINE

## 2020-01-10 PROCEDURE — 2580000003 HC RX 258: Performed by: INTERNAL MEDICINE

## 2020-01-10 PROCEDURE — 2060000000 HC ICU INTERMEDIATE R&B

## 2020-01-10 PROCEDURE — 2700000000 HC OXYGEN THERAPY PER DAY

## 2020-01-10 PROCEDURE — 93005 ELECTROCARDIOGRAM TRACING: CPT | Performed by: INTERNAL MEDICINE

## 2020-01-10 PROCEDURE — 94761 N-INVAS EAR/PLS OXIMETRY MLT: CPT

## 2020-01-10 RX ORDER — LEVALBUTEROL 1.25 MG/.5ML
1.25 SOLUTION, CONCENTRATE RESPIRATORY (INHALATION) EVERY 4 HOURS
Status: DISCONTINUED | OUTPATIENT
Start: 2020-01-10 | End: 2020-01-13 | Stop reason: HOSPADM

## 2020-01-10 RX ORDER — DEXTROSE MONOHYDRATE 50 MG/ML
100 INJECTION, SOLUTION INTRAVENOUS PRN
Status: DISCONTINUED | OUTPATIENT
Start: 2020-01-10 | End: 2020-01-13 | Stop reason: HOSPADM

## 2020-01-10 RX ORDER — DEXTROSE MONOHYDRATE 25 G/50ML
12.5 INJECTION, SOLUTION INTRAVENOUS PRN
Status: DISCONTINUED | OUTPATIENT
Start: 2020-01-10 | End: 2020-01-13 | Stop reason: HOSPADM

## 2020-01-10 RX ORDER — SODIUM CHLORIDE FOR INHALATION 0.9 %
3 VIAL, NEBULIZER (ML) INHALATION PRN
Status: DISCONTINUED | OUTPATIENT
Start: 2020-01-10 | End: 2020-01-13 | Stop reason: HOSPADM

## 2020-01-10 RX ORDER — NICOTINE POLACRILEX 4 MG
15 LOZENGE BUCCAL PRN
Status: DISCONTINUED | OUTPATIENT
Start: 2020-01-10 | End: 2020-01-13 | Stop reason: HOSPADM

## 2020-01-10 RX ORDER — LEVALBUTEROL 1.25 MG/.5ML
1.25 SOLUTION, CONCENTRATE RESPIRATORY (INHALATION) EVERY 6 HOURS PRN
Status: DISCONTINUED | OUTPATIENT
Start: 2020-01-10 | End: 2020-01-13 | Stop reason: HOSPADM

## 2020-01-10 RX ADMIN — PANTOPRAZOLE SODIUM 40 MG: 40 TABLET, DELAYED RELEASE ORAL at 05:00

## 2020-01-10 RX ADMIN — CEFTRIAXONE SODIUM 1 G: 1 INJECTION, POWDER, FOR SOLUTION INTRAMUSCULAR; INTRAVENOUS at 09:07

## 2020-01-10 RX ADMIN — GABAPENTIN 300 MG: 300 CAPSULE ORAL at 17:59

## 2020-01-10 RX ADMIN — IPRATROPIUM BROMIDE 0.5 MG: 0.5 SOLUTION RESPIRATORY (INHALATION) at 22:52

## 2020-01-10 RX ADMIN — Medication 10 ML: at 09:08

## 2020-01-10 RX ADMIN — Medication 10 ML: at 21:30

## 2020-01-10 RX ADMIN — METHYLPREDNISOLONE SODIUM SUCCINATE 40 MG: 40 INJECTION, POWDER, FOR SOLUTION INTRAMUSCULAR; INTRAVENOUS at 04:20

## 2020-01-10 RX ADMIN — IPRATROPIUM BROMIDE AND ALBUTEROL SULFATE 1 AMPULE: .5; 3 SOLUTION RESPIRATORY (INHALATION) at 11:53

## 2020-01-10 RX ADMIN — DILTIAZEM HYDROCHLORIDE 120 MG: 120 CAPSULE, COATED, EXTENDED RELEASE ORAL at 09:07

## 2020-01-10 RX ADMIN — IPRATROPIUM BROMIDE AND ALBUTEROL SULFATE 1 AMPULE: .5; 3 SOLUTION RESPIRATORY (INHALATION) at 03:01

## 2020-01-10 RX ADMIN — TOPIRAMATE 50 MG: 25 TABLET, FILM COATED ORAL at 09:07

## 2020-01-10 RX ADMIN — METHYLPREDNISOLONE SODIUM SUCCINATE 40 MG: 40 INJECTION, POWDER, FOR SOLUTION INTRAMUSCULAR; INTRAVENOUS at 09:11

## 2020-01-10 RX ADMIN — ENOXAPARIN SODIUM 40 MG: 40 INJECTION SUBCUTANEOUS at 09:08

## 2020-01-10 RX ADMIN — DOXYCYCLINE HYCLATE 100 MG: 100 TABLET ORAL at 09:07

## 2020-01-10 RX ADMIN — SIMVASTATIN 20 MG: 10 TABLET, FILM COATED ORAL at 21:27

## 2020-01-10 RX ADMIN — GABAPENTIN 300 MG: 300 CAPSULE ORAL at 09:08

## 2020-01-10 RX ADMIN — LEVALBUTEROL HYDROCHLORIDE 1.25 MG: 1.25 SOLUTION, CONCENTRATE RESPIRATORY (INHALATION) at 22:52

## 2020-01-10 RX ADMIN — IPRATROPIUM BROMIDE AND ALBUTEROL SULFATE 1 AMPULE: .5; 3 SOLUTION RESPIRATORY (INHALATION) at 18:51

## 2020-01-10 RX ADMIN — GUAIFENESIN 600 MG: 600 TABLET, EXTENDED RELEASE ORAL at 09:07

## 2020-01-10 RX ADMIN — INSULIN LISPRO 2 UNITS: 100 INJECTION, SOLUTION INTRAVENOUS; SUBCUTANEOUS at 21:30

## 2020-01-10 RX ADMIN — Medication 10 ML: at 17:59

## 2020-01-10 RX ADMIN — QUETIAPINE FUMARATE 25 MG: 25 TABLET ORAL at 21:27

## 2020-01-10 RX ADMIN — TOPIRAMATE 50 MG: 25 TABLET, FILM COATED ORAL at 21:27

## 2020-01-10 RX ADMIN — INSULIN LISPRO 3 UNITS: 100 INJECTION, SOLUTION INTRAVENOUS; SUBCUTANEOUS at 12:53

## 2020-01-10 RX ADMIN — METHYLPREDNISOLONE SODIUM SUCCINATE 40 MG: 40 INJECTION, POWDER, FOR SOLUTION INTRAMUSCULAR; INTRAVENOUS at 17:59

## 2020-01-10 RX ADMIN — GUAIFENESIN 600 MG: 600 TABLET, EXTENDED RELEASE ORAL at 21:27

## 2020-01-10 RX ADMIN — INSULIN LISPRO 2 UNITS: 100 INJECTION, SOLUTION INTRAVENOUS; SUBCUTANEOUS at 17:59

## 2020-01-10 RX ADMIN — DOXYCYCLINE HYCLATE 100 MG: 100 TABLET ORAL at 21:30

## 2020-01-10 RX ADMIN — IPRATROPIUM BROMIDE AND ALBUTEROL SULFATE 1 AMPULE: .5; 3 SOLUTION RESPIRATORY (INHALATION) at 07:26

## 2020-01-10 RX ADMIN — IPRATROPIUM BROMIDE AND ALBUTEROL SULFATE 1 AMPULE: .5; 3 SOLUTION RESPIRATORY (INHALATION) at 15:23

## 2020-01-10 RX ADMIN — VENLAFAXINE HYDROCHLORIDE 75 MG: 75 CAPSULE, EXTENDED RELEASE ORAL at 09:07

## 2020-01-10 RX ADMIN — INSULIN LISPRO 2 UNITS: 100 INJECTION, SOLUTION INTRAVENOUS; SUBCUTANEOUS at 08:28

## 2020-01-10 NOTE — FLOWSHEET NOTE
01/10/20 0900   Vital Signs   Temp 98.4 °F (36.9 °C)   Temp Source Oral   Pulse 122   Heart Rate Source Monitor   Resp 16   /75   BP Location Left upper arm   Level of Consciousness 0   MEWS Score 3   Patient Currently in Pain Denies   Oxygen Therapy   SpO2 94 %   O2 Device Nasal cannula   O2 Flow Rate (L/min) 2 L/min        01/10/20 0900   Vital Signs   Temp 98.4 °F (36.9 °C)   Temp Source Oral   Pulse 122   Heart Rate Source Monitor   Resp 16   /75   BP Location Left upper arm   Level of Consciousness 0   MEWS Score 3   Patient Currently in Pain Denies   Oxygen Therapy   SpO2 94 %   O2 Device Nasal cannula   O2 Flow Rate (L/min) 2 L/min   Assessment completed and documented VSS, ST on monitor and remains on home O2 at 2L.

## 2020-01-10 NOTE — PROGRESS NOTES
Progress Note    Admit Date:  1/8/2020    Subjective:  Ms. Jerry Roberto continues to feel ill. Persistent cough and shortness of breath. Complains of some difficulty swallowing and feels food is stuck in the lower chest upper abdomen area. Blood sugars trending up    Objective:   /75   Pulse 122   Temp 98.4 °F (36.9 °C) (Oral)   Resp 20   Ht 5' (1.524 m)   Wt 101 lb (45.8 kg)   SpO2 94%   BMI 19.73 kg/m²       Intake/Output Summary (Last 24 hours) at 1/10/2020 1339  Last data filed at 1/10/2020 1745  Gross per 24 hour   Intake 2891 ml   Output 600 ml   Net 2291 ml         Physical Exam:  Gen: Thin, frail appearing female,  Alert, awake, oriented   . Distressed with active coughing   Eyes: PERRL. No sclera icterus. No conjunctival injection. ENT: No discharge. Pharynx clear. Neck: No JVD. No Carotid Bruit. Trachea midline. Resp: + accessory muscle use. No crackles. + wheezes. Diffuse rhonchi. On 2.5L NC O2  CV: Tachycardia. Regular rhythm. No murmur. No rub. No edema. Capillary Refill: Brisk,< 3 seconds   Peripheral Pulses: +2 palpable, equal bilaterally   GI: Non-tender. Non-distended. . Normal bowel sounds. Skin: Warm and dry. M/S: No cyanosis. No joint deformity. No clubbing. Neuro: Awake. Grossly nonfocal    Psych: Oriented x 3.  No anxiety or agitation.          Medications:   Scheduled Meds:   ipratropium-albuterol  1 ampule Inhalation Q4H    insulin lispro  0-6 Units Subcutaneous TID     insulin lispro  0-3 Units Subcutaneous Nightly    cefTRIAXone (ROCEPHIN) IV  1 g Intravenous Q24H    doxycycline hyclate  100 mg Oral 2 times per day    guaiFENesin  600 mg Oral BID    diltiazem  120 mg Oral Daily    pantoprazole  40 mg Oral QAM AC    [Held by provider] furosemide  40 mg Oral Daily    gabapentin  300 mg Oral TID    QUEtiapine  25 mg Oral Nightly    venlafaxine  75 mg Oral Daily with breakfast    topiramate  50 mg Oral BID    simvastatin  20 mg Oral Nightly    sodium maintain good sats  -Patient recently treated for pneumonia and repeat imaging with bronchiolitis likely is the cause  -PRN supplemental O2 and wean as tolerated     Bronchiolitis  COPD acute exacerbation  -Recent admission for pneumonia with patient currently still on antibiotics  -Patient was treated with ceftriaxone and azithromycin for 4 days during her recent admission and was discharged home with Ceftin x1 week  -Continue inhaled bronchodilators, steroids and CTX and Doxycycline   -Pulmonology consult  - Mucinex and Acapella      Diabetes mellitus type 2  -with steroid-induced hypoglycemia  -Increase SSI   - resume Januvia, Metformin     GERD  -Continue PPI     Depression  - Continue home regimen      Mild Dehydration   -  Gentle IVF   - Will hold Lasix   - monitor BMP     Tachycardia  - Had persistent tachycardia during last admission   - Cardizem was started .     Lung Nodules  - Noted on CT Chest   - Micronodules  - Can follow up with pulmonology     Dysphagia   - check esophagram      DVT Prophylaxis: Lovenox   Diet: DIET GENERAL;   Code Status: Full Code          Was notified by nursing staff that patient wished to be DNR CC-A, which she was last admission but her paperwork was at home. patient reports that she does want CRP/intubation/all life saving measures to be taken but does not want to remain on life supporting measures for prolonged period of time.  We discussed that this would mean she prefers to remain a full code and patient confirms that she no longer wishes to be a DNR CC-A     code status FULL CODE.           Seth Nielsen MD 1/10/2020 1:39 PM

## 2020-01-10 NOTE — PLAN OF CARE
Problem: Falls - Risk of:  Goal: Will remain free from falls  Description  Will remain free from falls  Outcome: Ongoing     Problem: DAILY CARE  Goal: Daily care needs are met  Outcome: Ongoing     Problem: PAIN  Goal: Patient's pain/discomfort is manageable  Outcome: Ongoing     Problem: SKIN INTEGRITY  Goal: Skin integrity is maintained or improved  Outcome: Ongoing     Problem:  Activity Intolerance:  Goal: Ability to tolerate increased activity will improve  Description  Ability to tolerate increased activity will improve  Outcome: Ongoing     Problem: Airway Clearance - Ineffective:  Goal: Ability to maintain a clear airway will improve  Description  Ability to maintain a clear airway will improve  Outcome: Ongoing     Problem: Breathing Pattern - Ineffective:  Goal: Ability to achieve and maintain a regular respiratory rate will improve  Description  Ability to achieve and maintain a regular respiratory rate will improve  Outcome: Ongoing     Problem: Gas Exchange - Impaired:  Goal: Levels of oxygenation will improve  Description  Levels of oxygenation will improve  Outcome: Ongoing

## 2020-01-11 ENCOUNTER — APPOINTMENT (OUTPATIENT)
Dept: GENERAL RADIOLOGY | Age: 70
DRG: 190 | End: 2020-01-11
Payer: MEDICARE

## 2020-01-11 LAB
BLOOD CULTURE, ROUTINE: ABNORMAL
BLOOD CULTURE, ROUTINE: ABNORMAL
EKG ATRIAL RATE: 117 BPM
EKG DIAGNOSIS: NORMAL
EKG P AXIS: 84 DEGREES
EKG P-R INTERVAL: 180 MS
EKG Q-T INTERVAL: 312 MS
EKG QRS DURATION: 92 MS
EKG QTC CALCULATION (BAZETT): 435 MS
EKG R AXIS: 75 DEGREES
EKG T AXIS: 13 DEGREES
EKG VENTRICULAR RATE: 117 BPM
GLUCOSE BLD-MCNC: 112 MG/DL (ref 70–99)
GLUCOSE BLD-MCNC: 159 MG/DL (ref 70–99)
GLUCOSE BLD-MCNC: 332 MG/DL (ref 70–99)
GLUCOSE BLD-MCNC: 93 MG/DL (ref 70–99)
ORGANISM: ABNORMAL
ORGANISM: ABNORMAL
PERFORMED ON: ABNORMAL
PERFORMED ON: NORMAL

## 2020-01-11 PROCEDURE — 94150 VITAL CAPACITY TEST: CPT

## 2020-01-11 PROCEDURE — 2580000003 HC RX 258

## 2020-01-11 PROCEDURE — 6360000002 HC RX W HCPCS: Performed by: INTERNAL MEDICINE

## 2020-01-11 PROCEDURE — 74220 X-RAY XM ESOPHAGUS 1CNTRST: CPT

## 2020-01-11 PROCEDURE — 2580000003 HC RX 258: Performed by: INTERNAL MEDICINE

## 2020-01-11 PROCEDURE — 93010 ELECTROCARDIOGRAM REPORT: CPT | Performed by: INTERNAL MEDICINE

## 2020-01-11 PROCEDURE — 6370000000 HC RX 637 (ALT 250 FOR IP): Performed by: INTERNAL MEDICINE

## 2020-01-11 PROCEDURE — 94640 AIRWAY INHALATION TREATMENT: CPT

## 2020-01-11 PROCEDURE — 6370000000 HC RX 637 (ALT 250 FOR IP): Performed by: PHYSICIAN ASSISTANT

## 2020-01-11 PROCEDURE — 94669 MECHANICAL CHEST WALL OSCILL: CPT

## 2020-01-11 PROCEDURE — 94761 N-INVAS EAR/PLS OXIMETRY MLT: CPT

## 2020-01-11 PROCEDURE — 99232 SBSQ HOSP IP/OBS MODERATE 35: CPT | Performed by: INTERNAL MEDICINE

## 2020-01-11 PROCEDURE — 2700000000 HC OXYGEN THERAPY PER DAY

## 2020-01-11 PROCEDURE — 2060000000 HC ICU INTERMEDIATE R&B

## 2020-01-11 RX ORDER — SODIUM CHLORIDE 9 MG/ML
INJECTION, SOLUTION INTRAVENOUS
Status: COMPLETED
Start: 2020-01-11 | End: 2020-01-11

## 2020-01-11 RX ORDER — METFORMIN HYDROCHLORIDE 500 MG/1
500 TABLET, EXTENDED RELEASE ORAL 2 TIMES DAILY WITH MEALS
Status: DISCONTINUED | OUTPATIENT
Start: 2020-01-11 | End: 2020-01-13 | Stop reason: HOSPADM

## 2020-01-11 RX ADMIN — GABAPENTIN 300 MG: 300 CAPSULE ORAL at 20:26

## 2020-01-11 RX ADMIN — LINAGLIPTIN 5 MG: 5 TABLET, FILM COATED ORAL at 15:19

## 2020-01-11 RX ADMIN — ENOXAPARIN SODIUM 40 MG: 40 INJECTION SUBCUTANEOUS at 15:14

## 2020-01-11 RX ADMIN — GUAIFENESIN 600 MG: 600 TABLET, EXTENDED RELEASE ORAL at 15:14

## 2020-01-11 RX ADMIN — LEVALBUTEROL HYDROCHLORIDE 1.25 MG: 1.25 SOLUTION, CONCENTRATE RESPIRATORY (INHALATION) at 22:45

## 2020-01-11 RX ADMIN — TOPIRAMATE 50 MG: 25 TABLET, FILM COATED ORAL at 15:14

## 2020-01-11 RX ADMIN — IPRATROPIUM BROMIDE 0.5 MG: 0.5 SOLUTION RESPIRATORY (INHALATION) at 22:45

## 2020-01-11 RX ADMIN — METFORMIN HYDROCHLORIDE 500 MG: 500 TABLET, FILM COATED, EXTENDED RELEASE ORAL at 16:39

## 2020-01-11 RX ADMIN — INSULIN LISPRO 4 UNITS: 100 INJECTION, SOLUTION INTRAVENOUS; SUBCUTANEOUS at 20:21

## 2020-01-11 RX ADMIN — SODIUM CHLORIDE 250 ML: 9 INJECTION, SOLUTION INTRAVENOUS at 10:19

## 2020-01-11 RX ADMIN — TOPIRAMATE 50 MG: 25 TABLET, FILM COATED ORAL at 20:20

## 2020-01-11 RX ADMIN — GABAPENTIN 300 MG: 300 CAPSULE ORAL at 15:14

## 2020-01-11 RX ADMIN — LEVALBUTEROL HYDROCHLORIDE 1.25 MG: 1.25 SOLUTION, CONCENTRATE RESPIRATORY (INHALATION) at 06:45

## 2020-01-11 RX ADMIN — DOXYCYCLINE HYCLATE 100 MG: 100 TABLET ORAL at 20:21

## 2020-01-11 RX ADMIN — LEVALBUTEROL HYDROCHLORIDE 1.25 MG: 1.25 SOLUTION, CONCENTRATE RESPIRATORY (INHALATION) at 14:51

## 2020-01-11 RX ADMIN — SIMVASTATIN 20 MG: 10 TABLET, FILM COATED ORAL at 20:21

## 2020-01-11 RX ADMIN — CEFTRIAXONE SODIUM 1 G: 1 INJECTION, POWDER, FOR SOLUTION INTRAMUSCULAR; INTRAVENOUS at 10:19

## 2020-01-11 RX ADMIN — QUETIAPINE FUMARATE 25 MG: 25 TABLET ORAL at 20:21

## 2020-01-11 RX ADMIN — IPRATROPIUM BROMIDE 0.5 MG: 0.5 SOLUTION RESPIRATORY (INHALATION) at 18:49

## 2020-01-11 RX ADMIN — LEVALBUTEROL HYDROCHLORIDE 1.25 MG: 1.25 SOLUTION, CONCENTRATE RESPIRATORY (INHALATION) at 03:08

## 2020-01-11 RX ADMIN — LEVALBUTEROL HYDROCHLORIDE 1.25 MG: 1.25 SOLUTION, CONCENTRATE RESPIRATORY (INHALATION) at 18:49

## 2020-01-11 RX ADMIN — PREDNISONE 40 MG: 20 TABLET ORAL at 15:15

## 2020-01-11 RX ADMIN — DILTIAZEM HYDROCHLORIDE 120 MG: 120 CAPSULE, COATED, EXTENDED RELEASE ORAL at 15:14

## 2020-01-11 RX ADMIN — Medication 10 ML: at 10:20

## 2020-01-11 RX ADMIN — Medication 10 ML: at 20:20

## 2020-01-11 RX ADMIN — DOXYCYCLINE HYCLATE 100 MG: 100 TABLET ORAL at 15:14

## 2020-01-11 RX ADMIN — IPRATROPIUM BROMIDE 0.5 MG: 0.5 SOLUTION RESPIRATORY (INHALATION) at 03:08

## 2020-01-11 RX ADMIN — LEVALBUTEROL HYDROCHLORIDE 1.25 MG: 1.25 SOLUTION, CONCENTRATE RESPIRATORY (INHALATION) at 10:42

## 2020-01-11 RX ADMIN — VENLAFAXINE HYDROCHLORIDE 75 MG: 75 CAPSULE, EXTENDED RELEASE ORAL at 15:14

## 2020-01-11 RX ADMIN — IPRATROPIUM BROMIDE 0.5 MG: 0.5 SOLUTION RESPIRATORY (INHALATION) at 10:42

## 2020-01-11 RX ADMIN — GUAIFENESIN 600 MG: 600 TABLET, EXTENDED RELEASE ORAL at 20:21

## 2020-01-11 RX ADMIN — IPRATROPIUM BROMIDE 0.5 MG: 0.5 SOLUTION RESPIRATORY (INHALATION) at 14:51

## 2020-01-11 RX ADMIN — PANTOPRAZOLE SODIUM 40 MG: 40 TABLET, DELAYED RELEASE ORAL at 15:20

## 2020-01-11 RX ADMIN — IPRATROPIUM BROMIDE 0.5 MG: 0.5 SOLUTION RESPIRATORY (INHALATION) at 06:45

## 2020-01-11 NOTE — PROGRESS NOTES
RESPIRATORY THERAPY ASSESSMENT    Name:  Antoine Record Number:  [id number]  Age: 78 y.o. Gender: female  : 1950  Today's Date:  2020  Room:  /0310-02    Assessment     Is the patient being admitted for a COPD or Asthma exacerbation? Yes   (If yes the patient will be seen every 4 hours for the first 24 hours and then reassessed)    Patient Admission Diagnosis      Allergies  No Known Allergies    Minimum Predicted Vital Capacity:     680          Actual Vital Capacity:      1320              Pulmonary History:COPD  Home Oxygen Therapy:  2L  Home Respiratory Therapy:Albuterol and Albuterol/Ipratropium Bromide MDI   Current Respiratory Therapy:  Xopenex/atrovent q4  Treatment Type: HHN, Vibratory mucous clearing therapy or intervention  Medications: Levalbuterol HCL, Ipratropium Bromide    Respiratory Severity Index(RSI)   Patients with orders for inhalation medications, oxygen, or any therapeutic treatment modality will be placed on Respiratory Protocol. They will be assessed with the first treatment and at least every 72 hours thereafter. The following severity scale will be used to determine frequency of treatment intervention.     Smoking History: Pulmonary Disease or Smoking History, Greater than 15 pack year = 2    Social History  Social History     Tobacco Use    Smoking status: Former Smoker     Packs/day: 1.00     Years: 30.00     Pack years: 30.00     Types: Cigarettes     Last attempt to quit: 1996     Years since quittin.0    Smokeless tobacco: Never Used   Substance Use Topics    Alcohol use: No     Alcohol/week: 0.0 standard drinks    Drug use: No       Recent Surgical History: None = 0  Past Surgical History  Past Surgical History:   Procedure Laterality Date    ABDOMEN SURGERY  11    total abdominal colectomy iliostomy    CARPAL TUNNEL RELEASE      right    CATARACT REMOVAL Bilateral     L 2013, R 2013    CATARACT REMOVAL WITH

## 2020-01-11 NOTE — PROGRESS NOTES
Xray tech called and stated they were working with a patient now and then should be able to do FL Esophagram soon after. Will monitor. Patient informed.  Shows V/U.

## 2020-01-11 NOTE — PROGRESS NOTES
dextrose, glucagon (rDNA), dextrose, levalbuterol, sodium chloride nebulizer, sodium chloride flush, magnesium hydroxide, ondansetron, acetaminophen    Labs:  CBC:   Recent Labs     01/08/20  1644 01/09/20  0447   WBC 12.8* 10.3   HGB 12.8 12.7   HCT 40.4 39.5   MCV 89.0 88.9    355     BMP:   Recent Labs     01/08/20  1515 01/09/20  0447 01/10/20  0429    136 138   K 4.1 4.1 3.3*   CL 93* 97* 97*   CO2 35* 29 28   BUN 33* 31* 29*   CREATININE 0.9 0.7 0.8     LIVER PROFILE:   Recent Labs     01/08/20  1515   AST 14*   ALT 14   BILITOT <0.2   ALKPHOS 74     PT/INR: No results for input(s): PROTIME, INR in the last 72 hours. APTT: No results for input(s): APTT in the last 72 hours. UA:No results for input(s): NITRITE, COLORU, PHUR, LABCAST, WBCUA, RBCUA, MUCUS, TRICHOMONAS, YEAST, BACTERIA, CLARITYU, SPECGRAV, LEUKOCYTESUR, UROBILINOGEN, BILIRUBINUR, BLOODU, GLUCOSEU, AMORPHOUS in the last 72 hours. Invalid input(s): KETONESU  No results for input(s): PHART, DDP1TSB, PO2ART in the last 72 hours. Cultures:   Bld: + CNS      Films:  Chest CTA: No PE, Lungs/pleura:  Moderate centrilobular emphysema.  Biapical pleural-parenchymal scarring right greater than left.  Unchanged.  4 mm 3 mm medial right lung  apex nodule series 3, image 17 unchanged as is a 4 mm more peripheral nodule  on image 27.  Previously noted 6 mm and 3 mm middle lobe nodules on image 83  and 75 respectively unchanged.  Further cephalad is cluster of tree-in-bud  micro nodules in the upper portion medial segment middle lobe suggestive of  some some bronchiolitis.   No pleural effusion or pneumothorax.           ASSESSMENT:  · Acute respiratory failure with hypoxemia  · COPD with acute exacerbation  · Bronchopneumonia/bronchiolitis- with micronodules on chest CT     PLAN:  ·  IV steroids -> pred taper  · Inhaled bronchodilators   · Antibiotics (D3 CTX and doxycycline)  · Supplemental oxygen to maintain SaO2 >92%; wean as tolerated  · Radiology recommended short term f/u for lung micronodules

## 2020-01-11 NOTE — PROGRESS NOTES
Pt is lying in bed with their eyes closed. Respirations are easy and even. Call light within reach bed in lowest position with the wheels locked. Will continue to monitor.  Maria L Franklin

## 2020-01-11 NOTE — PROGRESS NOTES
Assessment completed as charted. Patient resting in bed. Patient on 2 L of oxygen 97%. Patient states SOB with exertion. VSS. Patient remains NPO at this time for a esophagram. ATB infusing as ordered. Call light in reach will monitor.

## 2020-01-11 NOTE — PROGRESS NOTES
Progress Note    Admit Date:  1/8/2020    Subjective:  Ms. Marcelo Bah feels much better today  cough and shortness of breath have improved  Some wheezing on exam    Complains of some difficulty swallowing and feels food is stuck in the lower chest upper abdomen area. Esophagogram pending. Blood sugars better    Objective:   BP (!) 146/86   Pulse 114   Temp 97.5 °F (36.4 °C) (Oral)   Resp 18   Ht 5' (1.524 m)   Wt 100 lb 12.8 oz (45.7 kg)   SpO2 98%   BMI 19.69 kg/m²       Intake/Output Summary (Last 24 hours) at 1/11/2020 1127  Last data filed at 1/11/2020 1113  Gross per 24 hour   Intake 411 ml   Output --   Net 411 ml         Physical Exam:  Gen: Thin, frail appearing female,  Alert, awake, oriented   Eyes: PERRL. No sclera icterus. No conjunctival injection. ENT: No discharge. Pharynx clear. Neck: No JVD. No Carotid Bruit. Trachea midline. Resp: no accessory muscle use. No crackles. + mild wheezes. no rhonchi. On 2.5L NC O2  CV:  Regular rate and rhythm. No murmur. No rub. No edema. Capillary Refill: Brisk,< 3 seconds   Peripheral Pulses: +2 palpable, equal bilaterally   GI: Non-tender. Non-distended. . Normal bowel sounds. Skin: Warm and dry. M/S: No cyanosis. No joint deformity. No clubbing. Neuro: Awake. Grossly nonfocal    Psych: Oriented x 3.  No anxiety or agitation.          Medications:   Scheduled Meds:   linagliptin  5 mg Oral Daily    metFORMIN  500 mg Oral BID WC    insulin lispro  0-12 Units Subcutaneous TID     insulin lispro  0-6 Units Subcutaneous Nightly    ipratropium  0.5 mg Nebulization Q4H    levalbuterol  1.25 mg Nebulization Q4H    cefTRIAXone (ROCEPHIN) IV  1 g Intravenous Q24H    doxycycline hyclate  100 mg Oral 2 times per day    guaiFENesin  600 mg Oral BID    diltiazem  120 mg Oral Daily    pantoprazole  40 mg Oral QAM AC    [Held by provider] furosemide  40 mg Oral Daily    gabapentin  300 mg Oral TID    QUEtiapine  25 mg Oral Nightly    venlafaxine 75 mg Oral Daily with breakfast    topiramate  50 mg Oral BID    simvastatin  20 mg Oral Nightly    sodium chloride flush  10 mL Intravenous 2 times per day    enoxaparin  40 mg Subcutaneous Daily    predniSONE  40 mg Oral Daily       Continuous Infusions:   dextrose         Data:  CBC:   Recent Labs     01/08/20  1644 01/09/20  0447   WBC 12.8* 10.3   RBC 4.54 4.44   HGB 12.8 12.7   HCT 40.4 39.5   MCV 89.0 88.9   RDW 14.7 14.6    355     BMP:   Recent Labs     01/08/20  1515 01/09/20  0447 01/10/20  0429    136 138   K 4.1 4.1 3.3*   CL 93* 97* 97*   CO2 35* 29 28   BUN 33* 31* 29*   CREATININE 0.9 0.7 0.8     BNP: No results for input(s): BNP in the last 72 hours. PT/INR: No results for input(s): PROTIME, INR in the last 72 hours. APTT: No results for input(s): APTT in the last 72 hours. CARDIAC ENZYMES:   Recent Labs     01/08/20  1515   TROPONINI <0.01     FASTING LIPID PANEL:  Lab Results   Component Value Date    CHOL 160 11/13/2019    HDL 79 (H) 11/13/2019    TRIG 92 11/13/2019     LIVER PROFILE:   Recent Labs     01/08/20  1515   AST 14*   ALT 14   BILITOT <0.2   ALKPHOS 74      Radiology  CT Chest Pulmonary Embolism W Contrast   Final Result   1. No evidence for acute pulmonary embolism. 2. New tree-in-bud group of micro nodules in the medial segment middle lobe   upper portion suggestive of bronchiolitis. Consider three-month follow-up. 3. Emphysema unchanged as are a few other subcentimeter nodules as discussed   in detail above. FL ESOPHAGRAM    (Results Pending)         Assessment:  Active Problems:    COPD exacerbation (HCC)    Acute on chronic respiratory failure with hypoxia (HCC)    Acute exacerbation of COPD with asthma ([facility] [address]    Bronchopneumonia    Bronchiolitis  Resolved Problems:    * No resolved hospital problems.  *      Plan:    Acute on chronic hypoxic respiratory failure  -Patient uses 2 L nasal cannula O2 at home  -She was short of breath and found to be

## 2020-01-12 LAB
ANION GAP SERPL CALCULATED.3IONS-SCNC: 7 MMOL/L (ref 3–16)
BUN BLDV-MCNC: 23 MG/DL (ref 7–20)
CALCIUM SERPL-MCNC: 9.1 MG/DL (ref 8.3–10.6)
CHLORIDE BLD-SCNC: 101 MMOL/L (ref 99–110)
CO2: 27 MMOL/L (ref 21–32)
CREAT SERPL-MCNC: 0.6 MG/DL (ref 0.6–1.2)
CULTURE, BLOOD 2: NORMAL
GFR AFRICAN AMERICAN: >60
GFR NON-AFRICAN AMERICAN: >60
GLUCOSE BLD-MCNC: 118 MG/DL (ref 70–99)
GLUCOSE BLD-MCNC: 137 MG/DL (ref 70–99)
GLUCOSE BLD-MCNC: 137 MG/DL (ref 70–99)
GLUCOSE BLD-MCNC: 159 MG/DL (ref 70–99)
GLUCOSE BLD-MCNC: 243 MG/DL (ref 70–99)
PERFORMED ON: ABNORMAL
POTASSIUM REFLEX MAGNESIUM: 4.2 MMOL/L (ref 3.5–5.1)
SODIUM BLD-SCNC: 135 MMOL/L (ref 136–145)

## 2020-01-12 PROCEDURE — 2580000003 HC RX 258: Performed by: INTERNAL MEDICINE

## 2020-01-12 PROCEDURE — 94640 AIRWAY INHALATION TREATMENT: CPT

## 2020-01-12 PROCEDURE — 2700000000 HC OXYGEN THERAPY PER DAY

## 2020-01-12 PROCEDURE — 94669 MECHANICAL CHEST WALL OSCILL: CPT

## 2020-01-12 PROCEDURE — 97165 OT EVAL LOW COMPLEX 30 MIN: CPT

## 2020-01-12 PROCEDURE — 2060000000 HC ICU INTERMEDIATE R&B

## 2020-01-12 PROCEDURE — 6370000000 HC RX 637 (ALT 250 FOR IP): Performed by: PHYSICIAN ASSISTANT

## 2020-01-12 PROCEDURE — 6360000002 HC RX W HCPCS: Performed by: INTERNAL MEDICINE

## 2020-01-12 PROCEDURE — 6370000000 HC RX 637 (ALT 250 FOR IP): Performed by: INTERNAL MEDICINE

## 2020-01-12 PROCEDURE — 97161 PT EVAL LOW COMPLEX 20 MIN: CPT

## 2020-01-12 PROCEDURE — 97530 THERAPEUTIC ACTIVITIES: CPT

## 2020-01-12 PROCEDURE — 80048 BASIC METABOLIC PNL TOTAL CA: CPT

## 2020-01-12 PROCEDURE — 99232 SBSQ HOSP IP/OBS MODERATE 35: CPT | Performed by: INTERNAL MEDICINE

## 2020-01-12 PROCEDURE — 36415 COLL VENOUS BLD VENIPUNCTURE: CPT

## 2020-01-12 PROCEDURE — 94761 N-INVAS EAR/PLS OXIMETRY MLT: CPT

## 2020-01-12 PROCEDURE — 97116 GAIT TRAINING THERAPY: CPT

## 2020-01-12 RX ORDER — FUROSEMIDE 10 MG/ML
20 INJECTION INTRAMUSCULAR; INTRAVENOUS ONCE
Status: COMPLETED | OUTPATIENT
Start: 2020-01-12 | End: 2020-01-12

## 2020-01-12 RX ADMIN — IPRATROPIUM BROMIDE 0.5 MG: 0.5 SOLUTION RESPIRATORY (INHALATION) at 11:13

## 2020-01-12 RX ADMIN — GUAIFENESIN 600 MG: 600 TABLET, EXTENDED RELEASE ORAL at 20:43

## 2020-01-12 RX ADMIN — GABAPENTIN 300 MG: 300 CAPSULE ORAL at 14:28

## 2020-01-12 RX ADMIN — LEVALBUTEROL HYDROCHLORIDE 1.25 MG: 1.25 SOLUTION, CONCENTRATE RESPIRATORY (INHALATION) at 02:57

## 2020-01-12 RX ADMIN — METFORMIN HYDROCHLORIDE 500 MG: 500 TABLET, FILM COATED, EXTENDED RELEASE ORAL at 17:15

## 2020-01-12 RX ADMIN — Medication 10 ML: at 20:44

## 2020-01-12 RX ADMIN — GABAPENTIN 300 MG: 300 CAPSULE ORAL at 20:44

## 2020-01-12 RX ADMIN — ENOXAPARIN SODIUM 40 MG: 40 INJECTION SUBCUTANEOUS at 09:19

## 2020-01-12 RX ADMIN — SIMVASTATIN 20 MG: 10 TABLET, FILM COATED ORAL at 20:43

## 2020-01-12 RX ADMIN — CEFTRIAXONE SODIUM 1 G: 1 INJECTION, POWDER, FOR SOLUTION INTRAMUSCULAR; INTRAVENOUS at 09:19

## 2020-01-12 RX ADMIN — IPRATROPIUM BROMIDE 0.5 MG: 0.5 SOLUTION RESPIRATORY (INHALATION) at 23:32

## 2020-01-12 RX ADMIN — INSULIN LISPRO 2 UNITS: 100 INJECTION, SOLUTION INTRAVENOUS; SUBCUTANEOUS at 13:29

## 2020-01-12 RX ADMIN — IPRATROPIUM BROMIDE 0.5 MG: 0.5 SOLUTION RESPIRATORY (INHALATION) at 19:15

## 2020-01-12 RX ADMIN — DILTIAZEM HYDROCHLORIDE 120 MG: 120 CAPSULE, COATED, EXTENDED RELEASE ORAL at 09:20

## 2020-01-12 RX ADMIN — INSULIN LISPRO 2 UNITS: 100 INJECTION, SOLUTION INTRAVENOUS; SUBCUTANEOUS at 20:44

## 2020-01-12 RX ADMIN — IPRATROPIUM BROMIDE 0.5 MG: 0.5 SOLUTION RESPIRATORY (INHALATION) at 02:57

## 2020-01-12 RX ADMIN — LINAGLIPTIN 5 MG: 5 TABLET, FILM COATED ORAL at 09:20

## 2020-01-12 RX ADMIN — LEVALBUTEROL HYDROCHLORIDE 1.25 MG: 1.25 SOLUTION, CONCENTRATE RESPIRATORY (INHALATION) at 07:08

## 2020-01-12 RX ADMIN — LEVALBUTEROL HYDROCHLORIDE 1.25 MG: 1.25 SOLUTION, CONCENTRATE RESPIRATORY (INHALATION) at 15:29

## 2020-01-12 RX ADMIN — TOPIRAMATE 50 MG: 25 TABLET, FILM COATED ORAL at 20:43

## 2020-01-12 RX ADMIN — LEVALBUTEROL HYDROCHLORIDE 1.25 MG: 1.25 SOLUTION, CONCENTRATE RESPIRATORY (INHALATION) at 19:15

## 2020-01-12 RX ADMIN — IPRATROPIUM BROMIDE 0.5 MG: 0.5 SOLUTION RESPIRATORY (INHALATION) at 07:08

## 2020-01-12 RX ADMIN — GUAIFENESIN 600 MG: 600 TABLET, EXTENDED RELEASE ORAL at 09:20

## 2020-01-12 RX ADMIN — Medication 10 ML: at 09:19

## 2020-01-12 RX ADMIN — LEVALBUTEROL HYDROCHLORIDE 1.25 MG: 1.25 SOLUTION, CONCENTRATE RESPIRATORY (INHALATION) at 23:32

## 2020-01-12 RX ADMIN — DOXYCYCLINE HYCLATE 100 MG: 100 TABLET ORAL at 20:43

## 2020-01-12 RX ADMIN — DOXYCYCLINE HYCLATE 100 MG: 100 TABLET ORAL at 09:20

## 2020-01-12 RX ADMIN — FUROSEMIDE 20 MG: 10 INJECTION, SOLUTION INTRAMUSCULAR; INTRAVENOUS at 17:15

## 2020-01-12 RX ADMIN — IPRATROPIUM BROMIDE 0.5 MG: 0.5 SOLUTION RESPIRATORY (INHALATION) at 15:29

## 2020-01-12 RX ADMIN — TOPIRAMATE 50 MG: 25 TABLET, FILM COATED ORAL at 09:20

## 2020-01-12 RX ADMIN — PREDNISONE 40 MG: 20 TABLET ORAL at 09:20

## 2020-01-12 RX ADMIN — VENLAFAXINE HYDROCHLORIDE 75 MG: 75 CAPSULE, EXTENDED RELEASE ORAL at 09:20

## 2020-01-12 RX ADMIN — GABAPENTIN 300 MG: 300 CAPSULE ORAL at 09:19

## 2020-01-12 RX ADMIN — QUETIAPINE FUMARATE 25 MG: 25 TABLET ORAL at 20:43

## 2020-01-12 RX ADMIN — METFORMIN HYDROCHLORIDE 500 MG: 500 TABLET, FILM COATED, EXTENDED RELEASE ORAL at 09:20

## 2020-01-12 RX ADMIN — LEVALBUTEROL HYDROCHLORIDE 1.25 MG: 1.25 SOLUTION, CONCENTRATE RESPIRATORY (INHALATION) at 11:13

## 2020-01-12 NOTE — PROGRESS NOTES
commands    Subjective  Patient sitting up in chair with no family present  Pt agreeable to this PT eval & tx. Upper Extremity ROM/Strength  Please see OT evaluation. Lower Extremity ROM / Strength    AROM WFL: Yes  ROM limitations: none. Strength Assessment (measured on a 0-5 scale):  R LE   Quad   4+   Ant Tib  4+   Hamstring 4   Iliopsoas 4  L LE  Quad   4+   Ant Tib  4+   Hamstring 4   Iliopsoas 4    Lower Extremity Sensation    NT    Lower Extremity Proprioception:   NT    Coordination and Tone  WFL    Balance  Static Sitting:  Good    Tolerance: able to sit edge of bed with and without UE support  Dynamic Sitting:  Good (don/doff socks)  Static Standing: Fair    Tolerance: able to stand x10 seconds. Dynamic Standing: Fair - (unsteady, reaching for UE support)    Bed Mobility   Supine to Sit:   Supervision  Sit to Supine:  Not Tested  Rolling:   Supervision  Scooting at EOB: Supervision  Scooting to [facility]:  Supervision    Transfer Training     Sit to stand:   Supervision  Stand to sit:   Supervision  Bed to Chair:  Supervision with use of rolling walker (RW)    Gait gait completed as indicated below  Distance:  2x30 ft   Deviations (firm surface/linoleum): decreased darline, Decreased step length on R, Decreased step length on L, narrow CHARBEL, forward flexed posture and cautious gait    Assistive Device Used:  rolling walker (RW), MIN A without AD (reaching for UE support initially, HHA provided).   Level of Assist: SBA and CGA  Comment:     Stair Training deferred, pt does not have stairs in the home environment    Activity Tolerance   Pt completed therapy session with SOB noted with transitional movements and ambulation  SpO2: 97  HR:  115    Vitals with ambulation  SPO2:  95  HR:  130s with ambulation    Vitals with seated rest x2 minutes  SPO2:  95  HR:  120s with     Positioning Needs   Pt instructed and educated on use of call light to call for assist if desiring to get up or change position, call light handed to pt and needs within reach., Pt sitting up in chair and call light provided and all needs within reach    Exercises Initiated    N/A    Patient/Family Education   Pt educated on role of inpatient PT, POC, importance of continued activity, DC recommendations, pursed lip breathing, pacing activity and calling for assist with mobility, leaning forward on bedside table to ease work of breathing after exertional activities. Assessment  Pt seen for Physical Therapy evaluation in acute care setting. Pt demonstrated decreased Activity tolerance and Balance and decreased independence with Ambulation. Motivated. Oxygen saturation stable throughout, but HR elevated at rest and with activity. Does recover to be able to carry on a conversation within several minutes seated rest.  Mentioned inpatient pulmonary rehab, but patient not very interested, preferring home PT at d/c. Goals : To be met in 3 visits:  1). Independent with LE Ex x 10 reps    To be met in 6 visits:  1). Supine to/from sit: Supervision consistently  2). Sit to/from stand: Supervision consistently. 3). Bed to chair: Supervision  4). Gait: Ambulate 150 ft.  with  Supervision  and use of rolling walker (RW)  5). Tolerate B LE exercises 1-2 sets of 10-15 reps  6). Patient can tolerate a 3 min or 6 min walk. Rehabilitation Potential: Good  Strengths for achieving goals include:   Pt motivated, PLOF, Family Support and Pt cooperative  Barriers to achieving goals include:    No barriers     Plan    To be seen 3-5 x / week  while in acute care setting for therapeutic exercises, bed mobility, transfers, progressive gait training, balance training, and family/patient education. Jesse Sams, PT  #[id number]      If patient discharges from this facility prior to next visit, this note will serve as the Discharge Summary.

## 2020-01-12 NOTE — PROGRESS NOTES
Pulmonary Progress Note    CC: shortness of breath     Subjective:   Patient feels she needs PT. She says she is more dyspneic this am.       Intake/Output Summary (Last 24 hours) at 1/12/2020 0553  Last data filed at 1/11/2020 1801  Gross per 24 hour   Intake 406 ml   Output --   Net 406 ml       Exam:   /70   Pulse 99   Temp 97 °F (36.1 °C) (Oral)   Resp 18   Ht 5' (1.524 m)   Wt 100 lb 3.2 oz (45.5 kg)   SpO2 98%   BMI 19.57 kg/m²  on 2l NC  Gen: Mild distress. Normocephalic, atraumatic. Eyes: PERRL. No sclera icterus. No conjunctival injection. ENT: No discharge. Pharynx clear. Neck: Trachea midline. No obvious mass. Resp: No accessory muscle use. No crackles. No wheezes. No rhonchi. No dullness on percussion. CV: Regular rate. Regular rhythm. No murmur or rub. No edema. GI: Non-tender. Non-distended. No hernia. Skin: Warm and dry. No nodule on exposed extremities. Lymph: No cervical LAD. No supraclavicular LAD. M/S: No cyanosis. No joint deformity. No clubbing. Neuro: Awake. Alert. Moves all four extremities.      Scheduled Meds:   linagliptin  5 mg Oral Daily    metFORMIN  500 mg Oral BID WC    insulin lispro  0-12 Units Subcutaneous TID WC    insulin lispro  0-6 Units Subcutaneous Nightly    ipratropium  0.5 mg Nebulization Q4H    levalbuterol  1.25 mg Nebulization Q4H    cefTRIAXone (ROCEPHIN) IV  1 g Intravenous Q24H    doxycycline hyclate  100 mg Oral 2 times per day    guaiFENesin  600 mg Oral BID    diltiazem  120 mg Oral Daily    pantoprazole  40 mg Oral QAM AC    [Held by provider] furosemide  40 mg Oral Daily    gabapentin  300 mg Oral TID    QUEtiapine  25 mg Oral Nightly    venlafaxine  75 mg Oral Daily with breakfast    topiramate  50 mg Oral BID    simvastatin  20 mg Oral Nightly    sodium chloride flush  10 mL Intravenous 2 times per day    enoxaparin  40 mg Subcutaneous Daily    predniSONE  40 mg Oral Daily     Continuous Infusions:   dextrose PRN Meds:  glucose, dextrose, glucagon (rDNA), dextrose, levalbuterol, sodium chloride nebulizer, sodium chloride flush, magnesium hydroxide, ondansetron, acetaminophen    Labs:  CBC:   No results for input(s): WBC, HGB, HCT, MCV, PLT in the last 72 hours. BMP:   Recent Labs     01/10/20  0429      K 3.3*   CL 97*   CO2 28   BUN 29*   CREATININE 0.8     LIVER PROFILE:   No results for input(s): AST, ALT, LIPASE, BILIDIR, BILITOT, ALKPHOS in the last 72 hours. Invalid input(s): AMYLASE,  ALB  PT/INR: No results for input(s): PROTIME, INR in the last 72 hours. APTT: No results for input(s): APTT in the last 72 hours. UA:No results for input(s): NITRITE, COLORU, PHUR, LABCAST, WBCUA, RBCUA, MUCUS, TRICHOMONAS, YEAST, BACTERIA, CLARITYU, SPECGRAV, LEUKOCYTESUR, UROBILINOGEN, BILIRUBINUR, BLOODU, GLUCOSEU, AMORPHOUS in the last 72 hours. Invalid input(s): KETONESU  No results for input(s): PHART, QIJ5QEH, PO2ART in the last 72 hours. Cultures:   Bld: + CNS      Films:  Chest CTA: No PE, Lungs/pleura:  Moderate centrilobular emphysema.  Biapical pleural-parenchymal scarring right greater than left.  Unchanged.  4 mm 3 mm medial right lung  apex nodule series 3, image 17 unchanged as is a 4 mm more peripheral nodule  on image 27.  Previously noted 6 mm and 3 mm middle lobe nodules on image 83  and 75 respectively unchanged.  Further cephalad is cluster of tree-in-bud  micro nodules in the upper portion medial segment middle lobe suggestive of  some some bronchiolitis.   No pleural effusion or pneumothorax.           ASSESSMENT:  · Acute respiratory failure with hypoxemia  · COPD with acute exacerbation  · Bronchopneumonia/bronchiolitis- with micronodules on chest CT     PLAN:  · pred taper  · Inhaled bronchodilators   · Antibiotics (D4 CTX and doxycycline)  · Supplemental oxygen to maintain SaO2 >92%; wean as tolerated  · Radiology recommended short term f/u for lung micronodules  · PT/OT

## 2020-01-12 NOTE — FLOWSHEET NOTE
01/11/20 2017   Vital Signs   Temp 97 °F (36.1 °C)   Temp Source Oral   Pulse 112   Heart Rate Source Monitor   Resp 18   /74   BP Location Right upper arm   BP Upper/Lower Upper   Level of Consciousness 0   MEWS Score 3   Oxygen Therapy   SpO2 97 %   O2 Device Nasal cannula   O2 Flow Rate (L/min) 2 L/min   Pt assessment complete. Pt lying in bed quietly. No s/s of distress noted. Lung sounds diminished. Pt on 02 2 liters via nasal canula. Nightly medications given. Denies needs at this time. Call light within reach.

## 2020-01-12 NOTE — PROGRESS NOTES
floor: doorway step steps to enter, ramp leading up to house  Steps to second floor:         Full flight of 12-13 (basement, patient doesn't use)  Bathroom: Walk-in Shower, Grab bars, Shower Chair  and Standard height toilet  Equipment owned: SPC, Rolling Walker, home O2 (2 at all times L) continuous, pulse ox, reacher, inhaler, nebulizer and life alert     Preadmission Status:  Pt. Able to drive: Yes  Pt Fully independent with ADLs: Yes  Pt. Required assistance from family for: heavier household tasks  Pt. Fully independent for transfers and gait and walked with No Device  History of falls          No    Pain  None reported throughout     Cognition    A&O x4   Able to follow 2 step commands    Subjective  Patient lying supine in bed with no family present  Pt agreeable to this OT eval & tx. Upper Extremity ROM:    WFL,  pt able to perform all bed mobility, transfers, and gait without ROM limitation. Upper Extremity Strength:    BUE strength WFL, but not formally assessed w/ MMT    Upper Extremity Sensation    WNL    Upper Extremity Proprioception:   WNL    Coordination and Tone  WNL    Balance  Functional Sitting Balance: WNL  Functional Standing Balance:Diminished (improved with RW)     Bed mobility:    Supine to sit:   Supervision  Sit to supine:   Supervision  Scooting to head of bed:   Supervision  Scooting in sitting:  Supervision  Rolling:   Not Tested  Bridging:   Not Tested    Transfers:    Sit to stand:  Supervision  Stand to sit:  Supervision  Pt completed functional mobility of household distance in preparation for standing ADL/IADLs this date with SBA for line management and use of the RW. Activity Tolerance   Pt completed therapy session with SOB noted with mobility. SpO2: remained above 90% throughout session   HR: 115-130bpm     Dressing:     UE:   Not Tested  LE:    Supervision to don socks seated in bedside chair     Bathing:    UE:  Not Tested  LE:  Not Tested    Eating:    Indpt to

## 2020-01-12 NOTE — PROGRESS NOTES
Patient resting in bed and denies any pain or needs at this time. Call light within reach and bed locked in the lowest position.

## 2020-01-12 NOTE — PROGRESS NOTES
Progress Note    Admit Date:  1/8/2020    Subjective:  Ms. Jagjit Pruitt feels worse today   Increased cough and shortness of breath   unable to bring up any sputum       feels weak     Complained of some difficulty swallowing and feels food is stuck in the lower chest upper abdomen area- > Esophagogram done . No striture , mild reflux noted     Blood sugars better    Objective:   /79   Pulse 120   Temp 98.3 °F (36.8 °C) (Oral)   Resp 22   Ht 5' (1.524 m)   Wt 100 lb 3.2 oz (45.5 kg)   SpO2 95%   BMI 19.57 kg/m²       Intake/Output Summary (Last 24 hours) at 1/12/2020 1437  Last data filed at 1/12/2020 1233  Gross per 24 hour   Intake 855 ml   Output --   Net 855 ml         Physical Exam:  Gen: Thin, frail appearing female,  Alert, awake, oriented   Eyes: PERRL. No sclera icterus. No conjunctival injection. ENT: No discharge. Pharynx clear. Neck: No JVD. No Carotid Bruit. Trachea midline. Resp: no accessory muscle use. Very diminished breath sounds. No wheezes. no rhonchi. On 2.5L NC O2  CV:  Regular rate and rhythm. No murmur. No rub. No edema. Capillary Refill: Brisk,< 3 seconds   Peripheral Pulses: +2 palpable, equal bilaterally   GI: Non-tender. Non-distended. . Normal bowel sounds. Skin: Warm and dry. M/S: No cyanosis. No joint deformity. No clubbing. Neuro: Awake. Grossly nonfocal    Psych: Oriented x 3.  No anxiety or agitation.          Medications:   Scheduled Meds:   linagliptin  5 mg Oral Daily    metFORMIN  500 mg Oral BID WC    insulin lispro  0-12 Units Subcutaneous TID     insulin lispro  0-6 Units Subcutaneous Nightly    ipratropium  0.5 mg Nebulization Q4H    levalbuterol  1.25 mg Nebulization Q4H    cefTRIAXone (ROCEPHIN) IV  1 g Intravenous Q24H    doxycycline hyclate  100 mg Oral 2 times per day    guaiFENesin  600 mg Oral BID    diltiazem  120 mg Oral Daily    pantoprazole  40 mg Oral QAM AC    [Held by provider] furosemide  40 mg Oral Daily    gabapentin  300 mg Oral TID    QUEtiapine  25 mg Oral Nightly    venlafaxine  75 mg Oral Daily with breakfast    topiramate  50 mg Oral BID    simvastatin  20 mg Oral Nightly    sodium chloride flush  10 mL Intravenous 2 times per day    enoxaparin  40 mg Subcutaneous Daily    predniSONE  40 mg Oral Daily       Continuous Infusions:   dextrose         Data:  CBC:   No results for input(s): WBC, RBC, HGB, HCT, MCV, RDW, PLT in the last 72 hours. BMP:   Recent Labs     01/10/20  0429 01/12/20  0619    135*   K 3.3* 4.2   CL 97* 101   CO2 28 27   BUN 29* 23*   CREATININE 0.8 0.6     BNP: No results for input(s): BNP in the last 72 hours. PT/INR: No results for input(s): PROTIME, INR in the last 72 hours. APTT: No results for input(s): APTT in the last 72 hours. CARDIAC ENZYMES:   No results for input(s): CKMB, CKMBINDEX, TROPONINI in the last 72 hours. Invalid input(s): CKTOTAL;3  FASTING LIPID PANEL:  Lab Results   Component Value Date    CHOL 160 11/13/2019    HDL 79 (H) 11/13/2019    TRIG 92 11/13/2019     LIVER PROFILE:   No results for input(s): AST, ALT, ALB, BILIDIR, BILITOT, ALKPHOS in the last 72 hours. Radiology  FL ESOPHAGRAM   Final Result   No esophageal stricture, narrowing, or mass. No hiatal hernia. Moderate   gastroesophageal reflux demonstrated with the refluxed barium extended above   the level of the aortic arch. CT Chest Pulmonary Embolism W Contrast   Final Result   1. No evidence for acute pulmonary embolism. 2. New tree-in-bud group of micro nodules in the medial segment middle lobe   upper portion suggestive of bronchiolitis. Consider three-month follow-up. 3. Emphysema unchanged as are a few other subcentimeter nodules as discussed   in detail above.                Assessment:  Active Problems:    COPD exacerbation (HCC)    Acute on chronic respiratory failure with hypoxia (HCC)    Acute exacerbation of COPD with asthma (HCC)    Bronchopneumonia    Bronchiolitis  Resolved

## 2020-01-12 NOTE — PROGRESS NOTES
Pt is lying in bed with their eyes closed. Respirations are easy and even. Call light within reach bed in lowest position with the wheels locked. Will continue to monitor.  Severiano Price

## 2020-01-13 VITALS
BODY MASS INDEX: 19.71 KG/M2 | SYSTOLIC BLOOD PRESSURE: 105 MMHG | OXYGEN SATURATION: 97 % | DIASTOLIC BLOOD PRESSURE: 64 MMHG | TEMPERATURE: 97.1 F | HEIGHT: 60 IN | WEIGHT: 100.4 LBS | HEART RATE: 87 BPM | RESPIRATION RATE: 18 BRPM

## 2020-01-13 LAB
GLUCOSE BLD-MCNC: 110 MG/DL (ref 70–99)
GLUCOSE BLD-MCNC: 137 MG/DL (ref 70–99)
GLUCOSE BLD-MCNC: 229 MG/DL (ref 70–99)
PERFORMED ON: ABNORMAL

## 2020-01-13 PROCEDURE — 6360000002 HC RX W HCPCS: Performed by: INTERNAL MEDICINE

## 2020-01-13 PROCEDURE — 97535 SELF CARE MNGMENT TRAINING: CPT

## 2020-01-13 PROCEDURE — 99232 SBSQ HOSP IP/OBS MODERATE 35: CPT | Performed by: INTERNAL MEDICINE

## 2020-01-13 PROCEDURE — 94640 AIRWAY INHALATION TREATMENT: CPT

## 2020-01-13 PROCEDURE — 6370000000 HC RX 637 (ALT 250 FOR IP): Performed by: INTERNAL MEDICINE

## 2020-01-13 PROCEDURE — 99238 HOSP IP/OBS DSCHRG MGMT 30/<: CPT | Performed by: INTERNAL MEDICINE

## 2020-01-13 PROCEDURE — 94669 MECHANICAL CHEST WALL OSCILL: CPT

## 2020-01-13 PROCEDURE — 2580000003 HC RX 258: Performed by: INTERNAL MEDICINE

## 2020-01-13 PROCEDURE — 97110 THERAPEUTIC EXERCISES: CPT

## 2020-01-13 PROCEDURE — 6370000000 HC RX 637 (ALT 250 FOR IP): Performed by: PHYSICIAN ASSISTANT

## 2020-01-13 PROCEDURE — 2700000000 HC OXYGEN THERAPY PER DAY

## 2020-01-13 RX ORDER — PREDNISONE 10 MG/1
TABLET ORAL
Qty: 18 TABLET | Refills: 0 | Status: SHIPPED | OUTPATIENT
Start: 2020-01-13 | End: 2020-01-28 | Stop reason: ALTCHOICE

## 2020-01-13 RX ORDER — CEFUROXIME AXETIL 500 MG/1
500 TABLET ORAL 2 TIMES DAILY
Qty: 4 TABLET | Refills: 0
Start: 2020-01-13 | End: 2020-01-15

## 2020-01-13 RX ORDER — DOXYCYCLINE HYCLATE 100 MG
100 TABLET ORAL EVERY 12 HOURS SCHEDULED
Qty: 4 TABLET | Refills: 0 | Status: SHIPPED | OUTPATIENT
Start: 2020-01-13 | End: 2020-01-15

## 2020-01-13 RX ADMIN — IPRATROPIUM BROMIDE 0.5 MG: 0.5 SOLUTION RESPIRATORY (INHALATION) at 02:48

## 2020-01-13 RX ADMIN — GABAPENTIN 300 MG: 300 CAPSULE ORAL at 09:20

## 2020-01-13 RX ADMIN — PREDNISONE 40 MG: 20 TABLET ORAL at 09:20

## 2020-01-13 RX ADMIN — LINAGLIPTIN 5 MG: 5 TABLET, FILM COATED ORAL at 09:20

## 2020-01-13 RX ADMIN — VENLAFAXINE HYDROCHLORIDE 75 MG: 75 CAPSULE, EXTENDED RELEASE ORAL at 09:20

## 2020-01-13 RX ADMIN — DOXYCYCLINE HYCLATE 100 MG: 100 TABLET ORAL at 09:20

## 2020-01-13 RX ADMIN — IPRATROPIUM BROMIDE 0.5 MG: 0.5 SOLUTION RESPIRATORY (INHALATION) at 11:31

## 2020-01-13 RX ADMIN — IPRATROPIUM BROMIDE 0.5 MG: 0.5 SOLUTION RESPIRATORY (INHALATION) at 07:49

## 2020-01-13 RX ADMIN — FUROSEMIDE 40 MG: 40 TABLET ORAL at 09:20

## 2020-01-13 RX ADMIN — CEFTRIAXONE SODIUM 1 G: 1 INJECTION, POWDER, FOR SOLUTION INTRAMUSCULAR; INTRAVENOUS at 09:20

## 2020-01-13 RX ADMIN — LEVALBUTEROL HYDROCHLORIDE 1.25 MG: 1.25 SOLUTION, CONCENTRATE RESPIRATORY (INHALATION) at 07:48

## 2020-01-13 RX ADMIN — METFORMIN HYDROCHLORIDE 500 MG: 500 TABLET, FILM COATED, EXTENDED RELEASE ORAL at 09:20

## 2020-01-13 RX ADMIN — LEVALBUTEROL HYDROCHLORIDE 1.25 MG: 1.25 SOLUTION, CONCENTRATE RESPIRATORY (INHALATION) at 15:22

## 2020-01-13 RX ADMIN — LEVALBUTEROL HYDROCHLORIDE 1.25 MG: 1.25 SOLUTION, CONCENTRATE RESPIRATORY (INHALATION) at 11:31

## 2020-01-13 RX ADMIN — ENOXAPARIN SODIUM 40 MG: 40 INJECTION SUBCUTANEOUS at 09:19

## 2020-01-13 RX ADMIN — LEVALBUTEROL HYDROCHLORIDE 1.25 MG: 1.25 SOLUTION, CONCENTRATE RESPIRATORY (INHALATION) at 02:48

## 2020-01-13 RX ADMIN — PANTOPRAZOLE SODIUM 40 MG: 40 TABLET, DELAYED RELEASE ORAL at 09:20

## 2020-01-13 RX ADMIN — DILTIAZEM HYDROCHLORIDE 120 MG: 120 CAPSULE, COATED, EXTENDED RELEASE ORAL at 09:20

## 2020-01-13 RX ADMIN — Medication 10 ML: at 09:19

## 2020-01-13 RX ADMIN — GUAIFENESIN 600 MG: 600 TABLET, EXTENDED RELEASE ORAL at 09:20

## 2020-01-13 RX ADMIN — TOPIRAMATE 50 MG: 25 TABLET, FILM COATED ORAL at 09:20

## 2020-01-13 RX ADMIN — IPRATROPIUM BROMIDE 0.5 MG: 0.5 SOLUTION RESPIRATORY (INHALATION) at 15:22

## 2020-01-13 ASSESSMENT — PAIN SCALES - GENERAL
PAINLEVEL_OUTOF10: 0
PAINLEVEL_OUTOF10: 0

## 2020-01-13 NOTE — PROGRESS NOTES
Pulmonary Progress Note  CC: SOB    Subjective:  She feels SOB. She does not feel ready to go home yet. EXAM: /64   Pulse 87   Temp 97.1 °F (36.2 °C) (Oral)   Resp 18   Ht 5' (1.524 m)   Wt 100 lb 6.4 oz (45.5 kg)   SpO2 99%   BMI 19.61 kg/m²  on 2lpm  Constitutional:  No acute distress. Eyes: PERRL. Conjunctivae anicteric. ENT: Normal nose. Normal tongue. Neck:  Trachea is midline. No thyroid tenderness. Respiratory: No accessory muscle usage. decreased breath sounds. No wheezes. No rales. No Rhonchi. Cardiovascular: Normal S1S2. No digit clubbing. No digit cyanosis. No LE edema. Psychiatric: No anxiety or Agitation. Alert and Oriented to person, place and time. Scheduled Meds:   linagliptin  5 mg Oral Daily    metFORMIN  500 mg Oral BID WC    insulin lispro  0-12 Units Subcutaneous TID WC    insulin lispro  0-6 Units Subcutaneous Nightly    ipratropium  0.5 mg Nebulization Q4H    levalbuterol  1.25 mg Nebulization Q4H    cefTRIAXone (ROCEPHIN) IV  1 g Intravenous Q24H    doxycycline hyclate  100 mg Oral 2 times per day    guaiFENesin  600 mg Oral BID    diltiazem  120 mg Oral Daily    pantoprazole  40 mg Oral QAM AC    furosemide  40 mg Oral Daily    gabapentin  300 mg Oral TID    QUEtiapine  25 mg Oral Nightly    venlafaxine  75 mg Oral Daily with breakfast    topiramate  50 mg Oral BID    simvastatin  20 mg Oral Nightly    sodium chloride flush  10 mL Intravenous 2 times per day    enoxaparin  40 mg Subcutaneous Daily    predniSONE  40 mg Oral Daily     Continuous Infusions:   dextrose       PRN Meds:  glucose, dextrose, glucagon (rDNA), dextrose, levalbuterol, sodium chloride nebulizer, sodium chloride flush, magnesium hydroxide, ondansetron, acetaminophen    Labs:  CBC: No results for input(s): WBC, HGB, HCT, MCV, PLT in the last 72 hours.   BMP:   Recent Labs     01/12/20  0619   *   K 4.2      CO2 27   BUN 23*   CREATININE 0.6

## 2020-01-13 NOTE — PROGRESS NOTES
Dc instructions reviewed with patient who gave a verbal understanding. Patients IV removed with no complications. Patient stated she had all of her belongings. Patient currently dressed ,eating dinner.

## 2020-01-13 NOTE — CARE COORDINATION
Case Management Assessment  Initial Evaluation    Date/Time of Evaluation: 1/9/2020 9:34 AM  Assessment Completed by: Ángela Parsons    Patient Name: Abel Matamoros  YOB: 1950  Diagnosis: COPD with acute exacerbation [facility]) [J44.1]  Date / Time: 1/8/2020  2:55 PM  Admission status/Date:1/8/2020 inpt  Chart Reviewed: Yes      Patient Interviewed: Yes   Family Interviewed:  No      Hospitalization in the last 30 days:  No    Contacts  :     Relationship to Patient:   Phone Number:    Alternate Contact:     Relationship to Patient:     Phone Number:    Met with:    Current PCP  Deandre Fraser required for SNF : Y        3 night stay required - [employer]  Support Systems: Children, Family Members  Transportation: self    Meal Preparation: self    Housing  Home Environment: lives in 1 story house with dtr and fernando  Steps: 1  Plans to Return to Present Housing: Yes  Other Identified Issues:     Margot Matute 77  Currently active with [employer] [address] : No  Type of Home Care Services: None  Passport/Waiver : No  :                      Phone Number:    Passport/Waiver Services:           [facility] Provider: Rotnarda  Equipment: Walker___Cane___RTS___ BSC___Shower Chair___  02_X_ at __2__Liter(s)---Uses_continuous_HHN_X__ CPAP___ BiPap___ Hospital Bed___W/C____Other________      Has Home O2 in place on admit:  Yes  Informed of need to bring portable home O2 tank on day of discharge for nursing to connect prior to leaving:   Yes  Verbalized agreement/Understanding:   Yes    Community Service Affiliation  Dialysis:  No    · Name:  · Location  · Dialysis Schedule:  · Phone:   · Fax: Outpatient PT/OT: Yes - Estefani Stanton: No     CHF Clinic: No     Pulmonary Rehab: No  Pain Clinic: No  Community Mental Health: No    Wound Clinic: No     Other:     DISCHARGE PLAN: Chart reviewed and role of dcp explained.
DISCHARGE ORDER  Date/Time 2020 4:19 PM  Completed by: Carlos Alberto Rodriguez, Case Management    Patient Name: Dot Adam    : 1950  Admitting Diagnosis: COPD with acute exacerbation ([facility] [address] [J44.1]      Admit order Date and [id number] inpt  (verify MD's last order for status of admission)      Noted discharge order. Confirmed discharge plan with patient / family (pt): Yes    If pt confirmed DC plan does family need to be contacted by CM No if yes who______  Discharge Plan: Reviewed chart. Role of discharge planner explained and patient verbalized understanding. Discharge order is noted. Pt is being d/c'd to home today. Pt lives with son and daughter in law. Pt's O2 sats are 97% on 2L. Pt is active with [employer] for home O2 and is at 2L baseline. Pt declines HHC. No further discharge needs needed or noted. Reviewed chart. Role of discharge planner explained and patient verbalized understanding. Discharge order is noted. Has Home O2 in place on admit:  Yes  Informed of need to bring portable home O2 tank on day of discharge for nursing to connect prior to leaving:   Yes  Verbalized agreement/Understanding:   Yes    Discharge timeout done with Quentin Melendez RN. All discharge needs and concerns addressed.
you have difficulty taking your medications?:  I always take them as prescribed. Housing Review  Who do you live with?:  Child  Are you an active caregiver in your home?:  No  Social Support  Do you have a ?:  No  Do you have a [facility]?:  No  Durable Medical Equipment  Patient DME:  Straight cane, [employer], Other  Other Patient DME:  life alert, reacher  Patient Home Equipment:  Oxygen, Nebulizer  Functional Review  Ability to maintain home (clean home, laundry): Independent  Ability to drive and/or has transportation:  Independent  Ability to do shopping:  Independent  Ability to manage finances: Independent  Is patient able to live independently?:  Yes  Hearing and Vision  Care Transitions Interventions                                 Follow Up  Future Appointments   Date Time Provider Estefani Ng   1/14/2020 11:00 AM King Faith, PT [facility] EG PT [facility]   1/22/2020 11:00 AM King Faith, PT [facility] EG PT [place] [facility]   2/18/2020 10:30 AM Scott Villa MD [place] [facility] None   3/11/2020 10:00 AM Geremias Foreman MD [facility]O [place]   5/14/2020  1:00 PM Beverly Riggs MD [facility]   5/28/2020  1:15 PM Mahsa Dunlap  [address] Maintenance  There are no preventive care reminders to display for this patient.     Brendon Dwyer RN

## 2020-01-13 NOTE — DISCHARGE SUMMARY
Name:  Nuvia Baker  Room:  /7129-68  MRN:    [id number]    Discharge Summary      This discharge summary is in conjunction with a complete physical exam done on the day of discharge. Discharging Physician: Dr. Sy Ashley: 1/8/2020  Discharge: 1/13/2020     HPI taken from admission H&P:    The patient is a 78 y.o. female with severe COPD, depression, hyperlipidemia, diabetes mellitus who presented to [facility] ED with complaint of shortness of breath. Patient was recently admitted from 1/3- 1/6 for CAP and COPD AE. She was discharged home and reports that she never improved. She continued to have SOB and a persistent, non-productive cough despite feeling like she needed to cough up sputum. She denies any fevers at home. She reports coughing throughout the day and night and feeling very weak and fatigued to the point that it took her 2 hours sitting on the side of her bed before she could get up yesterday. She reports using 2L nc O2 at home and has not had to significantly increase her O2 but feels like she can not get a deep breath at any point.      Diagnoses this Admission and Hospital Course   Acute on chronic hypoxic respiratory failure  -Patient uses 2 L nasal cannula O2 at home  -She was short of breath and found to be hypoxic on her home supplemental O2  -Now requiring 2.5 L to maintain good sats  -Patient recently treated for pneumonia and repeat imaging with bronchiolitis likely is the cause  -PRN supplemental O2 and wean as tolerated  - resume lasix      Bronchiolitis  COPD acute exacerbation  -Recent admission for pneumonia with patient currently still on antibiotics  -Patient was treated with ceftriaxone and azithromycin for 4 days during her recent admission and was discharged home with Ceftin x1 week  -Continue inhaled bronchodilators, steroids and CTX and Doxycycline D#5-->d/c home on ceftin and doxy x 2 more days and PO steroid taper   -Pulmonology consult  - Mucinex and 1 tablet by mouth 2 times daily for 2 days     Compressor/Nebulizer Misc  1 Device by Does not apply route as needed DX COPD J44.9     diltiazem 120 MG extended release capsule  Commonly known as:  CARDIZEM CD  Take 1 capsule by mouth daily     esomeprazole 40 MG delayed release capsule  Commonly known as:  NEXIUM  TAKE 1 CAPSULE EVERY MORNING BEFORE BREAKFAST     fluticasone 50 MCG/ACT nasal spray  Commonly known as:  FLONASE  2 sprays by Nasal route daily.      furosemide 40 MG tablet  Commonly known as:  LASIX  TAKE 1 TABLET EVERY DAY     gabapentin 300 MG capsule  Commonly known as:  NEURONTIN  TAKE 1 CAPSULE BY MOUTH THREE TIMES DAILY FOR 30 DAYS     guaiFENesin 600 MG extended release tablet  Commonly known as:  MUCINEX  Take 1 tablet by mouth 2 times daily     JANUVIA 100 MG tablet  Generic drug:  SITagliptin  TAKE 1 TABLET BY MOUTH DAILY     metFORMIN 500 MG extended release tablet  Commonly known as:  GLUCOPHAGE-XR  TAKE 2 TABLETS BY MOUTH TWICE DAILY     naproxen 375 MG tablet  Commonly known as:  NAPROSYN  TAKE 1 TABLET TWICE DAILY     Nebulizer/Tubing/Mouthpiece Kit  1 kit by Does not apply route daily as needed DX COPD J44.9     potassium chloride 20 MEQ extended release tablet  Commonly known as:  KLOR-CON M  TAKE 1 TABLET EVERY DAY     QUEtiapine 25 MG tablet  Commonly known as:  SEROQUEL  TAKE 1 TO 2 TABLETS BY MOUTH AT BEDTIME.     simvastatin 20 MG tablet  Commonly known as:  ZOCOR  TAKE 1 TABLET EVERY EVENING     theophylline 200 MG extended release tablet  Commonly known as:  THEOCHRON  Take 1 tablet by mouth 2 times daily     tiotropium 18 MCG inhalation capsule  Commonly known as:  SPIRIVA HANDIHALER  INHALE THE CONTENTS OF 1 CAPSULE EVERY DAY     topiramate 50 MG tablet  Commonly known as:  TOPAMAX  TAKE 1 TABLET BY MOUTH TWICE DAILY     venlafaxine 75 MG extended release capsule  Commonly known as:  EFFEXOR XR  TAKE 1 CAPSULE EVERY DAY     VITAMIN B-12 PO     vitamin D 400 units Tabs

## 2020-01-13 NOTE — PROGRESS NOTES
Occupational Therapy Daily Treatment Note    Unit: PCU  Date:  1/13/2020  Patient Name:    Marti Lopez  Admitting diagnosis:  COPD with acute exacerbation [facility]) [J44.1]  Admit Date:  1/8/2020  Precautions/Restrictions:  fall risk, IV, bed/chair alarm, supplemental O2 (2L) and telemetry      Discharge Recommendations: Acute Rehab (ARU)/ Inpatient [facility] ([facility]) Inpatient pulmonary rehab   DME needs for discharge: Needs Met       Therapy recommendations for staff:   Assist of 1 (stand by assist) with use of rolling walker (RW) for all transfers to/from BSC/chair  to/from bedside commode    AM-PAC Score: 21  Home Health S4 Level: NA       Treatment Time:  11:03-11:33  Treatment number:  2   Total Treatment Time:   30 minutes      Subjective:  Pt very agreeable to tx and cooperative     Pain   No  Rating: NA  Location:NA  Pain Medicine Status: Denies need      Bed Mobility:   Supine to Sit:  Modified Independent  Sit to Supine:  N/A  Rolling:           Modified Independent  Scooting:        Modified Independent    Transfer Training:   Sit to stand:   SBA  Stand to sit:  SBA  Bed to Chair:  CGA no AD using furniture for stability (room was not conducive for using RW)   Bed to BSC:   CGA no AD   Standard toilet:   N/A    Activity Tolerance   Pt completed therapy session with No nausea, dizziness, pain or SOB noted w/activity  SpO2: 94% on 2 L after toileting task, 90% on 2L after standing exercise 15 reps B UE, 95% after exercise standing and walk to and from chair.    HR: 122 with transfers 127 with exercise       ADL Training:   Upper body dressing:  N/A  Upper body bathing:  N/A  Lower body dressing:  Modified Independent with toileting   Lower body bathing:  Modified Independent  Toileting:   Modified Independent  Grooming/Hygiene:  Supervision    Therapeutic Exercise: did all exercises standing with feet in different positions to challenge balance and pt had no LOB   Shoulder flex/ext:  x15 reps   Shoulder

## 2020-01-14 ENCOUNTER — HOSPITAL ENCOUNTER (OUTPATIENT)
Dept: PHYSICAL THERAPY | Age: 70
Setting detail: THERAPIES SERIES
Discharge: HOME | End: 2020-01-14
Payer: MEDICARE

## 2020-01-14 ENCOUNTER — CARE COORDINATION (OUTPATIENT)
Dept: CASE MANAGEMENT | Age: 70
End: 2020-01-14

## 2020-01-14 NOTE — CARE COORDINATION
[facility] Transitions Initial Follow Up Call    Call within 2 business days of discharge: Yes    Patient: Yaakov Severs Patient : 1950   MRN: [id number]  Reason for Admission: PNA, COPD  Discharge Date: 20 RARS: Readmission Risk Score: 17      Last Discharge [facility]       Complaint Diagnosis Description Type Department Provider    20 Shortness of Breath Acute exacerbation of COPD with asthma ([address] . .. ED to Hosp-Admission (Discharged) (ADMITTED) [address] PCU Renne Lennox, [facility]; [facility],... Facility: [facility]    1st attempt to reach patient for post 24h discharge care transition call. Message left stating purpose of call, contact information and request for return call.        Follow Up  Future Appointments   Date Time Provider Estefani Ng   2020 11:00 AM Eli Berry, PT [address] EG PT [place] [facility]OD   2020 11:00 AM Eli Berry PT [address] EG PT [place] [facility]OD   2020 10:30 AM Eric Harrell MD [place] [facility] None   3/11/2020 10:00 AM Ayush Mak MD AND [facility]URO [facility]   2020  1:00 PM Priscila Bardales MD [facility] AND [facility]   2020  1:15 PM Александр Zavala MD [facility]       Servando Castaneda RN

## 2020-01-14 NOTE — FLOWSHEET NOTE
[facility], [address] [place] [address], [address] [place] [address] [address]  Phone: [phone]   Fax:     [phone]    Physical Therapy  Cancellation/No-show Note  Patient Name:  Radha Eaton  :  1950   Date:  2020    Cancelled visits to date: 0  No-shows to date: 1    For today's appointment patient:  []  Cancelled  []  Rescheduled appointment  [x]  No-show     Reason given by patient:  []  Patient ill  []  Conflicting appointment  []  No transportation    []  Conflict with work  [x]  No reason given  []  Other:     Comments:      Phone call information:   []  Phone call made today to patient at _ time at number provided:      []  Patient answered, conversation as follows:    []  Patient did not answer, message left as follows:  [x]  Phone call not made today  []  Phone call not needed - pt contacted us to cancel and provided reason for cancellation.      Electronically signed by:  Dom Robb PT

## 2020-01-15 ENCOUNTER — CARE COORDINATION (OUTPATIENT)
Dept: CASE MANAGEMENT | Age: 70
End: 2020-01-15

## 2020-01-15 PROCEDURE — 1111F DSCHRG MED/CURRENT MED MERGE: CPT | Performed by: INTERNAL MEDICINE

## 2020-01-20 RX ORDER — BLOOD SUGAR DIAGNOSTIC
STRIP MISCELLANEOUS
Qty: 100 STRIP | Refills: 0 | Status: SHIPPED | OUTPATIENT
Start: 2020-01-20 | End: 2020-03-30 | Stop reason: SDUPTHER

## 2020-01-20 RX ORDER — SIMVASTATIN 20 MG
TABLET ORAL
Qty: 90 TABLET | Refills: 0 | Status: SHIPPED | OUTPATIENT
Start: 2020-01-20 | End: 2020-03-30 | Stop reason: SDUPTHER

## 2020-01-22 ENCOUNTER — HOSPITAL ENCOUNTER (OUTPATIENT)
Dept: PHYSICAL THERAPY | Age: 70
Setting detail: THERAPIES SERIES
Discharge: HOME | End: 2020-01-22
Payer: MEDICARE

## 2020-01-22 ENCOUNTER — CARE COORDINATION (OUTPATIENT)
Dept: CASE MANAGEMENT | Age: 70
End: 2020-01-22

## 2020-01-22 NOTE — FLOWSHEET NOTE
[facility], [address] [place] [address], [address] [place] [address] [address]  Phone: [phone]   Fax:     [phone]    Physical Therapy  Cancellation/No-show Note  Patient Name:  Marti Lopez  :  1950   Date:  2020    Cancelled visits to date: 0  No-shows to date: 2    For today's appointment patient:  []  Cancelled  []  Rescheduled appointment  [x]  No-show     Reason given by patient:  []  Patient ill  []  Conflicting appointment  []  No transportation    []  Conflict with work  [x]  No reason given  []  Other:     Comments:      Phone call information:   []  Phone call made today to patient at _ time at number provided:      []  Patient answered, conversation as follows:    []  Patient did not answer, message left as follows:  [x]  Phone call not made today  []  Phone call not needed - pt contacted us to cancel and provided reason for cancellation.      Electronically signed by:  Mandy Dyer PT

## 2020-01-22 NOTE — CARE COORDINATION
[facility] Transitions Follow Up Call    2020    Patient: Pearlean Lefort  Patient : 1950   MRN: [id number]  Reason for Admission: PNA  Discharge Date: 20 RARS: Readmission Risk Score: 17         Spoke with: Pearlean Lefort (patient)    Pearlean Lefort reports she is getting around a lot better, denies SOB. Continues with cough but it is now productive and clearing. Weight stable at 99#.  yesterday morning. Notified her that her outpatient PT was documented as \"no show\" and instructed her to communicate with them that she is recovering from PNA. Encouraged TCM visit, she again declines. Reviewed s/s to report SAME DAY to PCP or writer for recommendation. She states she will call if needed. Care transition following. Care Transitions Subsequent and Final Call    Schedule Follow Up Appointment with PCP:  Declined  Subsequent and Final Calls  Do you have any ongoing symptoms?:  Yes  Onset of Patient-reported symptoms:  Other  Patient-reported symptoms:  Cough  Interventions for patient-reported symptoms:  Other  Have your medications changed?:  No  Do you have any questions related to your medications?:  No  Do you currently have any active services?:  No  Do you have any needs or concerns that I can assist you with?:  No  Identified Barriers:  None  Care Transitions Interventions     Other Therapy Services:  Declined      Other Services:  Declined                           Other Interventions:             Follow Up  Future Appointments   Date Time Provider Estefani Ng   2020 10:30 AM Javier Vargas MD [place] [facility] None   3/11/2020 10:00 AM Alexia Calzada MD AND NEURO [facility]   2020  1:00 PM MD VERONICA Glez AND [facility]   2020  1:15 PM Leopold Boss, MD [facility] PULM [facility]       José Miguel Anthony RN

## 2020-01-27 ENCOUNTER — CARE COORDINATION (OUTPATIENT)
Dept: CASE MANAGEMENT | Age: 70
End: 2020-01-27

## 2020-01-27 NOTE — CARE COORDINATION
[facility] Transitions Follow Up Call    2020    Patient: Margie Grady  Patient : 1950   MRN: [id number]  Reason for Admission: resp failure 2/2 PNA aeCOPD  Discharge Date: 20 RARS: Readmission Risk Score: 17       Unable to reach patient by phone. Message left stating purpose of call with contact information requesting return call. Care transition outreach complete.      Follow Up  Future Appointments   Date Time Provider Estefani Ng   2020 10:30 AM Candace Alfaro MD [place] [facility] None   3/11/2020 10:00 AM Barrington Epps MD [facility]   2020  1:00 PM Ayde Arriaga MD [facility]   2020  1:15 PM Leslie Rider [facility]       Mukesh Bryan RN

## 2020-01-28 ENCOUNTER — CARE COORDINATION (OUTPATIENT)
Dept: CARE COORDINATION | Age: 70
End: 2020-01-28

## 2020-01-28 RX ORDER — POTASSIUM CHLORIDE 750 MG/1
20 CAPSULE, EXTENDED RELEASE ORAL DAILY
COMMUNITY
End: 2022-02-24

## 2020-01-28 RX ORDER — POTASSIUM CHLORIDE 1.5 G/1.77G
20 POWDER, FOR SOLUTION ORAL 2 TIMES DAILY
COMMUNITY
End: 2020-01-28 | Stop reason: CLARIF

## 2020-01-28 NOTE — CARE COORDINATION
Independent  Ability to manage finances: Independent  Is patient able to live independently?:  Yes     Current Housing:  Private Residence        Per the Fall Risk Screening, did the patient have 2 or more falls or 1 fall with injury in the past year?:  No     Frequent urination at night?:  No  Do you use rails/bars?:  Yes  Do you have a non-slip tub mat?:  Yes     Are you experiencing loss of meaning?:  No  Are you experiencing loss of hope and peace?:  No     Thinking about your patient's physical health needs, are there any symptoms or problems (risk indicators) you are unsure about that require further investigation?:  No identified areas of uncertainly or problems already being investigated   Are the patients physical health problems impacting on their mental well-being?:  No identified areas of concern   Are there any problems with your patients lifestyle behaviors (alcohol, drugs, diet, exercise) that are impacting on physical or mental well-being?:  No identified areas of concern   Do you have any other concerns about your patients mental well-being?  How would you rate their severity and impact on the patient?:  No identified areas of concern   How would you rate their home environment in terms of safety and stability (including domestic violence, insecure housing, neighbor harassment)?:  Consistently safe, supportive, stable, no identified problems   How do daily activities impact on the patient's well-being? (include current or anticipated unemployment, work, caregiving, access to transportation or other):  No identified problems or perceived positive benefits   How would you rate their social network (family, work, friends)?:  Adequate participation with social networks   How would you rate their financial resources (including ability to afford all required medical care)?:  Financially secure, resources adequate, no identified problems   How wells does the patient now understand their health and well-being (symptoms, signs or risk factors) and what they need to do to manage their health?:  Reasonable to good understanding and already engages in managing health or is willing to undertake better management   How well do you think your patient can engage in healthcare discussions? (Barriers include language, deafness, aphasia, alcohol or drug problems, learning difficulties, concentration):  Clear and open communication, no identified barriers   Do other services need to be involved to help this patient?:  Other care/services not required at this time   Are current services involved with this patient well-coordinated? (Include coordination with other services you are now recommendation): All required care/services in place and well-coordinated   Suggested Interventions and Community Resources                  Prior to Admission medications    Medication Sig Start Date End Date Taking?  Authorizing Provider   potassium chloride (MICRO-K) 10 MEQ extended release capsule Take 20 mEq by mouth daily    Yes Historical Provider, MD   simvastatin (ZOCOR) 20 MG tablet TAKE 1 TABLET EVERY EVENING 1/20/20  Yes Rickey Ambrose MD   diltiazem (CARDIZEM CD) 120 MG extended release capsule Take 1 capsule by mouth daily 1/6/20  Yes Kashif Salgado MD   guaiFENesin (MUCINEX) 600 MG extended release tablet Take 1 tablet by mouth 2 times daily 1/6/20  Yes Kashif Salgado MD   venlafaxine (EFFEXOR XR) 75 MG extended release capsule TAKE 1 CAPSULE EVERY DAY 12/20/19  Yes Rickey Ambrose MD   topiramate (TOPAMAX) 50 MG tablet TAKE 1 TABLET BY MOUTH TWICE DAILY 12/9/19  Yes Don Sahrpe MD   QUEtiapine (SEROQUEL) 25 MG tablet TAKE 1 TO 2 TABLETS BY MOUTH AT BEDTIME. 12/9/19  Yes Rickey Ambrose MD   Cyanocobalamin (VITAMIN B-12 PO) Take by mouth   Yes Historical Provider, MD   tiotropium (Janeece Bark) 18 MCG inhalation capsule INHALE THE CONTENTS OF 1 CAPSULE EVERY DAY 11/21/19  Yes Mary Grace Bolton Vasquez Madrid MD   JANUVIA 100 MG tablet TAKE 1 TABLET BY MOUTH DAILY 11/8/19  Yes Marlo Guerrero MD   metFORMIN (GLUCOPHAGE-XR) 500 MG extended release tablet TAKE 2 TABLETS BY MOUTH TWICE DAILY 11/8/19  Yes MD Sara Levin Friar 250-50 MCG/DOSE AEPB INHALE 1 PUFF INTO THE LUNGS EVERY 12 HOURS 11/5/19  Yes Shameka Jaffe MD   albuterol sulfate  (90 Base) MCG/ACT inhaler INHALE 2 PUFFS EVERY 6 HOURS AS NEEDED FOR WHEEZING 9/23/19  Yes Marlo Guerrero MD   naproxen (NAPROSYN) 375 MG tablet TAKE 1 TABLET TWICE DAILY 5/14/19  Yes Marlo Guerrero MD   furosemide (LASIX) 40 MG tablet TAKE 1 TABLET EVERY DAY 5/14/19  Yes Marlo Guerrero MD   esomeprazole ([address]) 40 MG delayed release capsule TAKE 1 CAPSULE EVERY MORNING BEFORE BREAKFAST 5/14/19  Yes Marol Guerrero MD   gabapentin (NEURONTIN) 300 MG capsule TAKE 1 CAPSULE BY MOUTH THREE TIMES DAILY FOR 30 DAYS 4/25/19 9/19/24 Yes Marlo Guerrero MD   alendronate (FOSAMAX) 35 MG tablet TAKE 1 TABLET EVERY 7 DAYS 2/27/19  Yes Marlo Guerrero MD   albuterol (PROVENTIL) (2.5 MG/3ML) 0.083% nebulizer solution INHALE THE CONTENTS OF 1 VIAL VIA NEBULIZER EVERY 6 HOURS AS NEEDED FOR SHORTNESS OF BREATH OR WHEEZING 8/30/18  Yes Shameka Jaffe MD   theophylline (THEOCHRON) 200 MG extended release tablet Take 1 tablet by mouth 2 times daily 10/18/16  Yes Marlo Guerrero MD   vitamin D (CHOLECALCIFEROL) 400 UNITS TABS tablet Take 2 tablets by mouth daily 3/3/16  Yes Shameka Jaffe MD   calcium carbonate (CALCIUM 600) 600 MG TABS tablet Take 2 tablets by mouth daily 3/3/16  Yes Shameka Jaffe MD   fluticasone (FLONASE) 50 MCG/ACT nasal spray 2 sprays by Nasal route daily. 4/18/13  Yes Marlo Guerrero MD   ACCU-CHEK SMARTVIEW strip TEST BLOOD SUGAR EVERY DAY 1/20/20   Marlo Guerrero MD   Blood Glucose Monitoring Suppl (ACCU-CHEK GAURANG SMARTVIEW) w/Device KIT Use to test once daily.  DX:E11.9 1/12/19   Nazanin Brock MD   blood glucose test strips (ACCU-CHEK SMARTVIEW) strip Use to test once daily. DX:E11.9 1/12/19   Nazanin Brock MD   Lancets Misc. (ACCU-CHEK FASTCLIX LANCET) KIT Use to test once daily. DX:E11.9 1/12/19   Nazanin Brock MD   ACCU-CHEK JOSIE PLUS strip USE TO TEST DAILY 5/29/18   Nazanin Brock MD   Respiratory Therapy Supplies (NEBULIZER/TUBING/MOUTHPIECE) KIT 1 kit by Does not apply route daily as needed DX COPD J44.9 12/3/15   Dino Diamond MD   Nebulizers (COMPRESSOR/NEBULIZER) MISC 1 Device by Does not apply route as needed DX COPD J44.9 12/3/15   Dino Diamond MD   Misc. Devices (ACAPELLA) MISC by Does not apply route 4 times daily. 2/6/12   Dino Diamond MD       Future Appointments   Date Time Provider Estefani Matamorosisti   2/18/2020 10:30 AM MD Rolly [facility] None   3/11/2020 10:00 AM Mary Lima MD AND [facility]   5/14/2020  1:00 PM Adi Quiroz MD [facility]   5/28/2020  1:15 PM [facility]     ,   Diabetes Assessment    Medic Alert ID:  No  How often do you test your blood sugar?:  Daily   Do you have barriers with adherence to non-pharmacologic self-management interventions?  (Nutrition/Exercise/Self-Monitoring):  No   Have you ever had to go to the ED for symptoms of low blood sugar?:  No       No patient-reported symptoms   Do you have hyperglycemia symptoms?:  No   Do you have hypoglycemia symptoms?:  No   Last Blood Sugar Value:  112   Blood Sugar Monitoring Regimen:  Once a Day   Blood Sugar Trends:  No Change       and   Congestive Heart Failure Assessment           Symptoms:

## 2020-02-04 ENCOUNTER — CARE COORDINATION (OUTPATIENT)
Dept: CARE COORDINATION | Age: 70
End: 2020-02-04

## 2020-02-04 ENCOUNTER — TELEPHONE (OUTPATIENT)
Dept: PULMONOLOGY | Age: 70
End: 2020-02-04

## 2020-02-04 NOTE — TELEPHONE ENCOUNTER
FW: needs a new nebulizer   Received: Today   Message Contents   [facility] [address] [place] [address] Dr for new [employer] on cancellation list    Previous Messages      ----- Message -----   From: Alden Jones RN   Sent: 2/4/2020   9:54 AM EST   To: Jesse Grimes MD   Subject: needs a new nebulizer                             Patient reports that her nebulizer is not working adequately and feels she needs a new one.   Air pressure seems weak.  Can we work on getting her a new device. She does not see you again though until May. She had also told me she was using treatment every 3-4 hours instead of every 6 prn and had the recent admit in January COPD/Pneumonia.   Would you like to see her sooner. I am going to be following her for some care coordination needs as well. She does see the PCP on 2/18 Please advise.  Thanks Alden Jones

## 2020-02-04 NOTE — CARE COORDINATION
ACM attempted outreach. Left message. Contact information for call back provided. Follow up on need for nebulizer.  Oxygen supplier

## 2020-02-05 NOTE — TELEPHONE ENCOUNTER
Patient called with message left for patient to call back to office. Need to see where she would like neb order sent.

## 2020-02-12 ENCOUNTER — OFFICE VISIT (OUTPATIENT)
Dept: PULMONOLOGY | Age: 70
End: 2020-02-12
Payer: MEDICARE

## 2020-02-12 VITALS
HEART RATE: 120 BPM | HEIGHT: 61 IN | BODY MASS INDEX: 18.54 KG/M2 | OXYGEN SATURATION: 93 % | WEIGHT: 98.2 LBS | DIASTOLIC BLOOD PRESSURE: 60 MMHG | SYSTOLIC BLOOD PRESSURE: 102 MMHG

## 2020-02-12 PROCEDURE — G8926 SPIRO NO PERF OR DOC: HCPCS | Performed by: INTERNAL MEDICINE

## 2020-02-12 PROCEDURE — 1090F PRES/ABSN URINE INCON ASSESS: CPT | Performed by: INTERNAL MEDICINE

## 2020-02-12 PROCEDURE — G8482 FLU IMMUNIZE ORDER/ADMIN: HCPCS | Performed by: INTERNAL MEDICINE

## 2020-02-12 PROCEDURE — 99214 OFFICE O/P EST MOD 30 MIN: CPT | Performed by: INTERNAL MEDICINE

## 2020-02-12 PROCEDURE — 4040F PNEUMOC VAC/ADMIN/RCVD: CPT | Performed by: INTERNAL MEDICINE

## 2020-02-12 PROCEDURE — 1036F TOBACCO NON-USER: CPT | Performed by: INTERNAL MEDICINE

## 2020-02-12 PROCEDURE — 1111F DSCHRG MED/CURRENT MED MERGE: CPT | Performed by: INTERNAL MEDICINE

## 2020-02-12 PROCEDURE — 3017F COLORECTAL CA SCREEN DOC REV: CPT | Performed by: INTERNAL MEDICINE

## 2020-02-12 PROCEDURE — G8427 DOCREV CUR MEDS BY ELIG CLIN: HCPCS | Performed by: INTERNAL MEDICINE

## 2020-02-12 PROCEDURE — 1123F ACP DISCUSS/DSCN MKR DOCD: CPT | Performed by: INTERNAL MEDICINE

## 2020-02-12 PROCEDURE — G8399 PT W/DXA RESULTS DOCUMENT: HCPCS | Performed by: INTERNAL MEDICINE

## 2020-02-12 PROCEDURE — 3023F SPIROM DOC REV: CPT | Performed by: INTERNAL MEDICINE

## 2020-02-12 PROCEDURE — G8420 CALC BMI NORM PARAMETERS: HCPCS | Performed by: INTERNAL MEDICINE

## 2020-02-12 RX ORDER — ALBUTEROL SULFATE 2.5 MG/3ML
2.5 SOLUTION RESPIRATORY (INHALATION) EVERY 6 HOURS PRN
Qty: 120 VIAL | Refills: 6 | Status: SHIPPED | OUTPATIENT
Start: 2020-02-12 | End: 2021-03-02

## 2020-02-12 NOTE — PROGRESS NOTES
release capsule, Take 1 capsule by mouth daily, Disp: 30 capsule, Rfl: 3    guaiFENesin (MUCINEX) 600 MG extended release tablet, Take 1 tablet by mouth 2 times daily, Disp: 60 tablet, Rfl: 1    venlafaxine (EFFEXOR XR) 75 MG extended release capsule, TAKE 1 CAPSULE EVERY DAY, Disp: 90 capsule, Rfl: 0    topiramate (TOPAMAX) 50 MG tablet, TAKE 1 TABLET BY MOUTH TWICE DAILY, Disp: 180 tablet, Rfl: 1    QUEtiapine (SEROQUEL) 25 MG tablet, TAKE 1 TO 2 TABLETS BY MOUTH AT BEDTIME., Disp: 180 tablet, Rfl: 0    Cyanocobalamin (VITAMIN B-12 PO), Take by mouth, Disp: , Rfl:     tiotropium (SPIRIVA HANDIHALER) 18 MCG inhalation capsule, INHALE THE CONTENTS OF 1 CAPSULE EVERY DAY, Disp: 90 capsule, Rfl: 1    JANUVIA 100 MG tablet, TAKE 1 TABLET BY MOUTH DAILY, Disp: 90 tablet, Rfl: 0    metFORMIN (GLUCOPHAGE-XR) 500 MG extended release tablet, TAKE 2 TABLETS BY MOUTH TWICE DAILY, Disp: 360 tablet, Rfl: 0    WIXELA INHUB 250-50 MCG/DOSE AEPB, INHALE 1 PUFF INTO THE LUNGS EVERY 12 HOURS, Disp: 180 each, Rfl: 1    albuterol sulfate  (90 Base) MCG/ACT inhaler, INHALE 2 PUFFS EVERY 6 HOURS AS NEEDED FOR WHEEZING, Disp: 54 g, Rfl: 3    naproxen (NAPROSYN) 375 MG tablet, TAKE 1 TABLET TWICE DAILY, Disp: 180 tablet, Rfl: 0    furosemide (LASIX) 40 MG tablet, TAKE 1 TABLET EVERY DAY, Disp: 90 tablet, Rfl: 0    esomeprazole (NEXIUM) 40 MG delayed release capsule, TAKE 1 CAPSULE EVERY MORNING BEFORE BREAKFAST, Disp: 90 capsule, Rfl: 0    gabapentin (NEURONTIN) 300 MG capsule, TAKE 1 CAPSULE BY MOUTH THREE TIMES DAILY FOR 30 DAYS, Disp: 90 capsule, Rfl: 0    alendronate (FOSAMAX) 35 MG tablet, TAKE 1 TABLET EVERY 7 DAYS, Disp: 12 tablet, Rfl: 0    Blood Glucose Monitoring Suppl (ACCU-CHEK GAURANG SMARTVIEW) w/Device KIT, Use to test once daily. DX:E11.9, Disp: 1 kit, Rfl: 0    blood glucose test strips (ACCU-CHEK SMARTVIEW) strip, Use to test once daily.  DX:E11.9, Disp: 100 each, Rfl: 11    Lancets Misc. (ACCU-CHEK FASTCLIX LANCET) KIT, Use to test once daily. DX:E11.9, Disp: 1 kit, Rfl: 2    albuterol (PROVENTIL) (2.5 MG/3ML) 0.083% nebulizer solution, INHALE THE CONTENTS OF 1 VIAL VIA NEBULIZER EVERY 6 HOURS AS NEEDED FOR SHORTNESS OF BREATH OR WHEEZING, Disp: 1080 mL, Rfl: 3    ACCU-CHEK JOSIE PLUS strip, USE TO TEST DAILY, Disp: 100 strip, Rfl: 2    theophylline (THEOCHRON) 200 MG extended release tablet, Take 1 tablet by mouth 2 times daily, Disp: 180 tablet, Rfl: 0    vitamin D (CHOLECALCIFEROL) 400 UNITS TABS tablet, Take 2 tablets by mouth daily, Disp: 180 tablet, Rfl: 1    calcium carbonate (CALCIUM 600) 600 MG TABS tablet, Take 2 tablets by mouth daily, Disp: 180 tablet, Rfl: 1    Respiratory Therapy Supplies (NEBULIZER/TUBING/MOUTHPIECE) KIT, 1 kit by Does not apply route daily as needed DX COPD J44.9, Disp: 1 kit, Rfl: 0    Nebulizers (COMPRESSOR/NEBULIZER) MISC, 1 Device by Does not apply route as needed DX COPD J44.9, Disp: 1 each, Rfl: 0    fluticasone (FLONASE) 50 MCG/ACT nasal spray, 2 sprays by Nasal route daily. , Disp: 3 Bottle, Rfl: 0    Misc. Devices (ACAPELLA) MISC, by Does not apply route 4 times daily. , Disp: 1 each, Rfl: 0           Objective:     /60 (Site: Left Upper Arm, Position: Sitting, Cuff Size: Medium Adult)   Pulse 120   Ht 5' 1\" (1.549 m)   Wt 98 lb 3.2 oz (44.5 kg)   SpO2 93%   BMI 18.55 kg/m²  RA  Gen: No distress. Pleasant. Ill-appearing  Eyes: PERRL. No sclera icterus. No conjunctival injection. ENT: No discharge. Pharynx clear. Scar from prior trach. Neck: Trachea midline. No obvious mass. Resp: No accessory muscle use. No crackles. minimal wheezes. No rhonchi. No dullness on percussion. Decreased air entry. CV: Regular rate. Regular rhythm. No murmur or rub. No edema. GI: Non-tender. Non-distended. No hernia. Skin: Warm and dry. No nodule on exposed extremities. Lymph: No cervical LAD. No supraclavicular LAD. M/S: No cyanosis. No joint deformity.  No

## 2020-02-18 ENCOUNTER — OFFICE VISIT (OUTPATIENT)
Dept: INTERNAL MEDICINE CLINIC | Age: 70
End: 2020-02-18

## 2020-02-18 VITALS
SYSTOLIC BLOOD PRESSURE: 110 MMHG | RESPIRATION RATE: 16 BRPM | DIASTOLIC BLOOD PRESSURE: 80 MMHG | HEART RATE: 90 BPM | HEIGHT: 60 IN | WEIGHT: 100 LBS | BODY MASS INDEX: 19.63 KG/M2

## 2020-02-18 PROCEDURE — 1036F TOBACCO NON-USER: CPT | Performed by: INTERNAL MEDICINE

## 2020-02-18 PROCEDURE — 3044F HG A1C LEVEL LT 7.0%: CPT | Performed by: INTERNAL MEDICINE

## 2020-02-18 PROCEDURE — G8420 CALC BMI NORM PARAMETERS: HCPCS | Performed by: INTERNAL MEDICINE

## 2020-02-18 PROCEDURE — G8428 CUR MEDS NOT DOCUMENT: HCPCS | Performed by: INTERNAL MEDICINE

## 2020-02-18 PROCEDURE — G8482 FLU IMMUNIZE ORDER/ADMIN: HCPCS | Performed by: INTERNAL MEDICINE

## 2020-02-18 PROCEDURE — G8399 PT W/DXA RESULTS DOCUMENT: HCPCS | Performed by: INTERNAL MEDICINE

## 2020-02-18 PROCEDURE — 2022F DILAT RTA XM EVC RTNOPTHY: CPT | Performed by: INTERNAL MEDICINE

## 2020-02-18 PROCEDURE — 1123F ACP DISCUSS/DSCN MKR DOCD: CPT | Performed by: INTERNAL MEDICINE

## 2020-02-18 PROCEDURE — 3017F COLORECTAL CA SCREEN DOC REV: CPT | Performed by: INTERNAL MEDICINE

## 2020-02-18 PROCEDURE — 1090F PRES/ABSN URINE INCON ASSESS: CPT | Performed by: INTERNAL MEDICINE

## 2020-02-18 PROCEDURE — 4040F PNEUMOC VAC/ADMIN/RCVD: CPT | Performed by: INTERNAL MEDICINE

## 2020-02-18 PROCEDURE — 99214 OFFICE O/P EST MOD 30 MIN: CPT | Performed by: INTERNAL MEDICINE

## 2020-02-18 ASSESSMENT — ENCOUNTER SYMPTOMS
NAUSEA: 0
ABDOMINAL PAIN: 0
VOMITING: 0
EYE DISCHARGE: 0
SHORTNESS OF BREATH: 1
RHINORRHEA: 0
BACK PAIN: 1
COUGH: 0
WHEEZING: 0

## 2020-02-18 NOTE — PROGRESS NOTES
Blood Glucose Monitoring Suppl (ACCU-CHEK GAURANG SMARTVIEW) w/Device KIT Use to test once daily. DX:E11.9 1 kit 0    blood glucose test strips (ACCU-CHEK SMARTVIEW) strip Use to test once daily. DX:E11.9 100 each 11    Lancets Misc. (ACCU-CHEK FASTCLIX LANCET) KIT Use to test once daily. DX:E11.9 1 kit 2    ACCU-CHEK JOSIE PLUS strip USE TO TEST DAILY 100 strip 2    theophylline (THEOCHRON) 200 MG extended release tablet Take 1 tablet by mouth 2 times daily 180 tablet 0    vitamin D (CHOLECALCIFEROL) 400 UNITS TABS tablet Take 2 tablets by mouth daily 180 tablet 1    calcium carbonate (CALCIUM 600) 600 MG TABS tablet Take 2 tablets by mouth daily 180 tablet 1    Respiratory Therapy Supplies (NEBULIZER/TUBING/MOUTHPIECE) KIT 1 kit by Does not apply route daily as needed DX COPD J44.9 1 kit 0    Nebulizers (COMPRESSOR/NEBULIZER) MISC 1 Device by Does not apply route as needed DX COPD J44.9 1 each 0    fluticasone (FLONASE) 50 MCG/ACT nasal spray 2 sprays by Nasal route daily. 3 Bottle 0    Misc. Devices (ACAPELLA) MISC by Does not apply route 4 times daily. 1 each 0     No current facility-administered medications on file prior to visit. There are no changes to past medical history, family history, social history or review of systems(except as noted in the history section) since prior note (all reviewed with patient). /80 (Site: Right Upper Arm, Position: Sitting, Cuff Size: Medium Adult)   Pulse 90   Resp 16   Ht 5' (1.524 m)   Wt 100 lb (45.4 kg)   BMI 19.53 kg/m²      Objective:   Physical Exam   Constitutional: She is oriented to person, place, and time. She appears well-developed and well-nourished. HENT:   Head: Normocephalic. Right Ear: No drainage. Tympanic membrane is not injected. Left Ear: No drainage. Tympanic membrane is not injected. Eyes: Pupils are equal, round, and reactive to light.  Conjunctivae and EOM are normal.   Neck: Trachea normal and normal range of

## 2020-02-19 RX ORDER — GABAPENTIN 300 MG/1
CAPSULE ORAL
Qty: 90 CAPSULE | Refills: 0 | Status: SHIPPED | OUTPATIENT
Start: 2020-02-19 | End: 2022-10-20

## 2020-02-19 RX ORDER — THEOPHYLLINE ANHYDROUS 200 MG
200 TABLET, EXTENDED RELEASE 12 HR ORAL 2 TIMES DAILY
Qty: 180 TABLET | Refills: 0 | Status: SHIPPED | OUTPATIENT
Start: 2020-02-19 | End: 2020-02-21 | Stop reason: DRUGHIGH

## 2020-02-19 RX ORDER — ESOMEPRAZOLE MAGNESIUM 40 MG/1
CAPSULE, DELAYED RELEASE ORAL
Qty: 90 CAPSULE | Refills: 0 | Status: SHIPPED | OUTPATIENT
Start: 2020-02-19 | End: 2020-04-21

## 2020-02-19 RX ORDER — LANOLIN ALCOHOL/MO/W.PET/CERES
2 CREAM (GRAM) TOPICAL 2 TIMES DAILY
Qty: 360 TABLET | Refills: 0 | Status: SHIPPED | OUTPATIENT
Start: 2020-02-19 | End: 2022-10-20

## 2020-02-19 RX ORDER — ALENDRONATE SODIUM 35 MG/1
TABLET ORAL
Qty: 12 TABLET | Refills: 0 | Status: SHIPPED | OUTPATIENT
Start: 2020-02-19 | End: 2020-04-20

## 2020-02-19 RX ORDER — FUROSEMIDE 40 MG/1
TABLET ORAL
Qty: 90 TABLET | Refills: 0 | Status: SHIPPED | OUTPATIENT
Start: 2020-02-19 | End: 2020-04-21

## 2020-02-19 NOTE — TELEPHONE ENCOUNTER
----- Message from Chrystine Aschoff, MD sent at 2/19/2020  8:34 AM EST -----  yes  ----- Message -----  From: Laquita Quinn  Sent: 2/19/2020   8:18 AM EST  To: Chrystine Aschoff, MD    Pharmacy requesting refill for pt of calcium citrate vit d. Has not been filled since 2010. Is this ok?     [address]

## 2020-02-21 ENCOUNTER — TELEPHONE (OUTPATIENT)
Dept: INTERNAL MEDICINE CLINIC | Age: 70
End: 2020-02-21

## 2020-02-21 RX ORDER — THEOPHYLLINE 300 MG/1
300 TABLET, EXTENDED RELEASE ORAL DAILY
Qty: 90 TABLET | Refills: 0 | Status: SHIPPED | OUTPATIENT
Start: 2020-02-21 | End: 2020-05-29

## 2020-02-21 NOTE — TELEPHONE ENCOUNTER
----- Message from Kim Singleton MD sent at 2/21/2020  9:01 AM EST -----  300  ----- Message -----  From: Jairon Plaza  Sent: 2/20/2020  11:58 AM EST  To: Kim Singleton MD    Theophylline  mg has been d/c by lexy. Pharmacy requesting you send script for [id number] mg or 400 mg. Please advise.

## 2020-02-21 NOTE — TELEPHONE ENCOUNTER
----- Message from Chuck Ayala MD sent at 2/21/2020 11:08 AM EST -----  Change to qd  ----- Message -----  From: Prisca Tao  Sent: 2/21/2020  10:39 AM EST  To: Chuck Ayala MD    Still bid?  ----- Message -----  From: Chuck Ayala MD  Sent: 2/21/2020   9:01 AM EST  To: Vicky Patel Olesya    300  ----- Message -----  From: Prisca Tao  Sent: 2/20/2020  11:58 AM EST  To: Chuck Ayala MD    Theophylline  mg has been d/c by lexy. Pharmacy requesting you send script for [id number] mg or 400 mg. Please advise.

## 2020-02-21 NOTE — TELEPHONE ENCOUNTER
----- Message from Eric Harrell MD sent at 2/21/2020 11:08 AM EST -----  Change to qd  ----- Message -----  From: Teddy Byrne  Sent: 2/21/2020  10:39 AM EST  To: Eric Harrell MD    Still bid?  ----- Message -----  From: Eric Harrell MD  Sent: 2/21/2020   9:01 AM EST  To: Fior Sarah Olesya    300  ----- Message -----  From: Teddy Byrne  Sent: 2/20/2020  11:58 AM EST  To: Eric Harrell MD    Theophylline  mg has been d/c by lexy. Pharmacy requesting you send script for [id number] mg or 400 mg. Please advise.

## 2020-02-27 LAB
CATARACTS: NEGATIVE
DIABETIC RETINOPATHY: NEGATIVE
GLAUCOMA: NEGATIVE
INTRAOCULAR PRESSURE EYE: NORMAL
VISUAL ACUITY DISTANCE LEFT EYE: NORMAL
VISUAL ACUITY DISTANCE RIGHT EYE: NORMAL

## 2020-03-03 ENCOUNTER — HOSPITAL ENCOUNTER (OUTPATIENT)
Dept: PHYSICAL THERAPY | Age: 70
Setting detail: THERAPIES SERIES
Discharge: HOME | End: 2020-03-03
Payer: MEDICARE

## 2020-03-03 PROCEDURE — 97161 PT EVAL LOW COMPLEX 20 MIN: CPT | Performed by: SPECIALIST

## 2020-03-03 PROCEDURE — G0283 ELEC STIM OTHER THAN WOUND: HCPCS | Performed by: SPECIALIST

## 2020-03-03 PROCEDURE — 97140 MANUAL THERAPY 1/> REGIONS: CPT | Performed by: SPECIALIST

## 2020-03-03 PROCEDURE — 97110 THERAPEUTIC EXERCISES: CPT | Performed by: SPECIALIST

## 2020-03-03 NOTE — PLAN OF CARE
[facility], [address], [address] [facility] [address]  Phone: [phone]   Fax:     [phone]       Physical Therapy Certification    Dear  Dr Dyana Sanchez,    We had the pleasure of evaluating the following patient for physical therapy services at [address] [place] [address]. A summary of our findings can be found in the initial assessment below. This includes our plan of care. If you have any questions or concerns regarding these findings, please do not hesitate to contact me at the office phone number checked above.   Thank you for the referral.       Physician Signature:_______________________________Date:__________________  By signing above (or electronic signature), therapists plan is approved by physician      Patient: Linnea Bone   : 1950   MRN: [id number]  Referring Physician:  Dr Dyana Sanchez      Evaluation Date: 3/3/2020      Medical Diagnosis Information:    M70.62 (ICD-10-CM) - Greater trochanteric bursitis of left hip                                             Insurance information:  [employer] med[employer]     Precautions/ Contra-indications: DM COPD  Latex Allergy:  [x]NO      []YES  Preferred Language for Healthcare:   [x]English       []other:    SUBJECTIVE: Patient states long history of 15 yrs of left hip pain/bursitis insidious onset    Relevant Medical History:DM COPD  Functional Disability Index:  LEFS 71 %    Pain Scale: 3-10/10  Easing factors: rest  Provocative factors: sit stand walk     Type: []Constant   [x]Intermittent  []Radiating []Localized []other:     Numbness/Tingling: none    Occupation/School:retired    Living Status/Prior Level of Function: Independent with ADLs and IADLs,     OBJECTIVE:     ROM LEFT RIGHT   HIP Flex 110 100   HIP Abd     HIP Ext     HIP IR     HIP ER     Knee ext     Knee Flex     Ankle PF     Ankle DF     Ankle In to/and or personal factors that will affect rehab potential:              []Age  []Sex              []Motivation/Lack of Motivation                        []Co-Morbidities              []Cognitive Function, education/learning barriers              []Environmental, home barriers              []profession/work barriers  []past PT/medical experience  []other:  Justification:     Falls Risk Assessment (30 days):   [x] Falls Risk assessed and no intervention required. [] Falls Risk assessed and Patient requires intervention due to being higher risk   TUG score (>12s at risk):     [] Falls education provided, including       G-Codes:     LEFS 71 %      ASSESSMENT:   Functional Impairments:     []Noted lumbar/proximal hip/LE joint hypomobility   [x]Decreased LE functional ROM   [x]Decreased core/proximal hip strength and neuromuscular control   [x]Decreased LE functional strength   [x]Reduced balance/proprioceptive control   []other:      Functional Activity Limitations (from functional questionnaire and intake)   [x]Reduced ability to tolerate prolonged functional positions   [x]Reduced ability or difficulty with changes of positions or transfers between positions   [x]Reduced ability to maintain good posture and demonstrate good body mechanics with sitting, bending, and lifting   [x]Reduced ability to sleep   [x] Reduced ability or tolerance with driving and/or computer work   [x]Reduced ability to perform lifting, carrying tasks   [x]Reduced ability to squat   [x]Reduced ability to forward bend   [x]Reduced ability to ambulate prolonged functional periods/distances/surfaces   [x]Reduced ability to ascend/descend stairs   [x]Reduced ability to run, hop, cut or jump   []other:    Participation Restrictions   [x]Reduced participation in self care activities   [x]Reduced participation in home management activities   []Reduced participation in work activities   []Reduced participation in social activities.    []Reduced (typically 45 minutes face-to-face)  [] RE-EVAL     PLAN:   Frequency/Duration:  1-2 days per week for 6 Weeks:  Interventions:  [x]  Therapeutic exercise including: strength training, ROM, for Lower extremity and core   [x]  NMR activation and proprioception for LE, Glutes and Core   [x]  Manual therapy as indicated for LE, Hip and spine to include: Dry Needling/IASTM, STM, PROM, Gr I-IV mobilizations, manipulation. [x] Modalities as needed that may include: thermal agents, E-stim, Biofeedback, US, iontophoresis as indicated  [x] Patient education on joint protection, postural re-education, activity modification, progression of HEP. HEP instruction: Instructed and given handouts(see scanned forms)    GOALS:  Patient stated goal: sleep/walk    Therapist goals for Patient:   Short Term Goals: To be achieved in: 2 weeks  1. Independent in HEP and progression per patient tolerance, in order to prevent re-injury. [x] Progressing: [] Met: [] Not Met: [] Adjusted     2. Patient will have a decrease in pain to facilitate improvement in movement, function, and ADLs as indicated by Functional Deficits. [x] Progressing: [] Met: [] Not Met: [] Adjusted     Long Term Goals: To be achieved in: 6 weeks  1. Disability index score of 35% or less for the LEFS to assist with reaching prior level of function. [x] Progressing: [] Met: [] Not Met: [] Adjusted     2. Patient will demonstrate increased AROM to 115 to allow for proper joint functioning as indicated by patients Functional Deficits. [x] Progressing: [] Met: [] Not Met: [] Adjusted     3. Patient will demonstrate an increase in Strength to good proximal hip strength and control, within 5lb HHD in LE to allow for proper functional mobility as indicated by patients Functional Deficits. [x] Progressing: [] Met: [] Not Met: [] Adjusted     4. Patient will return to [facility] for functional activities without increased symptoms or restriction.    [x] Progressing: [] Met: [] Not Met: [] Adjusted     5.  Sleep/ walk with mi to no limitations (patient specific functional goal)    [x] Progressing: [] Met: [] Not Met: [] Adjusted      Electronically signed by:  Coco Hardy PT

## 2020-03-03 NOTE — FLOWSHEET NOTE
no limitations (patient specific functional goal)    [x] Progressing: [] Met: [] Not Met: [] Adjusted              Overall Progression Towards Functional goals/ Treatment Progress Update:  [] Patient is progressing as expected towards functional goals listed. [] Progression is slowed due to complexities/Impairments listed. [] Progression has been slowed due to co-morbidities. [x] Plan just implemented, too soon to assess goals progression <30days   [] Goals require adjustment due to lack of progress  [] Patient is not progressing as expected and requires additional follow up with physician  [] Other    Prognosis for POC: [x] Good [] Fair  [] Poor      Patient requires continued skilled intervention: [x] Yes  [] No    Treatment/Activity Tolerance:  [x] Patient able to complete treatment  [] Patient limited by fatigue  [] Patient limited by pain    [] Patient limited by other medical complications  [] Other:     Return to Play: (if applicable)   []  Stage 1: Intro to Strength   []  Stage 2: Return to Run and Strength   []  Stage 3: Return to Jump and Strength   []  Stage 4: Dynamic Strength and Agility   []  Stage 5: Sport Specific Training     []  Ready to Return to Play, Meets All Above Stages   [x]  Not Ready for Return to Sports   Comments:                          PLAN: See eval  [] Continue per plan of care [] Alter current plan (see comments above)  [x] Plan of care initiated [] Hold pending MD visit [] Discharge    Electronically signed by:  Lea Desai PT    Note: If patient does not return for scheduled/ recommended follow up visits, this note will serve as a discharge from care along with most recent update on progress.

## 2020-03-04 RX ORDER — METFORMIN HYDROCHLORIDE 500 MG/1
TABLET, EXTENDED RELEASE ORAL
Qty: 360 TABLET | Refills: 0 | Status: SHIPPED | OUTPATIENT
Start: 2020-03-04 | End: 2020-05-07

## 2020-03-09 RX ORDER — QUETIAPINE FUMARATE 25 MG/1
TABLET, FILM COATED ORAL
Qty: 180 TABLET | Refills: 0 | Status: SHIPPED | OUTPATIENT
Start: 2020-03-09 | End: 2020-06-05

## 2020-03-10 ENCOUNTER — OFFICE VISIT (OUTPATIENT)
Dept: NEUROLOGY | Age: 70
End: 2020-03-10
Payer: MEDICARE

## 2020-03-10 ENCOUNTER — APPOINTMENT (OUTPATIENT)
Dept: PHYSICAL THERAPY | Age: 70
End: 2020-03-10
Payer: MEDICARE

## 2020-03-10 VITALS
HEIGHT: 61 IN | WEIGHT: 102 LBS | DIASTOLIC BLOOD PRESSURE: 58 MMHG | OXYGEN SATURATION: 94 % | SYSTOLIC BLOOD PRESSURE: 90 MMHG | HEART RATE: 112 BPM | BODY MASS INDEX: 19.26 KG/M2

## 2020-03-10 PROCEDURE — G8420 CALC BMI NORM PARAMETERS: HCPCS | Performed by: PSYCHIATRY & NEUROLOGY

## 2020-03-10 PROCEDURE — 99213 OFFICE O/P EST LOW 20 MIN: CPT | Performed by: PSYCHIATRY & NEUROLOGY

## 2020-03-10 PROCEDURE — 2022F DILAT RTA XM EVC RTNOPTHY: CPT | Performed by: PSYCHIATRY & NEUROLOGY

## 2020-03-10 PROCEDURE — 1036F TOBACCO NON-USER: CPT | Performed by: PSYCHIATRY & NEUROLOGY

## 2020-03-10 PROCEDURE — G8427 DOCREV CUR MEDS BY ELIG CLIN: HCPCS | Performed by: PSYCHIATRY & NEUROLOGY

## 2020-03-10 PROCEDURE — 4040F PNEUMOC VAC/ADMIN/RCVD: CPT | Performed by: PSYCHIATRY & NEUROLOGY

## 2020-03-10 PROCEDURE — G8482 FLU IMMUNIZE ORDER/ADMIN: HCPCS | Performed by: PSYCHIATRY & NEUROLOGY

## 2020-03-10 PROCEDURE — 1123F ACP DISCUSS/DSCN MKR DOCD: CPT | Performed by: PSYCHIATRY & NEUROLOGY

## 2020-03-10 PROCEDURE — G8399 PT W/DXA RESULTS DOCUMENT: HCPCS | Performed by: PSYCHIATRY & NEUROLOGY

## 2020-03-10 PROCEDURE — 3044F HG A1C LEVEL LT 7.0%: CPT | Performed by: PSYCHIATRY & NEUROLOGY

## 2020-03-10 PROCEDURE — 1090F PRES/ABSN URINE INCON ASSESS: CPT | Performed by: PSYCHIATRY & NEUROLOGY

## 2020-03-10 PROCEDURE — 3017F COLORECTAL CA SCREEN DOC REV: CPT | Performed by: PSYCHIATRY & NEUROLOGY

## 2020-03-10 NOTE — PROGRESS NOTES
The patient came today for follow up regarding: chronic headaches    Since the patient's last visit, she continues to have intermittent tension headaches. Frequency is 1 to 3 months. Duration is minutes and degree is moderate. Description is holocranial pain and dull achy pain that can start in the back of the head and neck and then spread to the front of the head. No triggers or other associated symptoms. No visual aura, nausea or vomiting. She is on Topamax 50 mg twice daily. She feels better with such medication. No side effect. No other relieving or aggravating factors. She denies any change in medications. History of hypertension, depression and hyperlipidemia. Other review of system was unremarkable.     Past Medical History:   Diagnosis Date    Chronic airway obstruction, not elsewhere classified 3/4/08    oxygen 2L/min at HS and PRN through day    Chronic kidney disease     incontinent    COPD (chronic obstructive pulmonary disease) ([address]     Depression     Displacement of lumbar intervertebral disc without myelopathy 8/13/07    Edema 9/13/07    Emphysema of lung ([address]     Enthesopathy of hip region     Enthesopathy of hip region 3/5/07    Esophageal reflux 3/4/08    Hemorrhage of rectum and anus 11/14/07    Herpes zoster without complication     History of blood transfusion     Hyperlipidemia associated with type 2 diabetes mellitus ([address] 12/11/2017    Lumbar spondylosis 5/1/2018    Major depression, chronic 8/12/2015    Osteoporosis, unspecified 12/4/07    Other and unspecified hyperlipidemia 3/4/08    Pain in joint, shoulder region 3/4/08    Pneumonia     Pulmonary nodule     Recurrent major depressive disorder, in partial remission ([address] 5/4/2018    Rheumatic fever     S/P ileostomy ([address] 8/20/2011    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    Tension headache     Type 2 diabetes mellitus with hyperglycemia, without long-term current use of insulin ([address]     (TOPAMAX) 50 MG tablet TAKE 1 TABLET BY MOUTH TWICE DAILY Yes Bhumika Munson MD   Cyanocobalamin (VITAMIN B-12 PO) Take by mouth Yes Amauri Lee MD   tiotropium (Kendra Shantelle) 18 MCG inhalation capsule INHALE THE CONTENTS OF 1 Pascale Seo MD   JANUVIA 100 MG tablet TAKE 1 TABLET BY MOUTH DAILY Yes Angie Rangel MD   WIXELA INHUB 250-50 MCG/DOSE AEPB INHALE 1 PUFF INTO THE LUNGS EVERY 12 HOURS Yes Vishnu Sandra MD   albuterol sulfate  (90 Base) MCG/ACT inhaler INHALE 2 PUFFS EVERY 6 HOURS AS NEEDED FOR WHEEZING Yes Angie Rangel MD   naproxen (NAPROSYN) 375 MG tablet TAKE 1 TABLET TWICE DAILY Yes Angie Rangel MD   Blood Glucose Monitoring Suppl (ACCU-CHEK GAURANG SMARTVIEW) w/Device KIT Use to test once daily. DX:E11.9 Yes Angie Rangel MD   blood glucose test strips (ACCU-CHEK SMARTVIEW) strip Use to test once daily. DX:E11.9 Yes Angie Rangel MD   Lancets Misc. (ACCU-CHEK FASTCLIX LANCET) KIT Use to test once daily. DX:E11.9 Yes Angie Rangel MD   ACCU-CHEK JOSIE PLUS strip USE TO TEST DAILY Yes Angie Rangel MD   vitamin D (CHOLECALCIFEROL) 400 UNITS TABS tablet Take 2 tablets by mouth daily Yes Vishnu Sandra MD   calcium carbonate (CALCIUM 600) 600 MG TABS tablet Take 2 tablets by mouth daily Yes Vishnu Sandra MD   Respiratory Therapy Supplies (NEBULIZER/TUBING/MOUTHPIECE) KIT 1 kit by Does not apply route daily as needed DX COPD J44.9 Yes Vishnu Sandra MD   Nebulizers (COMPRESSOR/NEBULIZER) MISC 1 Device by Does not apply route as needed DX COPD J44.9 Yes Vishnu Sandra MD   fluticasone (FLONASE) 50 MCG/ACT nasal spray 2 sprays by Nasal route daily. Yes Angie Rangel MD   Misc. Devices (ACAPELLA) MISC by Does not apply route 4 times daily.  Yes Vishnu Sandra MD     No Known Allergies  Social History     Tobacco Use    Smoking status: Former Smoker     Packs/day: 1.00     Years: 30.00     Pack years: 30.00     Types: Cigarettes     Last attempt to quit: 1996     Years since quittin.2    Smokeless tobacco: Never Used   Substance Use Topics    Alcohol use: No     Alcohol/week: 0.0 standard drinks     Family History   Problem Relation Age of Onset    Asthma Daughter     Diabetes Daughter     Cancer Daughter         Uterine    Asthma Daughter     Diabetes Mother     Heart Failure Mother     Hypertension Mother     Heart Failure Brother     Diabetes Sister     Heart Failure Brother     Emphysema Neg Hx      Past Surgical History:   Procedure Laterality Date    ABDOMEN SURGERY  11    total abdominal colectomy iliostomy    CARPAL TUNNEL RELEASE      right    CATARACT REMOVAL Bilateral     L 2013, R 2013    CATARACT REMOVAL WITH IMPLANT Right 13    COLECTOMY      and reversal     COLONOSCOPY  , 2011, 10/28/15    normal    CYST REMOVAL Right 14    Dr. Patrice Panda; right ear pressure point cyst removal    DILATATION, ESOPHAGUS      ENDOSCOPY, COLON, DIAGNOSTIC      EPIDURAL STEROID INJECTION Bilateral 2019    BILATERAL LUMBAR FOUR TRANSFORAMINAL EPIDURAL STEROID INJECTION SITE CONFIRMED BY FLUOROSCOPY performed by Michelle Urrutia MD at [address] Left 13    cataract with lens implant    HERNIA REPAIR  14    Open Vental Hernia Repair    HIP SURGERY      HYSTERECTOMY  1973    subtotal    NECK SURGERY      incision of windpipe, repair of windpipe defect    OTHER SURGICAL HISTORY  2011    hand assisted laparoscopic ileostomy reversal    OVARY REMOVAL      TRACHEAL SURGERY      hx of trach     UMBILICAL HERNIA REPAIR  2014         Exam: no change in exam today  Constitutional:   Vitals:    03/10/20 1119   BP: (!) 90/58   Pulse: 112   SpO2: 94%   Weight: 102 lb (46.3 kg)   Height: 5' 1\" (1.549 m)       General appearance: well-nourished. Eye: No icterus.    Neck: supple  Cardiovascular:   No lower leg edema with good pulsation. Mental Status: Oriented to person, place, problem, and time. Fluent speech. Good fund of knowledge. Normal attention span and concentration. Intact recent and remote memory  Cranial Nerves:   II: Pupils: equal, round, reactive to light  III,IV,VI: Extra Ocular Movements are intact. No nystagmus  V: Facial sensation is intact to pin prick and light touch  VII: Facial strength and movements: intact and symmetric  VIII: Hearing: Intact to finger rub bilaterally  IX: Palate elevation is symmetric  XI: Shoulder shrug is intact  XII: Tongue movements are normal  Musculoskeletal: 5/5 in all 4 extremities. Normal tone. Reflexes: Bilateral biceps 2/4, triceps 2/4, brachial radialis 2/4, knee 2/4 and ankle 2/4. Coordination: no pronator drift, no dysmetria with FNF testing. No tremors. Sensation: normal to all modalities. Gait/Posture: steady  No change      ROS : A 10-12 system review of constitutional, cardiovascular, respiratory, musculoskeletal, endocrine, hematological, skin, SHEENT, genitourinary, psychiatric and neurologic systems was obtained and updated today which is unremarkable except as mentioned in my HPI  The same. Medical decision making: no change  I personally reviewed and updated social history, past medical history, medications, allergy, surgical history, and family history as documented in the patient's electronic health records. Labs and/or neuroimaging and other test results reviewed and discussed with the patient. Reviewed notes from other physicians. Provided patient education regarding risk, benefits and treatment options as well as adherence to medication regimen and side effect from these medications. Assessment:   Diagnosis Orders   1. Chronic tension-type headache, intractable     2. Type 2 diabetes mellitus with hyperglycemia, without long-term current use of insulin (HCC)     3. Dysthymia     4. Mixed hyperlipidemia     5.  Primary insomnia Plan:  Continue Topamax 50 mg twice daily  Refill for medication  Side effect was discussed  Hydration  Repeat eye exam  Continue SSRI  Continue blood pressure monitor and current medications  Continue blood sugar monitoring, follow A1c and diet control  Aspirin for stroke prevention  Statin  Improving sleep hygiene  Follow-up next year.

## 2020-03-11 ENCOUNTER — HOSPITAL ENCOUNTER (OUTPATIENT)
Dept: PHYSICAL THERAPY | Age: 70
Setting detail: THERAPIES SERIES
Discharge: HOME | End: 2020-03-11
Payer: MEDICARE

## 2020-03-11 PROCEDURE — 97112 NEUROMUSCULAR REEDUCATION: CPT | Performed by: SPECIALIST

## 2020-03-11 PROCEDURE — 97110 THERAPEUTIC EXERCISES: CPT | Performed by: SPECIALIST

## 2020-03-11 PROCEDURE — 97140 MANUAL THERAPY 1/> REGIONS: CPT | Performed by: SPECIALIST

## 2020-03-11 PROCEDURE — G0283 ELEC STIM OTHER THAN WOUND: HCPCS | Performed by: SPECIALIST

## 2020-03-18 ENCOUNTER — HOSPITAL ENCOUNTER (OUTPATIENT)
Dept: PHYSICAL THERAPY | Age: 70
Setting detail: THERAPIES SERIES
Discharge: HOME | End: 2020-03-18
Payer: MEDICARE

## 2020-03-18 PROCEDURE — 97140 MANUAL THERAPY 1/> REGIONS: CPT | Performed by: SPECIALIST

## 2020-03-18 PROCEDURE — G0283 ELEC STIM OTHER THAN WOUND: HCPCS | Performed by: SPECIALIST

## 2020-03-18 PROCEDURE — 97112 NEUROMUSCULAR REEDUCATION: CPT | Performed by: SPECIALIST

## 2020-03-18 PROCEDURE — 97110 THERAPEUTIC EXERCISES: CPT | Performed by: SPECIALIST

## 2020-03-18 NOTE — FLOWSHEET NOTE
[address] [facility], [address] [place] [address] [address]  Phone: [phone]   Fax:     [phone]      Physical Therapy Treatment Note/ Progress Report:     Date:  3/18/2020    Patient Name:  Demetrio Chawla    :    MRN: [id number]  Restrictions/Precautions:    Medical/Treatment Diagnosis Information:  ·   M70.62 (ICD-10-CM) - Greater trochanteric bursitis of left hip     Insurance/Certification information:   [facility]  Physician Information:   Dr Yina Caceres  Has the plan of care been signed (Y/N):        []  Yes  [x]  No     Date of Patient follow up with Physician: NS      Is this a Progress Report:     []  Yes  [x]  No        If Yes:  Date Range for reporting period:  Beginning 3/3/20   Ending 4/3/20    Progress report will be due (10 Rx or 30 days whichever is less):  58       Recertification will be due (POC Duration  / 90 days whichever is less): 6/3/20       Visit # Insurance Allowable Auth Required   3 [employer] medicaid []  Yes [x]  No        Functional Scale: LEFS 71%    Date assessed:  3/3/20      Latex Allergy:  [x]NO      []YES  Preferred Language for Healthcare:   [x]English       []other:      Pain level:  7/10     SUBJECTIVE:  Reports increased pain possibly from walking     OBJECTIVE: See eval   Observation:    Test measurements:      RESTRICTIONS/PRECAUTIONS: DM, COPD    Exercises/Interventions:   Therapeutic Ex (96799) Sets/sec Reps Notes/CUES HEP   Prone glut set  10x NV x   Prone buttock kick  10x NV x          Supine piriformis stretch B 20 2x  x   bridges  10x  x   LTR  10x  x          Bike L2 5 min            PB hip abd supine  GR 10x  x   Supine ball squeeze  10x  x          Manual Intervention (88459)       Massage stick left hip  8 min                                        NMR re-education (71744)   CUES NEEDED                                                                   Therapeutic Activity (49305) without increased symptoms or restriction. [x] Progressing: [] Met: [] Not Met: [] Adjusted     5. Sleep/ walk with mi to no limitations (patient specific functional goal)    [x] Progressing: [] Met: [] Not Met: [] Adjusted              Overall Progression Towards Functional goals/ Treatment Progress Update:  [x] Patient is progressing as expected towards functional goals listed. [] Progression is slowed due to complexities/Impairments listed. [] Progression has been slowed due to co-morbidities. [] Plan just implemented, too soon to assess goals progression <30days   [] Goals require adjustment due to lack of progress  [] Patient is not progressing as expected and requires additional follow up with physician  [] Other    Prognosis for POC: [x] Good [] Fair  [] Poor      Patient requires continued skilled intervention: [x] Yes  [] No    Treatment/Activity Tolerance:  [x] Patient able to complete treatment  [] Patient limited by fatigue  [] Patient limited by pain    [] Patient limited by other medical complications  [] Other:     Return to Play: (if applicable)   []  Stage 1: Intro to Strength   []  Stage 2: Return to Run and Strength   []  Stage 3: Return to Jump and Strength   []  Stage 4: Dynamic Strength and Agility   []  Stage 5: Sport Specific Training     []  Ready to Return to Play, Meets All Above Stages   [x]  Not Ready for Return to Sports   Comments:                          PLAN:   [x] Continue per plan of care [] Alter current plan (see comments above)  [] Plan of care initiated [] Hold pending MD visit [] Discharge    Electronically signed by:  Yeimy Kelly PT    Note: If patient does not return for scheduled/ recommended follow up visits, this note will serve as a discharge from care along with most recent update on progress.

## 2020-03-24 ENCOUNTER — CARE COORDINATION (OUTPATIENT)
Dept: CARE COORDINATION | Age: 70
End: 2020-03-24

## 2020-03-24 SDOH — ECONOMIC STABILITY: TRANSPORTATION INSECURITY
IN THE PAST 12 MONTHS, HAS THE LACK OF TRANSPORTATION KEPT YOU FROM MEDICAL APPOINTMENTS OR FROM GETTING MEDICATIONS?: NO

## 2020-03-24 SDOH — ECONOMIC STABILITY: TRANSPORTATION INSECURITY
IN THE PAST 12 MONTHS, HAS LACK OF TRANSPORTATION KEPT YOU FROM MEETINGS, WORK, OR FROM GETTING THINGS NEEDED FOR DAILY LIVING?: NO

## 2020-03-24 SDOH — HEALTH STABILITY: PHYSICAL HEALTH: ON AVERAGE, HOW MANY MINUTES DO YOU ENGAGE IN EXERCISE AT THIS LEVEL?: 0 MIN

## 2020-03-24 SDOH — ECONOMIC STABILITY: INCOME INSECURITY: HOW HARD IS IT FOR YOU TO PAY FOR THE VERY BASICS LIKE FOOD, HOUSING, MEDICAL CARE, AND HEATING?: NOT HARD AT ALL

## 2020-03-24 SDOH — SOCIAL STABILITY: SOCIAL NETWORK: ARE YOU MARRIED, WIDOWED, DIVORCED, SEPARATED, NEVER MARRIED, OR LIVING WITH A PARTNER?: MARRIED

## 2020-03-24 SDOH — SOCIAL STABILITY: SOCIAL INSECURITY
WITHIN THE LAST YEAR, HAVE TO BEEN RAPED OR FORCED TO HAVE ANY KIND OF SEXUAL ACTIVITY BY YOUR PARTNER OR EX-PARTNER?: NO

## 2020-03-24 SDOH — SOCIAL STABILITY: SOCIAL INSECURITY: WITHIN THE LAST YEAR, HAVE YOU BEEN HUMILIATED OR EMOTIONALLY ABUSED IN OTHER WAYS BY YOUR PARTNER OR EX-PARTNER?: NO

## 2020-03-24 SDOH — HEALTH STABILITY: MENTAL HEALTH
STRESS IS WHEN SOMEONE FEELS TENSE, NERVOUS, ANXIOUS, OR CAN'T SLEEP AT NIGHT BECAUSE THEIR MIND IS TROUBLED. HOW STRESSED ARE YOU?: TO SOME EXTENT

## 2020-03-24 SDOH — HEALTH STABILITY: PHYSICAL HEALTH: ON AVERAGE, HOW MANY DAYS PER WEEK DO YOU ENGAGE IN MODERATE TO STRENUOUS EXERCISE (LIKE A BRISK WALK)?: 0 DAYS

## 2020-03-24 SDOH — ECONOMIC STABILITY: FOOD INSECURITY: WITHIN THE PAST 12 MONTHS, THE FOOD YOU BOUGHT JUST DIDN'T LAST AND YOU DIDN'T HAVE MONEY TO GET MORE.: NEVER TRUE

## 2020-03-24 SDOH — SOCIAL STABILITY: SOCIAL INSECURITY
WITHIN THE LAST YEAR, HAVE YOU BEEN KICKED, HIT, SLAPPED, OR OTHERWISE PHYSICALLY HURT BY YOUR PARTNER OR EX-PARTNER?: NO

## 2020-03-24 SDOH — SOCIAL STABILITY: SOCIAL INSECURITY: WITHIN THE LAST YEAR, HAVE YOU BEEN AFRAID OF YOUR PARTNER OR EX-PARTNER?: NO

## 2020-03-24 SDOH — SOCIAL STABILITY: SOCIAL NETWORK
IN A TYPICAL WEEK, HOW MANY TIMES DO YOU TALK ON THE PHONE WITH FAMILY, FRIENDS, OR NEIGHBORS?: MORE THAN THREE TIMES A WEEK

## 2020-03-24 SDOH — ECONOMIC STABILITY: FOOD INSECURITY: WITHIN THE PAST 12 MONTHS, YOU WORRIED THAT YOUR FOOD WOULD RUN OUT BEFORE YOU GOT MONEY TO BUY MORE.: NEVER TRUE

## 2020-03-24 SDOH — HEALTH STABILITY: MENTAL HEALTH: HOW OFTEN DO YOU HAVE A DRINK CONTAINING ALCOHOL?: NEVER

## 2020-03-24 ASSESSMENT — ENCOUNTER SYMPTOMS: DYSPNEA ASSOCIATED WITH: EXERTION

## 2020-03-24 NOTE — CARE COORDINATION
Ambulatory Care Coordination Note  3/24/2020  CM Risk Score: 8  Charlson 10 Year Mortality Risk Score: 79%     ACC: Solomon Riggins, CHARISSE    Summary Note: Outreach care for care coordination. Patient stable at this time. No needs identified. Plan to complete care coordination episode. COPD and diabetes zone education completed.   Lab Results   Component Value Date    LABA1C 5.9 01/03/2020    LABA1C 5.7 11/13/2019    LABA1C 5.4 04/19/2019     Lab Results   Component Value Date    .6 01/03/2020    .9 11/13/2019    .3 04/19/2019     Past Medical History:   Diagnosis Date    Chronic airway obstruction, not elsewhere classified 3/4/08    oxygen 2L/min at HS and PRN through day    Chronic kidney disease     incontinent    COPD (chronic obstructive pulmonary disease) ([address]     Depression     Displacement of lumbar intervertebral disc without myelopathy 8/13/07    Edema 9/13/07    Emphysema of lung ([address]     Enthesopathy of hip region     Enthesopathy of hip region 3/5/07    Esophageal reflux 3/4/08    Hemorrhage of rectum and anus 11/14/07    Herpes zoster without complication     History of blood transfusion     Hyperlipidemia associated with type 2 diabetes mellitus ([address] 12/11/2017    Lumbar spondylosis 5/1/2018    Major depression, chronic 8/12/2015    Osteoporosis, unspecified 12/4/07    Other and unspecified hyperlipidemia 3/4/08    Pain in joint, shoulder region 3/4/08    Pneumonia     Pulmonary nodule     Recurrent major depressive disorder, in partial remission ([address] 5/4/2018    Rheumatic fever     S/P ileostomy ([address] 8/20/2011    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 Reviewed s/s of flu like symptoms to report to PCP or ACM. Discussed 20 second handwashing and use of alcohol based hand  if unable to hand wash. Discussed avoiding touching face, mouth, eyes and distancing from people when ever possible to avoid getting sick. Care Coordination Interventions    Program Enrollment:  Complex Care  Referral from Primary Care Provider:  No  Suggested Interventions and Community Resources  Fall Risk Prevention:  Completed  Social Work:  Declined  Transportation Support:  Declined  Zone Management Tools:  Completed (Comment: copd zone  3/24/20)         Goals Addressed                 This Visit's Progress       Care Coordination     COMPLETED: Conditions and Symptoms   On track     I will schedule office visits, as directed by my provider. I will keep my appointment or reschedule if I have to cancel. I will notify my provider of any barriers to my plan of care. I will follow my Zone Management tool to seek urgent or emergent care. I will notify my provider of any symptoms that indicate a worsening of my condition. Barriers: none  Plan for overcoming my barriers: N/A  Confidence: 7/10  Anticipated Goal Completion Date: 4/20    COPD/Diabetes zone teaching completed. Prior to Admission medications    Medication Sig Start Date End Date Taking?  Authorizing Provider   QUEtiapine (SEROQUEL) 25 MG tablet TAKE 1 TO 2 TABLETS BY MOUTH AT BEDTIME. 3/9/20   Brenda Dobbins MD   metFORMIN (GLUCOPHAGE-XR) 500 MG extended release tablet TAKE 2 TABLETS BY MOUTH TWICE DAILY 3/4/20   Brenda Dobbins MD   theophylline (THEODUR) 300 MG extended release tablet Take 1 tablet by mouth daily 2/21/20   Brenda Dobbins MD   alendronate (FOSAMAX) 35 MG tablet TAKE 1 TABLET EVERY 7 DAYS 2/19/20   Brenda Dobbins MD   furosemide (LASIX) 40 MG tablet TAKE 1 TABLET EVERY DAY 2/19/20   Brenda Dobbins MD esomeprazole (NEXIUM) 40 MG delayed release capsule TAKE 1 CAPSULE EVERY MORNING BEFORE BREAKFAST 2/19/20   Javier Vargas MD   gabapentin (NEURONTIN) 300 MG capsule TAKE 1 CAPSULE BY MOUTH THREE TIMES DAILY FOR 30 DAYS 2/19/20 5/19/20  Javier Vargas MD   calcium citrate-vitamin D (CITRACAL+D) 315-200 MG-UNIT per tablet Take 2 tablets by mouth 2 times daily 2/19/20 5/19/20  Javier Vargas MD   albuterol (PROVENTIL) (2.5 MG/3ML) 0.083% nebulizer solution Take 3 mLs by nebulization every 6 hours as needed for Wheezing or Shortness of Breath DX COPD J44.9 2/12/20   Leopold Boss, MD   potassium chloride (MICRO-K) 10 MEQ extended release capsule Take 20 mEq by mouth daily     Historical Provider, MD   ACCU-CHEK SMARTVIEW strip TEST BLOOD SUGAR EVERY DAY 1/20/20   Javier Vargas MD   simvastatin (ZOCOR) 20 MG tablet TAKE 1 TABLET EVERY EVENING 1/20/20   Javier Vargas MD   diltiazem (CARDIZEM CD) 120 MG extended release capsule Take 1 capsule by mouth daily 1/6/20   Seth Nielsen MD   guaiFENesin (MUCINEX) 600 MG extended release tablet Take 1 tablet by mouth 2 times daily 1/6/20   Seth Nielsen MD   venlafaxine (EFFEXOR XR) 75 MG extended release capsule TAKE 1 CAPSULE EVERY DAY 12/20/19   Javier Vargas MD   topiramate (TOPAMAX) 50 MG tablet TAKE 1 TABLET BY MOUTH TWICE DAILY 12/9/19   Bhumika Calzada MD   Cyanocobalamin (VITAMIN B-12 PO) Take by mouth    Historical Provider, MD   tiotropium (Darlyn Belch) 18 MCG inhalation capsule INHALE THE CONTENTS OF 1 CAPSULE EVERY DAY 11/21/19   Leopold Boss, MD   JANUVIA 100 MG tablet TAKE 1 TABLET BY MOUTH DAILY 11/8/19   Javier Vargas MD   WIXELA INHUB 250-50 MCG/DOSE AEPB INHALE 1 PUFF INTO THE LUNGS EVERY 12 HOURS 11/5/19   Leopold Boss, MD   albuterol sulfate  (90 Base) MCG/ACT inhaler INHALE 2 PUFFS EVERY 6 HOURS AS NEEDED FOR WHEEZING 9/23/19   Javier Vargas MD   naproxen (NAPROSYN) 375 MG tablet TAKE 1 TABLET TWICE DAILY 5/14/19   Jose Magaña MD   Blood Glucose Monitoring Suppl (ACCU-CHEK GAURANG SMARTVIEW) w/Device KIT Use to test once daily. DX:E11.9 1/12/19   Jose Magaña MD   blood glucose test strips (ACCU-CHEK SMARTVIEW) strip Use to test once daily. DX:E11.9 1/12/19   Jose Magaña MD   Lancets Misc. (ACCU-CHEK FASTCLIX LANCET) KIT Use to test once daily. DX:E11.9 1/12/19   Jose Magaña MD   ACCU-CHEK JOSIE PLUS strip USE TO TEST DAILY 5/29/18   Jose Magaña MD   vitamin D (CHOLECALCIFEROL) 400 UNITS TABS tablet Take 2 tablets by mouth daily 3/3/16   Cristopher Benavides MD   calcium carbonate (CALCIUM 600) 600 MG TABS tablet Take 2 tablets by mouth daily 3/3/16   Cristopher Benavides MD   Respiratory Therapy Supplies (NEBULIZER/TUBING/MOUTHPIECE) KIT 1 kit by Does not apply route daily as needed DX COPD J44.9 12/3/15   Cristopher Benavides MD   Nebulizers (COMPRESSOR/NEBULIZER) MISC 1 Device by Does not apply route as needed DX COPD J44.9 12/3/15   Cristopher Benavides MD   fluticasone (FLONASE) 50 MCG/ACT nasal spray 2 sprays by Nasal route daily. 4/18/13   Jose Magaña MD   Misc. Devices (ACAPELLA) MISC by Does not apply route 4 times daily. 2/6/12   Cristopher Benavides MD       Future Appointments   Date Time Provider Estefani gN   5/14/2020  1:00 PM Jasmyn Gomez MD [facility]   5/27/2020  8:40 AM Jose Magaña MD [place] [facility] None   5/28/2020  1:15 PM MD [facility]   7/8/2020 10:00 AM MHC CT ED [facility] [place] [facility]   7/15/2020  9:45 AM MD [facility]   3/10/2021 11:00 AM Bhumika Swain MD [facility]     ,   Diabetes Assessment    Medic Alert ID:  No  How often do you test your blood sugar?:  Daily   Do you have barriers with adherence to non-pharmacologic self-management interventions?  (Nutrition/Exercise/Self-Monitoring):  No   Have you ever had to go to the ED for symptoms of low blood sugar?:  No       No patient-reported symptoms   Do you have hyperglycemia symptoms?:  No   Do you have hypoglycemia symptoms?:  No       and   COPD Assessment    Does the patient understand envrionmental exposure?:  Yes  Is the patient able to verbalize Rescue vs. Long Acting medications?:  Yes  Does the patient have a nebulizer?:  Yes  Does the patient use a space with inhaled medications?:  No     No patient-reported symptoms         Symptoms:   None:  Yes      Symptom course:  stable  Breathlessness:  exertion  Increase use of rapid acting/rescue inhaled medications?:  No  Change in chronic cough?:  No/At Baseline  Change in sputum?:  No/At Baseline  Self Monitoring - SaO2:  Yes  Have you had a recent diagnosis of pneumonia either by PCP or at a hospital?:  No

## 2020-03-25 ENCOUNTER — APPOINTMENT (OUTPATIENT)
Dept: PHYSICAL THERAPY | Age: 70
End: 2020-03-25
Payer: MEDICARE

## 2020-03-30 RX ORDER — SIMVASTATIN 20 MG
TABLET ORAL
Qty: 90 TABLET | Refills: 0 | Status: SHIPPED | OUTPATIENT
Start: 2020-03-30 | End: 2020-05-26

## 2020-03-30 RX ORDER — BLOOD SUGAR DIAGNOSTIC
STRIP MISCELLANEOUS
Qty: 100 STRIP | Refills: 0 | Status: SHIPPED | OUTPATIENT
Start: 2020-03-30 | End: 2020-05-26

## 2020-04-20 RX ORDER — ALENDRONATE SODIUM 35 MG/1
TABLET ORAL
Qty: 12 TABLET | Refills: 0 | Status: SHIPPED | OUTPATIENT
Start: 2020-04-20 | End: 2020-06-08

## 2020-04-21 RX ORDER — FUROSEMIDE 40 MG/1
TABLET ORAL
Qty: 90 TABLET | Refills: 0 | Status: SHIPPED | OUTPATIENT
Start: 2020-04-21 | End: 2020-06-22

## 2020-04-21 RX ORDER — ESOMEPRAZOLE MAGNESIUM 40 MG/1
CAPSULE, DELAYED RELEASE ORAL
Qty: 90 CAPSULE | Refills: 0 | Status: SHIPPED | OUTPATIENT
Start: 2020-04-21 | End: 2020-06-22

## 2020-05-04 RX ORDER — TOPIRAMATE 50 MG/1
TABLET, FILM COATED ORAL
Qty: 180 TABLET | Refills: 1 | OUTPATIENT
Start: 2020-05-04

## 2020-05-07 RX ORDER — SITAGLIPTIN 100 MG/1
100 TABLET, FILM COATED ORAL DAILY
Qty: 90 TABLET | Refills: 0 | Status: SHIPPED | OUTPATIENT
Start: 2020-05-07 | End: 2020-08-04

## 2020-05-07 RX ORDER — METFORMIN HYDROCHLORIDE 500 MG/1
TABLET, EXTENDED RELEASE ORAL
Qty: 360 TABLET | Refills: 0 | Status: SHIPPED | OUTPATIENT
Start: 2020-05-07 | End: 2020-08-04

## 2020-05-14 ENCOUNTER — OFFICE VISIT (OUTPATIENT)
Dept: ORTHOPEDIC SURGERY | Age: 70
End: 2020-05-14
Payer: MEDICARE

## 2020-05-14 PROCEDURE — 1090F PRES/ABSN URINE INCON ASSESS: CPT | Performed by: ORTHOPAEDIC SURGERY

## 2020-05-14 PROCEDURE — 4040F PNEUMOC VAC/ADMIN/RCVD: CPT | Performed by: ORTHOPAEDIC SURGERY

## 2020-05-14 PROCEDURE — 1123F ACP DISCUSS/DSCN MKR DOCD: CPT | Performed by: ORTHOPAEDIC SURGERY

## 2020-05-14 PROCEDURE — 99213 OFFICE O/P EST LOW 20 MIN: CPT | Performed by: ORTHOPAEDIC SURGERY

## 2020-05-14 PROCEDURE — G8427 DOCREV CUR MEDS BY ELIG CLIN: HCPCS | Performed by: ORTHOPAEDIC SURGERY

## 2020-05-14 PROCEDURE — G8399 PT W/DXA RESULTS DOCUMENT: HCPCS | Performed by: ORTHOPAEDIC SURGERY

## 2020-05-14 PROCEDURE — 3017F COLORECTAL CA SCREEN DOC REV: CPT | Performed by: ORTHOPAEDIC SURGERY

## 2020-05-14 PROCEDURE — G8420 CALC BMI NORM PARAMETERS: HCPCS | Performed by: ORTHOPAEDIC SURGERY

## 2020-05-14 PROCEDURE — 1036F TOBACCO NON-USER: CPT | Performed by: ORTHOPAEDIC SURGERY

## 2020-05-14 NOTE — PROGRESS NOTES
Chief Complaint    Hip Pain (LEFT)      History of Present Illness:  Jamar Syed is a 78 y.o. female presents to the office today for follow-up visit concerning left hip bursitis. At her last visit she was placed into physical therapy. She states that a majority of her her symptoms have resolved. She has no pain during the day with activities. She is doing her home physical therapy exercises on a daily basis. No new injury or trauma. She denies lumbar pain or radicular symptoms.     Pain Assessment  Location of Pain: Pelvis  Location Modifiers: Left  Severity of Pain: 0  Limiting Behavior: No  Result of Injury: No  Work-Related Injury: No]       Medical History:  Past Medical History:   Diagnosis Date    Chronic airway obstruction, not elsewhere classified 3/4/08    oxygen 2L/min at HS and PRN through day    Chronic kidney disease     incontinent    COPD (chronic obstructive pulmonary disease) (HCC)     Depression     Displacement of lumbar intervertebral disc without myelopathy 8/13/07    Edema 9/13/07    Emphysema of lung ([address]     Enthesopathy of hip region     Enthesopathy of hip region 3/5/07    Esophageal reflux 3/4/08    Hemorrhage of rectum and anus 11/14/07    Herpes zoster without complication     History of blood transfusion     Hyperlipidemia associated with type 2 diabetes mellitus ([address] 12/11/2017    Lumbar spondylosis 5/1/2018    Major depression, chronic 8/12/2015    Osteoporosis, unspecified 12/4/07    Other and unspecified hyperlipidemia 3/4/08    Pain in joint, shoulder region 3/4/08    Pneumonia     Pulmonary nodule     Recurrent major depressive disorder, in partial remission ([address] 5/4/2018    Rheumatic fever     S/P ileostomy ([address] 8/20/2011    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 Easton Aguirre; right ear pressure point cyst removal    DILATATION, ESOPHAGUS      ENDOSCOPY, COLON, DIAGNOSTIC      EPIDURAL STEROID INJECTION Bilateral 2019    BILATERAL LUMBAR FOUR TRANSFORAMINAL EPIDURAL STEROID INJECTION SITE CONFIRMED BY FLUOROSCOPY performed by Ruddy Walls MD at [address] Left 13    cataract with lens implant    HERNIA REPAIR  14    Open Vental Hernia Repair    HIP SURGERY      HYSTERECTOMY  1973    subtotal    NECK SURGERY      incision of windpipe, repair of windpipe defect    OTHER SURGICAL HISTORY  2011    hand assisted laparoscopic ileostomy reversal    OVARY REMOVAL      TRACHEAL SURGERY      hx of trach     UMBILICAL HERNIA REPAIR  2014     Family History   Problem Relation Age of Onset    Asthma Daughter     Diabetes Daughter     Cancer Daughter         Uterine    Asthma Daughter     Diabetes Mother     Heart Failure Mother     Hypertension Mother     Heart Failure Brother     Diabetes Sister     Heart Failure Brother     Emphysema Neg Hx      Social History     Socioeconomic History    Marital status: Legally      Spouse name: Not on file    Number of children: Not on file    Years of education: Not on file    Highest education level: Not on file   Occupational History    Not on file   Social Needs    Financial resource strain: Not hard at all   Ap-Danelle insecurity     Worry: Never true     Inability: Never true    Transportation needs     Medical: No     Non-medical: No   Tobacco Use    Smoking status: Former Smoker     Packs/day: 1.00     Years: 30.00     Pack years: 30.00     Types: Cigarettes     Last attempt to quit: 1996     Years since quittin.3    Smokeless tobacco: Never Used   Substance and Sexual Activity    Alcohol use: No     Alcohol/week: 0.0 standard drinks     Frequency: Never    Drug use: No    Sexual activity: Never   Lifestyle    Physical activity     Days

## 2020-05-15 NOTE — PROGRESS NOTES
equal, round, and reactive to light. Respiratory:  Normal respiratory effort. Clear to auscultation, bilaterally decreased /Wheezes/Rhonchi. Cardiovascular: Regular rate and rhythm with normal S1/S2 without murmurs, rubs or gallops. Abdomen: Soft, non-tender, non-distended with normal bowel sounds. Musculoskeletal: No clubbing, cyanosis or edema bilaterally. Full range of motion without deformity. Skin: Skin color, texture, turgor normal.  No rashes or lesions. Neurologic:  Neurovascularly intact without any focal sensory/motor deficits. Cranial nerves: II-XII intact, grossly non-focal.  Psychiatric: Alert and oriented, t      Labs:   Recent Labs     01/03/20  1354 01/04/20  0536   WBC 8.1 6.5   HGB 11.9* 11.7*   HCT 36.9 36.6    248     Recent Labs     01/03/20  1349 01/04/20  0536    142   K 3.8 3.5    103   CO2 27 26   BUN 8 10   CREATININE 0.7 0.6   CALCIUM 9.0 8.7     Recent Labs     01/03/20  1349   AST 14*   ALT 8*   BILITOT <0.2   ALKPHOS 66       Radiology:  XR CHEST STANDARD (2 VW)   Final Result   Small amount of lingular atelectasis versus pneumonia.                  Assessment/Plan:    Active Hospital Problems    Diagnosis    COPD exacerbation ([facility] [J44.1]     Priority: High    Acute on chronic respiratory failure with hypoxia (HCC) [J96.21]     Priority: Medium    Esophageal reflux [K21.9]     Priority: Medium    Acute hypoxemic respiratory failure (HCC) [J96.01]    Community acquired pneumonia of left lung [J18.9]    Recurrent major depressive disorder, in partial remission ([facility] [F33.41]    Type 2 diabetes mellitus without complication, without long-term current use of insulin (HCC) [E11.9]    Pneumonia due to infectious organism [J18.9]       #Acute on chronic hypoxic respiratory failure  Due to severe  copd exacerbation and CAP  Steroid HHN  atb  On theophylline  Nc o2 wean        #sinus tachy   cardizem prn    #Diabetes mellitus type 2  -Continue home medications and use sliding scale insulin     #GERD    DVT Prophylaxis:\hep  Diet: DIET LOW SODIUM 2 GM;  Code Status: DNR-CCA      Dispo - tele    Erika Mancia MD 38 year-old  female, with history depression / anxiety, alcohol abuse, presenting for ?alcohol withdrawal seizure.     Patient presenting with alcohol abuse, with moderate anxiety and depression but no hopelessness or suicidal ideation. Patient has no manic / psychotic symptoms. Patient has no other substance abuse. Patient has therapeutic relationships and social supports. Patient is future oriented. Patient engaged in safety planning. Patient symptoms not indicating imminent risk for harm to self; not warranting involuntary in-patient hospitalization.     PLAN  1. Prozac 40 mg daily  2. PATIENT IS PSYCHIATRICALLY CLEARED.

## 2020-05-19 RX ORDER — VENLAFAXINE HYDROCHLORIDE 75 MG/1
CAPSULE, EXTENDED RELEASE ORAL
Qty: 90 CAPSULE | Refills: 0 | Status: SHIPPED | OUTPATIENT
Start: 2020-05-19 | End: 2020-07-13

## 2020-05-20 RX ORDER — TIOTROPIUM BROMIDE 18 UG/1
CAPSULE ORAL; RESPIRATORY (INHALATION)
Qty: 90 CAPSULE | Refills: 1 | Status: SHIPPED | OUTPATIENT
Start: 2020-05-20 | End: 2020-11-19

## 2020-05-26 RX ORDER — SIMVASTATIN 20 MG
TABLET ORAL
Qty: 90 TABLET | Refills: 0 | Status: SHIPPED | OUTPATIENT
Start: 2020-05-26 | End: 2020-08-03

## 2020-05-26 RX ORDER — BLOOD SUGAR DIAGNOSTIC
STRIP MISCELLANEOUS
Qty: 100 STRIP | Refills: 0 | Status: SHIPPED | OUTPATIENT
Start: 2020-05-26 | End: 2020-08-03

## 2020-05-27 ENCOUNTER — HOSPITAL ENCOUNTER (OUTPATIENT)
Age: 70
Discharge: HOME | End: 2020-05-27
Payer: MEDICARE

## 2020-05-27 ENCOUNTER — OFFICE VISIT (OUTPATIENT)
Dept: INTERNAL MEDICINE CLINIC | Age: 70
End: 2020-05-27

## 2020-05-27 ENCOUNTER — TELEPHONE (OUTPATIENT)
Dept: PULMONOLOGY | Age: 70
End: 2020-05-27

## 2020-05-27 VITALS
HEART RATE: 80 BPM | HEIGHT: 61 IN | DIASTOLIC BLOOD PRESSURE: 52 MMHG | WEIGHT: 102 LBS | SYSTOLIC BLOOD PRESSURE: 106 MMHG | RESPIRATION RATE: 14 BRPM | BODY MASS INDEX: 19.26 KG/M2

## 2020-05-27 PROBLEM — E78.2 MIXED HYPERLIPIDEMIA: Status: RESOLVED | Noted: 2017-03-06 | Resolved: 2020-05-27

## 2020-05-27 PROBLEM — J18.0 BRONCHOPNEUMONIA: Status: RESOLVED | Noted: 2020-01-09 | Resolved: 2020-05-27

## 2020-05-27 PROBLEM — R07.9 CHEST PAIN: Status: RESOLVED | Noted: 2018-11-05 | Resolved: 2020-05-27

## 2020-05-27 PROBLEM — J43.8 OTHER EMPHYSEMA (HCC): Status: ACTIVE | Noted: 2020-05-27

## 2020-05-27 PROBLEM — J44.1 ACUTE EXACERBATION OF COPD WITH ASTHMA (HCC): Status: RESOLVED | Noted: 2020-01-08 | Resolved: 2020-05-27

## 2020-05-27 PROBLEM — J45.901 ACUTE EXACERBATION OF COPD WITH ASTHMA (HCC): Status: RESOLVED | Noted: 2020-01-08 | Resolved: 2020-05-27

## 2020-05-27 LAB
A/G RATIO: 1.6 (ref 1.1–2.2)
ALBUMIN SERPL-MCNC: 4.1 G/DL (ref 3.4–5)
ALP BLD-CCNC: 54 U/L (ref 40–129)
ALT SERPL-CCNC: <5 U/L (ref 10–40)
ANION GAP SERPL CALCULATED.3IONS-SCNC: 12 MMOL/L (ref 3–16)
AST SERPL-CCNC: 11 U/L (ref 15–37)
BASOPHILS ABSOLUTE: 0 K/UL (ref 0–0.2)
BASOPHILS RELATIVE PERCENT: 0.4 %
BILIRUB SERPL-MCNC: <0.2 MG/DL (ref 0–1)
BUN BLDV-MCNC: 21 MG/DL (ref 7–20)
CALCIUM SERPL-MCNC: 9.1 MG/DL (ref 8.3–10.6)
CHLORIDE BLD-SCNC: 108 MMOL/L (ref 99–110)
CO2: 23 MMOL/L (ref 21–32)
CREAT SERPL-MCNC: 1 MG/DL (ref 0.6–1.2)
EOSINOPHILS ABSOLUTE: 0.1 K/UL (ref 0–0.6)
EOSINOPHILS RELATIVE PERCENT: 2 %
GFR AFRICAN AMERICAN: >60
GFR NON-AFRICAN AMERICAN: 55
GLOBULIN: 2.6 G/DL
GLUCOSE BLD-MCNC: 93 MG/DL (ref 70–99)
HCT VFR BLD CALC: 39.8 % (ref 36–48)
HEMOGLOBIN: 12.7 G/DL (ref 12–16)
LYMPHOCYTES ABSOLUTE: 1.8 K/UL (ref 1–5.1)
LYMPHOCYTES RELATIVE PERCENT: 26.9 %
MCH RBC QN AUTO: 27.1 PG (ref 26–34)
MCHC RBC AUTO-ENTMCNC: 31.9 G/DL (ref 31–36)
MCV RBC AUTO: 85 FL (ref 80–100)
MONOCYTES ABSOLUTE: 0.6 K/UL (ref 0–1.3)
MONOCYTES RELATIVE PERCENT: 9.3 %
NEUTROPHILS ABSOLUTE: 4.1 K/UL (ref 1.7–7.7)
NEUTROPHILS RELATIVE PERCENT: 61.4 %
PDW BLD-RTO: 17.1 % (ref 12.4–15.4)
PLATELET # BLD: 228 K/UL (ref 135–450)
PMV BLD AUTO: 9.6 FL (ref 5–10.5)
POTASSIUM SERPL-SCNC: 4.7 MMOL/L (ref 3.5–5.1)
RBC # BLD: 4.68 M/UL (ref 4–5.2)
SODIUM BLD-SCNC: 143 MMOL/L (ref 136–145)
TOTAL PROTEIN: 6.7 G/DL (ref 6.4–8.2)
WBC # BLD: 6.6 K/UL (ref 4–11)

## 2020-05-27 PROCEDURE — 80053 COMPREHEN METABOLIC PANEL: CPT

## 2020-05-27 PROCEDURE — 36415 COLL VENOUS BLD VENIPUNCTURE: CPT

## 2020-05-27 PROCEDURE — 85025 COMPLETE CBC W/AUTO DIFF WBC: CPT

## 2020-05-27 PROCEDURE — G0439 PPPS, SUBSEQ VISIT: HCPCS | Performed by: INTERNAL MEDICINE

## 2020-05-27 PROCEDURE — 83036 HEMOGLOBIN GLYCOSYLATED A1C: CPT

## 2020-05-27 PROCEDURE — 3044F HG A1C LEVEL LT 7.0%: CPT | Performed by: INTERNAL MEDICINE

## 2020-05-27 PROCEDURE — 3017F COLORECTAL CA SCREEN DOC REV: CPT | Performed by: INTERNAL MEDICINE

## 2020-05-27 PROCEDURE — 4040F PNEUMOC VAC/ADMIN/RCVD: CPT | Performed by: INTERNAL MEDICINE

## 2020-05-27 PROCEDURE — 1123F ACP DISCUSS/DSCN MKR DOCD: CPT | Performed by: INTERNAL MEDICINE

## 2020-05-27 ASSESSMENT — LIFESTYLE VARIABLES: HOW OFTEN DO YOU HAVE A DRINK CONTAINING ALCOHOL: 0

## 2020-05-27 ASSESSMENT — PATIENT HEALTH QUESTIONNAIRE - PHQ9
SUM OF ALL RESPONSES TO PHQ QUESTIONS 1-9: 0
SUM OF ALL RESPONSES TO PHQ QUESTIONS 1-9: 0

## 2020-05-27 NOTE — PROGRESS NOTES
citrate-vitamin D (CITRACAL+D) 315-200 MG-UNIT per tablet Take 2 tablets by mouth 2 times daily  Connie Harrell MD   albuterol (PROVENTIL) (2.5 MG/3ML) 0.083% nebulizer solution Take 3 mLs by nebulization every 6 hours as needed for Wheezing or Shortness of Breath DX COPD J44.9  Arline Mahan MD   potassium chloride (MICRO-K) 10 MEQ extended release capsule Take 20 mEq by mouth daily   Historical Provider, MD   diltiazem (CARDIZEM CD) 120 MG extended release capsule Take 1 capsule by mouth daily  Juan Pablomadelyn Matos MD   guaiFENesin (MUCINEX) 600 MG extended release tablet Take 1 tablet by mouth 2 times daily  Juan Pablo Matos MD   topiramate (TOPAMAX) 50 MG tablet TAKE 1 TABLET BY MOUTH TWICE DAILY  Bhumika Stallworth MD   Cyanocobalamin (VITAMIN B-12 PO) Take by mouth  Historical Provider, MD   Hari Milder 250-50 MCG/DOSE AEPB INHALE 1 PUFF INTO THE LUNGS EVERY Jesenia Apodaca MD   albuterol sulfate  (90 Base) MCG/ACT inhaler INHALE 2 PUFFS EVERY 6 HOURS AS NEEDED FOR WHEEZING  Connie Harrell MD   naproxen (NAPROSYN) 375 MG tablet TAKE 1 TABLET TWICE DAILY  Connie Harrell MD   Blood Glucose Monitoring Suppl (ACCU-CHEK GAURANG SMARTVIEW) w/Device KIT Use to test once daily. DX:E11.9  Connie Harrell MD   Lancets Misc. (ACCU-CHEK FASTCLIX LANCET) KIT Use to test once daily. DX:E11.9  Connie Harrell MD   vitamin D (CHOLECALCIFEROL) 400 UNITS TABS tablet Take 2 tablets by mouth daily  Arline Mahan MD   calcium carbonate (CALCIUM 600) 600 MG TABS tablet Take 2 tablets by mouth daily  Arline Mahan MD   Respiratory Therapy Supplies (NEBULIZER/TUBING/MOUTHPIECE) KIT 1 kit by Does not apply route daily as needed DX COPD J44.9  Arline Mahan MD   Nebulizers (COMPRESSOR/NEBULIZER) MISC 1 Device by Does not apply route as needed DX COPD J44.9  Arline Mahan MD   fluticasone (FLONASE) 50 MCG/ACT nasal spray 2 sprays by Nasal route daily. Connie Harrell MD   [facility] [place].

## 2020-05-27 NOTE — PATIENT INSTRUCTIONS
nutritional needs are being met? Can herbs and supplements still offer a benefit? Researchers have investigated a range of natural remedies, such as ginkgo , ginseng , and the supplement phosphatidylserine (PS). So far, though, the evidence is inconsistent as to whether these products can improve memory or thinking. If you are interested in taking herbs and supplements, talk to your doctor first because they may interact with other medicines that you are taking. Exercise Regularly    Among the many benefits of regular exercise are increased blood flow to the brain and decreased risk of certain diseases that can interfere with memory function. One study found that even moderate exercise has a beneficial effect. Examples of \"moderate\" exercise include:   Playing 18 holes of golf once a week, without a cart   Playing tennis twice a week   Walking one mile per day   Manage Stress    It can be tough to remember what is important when your mind is cluttered. Make time for relaxation. Choose activities that calm you down, and make it routine. Manage Chronic Conditions    Side effects of high blood pressure , diabetes, and heart disease can interfere with mental function. Many of the lifestyle steps discussed here can help manage these conditions. Strive to eat a healthy diet, exercise regularly, get stress under control, and follow your doctor's advice for your condition. Minimize Medications    Talk to your doctor about the medicines that you take. Some may be unnecessary. Also, healthy lifestyle habits may lower the need for certain drugs. Last Reviewed: April 2010 Cherry Mauricio MD   Updated: 4/13/2010   ·     [address]       As we get older, changes in balance, gait, strength, vision, hearing, and cognition make even the most youthful senior more prone to accidents. Falls are one of the leading health risks for older people.  This increased risk of falling is related to:   Aging process (eg, decreased muscle strength, slowed reflexes)   Higher incidence of chronic health problems (eg, arthritis, diabetes) that may limit mobility, agility or sensory awareness   Side effects of medicine (eg, dizziness, blurred vision)especially medicines like prescription pain medicines and drugs used to treat mental health conditions   Depending on the brittleness of your bones, the consequences of a fall can be serious and long lasting. Home Life   Research by the Association of Aging [facility]) shows that some home accidents among older adults can be prevented by making simple lifestyle changes and basic modifications and repairs to the home environment. Here are some lifestyle changes that experts recommend:   Have your hearing and vision checked regularly. Be sure to wear prescription glasses that are right for you. Speak to your doctor or pharmacist about the possible side effects of your medicines. A number of medicines can cause dizziness. If you have problems with sleep, talk to your doctor. Limit your intake of alcohol. If necessary, use a cane or walker to help maintain your balance. Wear supportive, rubber-soled shoes, even at home. If you live in a region that gets wintry weather, you may want to put special cleats on your shoes to prevent you from slipping on the snow and ice. Exercise regularly to help maintain muscle tone, agility, and balance. Always hold the banister when going up or down stairs. Also, use  bars when getting in or out of the bath or shower, or using the toilet. To avoid dizziness, get up slowly from a lying down position. Sit up first, dangling your legs for a minute or two before rising to a standing position. Overall Home Safety Check   According to the Consumer Product Safety Commision's \"Older Consumer Home Safety Checklist,\" it is important to check for potential hazards in each room. And remember, proper lighting is an essential factor in home safety.  If you they do not hang over the sink, range, or working areas. Look for coffee pots, kettles and toaster ovens with automatic shut-offs. Keep a mop handy in the kitchen so you can wipe up spills instantly. You should also have a small fire extinguisher. Arrange your kitchen with frequently used items on lower shelves to avoid the need to stand on a stepstool to reach them. Make sure countertops are well-lit to avoid injuries while cutting and preparing food. In the Bathroom    Use a non-slip mat or decals in the tub and shower, since wet, soapy tile or porcelain surfaces are extremely slippery. Make sure bathroom rugs are non-skid or tape them firmly to the floor. Bathtubs should have at least one, preferably two, grab bars, firmly attached to structural supports in the wall. (Do not use built-in soap holders or glass shower doors as grab bars.)    Tub seats fitted with non-slip material on the legs allow you to wash sitting down. For people with limited mobility, bathtub transfer benches allow you to slide safely into the tub. Raised toilet seats and toilet safety rails are helpful for those with knee or hip problems. In the [facility]    Make sure you use a nightlight and that the area around your bed is clear of potential obstacles. Be careful with electric blankets and never go to sleep with a heating pad, which can cause serious burns even if on a low setting. Use fire-resistant mattress covers and pillows, and NEVER smoke in bed. Keep a phone next to the bed that is programmed to dial 911 at the push of a button. If you have a chronic condition, you may want to sign on with an automatic call-in service. Typically the system includes a small pendant that connects directly to an emergency medical voice-response system. You should also make arrangements to stay in contact with someonefriend, neighbor, family memberon a regular schedule.    Fire Prevention   According to the [employer] (Smoke Alarms for Every) [facility], senior citizens are one of the two highest risk groups for death and serious injuries due to residential fires. When cooking, wear short-sleeved items, never a bulky long-sleeved robe. The [facility]'s Safety Checklist for Older Consumers emphasizes the importance of checking basements, garages, workshops and storage areas for fire hazards, such as volatile liquids, piles of old rags or clothing and overloaded circuits. Never smoke in bed or when lying down on a couch or recliner chair. Small portable electric or kerosene heaters are responsible for many home fires and should be used cautiously if at all. If you do use one, be sure to keep them away from flammable materials. In case of fire, make sure you have a pre-established emergency exit plan. Have a professional check your fireplace and other fuel-burning appliances yearly. Helping [employer]   Baby boomers entering the soni years will continue to see the development of new products to help older adults live safely and independently in spite of age-related changes. Making Life More Livable  , by Jose Lentz, lists over 1,000 products for \"living well in the mature years,\" such as bathing and mobility aids, household security devices, ergonomically designed knives and peelers, and faucet valves and knobs for temperature control. Medical supply stores and organizations are good sources of information about products that improve your quality of life and insure your safety.      Last Reviewed: November 2009 Joy Jimenez MD   Updated: 3/7/2011     ·

## 2020-05-28 ENCOUNTER — VIRTUAL VISIT (OUTPATIENT)
Dept: PULMONOLOGY | Age: 70
End: 2020-05-28
Payer: MEDICARE

## 2020-05-28 VITALS — BODY MASS INDEX: 20.03 KG/M2 | WEIGHT: 102 LBS | HEIGHT: 60 IN

## 2020-05-28 LAB
ESTIMATED AVERAGE GLUCOSE: 125.5 MG/DL
HBA1C MFR BLD: 6 %

## 2020-05-28 PROCEDURE — 99214 OFFICE O/P EST MOD 30 MIN: CPT | Performed by: INTERNAL MEDICINE

## 2020-05-28 RX ORDER — PREDNISONE 10 MG/1
TABLET ORAL
Qty: 30 TABLET | Refills: 0 | Status: SHIPPED | OUTPATIENT
Start: 2020-05-28 | End: 2020-06-07

## 2020-05-28 RX ORDER — DOXYCYCLINE HYCLATE 100 MG
100 TABLET ORAL 2 TIMES DAILY
Qty: 10 TABLET | Refills: 0 | Status: SHIPPED | OUTPATIENT
Start: 2020-05-28 | End: 2020-06-02

## 2020-05-28 RX ORDER — ALBUTEROL SULFATE 90 UG/1
2 AEROSOL, METERED RESPIRATORY (INHALATION) EVERY 6 HOURS PRN
Qty: 3 INHALER | Refills: 1 | Status: SHIPPED | OUTPATIENT
Start: 2020-05-28 | End: 2020-07-02

## 2020-05-28 NOTE — PROGRESS NOTES
distress. Cardiovascular: No LE edema. Musculoskeletal: Normal gait with no signs of ataxia. Normal range of motion of the neck. Skin: No significant exanthematous lesions or discoloration noted on facial skin    Neuro: Awake. Alert. Able to follow commands. No facial asymmetry. No gaze palsy. Psych: No agitation. Normal affect. No hallucinations. Oriented to person/time/place. No anxiety. Normal judgement and insight. DATA reviewed by me:   PFTs 03/29/2017 FEV1 0.55L(27%) TLC 5.21L(117%) DLCO 8.47(42%) 6MW 770 2L exertion   PFTs 07/07/2015 FEV1 0.63L(31%) TLC 5.12L(114%) DLCO 9.6(48%)   PFTs 12/06/2011 FEV1 0.83L(45%) TLC 5.14L(125%) DLCO 8.6 (49%)   PFTs 07/10/2008 FEV1 0.77L           TLC 5.41L            DLCO 7.62  PFTs 01/15/2007 FEV1 0.59L           TLC 5.15L            DLCO 10.82          CT chest 1/8/2020  Negative PE  Right middle lobe tree-in-bud nodules  Pulmonary emphysema    DEXA 3/3/16 Osteopenia        Assessment:      · Very severe COPD   · Abnormal CT chest 1/8/2020 with right middle lobe new tree-in-bud nodules-favor inflammatory  · R pulmonary nodules. Stable over 2 years . Most recent CT chest 8/16/18  · Hypoxia on exertion  · Off Daliresp given weight loss   · 20 pack years. Quit smoking 1996. Plan:       · Continue Wixella BID, Spiriva INH daily and Albuterol INH/Neb PRN  · Continue Theophylline   · Prednisone taper and Doxycycline 100 mg BID x 5 days for COPD AE self management if needed. · O2 2LPM on exertion. Advised to titrate O2 using her pulse oximeter- target O2 sat 90-92%. · Patient is up to date with Pneumococcal vaccine   · Advised to get influenza vaccine this year   · Pulmonary rehab referral   · Acapella and Mucinex   · CT chest July 2020 and follow up after CT              Jonnathan Dubose is a 78 y.o. female being evaluated by a Virtual Visit (video visit) encounter to address concerns as mentioned above. A caregiver was present when appropriate.

## 2020-05-29 RX ORDER — THEOPHYLLINE 300 MG/1
300 TABLET, EXTENDED RELEASE ORAL DAILY
Qty: 90 TABLET | Refills: 0 | Status: SHIPPED | OUTPATIENT
Start: 2020-05-29 | End: 2020-07-15 | Stop reason: SDUPTHER

## 2020-06-03 RX ORDER — ALBUTEROL SULFATE 90 UG/1
2 AEROSOL, METERED RESPIRATORY (INHALATION) EVERY 6 HOURS PRN
Qty: 54 G | OUTPATIENT
Start: 2020-06-03

## 2020-06-05 RX ORDER — QUETIAPINE FUMARATE 25 MG/1
TABLET, FILM COATED ORAL
Qty: 180 TABLET | Refills: 0 | Status: SHIPPED | OUTPATIENT
Start: 2020-06-05 | End: 2020-09-03

## 2020-06-08 RX ORDER — ALENDRONATE SODIUM 35 MG/1
TABLET ORAL
Qty: 12 TABLET | Refills: 0 | Status: SHIPPED | OUTPATIENT
Start: 2020-06-08 | End: 2020-08-13

## 2020-06-08 RX ORDER — TOPIRAMATE 50 MG/1
TABLET, FILM COATED ORAL
Qty: 180 TABLET | Refills: 1 | Status: SHIPPED | OUTPATIENT
Start: 2020-06-08 | End: 2020-12-02

## 2020-06-22 RX ORDER — ESOMEPRAZOLE MAGNESIUM 40 MG/1
CAPSULE, DELAYED RELEASE ORAL
Qty: 90 CAPSULE | Refills: 0 | Status: SHIPPED | OUTPATIENT
Start: 2020-06-22 | End: 2020-08-31

## 2020-06-22 RX ORDER — FUROSEMIDE 40 MG/1
TABLET ORAL
Qty: 90 TABLET | Refills: 0 | Status: SHIPPED | OUTPATIENT
Start: 2020-06-22 | End: 2020-08-31

## 2020-07-02 RX ORDER — ALBUTEROL SULFATE 90 UG/1
AEROSOL, METERED RESPIRATORY (INHALATION)
Qty: 54 G | Refills: 0 | Status: SHIPPED | OUTPATIENT
Start: 2020-07-02 | End: 2020-07-15

## 2020-07-08 ENCOUNTER — HOSPITAL ENCOUNTER (OUTPATIENT)
Dept: CT IMAGING | Age: 70
Discharge: HOME | End: 2020-07-08
Payer: MEDICARE

## 2020-07-08 PROCEDURE — 71250 CT THORAX DX C-: CPT

## 2020-07-13 RX ORDER — VENLAFAXINE HYDROCHLORIDE 75 MG/1
CAPSULE, EXTENDED RELEASE ORAL
Qty: 90 CAPSULE | Refills: 0 | Status: SHIPPED | OUTPATIENT
Start: 2020-07-13 | End: 2020-09-24

## 2020-07-15 ENCOUNTER — HOSPITAL ENCOUNTER (OUTPATIENT)
Age: 70
Discharge: HOME | End: 2020-07-15
Payer: MEDICARE

## 2020-07-15 ENCOUNTER — VIRTUAL VISIT (OUTPATIENT)
Dept: PULMONOLOGY | Age: 70
End: 2020-07-15
Payer: MEDICARE

## 2020-07-15 LAB — THEOPHYLLINE LEVEL: 4.5 UG/ML (ref 8–20)

## 2020-07-15 PROCEDURE — 36415 COLL VENOUS BLD VENIPUNCTURE: CPT

## 2020-07-15 PROCEDURE — 80198 ASSAY OF THEOPHYLLINE: CPT

## 2020-07-15 PROCEDURE — 99214 OFFICE O/P EST MOD 30 MIN: CPT | Performed by: INTERNAL MEDICINE

## 2020-07-15 RX ORDER — THEOPHYLLINE 300 MG/1
300 TABLET, EXTENDED RELEASE ORAL DAILY
Qty: 90 TABLET | Refills: 1 | Status: SHIPPED | OUTPATIENT
Start: 2020-07-15 | End: 2022-04-20 | Stop reason: SDUPTHER

## 2020-07-15 RX ORDER — ALBUTEROL SULFATE 90 UG/1
2 AEROSOL, METERED RESPIRATORY (INHALATION) EVERY 6 HOURS PRN
Qty: 3 INHALER | Refills: 1 | Status: SHIPPED | OUTPATIENT
Start: 2020-07-15 | End: 2020-09-14

## 2020-07-15 NOTE — PROGRESS NOTES
[facility]                                                            Outpatient Follow Up Note  TELEHEALTH EVALUATION: Service performed was Audio/Visual (During AGZUY-36 public health emergency) and not a face-to-face visit       CHIEF COMPLAINT: Follow up CT chest         HPI:  CT chest reviewed by me and noted below. Results were dicussed with patient and multiple good questions were answered.    Doing fine  Little cough with clear sputum  No hemoptysis   No fever   No chest pain  Uses O2 at night 2LPM with benefits   O2 PRN during the day still   No smoking   Uses Albuterol 4 times/day       Past Medical History:   Diagnosis Date    Acute on chronic respiratory failure with hypoxia (HCC) 12/3/2009    Chronic airway obstruction, not elsewhere classified 3/4/08    oxygen 2L/min at HS and PRN through day    Chronic kidney disease     incontinent    COPD (chronic obstructive pulmonary disease) ([address]     Depression     Displacement of lumbar intervertebral disc without myelopathy 8/13/07    Edema 9/13/07    Emphysema of lung ([address]     Enthesopathy of hip region     Enthesopathy of hip region 3/5/07    Esophageal reflux 3/4/08    Hemorrhage of rectum and anus 11/14/07    Herpes zoster without complication     History of blood transfusion     Hyperlipidemia associated with type 2 diabetes mellitus ([address] 12/11/2017    Lumbar spondylosis 5/1/2018    Major depression, chronic 8/12/2015    Osteoporosis, unspecified 12/4/07    Other and unspecified hyperlipidemia 3/4/08    Pain in joint, shoulder region 3/4/08    Pneumonia     Pulmonary nodule     Recurrent major depressive disorder, in partial remission ([address] 5/4/2018    Rheumatic fever     S/P ileostomy ([address] 8/20/2011    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     Unspecified asthma(493.90) 8/13/07    Unspecified chronic bronchitis ([facility]     Ventral hernia 6/29/2014       Past Surgical History:        Procedure Laterality Date    ABDOMEN SURGERY  5-19-11    total abdominal colectomy iliostomy    CARPAL TUNNEL RELEASE      right    CATARACT REMOVAL Bilateral     L 6/18/2013, R 07/01/2013    CATARACT REMOVAL WITH IMPLANT Right 7/1/13    COLECTOMY      and reversal     COLONOSCOPY  2009, 11/4/2011, 10/28/15    normal    CYST REMOVAL Right 6/20/14    Dr. Nehemiah Street; right ear pressure point cyst removal    DILATATION, ESOPHAGUS      ENDOSCOPY, COLON, DIAGNOSTIC      EPIDURAL STEROID INJECTION Bilateral 7/9/2019    BILATERAL LUMBAR FOUR TRANSFORAMINAL EPIDURAL STEROID INJECTION SITE CONFIRMED BY FLUOROSCOPY performed by Mick Alvarez MD at [address] [place] [address] Left 6/18/13    cataract with lens implant    HERNIA REPAIR  6/27/14    Open Vental Hernia Repair    HIP SURGERY      HYSTERECTOMY  1973    subtotal    NECK SURGERY      incision of windpipe, repair of windpipe defect    OTHER SURGICAL HISTORY  8/19/2011    hand assisted laparoscopic ileostomy reversal    OVARY REMOVAL      TRACHEAL SURGERY  2005    hx of trach     UMBILICAL HERNIA REPAIR  6/27/2014       Allergies:  has No Known Allergies. Social History:    TOBACCO:   reports that she quit smoking about 24 years ago. Her smoking use included cigarettes. She has a 30.00 pack-year smoking history. She has never used smokeless tobacco.  ETOH:   reports no history of alcohol use.       Family History:       Problem Relation Age of Onset    Asthma Daughter     Diabetes Daughter     Cancer Daughter         Uterine    Alcohol Abuse Daughter     Asthma Daughter     Diabetes Mother     Heart Failure Mother     Hypertension Mother     Heart Failure Brother     Diabetes Sister     Heart Failure Brother     Coronary Art Dis Son     Emphysema Neg Hx        Current normocephalic and atraumatic. Mucus membranes are moist and the tongue appears normal. Normal appearing nose. External Ears normal.   Neck: No visualized mass. Miachel Salm is midline   Resp: No accessory muscle use. Respiratory effort normal. No visualized signs of difficulty breathing or respiratory distress. Cardiovascular: No LE edema. Musculoskeletal: Normal gait with no signs of ataxia. Normal range of motion of the neck. Skin: No significant exanthematous lesions or discoloration noted on facial skin    Neuro: Awake. Alert. Able to follow commands. No facial asymmetry. No gaze palsy. Psych: No agitation. Normal affect. No hallucinations. Oriented to person/time/place. No anxiety. Normal judgement and insight. DATA reviewed by me:   PFTs 03/29/2017 FEV1 0.55L(27%) TLC 5.21L(117%) DLCO 8.47(42%) 6MW 770 2L exertion   PFTs 07/07/2015 FEV1 0.63L(31%) TLC 5.12L(114%) DLCO 9.6(48%)   PFTs 12/06/2011 FEV1 0.83L(45%) TLC 5.14L(125%) DLCO 8.6 (49%)   PFTs 07/10/2008 FEV1 0.77L           TLC 5.41L            DLCO 7.62  PFTs 01/15/2007 FEV1 0.59L           TLC 5.15L            DLCO 10.82          CT chest 7/8/2020 imaging reviewed by me and showed  Stable CT chest  No change in tree-in-bud nodularity right middle lobe  Unchanged few additional noncalcified nodules back to 2018  Emphysema with mild bronchiectasis        DEXA 3/3/16 Osteopenia        Assessment:      · Very severe COPD   · Pulmonary emphysema  · Bronchiectasis  · Abnormal CT chest 1/8/2020 with right middle lobe new tree-in-bud nodules-favor inflammatory. Stable on repeat CT 7/8/2020  · R pulmonary nodules. Stable over 2 years . · Hypoxia on exertion  · Off Daliresp given weight loss   · 20 pack years. Quit smoking 1996. Plan:       · Continue Wixella BID, Spiriva INH daily and Albuterol INH/Neb PRN  · Continue Theophylline   · Check theophylline level  · O2 2LPM on exertion.  Advised to titrate O2 using her pulse oximeter- target O2 sat 90-92%. · Patient is up to date with Pneumococcal vaccine   · Advised to get influenza vaccine this year   · Pulmonary rehab referral   · Acapella and Mucinex   · CT chest July 2021  · Follow up in 6 months               Josey Jaeger is a 78 y.o. female being evaluated by a Virtual Visit (video visit) encounter to address concerns as mentioned above. A caregiver was present when appropriate. Due to this being a TeleHealth encounter (During [facility]- public health emergency), evaluation of the following organ systems was limited: Vitals/Constitutional/EENT/Resp/CV/GI//MS/Neuro/Skin/Heme-Lymph-Imm. Pursuant to the emergency declaration under the [address] [place] [address] authority and the [employer] Act, this Virtual Visit was conducted with patient's (and/or legal guardian's) consent, to reduce the patient's risk of exposure to COVID-19 and provide necessary medical care. The patient (and/or legal guardian) has also been advised to contact this office for worsening conditions or problems, and seek emergency medical treatment and/or call 911 if deemed necessary. Services were provided through a video synchronous discussion virtually to substitute for in-person clinic visit. Patient was located in her home, provider was located in his office. --Cha Santos MD on 7/15/2020 at 9:35 AM    An electronic signature was used to authenticate this note.

## 2020-08-03 RX ORDER — SIMVASTATIN 20 MG
TABLET ORAL
Qty: 90 TABLET | Refills: 0 | Status: SHIPPED | OUTPATIENT
Start: 2020-08-03 | End: 2020-10-13

## 2020-08-03 RX ORDER — BLOOD SUGAR DIAGNOSTIC
STRIP MISCELLANEOUS
Qty: 100 STRIP | Refills: 3 | Status: SHIPPED | OUTPATIENT
Start: 2020-08-03 | End: 2021-05-07

## 2020-08-04 RX ORDER — SITAGLIPTIN 100 MG/1
100 TABLET, FILM COATED ORAL DAILY
Qty: 90 TABLET | Refills: 0 | Status: SHIPPED | OUTPATIENT
Start: 2020-08-04 | End: 2020-11-09

## 2020-08-04 RX ORDER — METFORMIN HYDROCHLORIDE 500 MG/1
TABLET, EXTENDED RELEASE ORAL
Qty: 360 TABLET | Refills: 0 | Status: SHIPPED | OUTPATIENT
Start: 2020-08-04 | End: 2020-11-09

## 2020-08-13 RX ORDER — ALENDRONATE SODIUM 35 MG/1
TABLET ORAL
Qty: 12 TABLET | Refills: 0 | Status: SHIPPED | OUTPATIENT
Start: 2020-08-13 | End: 2020-10-21

## 2020-08-31 RX ORDER — FUROSEMIDE 40 MG/1
TABLET ORAL
Qty: 90 TABLET | Refills: 0 | Status: SHIPPED | OUTPATIENT
Start: 2020-08-31 | End: 2020-11-10

## 2020-08-31 RX ORDER — ESOMEPRAZOLE MAGNESIUM 40 MG/1
CAPSULE, DELAYED RELEASE ORAL
Qty: 90 CAPSULE | Refills: 0 | Status: SHIPPED | OUTPATIENT
Start: 2020-08-31 | End: 2020-11-10

## 2020-09-01 ENCOUNTER — VIRTUAL VISIT (OUTPATIENT)
Dept: INTERNAL MEDICINE CLINIC | Age: 70
End: 2020-09-01

## 2020-09-01 PROCEDURE — 1036F TOBACCO NON-USER: CPT | Performed by: INTERNAL MEDICINE

## 2020-09-01 PROCEDURE — 1123F ACP DISCUSS/DSCN MKR DOCD: CPT | Performed by: INTERNAL MEDICINE

## 2020-09-01 PROCEDURE — 3023F SPIROM DOC REV: CPT | Performed by: INTERNAL MEDICINE

## 2020-09-01 PROCEDURE — G8420 CALC BMI NORM PARAMETERS: HCPCS | Performed by: INTERNAL MEDICINE

## 2020-09-01 PROCEDURE — G8926 SPIRO NO PERF OR DOC: HCPCS | Performed by: INTERNAL MEDICINE

## 2020-09-01 PROCEDURE — 3017F COLORECTAL CA SCREEN DOC REV: CPT | Performed by: INTERNAL MEDICINE

## 2020-09-01 PROCEDURE — G8427 DOCREV CUR MEDS BY ELIG CLIN: HCPCS | Performed by: INTERNAL MEDICINE

## 2020-09-01 PROCEDURE — 2022F DILAT RTA XM EVC RTNOPTHY: CPT | Performed by: INTERNAL MEDICINE

## 2020-09-01 PROCEDURE — G8399 PT W/DXA RESULTS DOCUMENT: HCPCS | Performed by: INTERNAL MEDICINE

## 2020-09-01 PROCEDURE — 3044F HG A1C LEVEL LT 7.0%: CPT | Performed by: INTERNAL MEDICINE

## 2020-09-01 PROCEDURE — 99214 OFFICE O/P EST MOD 30 MIN: CPT | Performed by: INTERNAL MEDICINE

## 2020-09-01 PROCEDURE — 1090F PRES/ABSN URINE INCON ASSESS: CPT | Performed by: INTERNAL MEDICINE

## 2020-09-01 PROCEDURE — 4040F PNEUMOC VAC/ADMIN/RCVD: CPT | Performed by: INTERNAL MEDICINE

## 2020-09-01 ASSESSMENT — ENCOUNTER SYMPTOMS
COUGH: 1
VOMITING: 0
WHEEZING: 0
ABDOMINAL PAIN: 0
SHORTNESS OF BREATH: 1
RHINORRHEA: 0
NAUSEA: 0

## 2020-09-01 NOTE — PROGRESS NOTES
Ahmet Davis MD   alendronate (FOSAMAX) 35 MG tablet TAKE 1 TABLET EVERY 7 DAYS  Rock Zhang MD   JANUVIA 100 MG tablet TAKE 1 TABLET BY MOUTH DAILY  Rock Zhang MD   metFORMIN (GLUCOPHAGE-XR) 500 MG extended release tablet TAKE 2 TABLETS BY MOUTH TWICE DAILY  Rock Zhang MD   simvastatin (ZOCOR) 20 MG tablet TAKE 1 TABLET EVERY Steven Lincoln MD   blood glucose test strips (ACCU-CHEK SMARTVIEW) strip TEST BLOOD SUGAR EVERY DAY  Rock Zhang MD   albuterol sulfate HFA (VENTOLIN HFA) 108 (90 Base) MCG/ACT inhaler Inhale 2 puffs into the lungs every 6 hours as needed for Wheezing or Shortness of Breath  Loreto Singleton MD   theophylline (THEODUR) 300 MG extended release tablet Take 1 tablet by mouth daily  Loreto Singleton MD   venlafaxine (EFFEXOR XR) 75 MG extended release capsule TAKE 1 CAPSULE EVERY DAY  Rock Zhang MD   topiramate (TOPAMAX) 50 MG tablet TAKE 1 TABLET BY MOUTH TWICE DAILY  Bhumika Hsu MD   QUEtiapine (SEROQUEL) 25 MG tablet TAKE 1 TO 2 TABLETS BY MOUTH AT BEDTIME  Rock Zhang MD   fluticasone-salmeterol (WIXELA INHUB) 250-50 MCG/DOSE AEPB INHALE 1 PUFF INTO THE LUNGS EVERY 12 HOURS  Loreto Singleton MD   tiotropium (Sangita Handler) 18 MCG inhalation capsule INHALE THE CONTENTS OF 1 CAPSULE VIA INHALATION DEVICE Summer Clements MD   gabapentin (NEURONTIN) 300 MG capsule TAKE 1 CAPSULE BY MOUTH THREE TIMES DAILY FOR 30 DAYS  Rock Zhang MD   calcium citrate-vitamin D (CITRACAL+D) 315-200 MG-UNIT per tablet Take 2 tablets by mouth 2 times daily  Rock Zhang MD   albuterol (PROVENTIL) (2.5 MG/3ML) 0.083% nebulizer solution Take 3 mLs by nebulization every 6 hours as needed for Wheezing or Shortness of Breath DX COPD J44.9  Loreto Singleton MD   potassium chloride (MICRO-K) 10 MEQ extended release capsule Take 20 mEq by mouth daily   Historical Provider, MD diltiazem (CARDIZEM CD) 120 MG extended release capsule Take 1 capsule by mouth daily  Tamika Camargo MD   guaiFENesin (MUCINEX) 600 MG extended release tablet Take 1 tablet by mouth 2 times daily  Tamika Camargo MD   Cyanocobalamin (VITAMIN B-12 PO) Take by mouth  Historical Provider, MD   albuterol sulfate  (90 Base) MCG/ACT inhaler INHALE 2 PUFFS EVERY 6 HOURS AS NEEDED FOR WHEEZING  Yeimy Lama MD   naproxen (NAPROSYN) 375 MG tablet TAKE 1 TABLET TWICE DAILY  Yeimy Lama MD   Blood Glucose Monitoring Suppl (ACCU-CHEK GAURANG SMARTVIEW) w/Device KIT Use to test once daily. DX:E11.9  Yeimy Lama MD   Lancets Misc. (ACCU-CHEK FASTCLIX LANCET) KIT Use to test once daily. DX:E11.9  Yeimy Lama MD   vitamin D (CHOLECALCIFEROL) 400 UNITS TABS tablet Take 2 tablets by mouth daily  Ramirez Cooley MD   calcium carbonate (CALCIUM 600) 600 MG TABS tablet Take 2 tablets by mouth daily  Ramirez Cooley MD   Respiratory Therapy Supplies (NEBULIZER/TUBING/MOUTHPIECE) KIT 1 kit by Does not apply route daily as needed DX COPD J44.9  Ramirez Cooley MD   Nebulizers (COMPRESSOR/NEBULIZER) MISC 1 Device by Does not apply route as needed DX COPD J44.9  Ramirez Cooley MD   fluticasone (FLONASE) 50 MCG/ACT nasal spray 2 sprays by Nasal route daily. Yeimy Lama MD   Misc. Devices (ACAPELLA) MISC by Does not apply route 4 times daily.   Ramirez Cooley MD           No Known Allergies,   Past Medical History:   Diagnosis Date    Acute on chronic respiratory failure with hypoxia ([address] 12/3/2009    Chronic airway obstruction, not elsewhere classified 3/4/08    oxygen 2L/min at HS and PRN through day    Chronic kidney disease     incontinent    COPD (chronic obstructive pulmonary disease) ([address]     Depression     Displacement of lumbar intervertebral disc without myelopathy 8/13/07    Edema 9/13/07    Emphysema of lung ([address]     Enthesopathy of hip region     Enthesopathy of hip region 3/5/07    Esophageal reflux 3/4/08    Hemorrhage of rectum and anus 11/14/07    Herpes zoster without complication     History of blood transfusion     Hyperlipidemia associated with type 2 diabetes mellitus ([address] 12/11/2017    Lumbar spondylosis 5/1/2018    Major depression, chronic 8/12/2015    Osteoporosis, unspecified 12/4/07    Other and unspecified hyperlipidemia 3/4/08    Pain in joint, shoulder region 3/4/08    Pneumonia     Pulmonary nodule     Recurrent major depressive disorder, in partial remission ([address] 5/4/2018    Rheumatic fever     S/P ileostomy ([address] 8/20/2011    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    Tension headache     Type 2 diabetes mellitus with hyperglycemia, without long-term current use of insulin ([address]     Unspecified asthma(493.90) 8/13/07    Unspecified chronic bronchitis ([address]     Ventral hernia 6/29/2014   ,   Past Surgical History:   Procedure Laterality Date    ABDOMEN SURGERY  5-19-11    total abdominal colectomy iliostomy    CARPAL TUNNEL RELEASE      right    CATARACT REMOVAL Bilateral     L 6/18/2013, R 07/01/2013    CATARACT REMOVAL WITH IMPLANT Right 7/1/13    COLECTOMY      and reversal     COLONOSCOPY  2009, 11/4/2011, 10/28/15    normal    CYST REMOVAL Right 6/20/14    Dr. Christina Powell; right ear pressure point cyst removal    DILATATION, ESOPHAGUS      ENDOSCOPY, COLON, DIAGNOSTIC      EPIDURAL STEROID INJECTION Bilateral 7/9/2019    BILATERAL LUMBAR FOUR TRANSFORAMINAL EPIDURAL STEROID INJECTION SITE CONFIRMED BY FLUOROSCOPY performed by Davie Andrade MD at [address] Left 6/18/13    cataract with lens implant    HERNIA REPAIR  6/27/14    Open Vental Hernia Repair    HIP SURGERY      HYSTERECTOMY  1973    subtotal    NECK SURGERY      incision of windpipe, repair of windpipe defect    OTHER SURGICAL HISTORY  8/19/2011    hand assisted laparoscopic ileostomy reversal    OVARY REMOVAL      TRACHEAL SURGERY [x] No Facial Asymmetry (Cranial nerve 7 motor function) (limited exam to video visit)                       [] No gaze palsy        [] Abnormal-         Skin:                     [] No significant exanthematous lesions or discoloration noted on facial skin         [] Abnormal-                                  Psychiatric:           [x] Normal Affect [x] No Hallucinations        [] Abnormal-     ASSESSMENT/PLAN:  1. Type 2 diabetes mellitus with hyperglycemia, without long-term current use of insulin (HCC)  - well controlled. 2. Chronic respiratory failure with hypoxia (HCC)  - stable on oxygen. 3. Gastroesophageal reflux disease, esophagitis presence not specified  - on Nexium    4. Hyperlipidemia associated with type 2 diabetes mellitus ([facility]  - on Zocor    5. Major depression, chronic  - stable on Effexor. 6. Other emphysema ([facility]  - sees pulmonary. Follow up with PCP    Heather Moffett is a 69 y.o. female being evaluated by a Virtual Visit (video visit) encounter to address concerns as mentioned above. A caregiver was present when appropriate. Due to this being a TeleHealth encounter ([facility]), evaluation of the following organ systems was limited: Vitals/Constitutional/EENT/Resp/CV/GI//MS/Neuro/Skin/Heme-Lymph-Imm. Pursuant to the emergency declaration under the [address] [place] [address] authority and the [employer] Act, this Virtual Visit was conducted with patient's (and/or legal guardian's) consent, to reduce the patient's risk of exposure to COVID-19 and provide necessary medical care. The patient (and/or legal guardian) has also been advised to contact this office for worsening conditions or problems, and seek emergency medical treatment and/or call 911 if deemed necessary.      Patient identification was verified at the start of the visit: Yes    Total time spent on this encounter: Not billed by time    Services were provided through a video synchronous discussion virtually to substitute for in-person clinic visit. Patient and provider were located at their individual homes. --Tyrone Butt MD on 9/1/2020 at 11:29 AM    An electronic signature was used to authenticate this note.

## 2020-09-03 RX ORDER — QUETIAPINE FUMARATE 25 MG/1
TABLET, FILM COATED ORAL
Qty: 180 TABLET | Refills: 0 | Status: SHIPPED | OUTPATIENT
Start: 2020-09-03 | End: 2020-12-02

## 2020-09-14 RX ORDER — ALBUTEROL SULFATE 90 UG/1
AEROSOL, METERED RESPIRATORY (INHALATION)
Qty: 54 G | Refills: 2 | Status: SHIPPED | OUTPATIENT
Start: 2020-09-14 | End: 2021-01-26 | Stop reason: CLARIF

## 2020-09-24 RX ORDER — VENLAFAXINE HYDROCHLORIDE 75 MG/1
CAPSULE, EXTENDED RELEASE ORAL
Qty: 90 CAPSULE | Refills: 0 | Status: SHIPPED | OUTPATIENT
Start: 2020-09-24 | End: 2020-12-02

## 2020-10-12 ENCOUNTER — CARE COORDINATION (OUTPATIENT)
Dept: CARE COORDINATION | Age: 70
End: 2020-10-12

## 2020-10-12 NOTE — CARE COORDINATION
ACM completed outreach call with the patient. She declined any needs at this time. No recent ER/IP visits feel she has been doing well. Additional support at home from Daughter and son in law and no services. She still drives and manages he meds. No mobility issues. Provided patient with contact information should additional needs arise. ACM to remove herself from care team at this time.      Lab Results   Component Value Date    LABA1C 6.0 05/27/2020    LABA1C 5.9 01/03/2020    LABA1C 5.7 11/13/2019     Lab Results   Component Value Date    .5 05/27/2020    .6 01/03/2020    .9 11/13/2019

## 2020-10-13 RX ORDER — SIMVASTATIN 20 MG
TABLET ORAL
Qty: 90 TABLET | Refills: 0 | Status: SHIPPED | OUTPATIENT
Start: 2020-10-13 | End: 2020-12-18

## 2020-10-21 RX ORDER — ALENDRONATE SODIUM 35 MG/1
TABLET ORAL
Qty: 12 TABLET | Refills: 0 | Status: SHIPPED | OUTPATIENT
Start: 2020-10-21 | End: 2020-12-18

## 2020-11-09 RX ORDER — SITAGLIPTIN 100 MG/1
100 TABLET, FILM COATED ORAL DAILY
Qty: 90 TABLET | Refills: 0 | Status: SHIPPED | OUTPATIENT
Start: 2020-11-09 | End: 2021-02-01

## 2020-11-09 RX ORDER — METFORMIN HYDROCHLORIDE 500 MG/1
TABLET, EXTENDED RELEASE ORAL
Qty: 360 TABLET | Refills: 0 | Status: SHIPPED | OUTPATIENT
Start: 2020-11-09 | End: 2021-02-01

## 2020-11-10 RX ORDER — ESOMEPRAZOLE MAGNESIUM 40 MG/1
CAPSULE, DELAYED RELEASE ORAL
Qty: 90 CAPSULE | Refills: 0 | Status: SHIPPED | OUTPATIENT
Start: 2020-11-10 | End: 2021-07-22

## 2020-11-10 RX ORDER — FUROSEMIDE 40 MG/1
TABLET ORAL
Qty: 90 TABLET | Refills: 0 | Status: SHIPPED | OUTPATIENT
Start: 2020-11-10 | End: 2021-07-22

## 2020-11-19 RX ORDER — TIOTROPIUM BROMIDE 18 UG/1
CAPSULE ORAL; RESPIRATORY (INHALATION)
Qty: 90 CAPSULE | Refills: 1 | Status: SHIPPED | OUTPATIENT
Start: 2020-11-19 | End: 2021-04-01

## 2020-11-24 RX ORDER — POTASSIUM CHLORIDE 20 MEQ/1
TABLET, EXTENDED RELEASE ORAL
Qty: 90 TABLET | Refills: 0 | Status: SHIPPED | OUTPATIENT
Start: 2020-11-24 | End: 2021-02-08

## 2020-12-02 RX ORDER — TOPIRAMATE 50 MG/1
TABLET, FILM COATED ORAL
Qty: 180 TABLET | Refills: 1 | Status: SHIPPED | OUTPATIENT
Start: 2020-12-02 | End: 2021-06-01

## 2020-12-02 RX ORDER — VENLAFAXINE HYDROCHLORIDE 75 MG/1
CAPSULE, EXTENDED RELEASE ORAL
Qty: 90 CAPSULE | Refills: 0 | Status: SHIPPED | OUTPATIENT
Start: 2020-12-02 | End: 2021-02-08

## 2020-12-02 RX ORDER — QUETIAPINE FUMARATE 25 MG/1
TABLET, FILM COATED ORAL
Qty: 180 TABLET | Refills: 0 | Status: SHIPPED | OUTPATIENT
Start: 2020-12-02 | End: 2021-03-02

## 2020-12-02 NOTE — TELEPHONE ENCOUNTER
Last seen 03.10.20    Last office note states to RTO 1 year/03.2021    Next appointment scheduled for 03.10.21

## 2020-12-18 RX ORDER — ALENDRONATE SODIUM 35 MG/1
TABLET ORAL
Qty: 12 TABLET | Refills: 0 | Status: SHIPPED | OUTPATIENT
Start: 2020-12-18 | End: 2021-02-12

## 2020-12-18 RX ORDER — SIMVASTATIN 20 MG
TABLET ORAL
Qty: 90 TABLET | Refills: 0 | Status: SHIPPED | OUTPATIENT
Start: 2020-12-18 | End: 2021-02-18

## 2021-01-26 ENCOUNTER — TELEPHONE (OUTPATIENT)
Dept: PULMONOLOGY | Age: 71
End: 2021-01-26

## 2021-01-26 ENCOUNTER — VIRTUAL VISIT (OUTPATIENT)
Dept: PULMONOLOGY | Age: 71
End: 2021-01-26
Payer: MEDICARE

## 2021-01-26 DIAGNOSIS — R09.02 HYPOXIA: ICD-10-CM

## 2021-01-26 DIAGNOSIS — J44.9 STAGE 3 SEVERE COPD BY GOLD CLASSIFICATION (HCC): Primary | ICD-10-CM

## 2021-01-26 DIAGNOSIS — J47.9 BRONCHIECTASIS WITHOUT COMPLICATION (HCC): ICD-10-CM

## 2021-01-26 DIAGNOSIS — R91.8 PULMONARY NODULES: ICD-10-CM

## 2021-01-26 PROCEDURE — 99213 OFFICE O/P EST LOW 20 MIN: CPT | Performed by: INTERNAL MEDICINE

## 2021-01-26 RX ORDER — PREDNISONE 10 MG/1
TABLET ORAL
Qty: 30 TABLET | Refills: 0 | Status: SHIPPED | OUTPATIENT
Start: 2021-01-26 | End: 2021-02-07

## 2021-01-26 RX ORDER — DOXYCYCLINE HYCLATE 100 MG/1
100 CAPSULE ORAL 2 TIMES DAILY
Qty: 10 CAPSULE | Refills: 0 | Status: SHIPPED | OUTPATIENT
Start: 2021-01-26 | End: 2021-01-31

## 2021-01-26 NOTE — PROGRESS NOTES
[facility]                                                            Outpatient Follow Up Note  TELEHEALTH EVALUATION: Service performed was Audio (During ZIGNA-61 public health emergency) and not a face-to-face visit     CHIEF COMPLAINT: Follow up COPD        HPI:  Doing fine   Cough with occasional milky white sputum   No hemoptysis   Uses O2 at night 2LPM with benefits   O2 PRN during the day still   No smoking   Uses Albuterol 3-4 times/day   Sat 96% on 2LPM       Past Medical History:   Diagnosis Date    Acute on chronic respiratory failure with hypoxia ([address] 12/3/2009    Chronic airway obstruction, not elsewhere classified 3/4/08    oxygen 2L/min at HS and PRN through day    Chronic kidney disease     incontinent    COPD (chronic obstructive pulmonary disease) ([address]     Depression     Displacement of lumbar intervertebral disc without myelopathy 8/13/07    Edema 9/13/07    Emphysema of lung ([address]     Enthesopathy of hip region     Enthesopathy of hip region 3/5/07    Esophageal reflux 3/4/08    Hemorrhage of rectum and anus 11/14/07    Herpes zoster without complication     History of blood transfusion     Hyperlipidemia associated with type 2 diabetes mellitus ([address] 12/11/2017    Lumbar spondylosis 5/1/2018    Major depression, chronic 8/12/2015    Osteoporosis, unspecified 12/4/07    Other and unspecified hyperlipidemia 3/4/08    Pain in joint, shoulder region 3/4/08    Pneumonia     Pulmonary nodule     Recurrent major depressive disorder, in partial remission ([address] 5/4/2018    Rheumatic fever     S/P ileostomy ([address] 8/20/2011    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    Tension headache     Type 2 diabetes mellitus with hyperglycemia, without long-term current use of insulin ([address]     Unspecified asthma(493.90) 8/13/07    Unspecified chronic bronchitis ([address]     Ventral hernia 6/29/2014       Past Surgical History:        Procedure Laterality Date    ABDOMEN SURGERY  5-19-11    total abdominal colectomy iliostomy    CARPAL TUNNEL RELEASE      right    CATARACT REMOVAL Bilateral     L 6/18/2013, R 07/01/2013    CATARACT REMOVAL WITH IMPLANT Right 7/1/13    COLECTOMY      and reversal     COLONOSCOPY  2009, 11/4/2011, 10/28/15    normal    CYST REMOVAL Right 6/20/14    Dr. Yosef Andrade; right ear pressure point cyst removal    DILATATION, ESOPHAGUS      ENDOSCOPY, COLON, DIAGNOSTIC      EPIDURAL STEROID INJECTION Bilateral 7/9/2019    BILATERAL LUMBAR FOUR TRANSFORAMINAL EPIDURAL STEROID INJECTION SITE CONFIRMED BY FLUOROSCOPY performed by Lorenzo Ross MD at [facility] Left 6/18/13    cataract with lens implant    HERNIA REPAIR  6/27/14    Open Vental Hernia Repair    HIP SURGERY      HYSTERECTOMY  1973    subtotal    NECK SURGERY      incision of windpipe, repair of windpipe defect    OTHER SURGICAL HISTORY  8/19/2011    hand assisted laparoscopic ileostomy reversal    OVARY REMOVAL      TRACHEAL SURGERY  2005    hx of trach     UMBILICAL HERNIA REPAIR  6/27/2014       Allergies:  has No Known Allergies. Social History:    TOBACCO:   reports that she quit smoking about 25 years ago. Her smoking use included cigarettes. She has a 30.00 pack-year smoking history. She has never used smokeless tobacco.  ETOH:   reports no history of alcohol use.       Family History:       Problem Relation Age of Onset    Asthma Daughter     Diabetes Daughter     Cancer Daughter         Uterine    Alcohol Abuse Daughter     Asthma Daughter     Diabetes Mother     Heart Failure Mother     Hypertension Mother     Heart Failure Brother     Diabetes Sister     Heart Failure Brother     Coronary Art Dis Son     Emphysema Neg Hx        Current Medications:    Current Outpatient Medications:     simvastatin (ZOCOR) 20 MG tablet, TAKE 1 TABLET EVERY EVENING, Disp: 90 tablet, Rfl: 0   alendronate (FOSAMAX) 35 MG tablet, TAKE 1 TABLET EVERY 7 DAYS, Disp: 12 tablet, Rfl: 0    venlafaxine (EFFEXOR XR) 75 MG extended release capsule, TAKE 1 CAPSULE EVERY DAY, Disp: 90 capsule, Rfl: 0    topiramate (TOPAMAX) 50 MG tablet, TAKE 1 TABLET BY MOUTH TWICE DAILY, Disp: 180 tablet, Rfl: 1    QUEtiapine (SEROQUEL) 25 MG tablet, TAKE 1 TO 2 TABLETS BY MOUTH AT BEDTIME, Disp: 180 tablet, Rfl: 0    fluticasone-salmeterol (WIXELA INHUB) 250-50 MCG/DOSE AEPB, INHALE 1 PUFF INTO THE LUNGS EVERY 12 HOURS, Disp: 180 each, Rfl: 1    potassium chloride (KLOR-CON M) 20 MEQ extended release tablet, TAKE 1 TABLET EVERY DAY, Disp: 90 tablet, Rfl: 0    SPIRIVA HANDIHALER 18 MCG inhalation capsule, INHALE THE CONTENTS OF 1 CAPSULE VIA INHALATION DEVICE EVERY DAY, Disp: 90 capsule, Rfl: 1    esomeprazole (NEXIUM) 40 MG delayed release capsule, TAKE 1 CAPSULE EVERY MORNING BEFORE BREAKFAST, Disp: 90 capsule, Rfl: 0    furosemide (LASIX) 40 MG tablet, TAKE 1 TABLET EVERY DAY, Disp: 90 tablet, Rfl: 0    JANUVIA 100 MG tablet, TAKE 1 TABLET BY MOUTH DAILY, Disp: 90 tablet, Rfl: 0    metFORMIN (GLUCOPHAGE-XR) 500 MG extended release tablet, TAKE 2 TABLETS BY MOUTH TWICE DAILY, Disp: 360 tablet, Rfl: 0    blood glucose test strips (ACCU-CHEK SMARTVIEW) strip, TEST BLOOD SUGAR EVERY DAY, Disp: 100 strip, Rfl: 3    theophylline (THEODUR) 300 MG extended release tablet, Take 1 tablet by mouth daily, Disp: 90 tablet, Rfl: 1    albuterol (PROVENTIL) (2.5 MG/3ML) 0.083% nebulizer solution, Take 3 mLs by nebulization every 6 hours as needed for Wheezing or Shortness of Breath DX COPD J44.9, Disp: 120 vial, Rfl: 6    potassium chloride (MICRO-K) 10 MEQ extended release capsule, Take 20 mEq by mouth daily , Disp: , Rfl:     diltiazem (CARDIZEM CD) 120 MG extended release capsule, Take 1 capsule by mouth daily, Disp: 30 capsule, Rfl: 3    guaiFENesin (MUCINEX) 600 MG extended release tablet, Take 1 tablet by mouth 2 times DLCO 10.82          CT chest 7/8/2020 imaging reviewed by me and showed  Stable CT chest  No change in tree-in-bud nodularity right middle lobe  Unchanged few additional noncalcified nodules back to 2018  Emphysema with mild bronchiectasis        DEXA 3/3/16 Osteopenia        Assessment:      · Very severe COPD   · Pulmonary emphysema  · Bronchiectasis  · Abnormal CT chest 1/8/2020 with right middle lobe new tree-in-bud nodules-favor inflammatory. Stable on repeat CT 7/8/2020  · R pulmonary nodules. Stable over 2 years . · Hypoxia on exertion  · Off Daliresp given weight loss   · 20 pack years. Quit smoking 1996. Plan:       · Continue Wixella BID, Spiriva INH daily and Albuterol INH/Neb PRN  · Continue Theophylline   · Continue O2 2LPM on exertion. Advised to titrate O2 using her pulse oximeter- target O2 sat 90-92%. · Prednisone taper and Doxycycline 100 mg BID x 5 days for COPD AE self management if needed. · Patient is up to date with Pneumococcal vaccine   · Advised to get influenza vaccine this year   · Acapella and Mucinex   · CT chest July 2021  · Follow up after CT chest           Jamila Ball is a 69 y.o. female evaluated via telephone on 1/26/2021. Consent:  She and/or health care decision maker is aware that that she may receive a bill for this telephone service, depending on her insurance coverage, and has provided verbal consent to proceed: Yes       Documentation:  I communicated with the patient and/or health care decision maker about: See above   Details of this discussion including any medical advice provided: See above       I Affirm this is a Patient Initiated Episode with an Established Patient who has not had a related appointment within my department in the past 7 days or scheduled within the next 24 hours.     Total Time: 11-20 minutes     Note: not billable if this call serves to triage the patient into an appointment for the relevant concern      Cristopher Otero

## 2021-01-26 NOTE — TELEPHONE ENCOUNTER
Left message asking patient to return call to office. Needs f/u after ct. See appt desk    LOV: 1/26/21    Assessment:   · Very severe COPD   · Pulmonary emphysema  · Bronchiectasis  · Abnormal CT chest 1/8/2020 with right middle lobe new tree-in-bud nodules-favor inflammatory. Stable on repeat CT 7/8/2020  · R pulmonary nodules. Stable over 2 years . · Hypoxia on exertion  · Off Daliresp given weight loss   · 20 pack years. Quit smoking 1996. Plan:       · Continue Wixella BID, Spiriva INH daily and Albuterol INH/Neb PRN  · Continue Theophylline   · Continue O2 2LPM on exertion. Advised to titrate O2 using her pulse oximeter- target O2 sat 90-92%. · Prednisone taper and Doxycycline 100 mg BID x 5 days for COPD AE self management if needed.    · Patient is up to date with Pneumococcal vaccine   · Advised to get influenza vaccine this year   · Acapella and Mucinex   · CT chest July 2021  · Follow up after CT chest

## 2021-02-01 RX ORDER — SITAGLIPTIN 100 MG/1
100 TABLET, FILM COATED ORAL DAILY
Qty: 90 TABLET | Refills: 0 | Status: SHIPPED | OUTPATIENT
Start: 2021-02-01 | End: 2021-05-03

## 2021-02-01 RX ORDER — METFORMIN HYDROCHLORIDE 500 MG/1
TABLET, EXTENDED RELEASE ORAL
Qty: 360 TABLET | Refills: 0 | Status: SHIPPED | OUTPATIENT
Start: 2021-02-01 | End: 2021-05-03

## 2021-02-08 RX ORDER — POTASSIUM CHLORIDE 20 MEQ/1
TABLET, EXTENDED RELEASE ORAL
Qty: 90 TABLET | Refills: 0 | Status: SHIPPED | OUTPATIENT
Start: 2021-02-08 | End: 2021-04-16

## 2021-02-08 RX ORDER — VENLAFAXINE HYDROCHLORIDE 75 MG/1
CAPSULE, EXTENDED RELEASE ORAL
Qty: 90 CAPSULE | Refills: 0 | Status: SHIPPED | OUTPATIENT
Start: 2021-02-08 | End: 2021-04-16

## 2021-02-12 RX ORDER — ALENDRONATE SODIUM 35 MG/1
TABLET ORAL
Qty: 12 TABLET | Refills: 0 | Status: SHIPPED | OUTPATIENT
Start: 2021-02-12 | End: 2021-04-16

## 2021-02-18 RX ORDER — SIMVASTATIN 20 MG
TABLET ORAL
Qty: 90 TABLET | Refills: 0 | Status: SHIPPED | OUTPATIENT
Start: 2021-02-18 | End: 2021-04-26

## 2021-03-02 RX ORDER — ALBUTEROL SULFATE 2.5 MG/3ML
SOLUTION RESPIRATORY (INHALATION)
Qty: 375 ML | Refills: 2 | Status: SHIPPED | OUTPATIENT
Start: 2021-03-02 | End: 2021-05-26

## 2021-03-02 RX ORDER — QUETIAPINE FUMARATE 25 MG/1
TABLET, FILM COATED ORAL
Qty: 180 TABLET | Refills: 0 | Status: SHIPPED | OUTPATIENT
Start: 2021-03-02 | End: 2021-06-01

## 2021-03-18 ENCOUNTER — OFFICE VISIT (OUTPATIENT)
Dept: NEUROLOGY | Age: 71
End: 2021-03-18
Payer: MEDICARE

## 2021-03-18 VITALS
HEIGHT: 61 IN | TEMPERATURE: 98.2 F | BODY MASS INDEX: 19.26 KG/M2 | WEIGHT: 102 LBS | DIASTOLIC BLOOD PRESSURE: 70 MMHG | HEART RATE: 74 BPM | OXYGEN SATURATION: 97 % | SYSTOLIC BLOOD PRESSURE: 123 MMHG

## 2021-03-18 DIAGNOSIS — F34.1 DYSTHYMIA: ICD-10-CM

## 2021-03-18 DIAGNOSIS — E11.65 TYPE 2 DIABETES MELLITUS WITH HYPERGLYCEMIA, WITHOUT LONG-TERM CURRENT USE OF INSULIN (HCC): ICD-10-CM

## 2021-03-18 DIAGNOSIS — G44.221 CHRONIC TENSION-TYPE HEADACHE, INTRACTABLE: Primary | ICD-10-CM

## 2021-03-18 PROCEDURE — G8399 PT W/DXA RESULTS DOCUMENT: HCPCS | Performed by: PSYCHIATRY & NEUROLOGY

## 2021-03-18 PROCEDURE — G8420 CALC BMI NORM PARAMETERS: HCPCS | Performed by: PSYCHIATRY & NEUROLOGY

## 2021-03-18 PROCEDURE — 3017F COLORECTAL CA SCREEN DOC REV: CPT | Performed by: PSYCHIATRY & NEUROLOGY

## 2021-03-18 PROCEDURE — G8427 DOCREV CUR MEDS BY ELIG CLIN: HCPCS | Performed by: PSYCHIATRY & NEUROLOGY

## 2021-03-18 PROCEDURE — G8484 FLU IMMUNIZE NO ADMIN: HCPCS | Performed by: PSYCHIATRY & NEUROLOGY

## 2021-03-18 PROCEDURE — 1123F ACP DISCUSS/DSCN MKR DOCD: CPT | Performed by: PSYCHIATRY & NEUROLOGY

## 2021-03-18 PROCEDURE — 4040F PNEUMOC VAC/ADMIN/RCVD: CPT | Performed by: PSYCHIATRY & NEUROLOGY

## 2021-03-18 PROCEDURE — 99213 OFFICE O/P EST LOW 20 MIN: CPT | Performed by: PSYCHIATRY & NEUROLOGY

## 2021-03-18 PROCEDURE — 2022F DILAT RTA XM EVC RTNOPTHY: CPT | Performed by: PSYCHIATRY & NEUROLOGY

## 2021-03-18 PROCEDURE — 1090F PRES/ABSN URINE INCON ASSESS: CPT | Performed by: PSYCHIATRY & NEUROLOGY

## 2021-03-18 PROCEDURE — 3046F HEMOGLOBIN A1C LEVEL >9.0%: CPT | Performed by: PSYCHIATRY & NEUROLOGY

## 2021-03-18 PROCEDURE — 1036F TOBACCO NON-USER: CPT | Performed by: PSYCHIATRY & NEUROLOGY

## 2021-03-18 NOTE — PROGRESS NOTES
The patient came today for follow up regarding: chronic headaches    Since her last visit, she continues to have intermittent chronic tension headache. Frequency is 2 to 4 months. Degree is moderate. Duration is minutes. The same description of holocranial  dull achy pain. No triggers except for stress or weather. No weakness or numbness or visual changes. No recent fever or chills. She takes Topamax 50 mg twice daily. No side effect. No numbness or tingling or kidney stones. She takes occasional OTC. No other new symptoms today. She is on the same blood pressure medication. She takes SSRI and statin. No suicidal ideation or thoughts. She takes gabapentin 3 times daily. Other review of system was unremarkable.         Past Medical History:   Diagnosis Date    Acute on chronic respiratory failure with hypoxia ([address] 12/3/2009    Chronic airway obstruction, not elsewhere classified 3/4/08    oxygen 2L/min at HS and PRN through day    Chronic kidney disease     incontinent    COPD (chronic obstructive pulmonary disease) ([address]     Depression     Displacement of lumbar intervertebral disc without myelopathy 8/13/07    Edema 9/13/07    Emphysema of lung ([address]     Enthesopathy of hip region     Enthesopathy of hip region 3/5/07    Esophageal reflux 3/4/08    Hemorrhage of rectum and anus 11/14/07    Herpes zoster without complication     History of blood transfusion     Hyperlipidemia associated with type 2 diabetes mellitus ([address] 12/11/2017    Lumbar spondylosis 5/1/2018    Major depression, chronic 8/12/2015    Osteoporosis, unspecified 12/4/07    Other and unspecified hyperlipidemia 3/4/08    Pain in joint, shoulder region 3/4/08    Pneumonia     Pulmonary nodule     Recurrent major depressive disorder, in partial remission ([address] 5/4/2018    Rheumatic fever     S/P ileostomy ([address] 8/20/2011    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    Tension headache     Type 2 diabetes mouth daily Yes Jesi Jose MD   gabapentin (NEURONTIN) 300 MG capsule TAKE 1 CAPSULE BY MOUTH THREE TIMES DAILY FOR 30 DAYS Yes Darya Matthew MD   calcium citrate-vitamin D (CITRACAL+D) 315-200 MG-UNIT per tablet Take 2 tablets by mouth 2 times daily Yes Darya Matthew MD   potassium chloride (MICRO-K) 10 MEQ extended release capsule Take 20 mEq by mouth daily  Yes Historical Provider, MD   diltiazem (CARDIZEM CD) 120 MG extended release capsule Take 1 capsule by mouth daily Yes Vesna Hi MD   guaiFENesin (MUCINEX) 600 MG extended release tablet Take 1 tablet by mouth 2 times daily Yes Vesna Hi MD   Cyanocobalamin (VITAMIN B-12 PO) Take by mouth Yes Historical Provider, MD   albuterol sulfate  (90 Base) MCG/ACT inhaler INHALE 2 PUFFS EVERY 6 HOURS AS NEEDED FOR WHEEZING Yes Darya Matthew MD   naproxen (NAPROSYN) 375 MG tablet TAKE 1 TABLET TWICE DAILY Yes Darya Matthew MD   Blood Glucose Monitoring Suppl (ACCU-CHEK GAURANG SMARTVIEW) w/Device KIT Use to test once daily. DX:E11.9 Yes Darya Matthew MD   Lancets Misc. (ACCU-CHEK FASTCLIX LANCET) KIT Use to test once daily. DX:E11.9 Yes Darya Matthew MD   vitamin D (CHOLECALCIFEROL) 400 UNITS TABS tablet Take 2 tablets by mouth daily Yes Jesi Jose MD   calcium carbonate (CALCIUM 600) 600 MG TABS tablet Take 2 tablets by mouth daily Yes Jesi Jose MD   Respiratory Therapy Supplies (NEBULIZER/TUBING/MOUTHPIECE) KIT 1 kit by Does not apply route daily as needed DX COPD J44.9 Yes Jesi Jose MD   Nebulizers (COMPRESSOR/NEBULIZER) MISC 1 Device by Does not apply route as needed DX COPD J44.9 Yes Jesi Jose MD   fluticasone (FLONASE) 50 MCG/ACT nasal spray 2 sprays by Nasal route daily. Yes Darya Matthew MD   Misc. Devices (ACAPELLA) MISC by Does not apply route 4 times daily.  Yes Jesi Jose MD     No Known Allergies  Social History     Tobacco Use    Smoking status: Former Smoker     Packs/day: 1.00     Years: 30.00     Pack years: 30.00     Types: Cigarettes     Quit date: 1996     Years since quittin.2    Smokeless tobacco: Never Used   Substance Use Topics    Alcohol use: No     Alcohol/week: 0.0 standard drinks     Frequency: Never         Exam:   Constitutional:   Vitals:    21 1006   BP: 123/70   Pulse: 74   Temp: 98.2 °F (36.8 °C)   TempSrc: Infrared   SpO2: 97%   Weight: 102 lb (46.3 kg)   Height: 5' 1\" (1.549 m)       General appearance: well-nourished. Eye: No icterus. Neck: supple  Cardiovascular:   No lower leg edema with good pulsation. Mental Status:   Oriented to person, place, problem, and time. Fluent speech. Good fund of knowledge. Normal attention span and concentration. Intact recent and remote memory  Cranial Nerves:   II: Pupils: equal, round, reactive to light  III,IV,VI: Extra Ocular Movements are intact. No nystagmus  V: Facial sensation is intact t  VII: Facial strength and movements: intact and symmetric  XII: Tongue movements are normal  Musculoskeletal: 5/5 in all 4 extremities. Normal tone. Coordination: no pronator drift, no dysmetria with FNF testing. No tremors. Sensation: normal to all modalities. Gait/Posture: steady        ROS : A 10-12 system review of constitutional, cardiovascular, respiratory, musculoskeletal, endocrine, hematological, skin, SHEENT, genitourinary, psychiatric and neurologic systems was obtained and updated today which is unremarkable except as mentioned in my HPI  The same. Medical decision making:   I personally reviewed and updated social history, past medical history, medications, allergy, surgical history, and family history as documented in the patient's electronic health records. Labs  other test results reviewed and discussed with the patient. Reviewed notes from other physicians.     Provided patient education regarding risk, benefits and treatment options as well as adherence to medication regimen and side effect from these medications. Assessment:   Diagnosis Orders   1. Chronic tension-type headache, intractable     2. Type 2 diabetes mellitus with hyperglycemia, without long-term current use of insulin ([facility])     3.  Dysthymia             Plan:  Continue Topamax 50 mg twice daily  Refill for medication when needed  Discussed side effect and the need to increase hydration  Eye exam once a year  Continue blood sugar monitor at home and follow A1c  Avoid OTC  SSRI  Statin  Aspirin  Improving sleep hygiene for chronic insomnia  Follow-up next year

## 2021-04-01 RX ORDER — TIOTROPIUM BROMIDE 18 UG/1
CAPSULE ORAL; RESPIRATORY (INHALATION)
Qty: 90 CAPSULE | Refills: 1 | Status: SHIPPED | OUTPATIENT
Start: 2021-04-01 | End: 2022-04-20 | Stop reason: SDUPTHER

## 2021-04-16 RX ORDER — POTASSIUM CHLORIDE 20 MEQ/1
TABLET, EXTENDED RELEASE ORAL
Qty: 90 TABLET | Refills: 0 | Status: SHIPPED | OUTPATIENT
Start: 2021-04-16 | End: 2021-06-23

## 2021-04-16 RX ORDER — ALENDRONATE SODIUM 35 MG/1
TABLET ORAL
Qty: 12 TABLET | Refills: 0 | Status: SHIPPED | OUTPATIENT
Start: 2021-04-16 | End: 2021-06-18

## 2021-04-16 RX ORDER — VENLAFAXINE HYDROCHLORIDE 75 MG/1
CAPSULE, EXTENDED RELEASE ORAL
Qty: 90 CAPSULE | Refills: 0 | Status: SHIPPED | OUTPATIENT
Start: 2021-04-16 | End: 2021-06-23

## 2021-04-26 RX ORDER — SIMVASTATIN 20 MG
TABLET ORAL
Qty: 90 TABLET | Refills: 0 | Status: SHIPPED | OUTPATIENT
Start: 2021-04-26 | End: 2021-07-06

## 2021-05-03 RX ORDER — SITAGLIPTIN 100 MG/1
100 TABLET, FILM COATED ORAL DAILY
Qty: 90 TABLET | Refills: 0 | Status: SHIPPED | OUTPATIENT
Start: 2021-05-03 | End: 2021-07-30

## 2021-05-03 RX ORDER — METFORMIN HYDROCHLORIDE 500 MG/1
TABLET, EXTENDED RELEASE ORAL
Qty: 360 TABLET | Refills: 0 | Status: SHIPPED | OUTPATIENT
Start: 2021-05-03 | End: 2021-07-30

## 2021-05-07 DIAGNOSIS — E11.65 TYPE 2 DIABETES MELLITUS WITH HYPERGLYCEMIA, WITHOUT LONG-TERM CURRENT USE OF INSULIN (HCC): ICD-10-CM

## 2021-05-07 RX ORDER — BLOOD SUGAR DIAGNOSTIC
STRIP MISCELLANEOUS
Qty: 100 STRIP | Refills: 3 | Status: SHIPPED | OUTPATIENT
Start: 2021-05-07 | End: 2022-03-30

## 2021-05-13 ENCOUNTER — TELEPHONE (OUTPATIENT)
Dept: INTERNAL MEDICINE CLINIC | Age: 71
End: 2021-05-13

## 2021-05-13 NOTE — TELEPHONE ENCOUNTER
----- Message from Katharine Griffiths sent at 5/13/2021  8:21 AM EDT -----  Contact: Remy Perez [phone]  Patient makes the following statements: She is short of breath. On 3L of O2 24/7. She is coughing up green/brown thick mucus. She is aching all over. She has not been vaccinated. She only goes to the grocery store. She is asking for antibiotics.  Thank you     [facility]

## 2021-05-26 DIAGNOSIS — J44.9 STAGE 3 SEVERE COPD BY GOLD CLASSIFICATION (HCC): Primary | ICD-10-CM

## 2021-05-26 RX ORDER — ALBUTEROL SULFATE 2.5 MG/3ML
SOLUTION RESPIRATORY (INHALATION)
Qty: 375 ML | Refills: 5 | Status: SHIPPED | OUTPATIENT
Start: 2021-05-26 | End: 2022-11-03 | Stop reason: SDUPTHER

## 2021-05-31 DIAGNOSIS — G44.221 CHRONIC TENSION-TYPE HEADACHE, INTRACTABLE: ICD-10-CM

## 2021-06-01 RX ORDER — QUETIAPINE FUMARATE 25 MG/1
TABLET, FILM COATED ORAL
Qty: 180 TABLET | Refills: 0 | Status: SHIPPED | OUTPATIENT
Start: 2021-06-01 | End: 2021-08-30

## 2021-06-01 RX ORDER — TOPIRAMATE 50 MG/1
TABLET, FILM COATED ORAL
Qty: 180 TABLET | Refills: 2 | Status: SHIPPED | OUTPATIENT
Start: 2021-06-01 | End: 2022-08-25 | Stop reason: SDUPTHER

## 2021-06-01 NOTE — TELEPHONE ENCOUNTER
Last seen 03.18.21    Last office note states to RTO 1 year/03.2022    Next appointment scheduled for 02.24.22

## 2021-06-18 RX ORDER — ALENDRONATE SODIUM 35 MG/1
TABLET ORAL
Qty: 12 TABLET | Refills: 0 | Status: SHIPPED | OUTPATIENT
Start: 2021-06-18 | End: 2021-08-20

## 2021-06-22 ENCOUNTER — OFFICE VISIT (OUTPATIENT)
Dept: INTERNAL MEDICINE CLINIC | Age: 71
End: 2021-06-22

## 2021-06-22 VITALS
DIASTOLIC BLOOD PRESSURE: 80 MMHG | WEIGHT: 104 LBS | BODY MASS INDEX: 19.63 KG/M2 | HEIGHT: 61 IN | SYSTOLIC BLOOD PRESSURE: 130 MMHG | HEART RATE: 70 BPM | RESPIRATION RATE: 12 BRPM

## 2021-06-22 DIAGNOSIS — F51.01 PRIMARY INSOMNIA: ICD-10-CM

## 2021-06-22 DIAGNOSIS — E11.65 TYPE 2 DIABETES MELLITUS WITH HYPERGLYCEMIA, WITHOUT LONG-TERM CURRENT USE OF INSULIN (HCC): Primary | ICD-10-CM

## 2021-06-22 DIAGNOSIS — Z12.31 ENCOUNTER FOR SCREENING MAMMOGRAM FOR MALIGNANT NEOPLASM OF BREAST: ICD-10-CM

## 2021-06-22 DIAGNOSIS — K21.9 GASTROESOPHAGEAL REFLUX DISEASE, UNSPECIFIED WHETHER ESOPHAGITIS PRESENT: ICD-10-CM

## 2021-06-22 DIAGNOSIS — E11.65 TYPE 2 DIABETES MELLITUS WITH HYPERGLYCEMIA, WITHOUT LONG-TERM CURRENT USE OF INSULIN (HCC): ICD-10-CM

## 2021-06-22 DIAGNOSIS — J43.8 OTHER EMPHYSEMA (HCC): ICD-10-CM

## 2021-06-22 DIAGNOSIS — J96.11 CHRONIC RESPIRATORY FAILURE WITH HYPOXIA (HCC): ICD-10-CM

## 2021-06-22 DIAGNOSIS — E11.69 HYPERLIPIDEMIA ASSOCIATED WITH TYPE 2 DIABETES MELLITUS (HCC): ICD-10-CM

## 2021-06-22 DIAGNOSIS — E78.5 HYPERLIPIDEMIA ASSOCIATED WITH TYPE 2 DIABETES MELLITUS (HCC): ICD-10-CM

## 2021-06-22 DIAGNOSIS — F33.41 RECURRENT MAJOR DEPRESSIVE DISORDER, IN PARTIAL REMISSION (HCC): ICD-10-CM

## 2021-06-22 LAB
A/G RATIO: 1.9 (ref 1.1–2.2)
ALBUMIN SERPL-MCNC: 4.3 G/DL (ref 3.4–5)
ALP BLD-CCNC: 62 U/L (ref 40–129)
ALT SERPL-CCNC: <5 U/L (ref 10–40)
ANION GAP SERPL CALCULATED.3IONS-SCNC: 16 MMOL/L (ref 3–16)
AST SERPL-CCNC: 13 U/L (ref 15–37)
BASOPHILS ABSOLUTE: 0.1 K/UL (ref 0–0.2)
BASOPHILS RELATIVE PERCENT: 0.7 %
BILIRUB SERPL-MCNC: <0.2 MG/DL (ref 0–1)
BILIRUBIN, POC: NORMAL
BLOOD URINE, POC: NORMAL
BUN BLDV-MCNC: 18 MG/DL (ref 7–20)
CALCIUM SERPL-MCNC: 9.5 MG/DL (ref 8.3–10.6)
CHLORIDE BLD-SCNC: 107 MMOL/L (ref 99–110)
CHOLESTEROL, TOTAL: 144 MG/DL (ref 0–199)
CLARITY, POC: NORMAL
CO2: 23 MMOL/L (ref 21–32)
COLOR, POC: NORMAL
CREAT SERPL-MCNC: 0.8 MG/DL (ref 0.6–1.2)
CREATININE URINE: 10.1 MG/DL (ref 28–259)
EOSINOPHILS ABSOLUTE: 0.1 K/UL (ref 0–0.6)
EOSINOPHILS RELATIVE PERCENT: 0.7 %
GFR AFRICAN AMERICAN: >60
GFR NON-AFRICAN AMERICAN: >60
GLOBULIN: 2.3 G/DL
GLUCOSE BLD-MCNC: 96 MG/DL (ref 70–99)
GLUCOSE URINE, POC: NORMAL
HCT VFR BLD CALC: 39.6 % (ref 36–48)
HDLC SERPL-MCNC: 69 MG/DL (ref 40–60)
HEMOGLOBIN: 12.8 G/DL (ref 12–16)
KETONES, POC: NORMAL
LDL CHOLESTEROL CALCULATED: 58 MG/DL
LEUKOCYTE EST, POC: NORMAL
LYMPHOCYTES ABSOLUTE: 1.1 K/UL (ref 1–5.1)
LYMPHOCYTES RELATIVE PERCENT: 13.4 %
MCH RBC QN AUTO: 28.8 PG (ref 26–34)
MCHC RBC AUTO-ENTMCNC: 32.3 G/DL (ref 31–36)
MCV RBC AUTO: 89.4 FL (ref 80–100)
MICROALBUMIN UR-MCNC: <1.2 MG/DL
MICROALBUMIN/CREAT UR-RTO: ABNORMAL MG/G (ref 0–30)
MONOCYTES ABSOLUTE: 0.8 K/UL (ref 0–1.3)
MONOCYTES RELATIVE PERCENT: 9.4 %
NEUTROPHILS ABSOLUTE: 6.3 K/UL (ref 1.7–7.7)
NEUTROPHILS RELATIVE PERCENT: 75.8 %
NITRITE, POC: NORMAL
PDW BLD-RTO: 17.3 % (ref 12.4–15.4)
PH, POC: NORMAL
PLATELET # BLD: 276 K/UL (ref 135–450)
PMV BLD AUTO: 9.5 FL (ref 5–10.5)
POTASSIUM SERPL-SCNC: 4.3 MMOL/L (ref 3.5–5.1)
PROTEIN, POC: NORMAL
RBC # BLD: 4.43 M/UL (ref 4–5.2)
SODIUM BLD-SCNC: 146 MMOL/L (ref 136–145)
SPECIFIC GRAVITY, POC: NORMAL
TOTAL PROTEIN: 6.6 G/DL (ref 6.4–8.2)
TRIGL SERPL-MCNC: 86 MG/DL (ref 0–150)
URIC ACID, SERUM: 4.3 MG/DL (ref 2.6–6)
UROBILINOGEN, POC: NORMAL
VLDLC SERPL CALC-MCNC: 17 MG/DL
WBC # BLD: 8.3 K/UL (ref 4–11)

## 2021-06-22 PROCEDURE — 99214 OFFICE O/P EST MOD 30 MIN: CPT | Performed by: INTERNAL MEDICINE

## 2021-06-22 PROCEDURE — 81002 URINALYSIS NONAUTO W/O SCOPE: CPT | Performed by: INTERNAL MEDICINE

## 2021-06-22 ASSESSMENT — ENCOUNTER SYMPTOMS
EYE DISCHARGE: 0
ABDOMINAL PAIN: 0
COUGH: 0
VOMITING: 0
SHORTNESS OF BREATH: 1
RHINORRHEA: 0
BACK PAIN: 1
NAUSEA: 0
WHEEZING: 0

## 2021-06-22 NOTE — PROGRESS NOTES
Subjective:      Patient ID: Melody Otoole is a 69 y.o. female. HPI  Patient is here for follow up on her diabetes, asthma, chronic respiratory failure, hyperlipidemia and arthritis. She has frequent productive cough. She is on Oxygen 2L at night and as needed during the day. The use of O2 requirement has decreased since the hospital stay. She has seen ortho for left hip. She had a MRI of the left hip. Discontinuity and partial tear of the IT band seen. She also has a hematoma of the area. She had 1 out of 4 physical therapy sessions. Intending to going back for the rest.    Her diabetesare well controlled. It is between 90s-100s. Her weight is stable. She has severe COPD and asthma. Recent testing showed low oxygen. She does get some dyspnea off and on. Has intermittent Productive coughing. She is off prednisone. Her pulmonary nodule is stable. Chest CT done 1/8/2020, showing some new tree in bud group of micronodules. Emphysema is unchanged from the previous CT in 2018. Her hyperlipidemia is controlled. No myalgias. Her arthritis has been stable. Her tension HA are controlled. She is on Topamax. She has GERD. She is on Nexium. No heartburn. She has depression. She is on Effexor. It is stable. Review of Systems   Constitutional: Negative for appetite change, chills, fatigue and fever. HENT: Negative for ear pain, postnasal drip and rhinorrhea. Eyes: Negative for discharge. Respiratory: Positive for shortness of breath. Negative for cough and wheezing. Cardiovascular: Positive for chest pain. Negative for palpitations. Gastrointestinal: Negative for abdominal pain, nausea and vomiting. Endocrine: Negative for polydipsia and polyphagia. Musculoskeletal: Positive for back pain. Left hip pain     Skin: Negative for rash. Psychiatric/Behavioral: Positive for sleep disturbance. The patient is nervous/anxious.       Current Outpatient Medications on File Prior to Visit Medication Sig Dispense Refill    alendronate (FOSAMAX) 35 MG tablet TAKE 1 TABLET EVERY 7 DAYS 12 tablet 0    topiramate (TOPAMAX) 50 MG tablet TAKE 1 TABLET BY MOUTH TWICE DAILY 180 tablet 2    QUEtiapine (SEROQUEL) 25 MG tablet TAKE 1 TO 2 TABLETS BY MOUTH AT BEDTIME 180 tablet 0    albuterol (PROVENTIL) (2.5 MG/3ML) 0.083% nebulizer solution INHALE 3ML VIA NEBULIZATION ROUTE EVERY 6 HOURS AS NEEDED FOR WHEEZING OR SHORTNESS OF BREATH 375 mL 5    fluticasone-salmeterol (WIXELA INHUB) 250-50 MCG/DOSE AEPB INHALE 1 PUFF INTO THE LUNGS EVERY 12 HOURS 3 Inhaler 1    Roflumilast (DALIRESP) 500 MCG tablet TAKE 1 TABLET EVERY DAY 90 tablet 0    blood glucose test strips (ACCU-CHEK SMARTVIEW) strip TEST BLOOD SUGAR EVERY DAY.   DX;E11.9 100 strip 3    JANUVIA 100 MG tablet TAKE 1 TABLET BY MOUTH DAILY 90 tablet 0    metFORMIN (GLUCOPHAGE-XR) 500 MG extended release tablet TAKE 2 TABLETS BY MOUTH TWICE DAILY 360 tablet 0    simvastatin (ZOCOR) 20 MG tablet TAKE 1 TABLET EVERY EVENING 90 tablet 0    venlafaxine (EFFEXOR XR) 75 MG extended release capsule TAKE 1 CAPSULE EVERY DAY 90 capsule 0    potassium chloride (KLOR-CON M) 20 MEQ extended release tablet TAKE 1 TABLET EVERY DAY 90 tablet 0    SPIRIVA HANDIHALER 18 MCG inhalation capsule INHALE THE CONTENTS OF 1 CAPSULE VIA INHALATION DEVICE EVERY DAY 90 capsule 1    esomeprazole (NEXIUM) 40 MG delayed release capsule TAKE 1 CAPSULE EVERY MORNING BEFORE BREAKFAST 90 capsule 0    furosemide (LASIX) 40 MG tablet TAKE 1 TABLET EVERY DAY 90 tablet 0    theophylline (THEODUR) 300 MG extended release tablet Take 1 tablet by mouth daily 90 tablet 1    gabapentin (NEURONTIN) 300 MG capsule TAKE 1 CAPSULE BY MOUTH THREE TIMES DAILY FOR 30 DAYS 90 capsule 0    calcium citrate-vitamin D (CITRACAL+D) 315-200 MG-UNIT per tablet Take 2 tablets by mouth 2 times daily 360 tablet 0    potassium chloride (MICRO-K) 10 MEQ extended release capsule Take 20 mEq by mouth daily  diltiazem (CARDIZEM CD) 120 MG extended release capsule Take 1 capsule by mouth daily 30 capsule 3    guaiFENesin (MUCINEX) 600 MG extended release tablet Take 1 tablet by mouth 2 times daily 60 tablet 1    Cyanocobalamin (VITAMIN B-12 PO) Take by mouth      albuterol sulfate  (90 Base) MCG/ACT inhaler INHALE 2 PUFFS EVERY 6 HOURS AS NEEDED FOR WHEEZING 54 g 3    naproxen (NAPROSYN) 375 MG tablet TAKE 1 TABLET TWICE DAILY 180 tablet 0    Blood Glucose Monitoring Suppl (ACCU-CHEK GAURANG SMARTVIEW) w/Device KIT Use to test once daily. DX:E11.9 1 kit 0    Lancets Misc. (ACCU-CHEK FASTCLIX LANCET) KIT Use to test once daily. DX:E11.9 1 kit 2    vitamin D (CHOLECALCIFEROL) 400 UNITS TABS tablet Take 2 tablets by mouth daily 180 tablet 1    calcium carbonate (CALCIUM 600) 600 MG TABS tablet Take 2 tablets by mouth daily 180 tablet 1    Respiratory Therapy Supplies (NEBULIZER/TUBING/MOUTHPIECE) KIT 1 kit by Does not apply route daily as needed DX COPD J44.9 1 kit 0    Nebulizers (COMPRESSOR/NEBULIZER) MISC 1 Device by Does not apply route as needed DX COPD J44.9 1 each 0    fluticasone (FLONASE) 50 MCG/ACT nasal spray 2 sprays by Nasal route daily. 3 Bottle 0    Misc. Devices (ACAPELLA) MISC by Does not apply route 4 times daily. 1 each 0     No current facility-administered medications on file prior to visit. There are no changes to past medical history, family history, social history or review of systems(except as noted in the history section) since prior note (all reviewed with patient). /80 (Site: Right Upper Arm, Position: Sitting, Cuff Size: Medium Adult)   Pulse 70   Resp 12   Ht 5' 1\" (1.549 m)   Wt 104 lb (47.2 kg)   BMI 19.65 kg/m²      Objective:   Physical Exam  Constitutional:       Appearance: She is underweight. HENT:      Head: Normocephalic. Right Ear: No drainage. Tympanic membrane is not injected. Left Ear: No drainage.  Tympanic membrane is not

## 2021-06-23 LAB
ESTIMATED AVERAGE GLUCOSE: 119.8 MG/DL
HBA1C MFR BLD: 5.8 %

## 2021-06-23 RX ORDER — VENLAFAXINE HYDROCHLORIDE 75 MG/1
CAPSULE, EXTENDED RELEASE ORAL
Qty: 90 CAPSULE | Refills: 0 | Status: SHIPPED | OUTPATIENT
Start: 2021-06-23 | End: 2021-08-30

## 2021-06-23 RX ORDER — POTASSIUM CHLORIDE 20 MEQ/1
TABLET, EXTENDED RELEASE ORAL
Qty: 90 TABLET | Refills: 0 | Status: SHIPPED | OUTPATIENT
Start: 2021-06-23 | End: 2021-08-30

## 2021-07-06 RX ORDER — SIMVASTATIN 20 MG
TABLET ORAL
Qty: 90 TABLET | Refills: 0 | Status: SHIPPED | OUTPATIENT
Start: 2021-07-06 | End: 2021-09-10

## 2021-07-08 ENCOUNTER — HOSPITAL ENCOUNTER (OUTPATIENT)
Dept: CT IMAGING | Age: 71
Discharge: HOME | End: 2021-07-08
Payer: MEDICARE

## 2021-07-08 DIAGNOSIS — R91.8 PULMONARY NODULES: ICD-10-CM

## 2021-07-08 DIAGNOSIS — R93.89 ABNORMAL CT OF THE CHEST: ICD-10-CM

## 2021-07-08 PROCEDURE — 71250 CT THORAX DX C-: CPT

## 2021-07-12 ENCOUNTER — TELEPHONE (OUTPATIENT)
Dept: PULMONOLOGY | Age: 71
End: 2021-07-12

## 2021-07-12 ENCOUNTER — OFFICE VISIT (OUTPATIENT)
Dept: PULMONOLOGY | Age: 71
End: 2021-07-12
Payer: MEDICARE

## 2021-07-12 VITALS
OXYGEN SATURATION: 100 % | SYSTOLIC BLOOD PRESSURE: 125 MMHG | TEMPERATURE: 96.2 F | HEIGHT: 60 IN | WEIGHT: 105 LBS | BODY MASS INDEX: 20.62 KG/M2 | DIASTOLIC BLOOD PRESSURE: 70 MMHG | HEART RATE: 102 BPM

## 2021-07-12 DIAGNOSIS — R91.8 PULMONARY NODULES: Primary | ICD-10-CM

## 2021-07-12 DIAGNOSIS — J47.9 BRONCHIECTASIS WITHOUT COMPLICATION (HCC): ICD-10-CM

## 2021-07-12 DIAGNOSIS — J44.1 COPD EXACERBATION (HCC): ICD-10-CM

## 2021-07-12 DIAGNOSIS — R93.89 ABNORMAL CT OF THE CHEST: ICD-10-CM

## 2021-07-12 DIAGNOSIS — J44.9 COPD, VERY SEVERE (HCC): Primary | ICD-10-CM

## 2021-07-12 PROCEDURE — 99214 OFFICE O/P EST MOD 30 MIN: CPT | Performed by: INTERNAL MEDICINE

## 2021-07-12 PROCEDURE — 3023F SPIROM DOC REV: CPT | Performed by: INTERNAL MEDICINE

## 2021-07-12 PROCEDURE — 1090F PRES/ABSN URINE INCON ASSESS: CPT | Performed by: INTERNAL MEDICINE

## 2021-07-12 PROCEDURE — G8399 PT W/DXA RESULTS DOCUMENT: HCPCS | Performed by: INTERNAL MEDICINE

## 2021-07-12 PROCEDURE — 1036F TOBACCO NON-USER: CPT | Performed by: INTERNAL MEDICINE

## 2021-07-12 PROCEDURE — G8926 SPIRO NO PERF OR DOC: HCPCS | Performed by: INTERNAL MEDICINE

## 2021-07-12 PROCEDURE — 4040F PNEUMOC VAC/ADMIN/RCVD: CPT | Performed by: INTERNAL MEDICINE

## 2021-07-12 PROCEDURE — G8427 DOCREV CUR MEDS BY ELIG CLIN: HCPCS | Performed by: INTERNAL MEDICINE

## 2021-07-12 PROCEDURE — 3017F COLORECTAL CA SCREEN DOC REV: CPT | Performed by: INTERNAL MEDICINE

## 2021-07-12 PROCEDURE — G8420 CALC BMI NORM PARAMETERS: HCPCS | Performed by: INTERNAL MEDICINE

## 2021-07-12 PROCEDURE — 1123F ACP DISCUSS/DSCN MKR DOCD: CPT | Performed by: INTERNAL MEDICINE

## 2021-07-12 RX ORDER — BENZONATATE 100 MG/1
100 CAPSULE ORAL 3 TIMES DAILY PRN
Qty: 90 CAPSULE | Refills: 3 | Status: SHIPPED | OUTPATIENT
Start: 2021-07-12 | End: 2021-11-05

## 2021-07-12 RX ORDER — DOXYCYCLINE HYCLATE 100 MG/1
100 CAPSULE ORAL 2 TIMES DAILY
Qty: 10 CAPSULE | Refills: 0 | Status: SHIPPED | OUTPATIENT
Start: 2021-07-12 | End: 2021-07-14 | Stop reason: SDUPTHER

## 2021-07-12 RX ORDER — PREDNISONE 10 MG/1
TABLET ORAL
Qty: 30 TABLET | Refills: 0 | Status: SHIPPED | OUTPATIENT
Start: 2021-07-12 | End: 2021-07-14 | Stop reason: SDUPTHER

## 2021-07-12 NOTE — TELEPHONE ENCOUNTER
[facility] called stating that pt had a PFT in 2017, but in order for pt to complete rehab again, pt will need a new PFT that shows worsening. Please advise how to proceed? If still proceeding, pt will need a new Pulm Rehab referral after PFT is completed. OV 7/12/21:    Assessment:   · Very severe COPD with AE  · Pulmonary emphysema  · Bronchiectasis  · Abnormal CT chest 1/8/2020 with right middle lobe new tree-in-bud nodules-favor inflammatory. Stable on repeat CT 7/8/2020  · New tree-in-bud nodularity lingula, RLL, LLL on CT 7/8/2021- favor inflammatory/infection. Rule out NTM   · R pulmonary nodules. Stable over 2 years . · Hypoxia on exertion  · Off Daliresp given weight loss   · Has 7 dogs at home   · 20 pack years. Quit smoking 1996. Plan:       · Prednisone taper  · Doxycycline 100 mg PO BID for 5 days. · Sputum AFB x3- declined Bronch after discussing risks/benefits associated with it. · CT chest in 3 months  · Benzonatate (Tessalon) 100 mg PO TID PRN for cough. · Continue Wixella BID, Spiriva INH daily and Albuterol INH/Neb PRN  · Continue Theophylline   · Continue O2 2LPM on exertion. Advised to titrate O2 using her pulse oximeter- target O2 sat 90-92%. · Pulmonary rehab referral   · Patient is up to date with pneumococcal vaccine, and influenza vaccine   · Advised to get her Covid vaccine - she declines. .Risks, benefits and side effects were discussed with patient.    · Acapella and Mucinex   · Follow up after CT chest

## 2021-07-12 NOTE — PROGRESS NOTES
[facility]                                                            Outpatient Follow Up Note    CHIEF COMPLAINT: Follow up CT chest         HPI:  CT chest reviewed by me and noted below. Results were dicussed with patient and multiple good questions were answered.    Worse SOB and cough  Milky white sputum  No hemoptysis   Uses O2 at night 2LPM with benefits   No smoking   Uses Albuterol 5 times/day         Past Medical History:   Diagnosis Date    Acute on chronic respiratory failure with hypoxia (HCC) 12/3/2009    Chronic airway obstruction, not elsewhere classified 3/4/08    oxygen 2L/min at HS and PRN through day    Chronic kidney disease     incontinent    COPD (chronic obstructive pulmonary disease) ([facility]     Depression     Displacement of lumbar intervertebral disc without myelopathy 8/13/07    Edema 9/13/07    Emphysema of lung ([address]     Enthesopathy of hip region     Enthesopathy of hip region 3/5/07    Esophageal reflux 3/4/08    Hemorrhage of rectum and anus 11/14/07    Herpes zoster without complication     History of blood transfusion     Hyperlipidemia associated with type 2 diabetes mellitus ([address] 12/11/2017    Lumbar spondylosis 5/1/2018    Major depression, chronic 8/12/2015    Osteoporosis, unspecified 12/4/07    Other and unspecified hyperlipidemia 3/4/08    Pain in joint, shoulder region 3/4/08    Pneumonia     Pulmonary nodule     Recurrent major depressive disorder, in partial remission ([address] 5/4/2018    Rheumatic fever     S/P ileostomy ([address] 8/20/2011    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    Tension headache     Type 2 diabetes mellitus with hyperglycemia, without long-term current use of insulin ([address]     Unspecified asthma(493.90) 8/13/07    Unspecified chronic bronchitis ([address]     Ventral hernia 6/29/2014       Past Surgical History:        Procedure Laterality Date    ABDOMEN SURGERY  5-19-11    total abdominal colectomy iliostomy    CARPAL TUNNEL RELEASE      right    CATARACT REMOVAL Bilateral     L 6/18/2013, R 07/01/2013    CATARACT REMOVAL WITH IMPLANT Right 7/1/13    COLECTOMY      and reversal     COLONOSCOPY  2009, 11/4/2011, 10/28/15    normal    CYST REMOVAL Right 6/20/14    Dr. Arline Jordan; right ear pressure point cyst removal    DILATATION, ESOPHAGUS      ENDOSCOPY, COLON, DIAGNOSTIC      EPIDURAL STEROID INJECTION Bilateral 7/9/2019    BILATERAL LUMBAR FOUR TRANSFORAMINAL EPIDURAL STEROID INJECTION SITE CONFIRMED BY FLUOROSCOPY performed by Estella Connolly MD at [address] Left 6/18/13    cataract with lens implant    HERNIA REPAIR  6/27/14    Open Vental Hernia Repair    HIP SURGERY      HYSTERECTOMY  1973    subtotal    NECK SURGERY      incision of windpipe, repair of windpipe defect    OTHER SURGICAL HISTORY  8/19/2011    hand assisted laparoscopic ileostomy reversal    OVARY REMOVAL      TRACHEAL SURGERY  2005    hx of trach     UMBILICAL HERNIA REPAIR  6/27/2014       Allergies:  has No Known Allergies. Social History:    TOBACCO:   reports that she quit smoking about 25 years ago. Her smoking use included cigarettes. She has a 30.00 pack-year smoking history. She has never used smokeless tobacco.  ETOH:   reports no history of alcohol use.       Family History:       Problem Relation Age of Onset   Osmany Salmayra Asthma Daughter     Diabetes Daughter     Cancer Daughter         Uterine    Alcohol Abuse Daughter     Asthma Daughter     Diabetes Mother     Heart Failure Mother     Hypertension Mother     Heart Failure Brother     Diabetes Sister     Heart Failure Brother     Coronary Art Dis Son     Emphysema Neg Hx        Current Medications:    Current Outpatient Medications:     simvastatin (ZOCOR) 20 MG tablet, TAKE 1 TABLET EVERY EVENING, Disp: 90 tablet, Rfl: 0    potassium chloride (KLOR-CON M) 20 MEQ extended release tablet, TAKE 1 TABLET EVERY DAY, Disp: 90 tablet, Rfl: 0    venlafaxine (EFFEXOR XR) 75 MG extended release capsule, TAKE 1 CAPSULE EVERY DAY, Disp: 90 capsule, Rfl: 0    alendronate (FOSAMAX) 35 MG tablet, TAKE 1 TABLET EVERY 7 DAYS, Disp: 12 tablet, Rfl: 0    topiramate (TOPAMAX) 50 MG tablet, TAKE 1 TABLET BY MOUTH TWICE DAILY, Disp: 180 tablet, Rfl: 2    QUEtiapine (SEROQUEL) 25 MG tablet, TAKE 1 TO 2 TABLETS BY MOUTH AT BEDTIME, Disp: 180 tablet, Rfl: 0    albuterol (PROVENTIL) (2.5 MG/3ML) 0.083% nebulizer solution, INHALE 3ML VIA NEBULIZATION ROUTE EVERY 6 HOURS AS NEEDED FOR WHEEZING OR SHORTNESS OF BREATH, Disp: 375 mL, Rfl: 5    fluticasone-salmeterol (WIXELA INHUB) 250-50 MCG/DOSE AEPB, INHALE 1 PUFF INTO THE LUNGS EVERY 12 HOURS, Disp: 3 Inhaler, Rfl: 1    Roflumilast (DALIRESP) 500 MCG tablet, TAKE 1 TABLET EVERY DAY, Disp: 90 tablet, Rfl: 0    blood glucose test strips (ACCU-CHEK SMARTVIEW) strip, TEST BLOOD SUGAR EVERY DAY.   DX;E11.9, Disp: 100 strip, Rfl: 3    JANUVIA 100 MG tablet, TAKE 1 TABLET BY MOUTH DAILY, Disp: 90 tablet, Rfl: 0    metFORMIN (GLUCOPHAGE-XR) 500 MG extended release tablet, TAKE 2 TABLETS BY MOUTH TWICE DAILY, Disp: 360 tablet, Rfl: 0    SPIRIVA HANDIHALER 18 MCG inhalation capsule, INHALE THE CONTENTS OF 1 CAPSULE VIA INHALATION DEVICE EVERY DAY, Disp: 90 capsule, Rfl: 1    esomeprazole (NEXIUM) 40 MG delayed release capsule, TAKE 1 CAPSULE EVERY MORNING BEFORE BREAKFAST, Disp: 90 capsule, Rfl: 0    furosemide (LASIX) 40 MG tablet, TAKE 1 TABLET EVERY DAY, Disp: 90 tablet, Rfl: 0    theophylline (THEODUR) 300 MG extended release tablet, Take 1 tablet by mouth daily, Disp: 90 tablet, Rfl: 1    gabapentin (NEURONTIN) 300 MG capsule, TAKE 1 CAPSULE BY MOUTH THREE TIMES DAILY FOR 30 DAYS, Disp: 90 capsule, Rfl: 0    calcium citrate-vitamin D (CITRACAL+D) 315-200 MG-UNIT per tablet, Take 2 tablets by mouth 2 times daily, Disp: 360 tablet, Rfl: 0    potassium chloride (MICRO-K) 10 MEQ extended release capsule, Take 20 mEq by mouth daily , Disp: , Rfl:     diltiazem (CARDIZEM CD) 120 MG extended release capsule, Take 1 capsule by mouth daily, Disp: 30 capsule, Rfl: 3    guaiFENesin (MUCINEX) 600 MG extended release tablet, Take 1 tablet by mouth 2 times daily, Disp: 60 tablet, Rfl: 1    Cyanocobalamin (VITAMIN B-12 PO), Take by mouth, Disp: , Rfl:     albuterol sulfate  (90 Base) MCG/ACT inhaler, INHALE 2 PUFFS EVERY 6 HOURS AS NEEDED FOR WHEEZING, Disp: 54 g, Rfl: 3    naproxen (NAPROSYN) 375 MG tablet, TAKE 1 TABLET TWICE DAILY, Disp: 180 tablet, Rfl: 0    Blood Glucose Monitoring Suppl (ACCU-CHEK GAURANG SMARTVIEW) w/Device KIT, Use to test once daily. DX:E11.9, Disp: 1 kit, Rfl: 0    Lancets Misc. (ACCU-CHEK FASTCLIX LANCET) KIT, Use to test once daily. DX:E11.9, Disp: 1 kit, Rfl: 2    vitamin D (CHOLECALCIFEROL) 400 UNITS TABS tablet, Take 2 tablets by mouth daily, Disp: 180 tablet, Rfl: 1    calcium carbonate (CALCIUM 600) 600 MG TABS tablet, Take 2 tablets by mouth daily, Disp: 180 tablet, Rfl: 1    Respiratory Therapy Supplies (NEBULIZER/TUBING/MOUTHPIECE) KIT, 1 kit by Does not apply route daily as needed DX COPD J44.9, Disp: 1 kit, Rfl: 0    Nebulizers (COMPRESSOR/NEBULIZER) MISC, 1 Device by Does not apply route as needed DX COPD J44.9, Disp: 1 each, Rfl: 0    fluticasone (FLONASE) 50 MCG/ACT nasal spray, 2 sprays by Nasal route daily. , Disp: 3 Bottle, Rfl: 0    Misc. Devices (ACAPELLA) MISC, by Does not apply route 4 times daily. , Disp: 1 each, Rfl: 0           Objective:   /70   Pulse 102   Temp 96.2 °F (35.7 °C)   Ht 5' (1.524 m)   Wt 105 lb (47.6 kg)   SpO2 100% Comment: 2lpm on continious  BMI 20.51 kg/m²  2LPM   Gen: No distress. Eyes: PERRL. No sclera icterus. No conjunctival injection. ENT: No discharge. Pharynx clear. Neck: Trachea midline. No obvious mass. Resp: No accessory muscle use. No crackles. Few wheezes. No rhonchi.  No dullness on percussion. Good air entry. CV: Regular rate. Regular rhythm. No murmur or rub. No edema. GI: Non-tender. Non-distended. No hernia. Skin: Warm and dry. No nodule on exposed extremities. Lymph: No cervical LAD. No supraclavicular LAD. M/S: No cyanosis. No joint deformity. No clubbing. Neuro: Awake. Alert. Moves all four extremities. Psych: Oriented x 3. No anxiety. DATA reviewed by me:   PFTs 03/29/2017 FEV1 0.55L(27%) TLC 5.21L(117%) DLCO 8.47(42%) 6MW 770 2L exertion   PFTs 07/07/2015 FEV1 0.63L(31%) TLC 5.12L(114%) DLCO 9.6(48%)   PFTs 12/06/2011 FEV1 0.83L(45%) TLC 5.14L(125%) DLCO 8.6 (49%)   PFTs 07/10/2008 FEV1 0.77L           TLC 5.41L            DLCO 7.62  PFTs 01/15/2007 FEV1 0.59L           TLC 5.15L            DLCO 10.82          CT chest 7/8/2020 imaging reviewed by me and showed  Stable CT chest  No change in tree-in-bud nodularity right middle lobe  Unchanged few additional noncalcified nodules back to 2018  Emphysema with mild bronchiectasis    CT chest 7/8/2021 imaging reviewed by me and showed  Increased tree-in-bud nodularity lingula right lower lobe with new focal nodule left lower lobe and right lower lobe. Stable right middle lobe nodularity    DEXA 3/3/16 Osteopenia        Assessment:      · Very severe COPD with AE  · Pulmonary emphysema  · Bronchiectasis  · Abnormal CT chest 1/8/2020 with right middle lobe new tree-in-bud nodules-favor inflammatory. Stable on repeat CT 7/8/2020  · New tree-in-bud nodularity lingula, RLL, LLL on CT 7/8/2021- favor inflammatory/infection. Rule out NTM   · R pulmonary nodules. Stable over 2 years . · Hypoxia on exertion  · Off Daliresp given weight loss   · Has 7 dogs at home   · 20 pack years. Quit smoking 1996. Plan:       · Prednisone taper  · Doxycycline 100 mg PO BID for 5 days. · Sputum [employer] x3- declined Bronch after discussing risks/benefits associated with it.    · CT chest in 3 months  · Benzonatate (Tessalon) 100 mg PO TID PRN for cough. · Continue Wixella BID, Spiriva INH daily and Albuterol INH/Neb PRN  · Continue Theophylline   · Continue O2 2LPM on exertion. Advised to titrate O2 using her pulse oximeter- target O2 sat 90-92%. · Pulmonary rehab referral   · Patient is up to date with pneumococcal vaccine, and influenza vaccine   · Advised to get her Covid vaccine - she declines. .Risks, benefits and side effects were discussed with patient.    · Acapella and Mucinex   · Follow up after CT chest

## 2021-07-14 ENCOUNTER — TELEPHONE (OUTPATIENT)
Dept: PULMONOLOGY | Age: 71
End: 2021-07-14

## 2021-07-14 RX ORDER — PREDNISONE 10 MG/1
TABLET ORAL
Qty: 30 TABLET | Refills: 0 | Status: SHIPPED | OUTPATIENT
Start: 2021-07-14 | End: 2021-07-26

## 2021-07-14 RX ORDER — DOXYCYCLINE HYCLATE 100 MG/1
100 CAPSULE ORAL 2 TIMES DAILY
Qty: 10 CAPSULE | Refills: 0 | Status: SHIPPED | OUTPATIENT
Start: 2021-07-14 | End: 2021-07-19

## 2021-07-14 NOTE — TELEPHONE ENCOUNTER
Pt called asking for Doxy and Pred to be sent to local pharmacy-[facility]. Scripts pending to go to local pharmacy. OV 7/12/21:    Assessment:   · Very severe COPD with AE  · Pulmonary emphysema  · Bronchiectasis  · Abnormal CT chest 1/8/2020 with right middle lobe new tree-in-bud nodules-favor inflammatory. Stable on repeat CT 7/8/2020  · New tree-in-bud nodularity lingula, RLL, LLL on CT 7/8/2021- favor inflammatory/infection. Rule out NTM   · R pulmonary nodules. Stable over 2 years . · Hypoxia on exertion  · Off Daliresp given weight loss   · Has 7 dogs at home   · 20 pack years. Quit smoking 1996. Plan:       · Prednisone taper  · Doxycycline 100 mg PO BID for 5 days. · Sputum [employer] x3- declined Bronch after discussing risks/benefits associated with it. · CT chest in 3 months  · Benzonatate (Tessalon) 100 mg PO TID PRN for cough. · Continue Wixella BID, Spiriva INH daily and Albuterol INH/Neb PRN  · Continue Theophylline   · Continue O2 2LPM on exertion. Advised to titrate O2 using her pulse oximeter- target O2 sat 90-92%. · Pulmonary rehab referral   · Patient is up to date with pneumococcal vaccine, and influenza vaccine   · Advised to get her Covid vaccine - she declines. .Risks, benefits and side effects were discussed with patient.    · Acapella and Mucinex   · Follow up after CT chest

## 2021-07-14 NOTE — TELEPHONE ENCOUNTER
Patient informed on need of PFT. PFT order pending. Patient scheduled for pft 7/26/21 pt aware to get tested for covid due to not being vaccinated.  Watch for result

## 2021-07-15 ENCOUNTER — HOSPITAL ENCOUNTER (OUTPATIENT)
Dept: MAMMOGRAPHY | Age: 71
Discharge: HOME | End: 2021-07-15
Payer: MEDICARE

## 2021-07-15 DIAGNOSIS — Z12.31 ENCOUNTER FOR SCREENING MAMMOGRAM FOR MALIGNANT NEOPLASM OF BREAST: ICD-10-CM

## 2021-07-15 PROCEDURE — 77067 SCR MAMMO BI INCL CAD: CPT

## 2021-07-15 NOTE — TELEPHONE ENCOUNTER
Pt brought sputum sample into office left on registrar desk while office was at lunch. Left message asking patient to return call to office pt will need to repeat sputum in appropriate container and take to lab.

## 2021-07-16 ENCOUNTER — TELEPHONE (OUTPATIENT)
Dept: MAMMOGRAPHY | Age: 71
End: 2021-07-16

## 2021-07-21 ENCOUNTER — HOSPITAL ENCOUNTER (OUTPATIENT)
Age: 71
Discharge: HOME | End: 2021-07-21
Payer: MEDICARE

## 2021-07-21 PROCEDURE — U0003 INFECTIOUS AGENT DETECTION BY NUCLEIC ACID (DNA OR RNA); SEVERE ACUTE RESPIRATORY SYNDROME CORONAVIRUS 2 (SARS-COV-2) (CORONAVIRUS DISEASE [COVID-19]), AMPLIFIED PROBE TECHNIQUE, MAKING USE OF HIGH THROUGHPUT TECHNOLOGIES AS DESCRIBED BY CMS-2020-01-R: HCPCS

## 2021-07-21 PROCEDURE — U0005 INFEC AGEN DETEC AMPLI PROBE: HCPCS

## 2021-07-22 LAB — SARS-COV-2: NOT DETECTED

## 2021-07-22 RX ORDER — FUROSEMIDE 40 MG/1
TABLET ORAL
Qty: 90 TABLET | Refills: 0 | Status: SHIPPED | OUTPATIENT
Start: 2021-07-22 | End: 2021-09-17

## 2021-07-22 RX ORDER — ESOMEPRAZOLE MAGNESIUM 40 MG/1
CAPSULE, DELAYED RELEASE ORAL
Qty: 90 CAPSULE | Refills: 0 | Status: SHIPPED | OUTPATIENT
Start: 2021-07-22 | End: 2021-09-17

## 2021-07-26 ENCOUNTER — HOSPITAL ENCOUNTER (OUTPATIENT)
Dept: PULMONOLOGY | Age: 71
Discharge: HOME | End: 2021-07-26
Payer: MEDICARE

## 2021-07-26 LAB
DLCO %PRED: 44 %
DLCO PRED: NORMAL
DLCO/VA %PRED: NORMAL
DLCO/VA PRED: NORMAL
DLCO/VA: NORMAL
DLCO: NORMAL
EXPIRATORY TIME-POST: NORMAL
EXPIRATORY TIME: NORMAL
FEF 25-75% %CHNG: NORMAL
FEF 25-75% %PRED-POST: NORMAL
FEF 25-75% %PRED-PRE: NORMAL
FEF 25-75% PRED: NORMAL
FEF 25-75%-POST: NORMAL
FEF 25-75%-PRE: NORMAL
FEV1 %PRED-POST: 36 %
FEV1 %PRED-PRE: 34 %
FEV1 PRED: NORMAL
FEV1-POST: NORMAL
FEV1-PRE: NORMAL
FEV1/FVC %PRED-POST: NORMAL
FEV1/FVC %PRED-PRE: NORMAL
FEV1/FVC PRED: NORMAL
FEV1/FVC-POST: 31 %
FEV1/FVC-PRE: 28 %
FVC %PRED-POST: NORMAL
FVC %PRED-PRE: NORMAL
FVC PRED: NORMAL
FVC-POST: NORMAL
FVC-PRE: NORMAL
GAW %PRED: NORMAL
GAW PRED: NORMAL
GAW: NORMAL
IC %PRED: NORMAL
IC PRED: NORMAL
IC: NORMAL
MEP: NORMAL
MIP: NORMAL
MVV %PRED-PRE: NORMAL
MVV PRED: NORMAL
MVV-PRE: NORMAL
PEF %PRED-POST: NORMAL
PEF %PRED-PRE: NORMAL
PEF PRED: NORMAL
PEF%CHNG: NORMAL
PEF-POST: NORMAL
PEF-PRE: NORMAL
RAW %PRED: NORMAL
RAW PRED: NORMAL
RAW: NORMAL
RV %PRED: NORMAL
RV PRED: NORMAL
RV: NORMAL
SVC %PRED: NORMAL
SVC PRED: NORMAL
SVC: NORMAL
TLC %PRED: 123 %
TLC PRED: NORMAL
TLC: NORMAL
VA %PRED: NORMAL
VA PRED: NORMAL
VA: NORMAL
VTG %PRED: NORMAL
VTG PRED: NORMAL
VTG: NORMAL

## 2021-07-26 PROCEDURE — 94060 EVALUATION OF WHEEZING: CPT

## 2021-07-26 PROCEDURE — 6370000000 HC RX 637 (ALT 250 FOR IP): Performed by: INTERNAL MEDICINE

## 2021-07-26 PROCEDURE — 94618 PULMONARY STRESS TESTING: CPT

## 2021-07-26 PROCEDURE — 94726 PLETHYSMOGRAPHY LUNG VOLUMES: CPT

## 2021-07-26 PROCEDURE — 94640 AIRWAY INHALATION TREATMENT: CPT

## 2021-07-26 PROCEDURE — 94729 DIFFUSING CAPACITY: CPT

## 2021-07-26 RX ORDER — ALBUTEROL SULFATE 90 UG/1
4 AEROSOL, METERED RESPIRATORY (INHALATION) ONCE
Status: COMPLETED | OUTPATIENT
Start: 2021-07-26 | End: 2021-07-26

## 2021-07-26 RX ADMIN — Medication 4 PUFF: at 12:20

## 2021-07-26 ASSESSMENT — PULMONARY FUNCTION TESTS
FEV1_PERCENT_PREDICTED_POST: 36
FEV1/FVC_PRE: 28
FEV1_PERCENT_PREDICTED_PRE: 34
FEV1/FVC_POST: 31

## 2021-07-30 RX ORDER — SITAGLIPTIN 100 MG/1
100 TABLET, FILM COATED ORAL DAILY
Qty: 90 TABLET | Refills: 0 | Status: SHIPPED | OUTPATIENT
Start: 2021-07-30 | End: 2021-10-28

## 2021-07-30 RX ORDER — METFORMIN HYDROCHLORIDE 500 MG/1
TABLET, EXTENDED RELEASE ORAL
Qty: 360 TABLET | Refills: 0 | Status: SHIPPED | OUTPATIENT
Start: 2021-07-30 | End: 2021-10-28

## 2021-08-02 RX ORDER — ALBUTEROL SULFATE 90 UG/1
AEROSOL, METERED RESPIRATORY (INHALATION)
Qty: 54 G | Refills: 5 | Status: SHIPPED | OUTPATIENT
Start: 2021-08-02 | End: 2022-08-12

## 2021-08-10 NOTE — TELEPHONE ENCOUNTER
Pulm rehab order faxed. Sput[employer] x3- declined Bronch after discussing risks/benefits associated with it. see office note.

## 2021-08-13 ENCOUNTER — TELEPHONE (OUTPATIENT)
Dept: PULMONOLOGY | Age: 71
End: 2021-08-13

## 2021-08-13 NOTE — TELEPHONE ENCOUNTER
Osvaldo from pulmonary rehab called stating that pt PFT numbers were not low enough to qualify pt for pulmonary rehab again. Insurance won't cover. Please be aware. OV 7/12/21:    Assessment:   · Very severe COPD with AE  · Pulmonary emphysema  · Bronchiectasis  · Abnormal CT chest 1/8/2020 with right middle lobe new tree-in-bud nodules-favor inflammatory. Stable on repeat CT 7/8/2020  · New tree-in-bud nodularity lingula, RLL, LLL on CT 7/8/2021- favor inflammatory/infection. Rule out NTM   · R pulmonary nodules. Stable over 2 years . · Hypoxia on exertion  · Off Daliresp given weight loss   · Has 7 dogs at home   · 20 pack years. Quit smoking 1996. Plan:       · Prednisone taper  · Doxycycline 100 mg PO BID for 5 days. · Sputum [employer] x3- declined Bronch after discussing risks/benefits associated with it. · CT chest in 3 months  · Benzonatate (Tessalon) 100 mg PO TID PRN for cough. · Continue Wixella BID, Spiriva INH daily and Albuterol INH/Neb PRN  · Continue Theophylline   · Continue O2 2LPM on exertion. Advised to titrate O2 using her pulse oximeter- target O2 sat 90-92%. · Pulmonary rehab referral   · Patient is up to date with pneumococcal vaccine, and influenza vaccine   · Advised to get her Covid vaccine - she declines. .Risks, benefits and side effects were discussed with patient.    · Acapella and Mucinex   · Follow up after CT chest

## 2021-08-16 NOTE — TELEPHONE ENCOUNTER
Osvaldo called back and said the patients recent PFT is not showing that it is worse from prior PFT so insurance will not cover her to complete pulm rehab again.  Even with severe COPD

## 2021-08-20 RX ORDER — ALENDRONATE SODIUM 35 MG/1
TABLET ORAL
Qty: 12 TABLET | Refills: 0 | Status: SHIPPED | OUTPATIENT
Start: 2021-08-20 | End: 2021-10-25

## 2021-08-30 RX ORDER — QUETIAPINE FUMARATE 25 MG/1
TABLET, FILM COATED ORAL
Qty: 180 TABLET | Refills: 0 | Status: SHIPPED | OUTPATIENT
Start: 2021-08-30 | End: 2021-11-29

## 2021-08-30 RX ORDER — POTASSIUM CHLORIDE 20 MEQ/1
TABLET, EXTENDED RELEASE ORAL
Qty: 90 TABLET | Refills: 0 | Status: SHIPPED | OUTPATIENT
Start: 2021-08-30 | End: 2021-11-10

## 2021-08-30 RX ORDER — VENLAFAXINE HYDROCHLORIDE 75 MG/1
CAPSULE, EXTENDED RELEASE ORAL
Qty: 90 CAPSULE | Refills: 0 | Status: SHIPPED | OUTPATIENT
Start: 2021-08-30 | End: 2021-11-10

## 2021-09-10 RX ORDER — SIMVASTATIN 20 MG
TABLET ORAL
Qty: 90 TABLET | Refills: 0 | Status: SHIPPED | OUTPATIENT
Start: 2021-09-10 | End: 2021-11-29

## 2021-09-17 RX ORDER — FUROSEMIDE 40 MG/1
TABLET ORAL
Qty: 90 TABLET | Refills: 0 | Status: SHIPPED | OUTPATIENT
Start: 2021-09-17 | End: 2021-11-29

## 2021-09-17 RX ORDER — ESOMEPRAZOLE MAGNESIUM 40 MG/1
CAPSULE, DELAYED RELEASE ORAL
Qty: 90 CAPSULE | Refills: 0 | Status: SHIPPED | OUTPATIENT
Start: 2021-09-17 | End: 2021-11-29

## 2021-10-04 ENCOUNTER — OFFICE VISIT (OUTPATIENT)
Dept: INTERNAL MEDICINE CLINIC | Age: 71
End: 2021-10-04

## 2021-10-04 VITALS
WEIGHT: 102 LBS | OXYGEN SATURATION: 95 % | RESPIRATION RATE: 14 BRPM | BODY MASS INDEX: 20.03 KG/M2 | DIASTOLIC BLOOD PRESSURE: 80 MMHG | SYSTOLIC BLOOD PRESSURE: 122 MMHG | HEIGHT: 60 IN | HEART RATE: 117 BPM

## 2021-10-04 DIAGNOSIS — E11.69 HYPERLIPIDEMIA ASSOCIATED WITH TYPE 2 DIABETES MELLITUS (HCC): ICD-10-CM

## 2021-10-04 DIAGNOSIS — H91.90 HEARING LOSS, UNSPECIFIED HEARING LOSS TYPE, UNSPECIFIED LATERALITY: ICD-10-CM

## 2021-10-04 DIAGNOSIS — Z00.00 MEDICARE ANNUAL WELLNESS VISIT, SUBSEQUENT: Primary | ICD-10-CM

## 2021-10-04 DIAGNOSIS — K21.9 GASTROESOPHAGEAL REFLUX DISEASE, UNSPECIFIED WHETHER ESOPHAGITIS PRESENT: ICD-10-CM

## 2021-10-04 DIAGNOSIS — J43.8 OTHER EMPHYSEMA (HCC): ICD-10-CM

## 2021-10-04 DIAGNOSIS — F32.9 MAJOR DEPRESSION, CHRONIC: ICD-10-CM

## 2021-10-04 DIAGNOSIS — E78.5 HYPERLIPIDEMIA ASSOCIATED WITH TYPE 2 DIABETES MELLITUS (HCC): ICD-10-CM

## 2021-10-04 DIAGNOSIS — R26.9 GAIT DIFFICULTY: ICD-10-CM

## 2021-10-04 DIAGNOSIS — Z23 NEED FOR INFLUENZA VACCINATION: ICD-10-CM

## 2021-10-04 DIAGNOSIS — Z00.00 ROUTINE GENERAL MEDICAL EXAMINATION AT A HEALTH CARE FACILITY: ICD-10-CM

## 2021-10-04 DIAGNOSIS — E11.65 TYPE 2 DIABETES MELLITUS WITH HYPERGLYCEMIA, WITHOUT LONG-TERM CURRENT USE OF INSULIN (HCC): ICD-10-CM

## 2021-10-04 DIAGNOSIS — J96.11 CHRONIC RESPIRATORY FAILURE WITH HYPOXIA (HCC): ICD-10-CM

## 2021-10-04 PROCEDURE — G0008 ADMIN INFLUENZA VIRUS VAC: HCPCS | Performed by: INTERNAL MEDICINE

## 2021-10-04 PROCEDURE — 4040F PNEUMOC VAC/ADMIN/RCVD: CPT | Performed by: INTERNAL MEDICINE

## 2021-10-04 PROCEDURE — 1123F ACP DISCUSS/DSCN MKR DOCD: CPT | Performed by: INTERNAL MEDICINE

## 2021-10-04 PROCEDURE — G0439 PPPS, SUBSEQ VISIT: HCPCS | Performed by: INTERNAL MEDICINE

## 2021-10-04 PROCEDURE — 3017F COLORECTAL CA SCREEN DOC REV: CPT | Performed by: INTERNAL MEDICINE

## 2021-10-04 PROCEDURE — G8484 FLU IMMUNIZE NO ADMIN: HCPCS | Performed by: INTERNAL MEDICINE

## 2021-10-04 PROCEDURE — 90662 IIV NO PRSV INCREASED AG IM: CPT | Performed by: INTERNAL MEDICINE

## 2021-10-04 PROCEDURE — 3044F HG A1C LEVEL LT 7.0%: CPT | Performed by: INTERNAL MEDICINE

## 2021-10-04 ASSESSMENT — PATIENT HEALTH QUESTIONNAIRE - PHQ9
SUM OF ALL RESPONSES TO PHQ9 QUESTIONS 1 & 2: 5
SUM OF ALL RESPONSES TO PHQ QUESTIONS 1-9: 5
1. LITTLE INTEREST OR PLEASURE IN DOING THINGS: 3
SUM OF ALL RESPONSES TO PHQ QUESTIONS 1-9: 5
SUM OF ALL RESPONSES TO PHQ QUESTIONS 1-9: 5
2. FEELING DOWN, DEPRESSED OR HOPELESS: 2

## 2021-10-04 ASSESSMENT — LIFESTYLE VARIABLES: HOW OFTEN DO YOU HAVE A DRINK CONTAINING ALCOHOL: 0

## 2021-10-04 NOTE — PROGRESS NOTES
Medicare Annual Wellness Visit  Name: Jeremy Sheth Date: 10/4/2021   MRN: [id number] Sex: Female   Age: 66 y.o. Ethnicity: Non- / Non    : 1950 Race: White (non-)      Gena Riley is here for Medicare AWV    Screenings for behavioral, psychosocial and functional/safety risks, and cognitive dysfunction are all negative except as indicated below. These results, as well as other patient data from the [address] form, are documented in Flowsheets linked to this Encounter. Patient is here for follow up on her diabetes, asthma, chronic respiratory failure, hyperlipidemia and arthritis. She has frequent productive cough. She is on Oxygen 2L . Her diabetes mellitus type 2 is well controlled. No hypoglycemia. She has severe COPD. On oxygen. She has stable pulmonary nodule. She has HLD controlled. No myalgias. She has arthritis. It is stable. She takes GERD and takes Nexium. She has depression. It is controlled. She has knots on her hands for six months-right hand. No Known Allergies      Prior to Visit Medications    Medication Sig Taking?  Authorizing Provider   furosemide (LASIX) 40 MG tablet TAKE 1 TABLET EVERY DAY  Sangeetha Alberts MD   esomeprazole (NEXIUM) 40 MG delayed release capsule TAKE 1 Jose Enrique Bang MD   simvastatin (ZOCOR) 20 MG tablet TAKE 1 TABLET EVERY EVENING  Sangeetha Alberts MD   QUEtiapine (SEROQUEL) 25 MG tablet TAKE 1 TO 2 TABLETS BY MOUTH AT BEDTIME  Sangeetha Alberts MD   potassium chloride (KLOR-CON M) 20 MEQ extended release tablet TAKE 1 TABLET EVERY Kali Monroe MD   venlafaxine (EFFEXOR XR) 75 MG extended release capsule TAKE 1 CAPSULE EVERY DAY  Sangeetha Alberts MD   alendronate (FOSAMAX) 35 MG tablet TAKE 1 TABLET EVERY 7 DAYS  Sangeetha Alberts MD   Roflumilast (DALIRESP) 500 MCG tablet TAKE 1 TABLET EVERY DAY Lorenza Ma MD   albuterol sulfate  (90 Base) MCG/ACT inhaler INHALE 2 PUFFS INTO THE LUNGS EVERY 6 HOURS AS NEEDED FOR WHEEZING OR SHORTNESS OF BREATH  Marciano Andres MD   metFORMIN (GLUCOPHAGE-XR) 500 MG extended release tablet TAKE 2 TABLETS BY MOUTH TWICE DAILY  Lorenza Ma MD   JANUVIA 100 MG tablet TAKE 1 TABLET BY MOUTH DAILY  Lorenza Ma MD   benzonatate (TESSALON PERLES) 100 MG capsule Take 1 capsule by mouth 3 times daily as needed for Cough  Marciano Andres MD   topiramate (TOPAMAX) 50 MG tablet TAKE 1 TABLET BY MOUTH TWICE DAILY  Bhumika Hsu MD   albuterol (PROVENTIL) (2.5 MG/3ML) 0.083% nebulizer solution INHALE 3ML VIA NEBULIZATION ROUTE EVERY 6 HOURS AS NEEDED FOR WHEEZING OR SHORTNESS OF BREATH  Marciano Andres MD   fluticasone-salmeterol (Roscoe Deck) 250-50 MCG/DOSE AEPB INHALE 1 PUFF INTO THE LUNGS EVERY 12 Elinore Bence, MD   blood glucose test strips (ACCU-CHEK SMARTVIEW) strip TEST BLOOD SUGAR EVERY DAY.   DX;E11.9  Lorenza Ma MD   SPIRIVA HANDIHALER 18 MCG inhalation capsule INHALE THE CONTENTS OF 1 CAPSULE VIA INHALATION DEVICE EVERY DAY  Marciano Andres MD   theophylline (THEODUR) 300 MG extended release tablet Take 1 tablet by mouth daily  Marciano Andres MD   gabapentin (NEURONTIN) 300 MG capsule TAKE 1 CAPSULE BY MOUTH THREE TIMES DAILY FOR 30 DAYS  Lorenza Ma MD   calcium citrate-vitamin D (CITRACAL+D) 315-200 MG-UNIT per tablet Take 2 tablets by mouth 2 times daily  Lorenza Ma MD   potassium chloride (MICRO-K) 10 MEQ extended release capsule Take 20 mEq by mouth daily   Historical Provider, MD   diltiazem (CARDIZEM CD) 120 MG extended release capsule Take 1 capsule by mouth daily  Raúl Gonzalez MD   guaiFENesin (MUCINEX) 600 MG extended release tablet Take 1 tablet by mouth 2 times daily  Raúl Gonzalez MD   Cyanocobalamin (VITAMIN B-12 PO) Take by mouth  Historical Provider, MD   naproxen (NAPROSYN) 375 MG tablet TAKE 1 TABLET TWICE DAILY  Hanna Joseph MD   Blood Glucose Monitoring Suppl (ACCU-CHEK GAUARNG SMARTVIEW) w/Device KIT Use to test once daily. DX:E11.9  Hanna Joseph MD   Lancets Misc. (ACCU-CHEK FASTCLIX LANCET) KIT Use to test once daily. DX:E11.9  Hanna Joseph MD   vitamin D (CHOLECALCIFEROL) 400 UNITS TABS tablet Take 2 tablets by mouth daily  Artem Ch MD   calcium carbonate (CALCIUM 600) 600 MG TABS tablet Take 2 tablets by mouth daily  Artem Ch MD   Respiratory Therapy Supplies (NEBULIZER/TUBING/MOUTHPIECE) KIT 1 kit by Does not apply route daily as needed DX COPD J44.9  Artem Ch MD   Nebulizers (COMPRESSOR/NEBULIZER) MISC 1 Device by Does not apply route as needed DX COPD J44.9  Artem Ch MD   fluticasone (FLONASE) 50 MCG/ACT nasal spray 2 sprays by Nasal route daily. Hanna Joseph MD   Misc. Devices (ACAPELLA) MISC by Does not apply route 4 times daily.   Artem Ch MD         Past Medical History:   Diagnosis Date    Acute on chronic respiratory failure with hypoxia ([address] 12/3/2009    Chronic airway obstruction, not elsewhere classified 3/4/08    oxygen 2L/min at HS and PRN through day    Chronic kidney disease     incontinent    COPD (chronic obstructive pulmonary disease) ([address]     Depression     Displacement of lumbar intervertebral disc without myelopathy 8/13/07    Edema 9/13/07    Emphysema of lung ([address]     Enthesopathy of hip region     Enthesopathy of hip region 3/5/07    Esophageal reflux 3/4/08    Hemorrhage of rectum and anus 11/14/07    Herpes zoster without complication     History of blood transfusion     Hyperlipidemia associated with type 2 diabetes mellitus ([address] 12/11/2017    Lumbar spondylosis 5/1/2018    Major depression, chronic 8/12/2015    Osteoporosis, unspecified 12/4/07    Other and unspecified hyperlipidemia 3/4/08    Pain in joint, shoulder region 3/4/08    Pneumonia     Pulmonary nodule  Recurrent major depressive disorder, in partial remission ([address] 5/4/2018    Rheumatic fever     S/P ileostomy ([address] 8/20/2011    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    Tension headache     Type 2 diabetes mellitus with hyperglycemia, without long-term current use of insulin ([address]     Unspecified asthma(493.90) 8/13/07    Unspecified chronic bronchitis ([address]     Ventral hernia 6/29/2014       Past Surgical History:   Procedure Laterality Date    ABDOMEN SURGERY  5-19-11    total abdominal colectomy iliostomy    CARPAL TUNNEL RELEASE      right    CATARACT REMOVAL Bilateral     L 6/18/2013, R 07/01/2013    CATARACT REMOVAL WITH IMPLANT Right 7/1/13    COLECTOMY      and reversal     COLONOSCOPY  2009, 11/4/2011, 10/28/15    normal    CYST REMOVAL Right 6/20/14    Dr. Tio Ordaz; right ear pressure point cyst removal    DILATATION, ESOPHAGUS      ENDOSCOPY, COLON, DIAGNOSTIC      EPIDURAL STEROID INJECTION Bilateral 7/9/2019    BILATERAL LUMBAR FOUR TRANSFORAMINAL EPIDURAL STEROID INJECTION SITE CONFIRMED BY FLUOROSCOPY performed by Sabrina Mccormack MD at [facility] Left 6/18/13    cataract with lens implant    HERNIA REPAIR  6/27/14    Open Vental Hernia Repair    HIP SURGERY      HYSTERECTOMY  1973    subtotal    NECK SURGERY      incision of windpipe, repair of windpipe defect    OTHER SURGICAL HISTORY  8/19/2011    hand assisted laparoscopic ileostomy reversal    OVARY REMOVAL      TRACHEAL SURGERY  2005    hx of trach     UMBILICAL HERNIA REPAIR  6/27/2014         Family History   Problem Relation Age of Onset    Asthma Daughter     Diabetes Daughter     Cancer Daughter         Uterine    Alcohol Abuse Daughter     Asthma Daughter     Diabetes Mother     Heart Failure Mother     Hypertension Mother     Heart Failure Brother     Diabetes Sister     Heart Failure Brother     Coronary Art Dis Son     Emphysema Neg Hx        CareTeam (Including outside providers/suppliers regularly involved in providing care):   Patient Care Team:  Bello Mesa MD as PCP - Michelle Brewer MD as PCP - [facility]  Gallo Cutler MD as Consulting Physician (Pulmonology)  Sapna Sheriff MD (Orthopedic Surgery)    Wt Readings from Last 3 Encounters:   10/04/21 102 lb (46.3 kg)   07/12/21 105 lb (47.6 kg)   06/22/21 104 lb (47.2 kg)     Vitals:    10/04/21 1627   BP: 122/80   Site: Right Upper Arm   Position: Sitting   Cuff Size: Medium Adult   Pulse: 117   Resp: 14   SpO2: 95%   Weight: 102 lb (46.3 kg)   Height: 5' (1.524 m)     Body mass index is 19.92 kg/m². Based upon direct observation of the patient, evaluation of cognition reveals recent and remote memory intact.     General Appearance: alert and oriented to person, place and time, well developed and thin, in no acute distress  Skin: warm and dry, no rash or erythema  Head: normocephalic and atraumatic  Eyes: pupils equal, round, and reactive to light, extraocular eye movements intact, conjunctivae normal  ENT: tympanic membrane, external ear and ear canal normal bilaterally, nose without deformity, nasal mucosa and turbinates normal without polyps  Neck: supple and non-tender without mass, no thyromegaly or thyroid nodules, no cervical lymphadenopathy  Pulmonary/Chest: clear to auscultation bilaterally- no wheezes, rales or rhonchi, diminished air movement, no respiratory distress  Cardiovascular: normal rate, regular rhythm, normal S1 and S2, no murmurs, rubs, clicks, or gallops, distal pulses intact, no carotid bruits  Abdomen: soft, non-tender, non-distended, normal bowel sounds, no masses or organomegaly  Extremities: no cyanosis, clubbing or edema  Musculoskeletal: normal range of motion, no joint swelling, deformity or tenderness  Neurologic: reflexes normal and symmetric, no cranial nerve deficit, gait, coordination and speech normal    Patient's complete Health Risk Assessment and screening values have been reviewed and are found in Flowsheets. The following problems were reviewed today and where indicated follow up appointments were made and/or referrals ordered. Positive Risk Factor Screenings with Interventions:       Depression:  PHQ-2 Score: 5  PHQ-9 Total Score: 5    Severity:1-4 = minimal depression, 5-9 = mild depression, 10-14 = moderate depression, 15-19 = moderately severe depression, 20-27 = severe depression  Depression Interventions:  · Relaxation techniques discussed        General Health and ACP:  General  In general, how would you say your health is?: (!) Poor  In the past 7 days, have you experienced any of the following? New or Increased Pain, New or Increased Fatigue, Loneliness, Social Isolation, Stress or Anger?: (!) New or Increased Pain, Stress  Do you get the social and emotional support that you need?: Yes  Do you have a Living Will?: Yes  Advance Directives     Power of  Living Will ACP-Advance Directive ACP-Power of     Not on File Filed on 05/26/11 Filed Not on File      General Health Risk Interventions:  · Poor self-assessment of health status: mulitple medical issues  · Pain issues: chronic abdominal issues. Work up negative.      Health Habits/Nutrition:  Health Habits/Nutrition  Do you exercise for at least 20 minutes 2-3 times per week?: Yes  Have you lost any weight without trying in the past 3 months?: No  Do you eat only one meal per day?: No  Have you seen the dentist within the past year?: (!) No  Body mass index: 19.92  Health Habits/Nutrition Interventions:  · Dental exam overdue:  she is edentulous    Hearing/Vision:  No exam data present  Hearing/Vision  Do you or your family notice any trouble with your hearing that hasn't been managed with hearing aids?: (!) Yes  Do you have difficulty driving, watching TV, or doing any of your daily activities because of your eyesight?: (!) Yes  Have you had an eye exam within the past year?: Yes  Hearing/Vision Interventions:  · Hearing concerns:  ENT referral provided  · Vision concerns:  patient encouraged to make appointment with his/her eye specialist     ADL:  ADLs  In the past 7 days, did you need help from others to perform any of the following everyday activities? Eating, dressing, grooming, bathing, toileting, or walking/balance?: None  In the past 7 days, did you need help from others to take care of any of the following?  Laundry, housekeeping, banking/finances, shopping, telephone use, food preparation, transportation, or taking medications?: (!) Shopping  ADL Interventions:  · she has help    Personalized Preventive Plan   Current Health Maintenance Status  Immunization History   Administered Date(s) Administered    Influenza 10/17/2013    Influenza A (T0H3-74) Vaccine IM 01/20/2010    Influenza A (A8N2-97) Vaccine PF IM 01/20/2010    Influenza Vaccine, unspecified formulation 10/17/2013    Influenza Virus Vaccine 09/22/2011, 09/30/2014, 11/09/2015    Influenza Whole 10/15/2008    Influenza, High Dose (Fluzone 65 yrs and older) 11/28/2016, 09/20/2017, 11/13/2019    Influenza, High-dose, Quadv, 65 yrs +, IM (Fluzone) 10/04/2021    Pneumococcal Conjugate 13-valent (Tbxcrvs06) 11/09/2015    Pneumococcal Conjugate 7-valent (Prevnar7) 10/03/2001, 01/01/2004    Pneumococcal Polysaccharide ([id number]) 05/05/2013, 11/10/2016, 06/29/2018        Health Maintenance   Topic Date Due    DTaP/Tdap/Td vaccine (1 - Tdap) Never done    Shingles Vaccine (1 of 2) Never done   [employer] Visit (AWV)  Never done    COVID-19 Vaccine (1) 06/08/2022 (Originally 8/10/1962)    Diabetic foot exam  06/22/2022    A1C test (Diabetic or Prediabetic)  06/22/2022    Diabetic microalbuminuria test  06/22/2022    Lipid screen  06/22/2022    Potassium monitoring  06/22/2022    Creatinine monitoring  06/22/2022    Diabetic retinal exam  03/05/2023    Breast cancer screen  07/15/2023    Colon cancer screen colonoscopy  05/02/2029    DEXA (modify frequency per FRAX score)  Completed    Flu vaccine  Completed    Pneumococcal 65+ years Vaccine  Completed    Hepatitis C screen  Completed    Hepatitis A vaccine  Aged Out    Hib vaccine  Aged Out    Meningococcal (ACWY) vaccine  Aged Out     Recommendations for Health-Connected Due: see orders and patient instructions/AVS.  . Recommended screening schedule for the next 5-10 years is provided to the patient in written form: see Patient Jennifer Navarro was seen today for medicare awv. Diagnoses and all orders for this visit:    Medicare annual wellness visit, subsequent    Need for influenza vaccination  -     INFLUENZA, HIGH-DOSE, QUADV, 72 YRS +, IM, PF, 0.7ML (FLUZONE)    Type 2 diabetes mellitus with hyperglycemia, without long-term current use of insulin ([address]    Hyperlipidemia associated with type 2 diabetes mellitus ([address]    Chronic respiratory failure with hypoxia (HCC)    Other emphysema ([address]    Gastroesophageal reflux disease, unspecified whether esophagitis present    Major depression, chronic    Hearing loss, unspecified hearing loss type, unspecified laterality  -     Clark Camargo MD, Otolaryngology, [facility]            She has Dupuytren's contracture right hand. Monitor.

## 2021-10-04 NOTE — PATIENT INSTRUCTIONS
Personalized Preventive Plan for Jordon Hammond - 10/4/2021  Medicare offers a range of preventive health benefits. Some of the tests and screenings are paid in full while other may be subject to a deductible, co-insurance, and/or copay. Some of these benefits include a comprehensive review of your medical history including lifestyle, illnesses that may run in your family, and various assessments and screenings as appropriate. After reviewing your medical record and screening and assessments performed today your provider may have ordered immunizations, labs, imaging, and/or referrals for you. A list of these orders (if applicable) as well as your Preventive Care list are included within your After Visit Summary for your review. Other Preventive Recommendations:    · A preventive eye exam performed by an eye specialist is recommended every 1-2 years to screen for glaucoma; cataracts, macular degeneration, and other eye disorders. · A preventive dental visit is recommended every 6 months. · Try to get at least 150 minutes of exercise per week or 10,000 steps per day on a pedometer . · Order or download the FREE \"Exercise & Physical Activity: Your Everyday Guide\" from [employer]. Call 5-801.486.4696 or search [employer] online. · You need 5475-7412 mg of calcium and 0997-2867 IU of vitamin D per day. It is possible to meet your calcium requirement with diet alone, but a vitamin D supplement is usually necessary to meet this goal.  · When exposed to the sun, use a sunscreen that protects against both UVA and UVB radiation with an SPF of 30 or greater. Reapply every 2 to 3 hours or after sweating, drying off with a towel, or swimming. · Always wear a seat belt when traveling in a car. Always wear a helmet when riding a bicycle or motorcycle.     Keep Your Memory Nichole Moment       Many factors can affect your ability to remembera hectic lifestyle, aging, stress, chronic disease, and certain medicines. But, there are steps you can take to sharpen your mind and help preserve your memory. Challenge Your Brain   Regularly challenging your mind may help keeps it in top shape. Good mental exercises include:   Crossword puzzlesUse a dictionary if you need it; you will learn more that way. Brainteasers Try some! Crafts, such as wood working and sewing   Hobbies, such as gardening and building model airplanes   SocializingVisit old friends or join groups to meet new ones. Reading   Learning a new language   Taking a class, whether it be art history or moe chi   TravelingExperience the food, history, and culture of your destination   Learning to use a computer   Going to museums, the theater, or thought-provoking movies   Changing things in your daily life, such as reversing your pattern in the grocery store or brushing your teeth using your nondominant hand   Use Memory Aids   There is no need to remember every detail on your own. These memory aids can help:   Calendars and day planners   Electronic organizers to store all sorts of helpful informationThese devices can \"beep\" to remind you of appointments. A book of days to record birthdays, anniversaries, and other occasions that occur on the same date every year   Detailed \"to-do\" lists and strategically placed sticky notes   Quick \"study\" sessionsBefore a gathering, review who will be there so their names will be fresh in your mind. Establish routinesFor example, keep your keys, wallet, and umbrella in the same place all the time or take medicine with your 8:00 AM glass of juice   Live a Healthy Life   Many actions that will keep your body strong will do the same for your mind. For example:   Talk to Your Doctor About Herbs and Supplements    Malnutrition and vitamin deficiencies can impair your mental function. For example, vitamin B12 deficiency can cause a range of symptoms, including confusion.  But, what if your nutritional needs are being met? Can herbs and supplements still offer a benefit? Researchers have investigated a range of natural remedies, such as ginkgo , ginseng , and the supplement phosphatidylserine (PS). So far, though, the evidence is inconsistent as to whether these products can improve memory or thinking. If you are interested in taking herbs and supplements, talk to your doctor first because they may interact with other medicines that you are taking. Exercise Regularly    Among the many benefits of regular exercise are increased blood flow to the brain and decreased risk of certain diseases that can interfere with memory function. One study found that even moderate exercise has a beneficial effect. Examples of \"moderate\" exercise include:   Playing 18 holes of golf once a week, without a cart   Playing tennis twice a week   Walking one mile per day   Manage Stress    It can be tough to remember what is important when your mind is cluttered. Make time for relaxation. Choose activities that calm you down, and make it routine. Manage Chronic Conditions    Side effects of high blood pressure , diabetes, and heart disease can interfere with mental function. Many of the lifestyle steps discussed here can help manage these conditions. Strive to eat a healthy diet, exercise regularly, get stress under control, and follow your doctor's advice for your condition. Minimize Medications    Talk to your doctor about the medicines that you take. Some may be unnecessary. Also, healthy lifestyle habits may lower the need for certain drugs. Last Reviewed: April 2010 Anuj William MD   Updated: 4/13/2010   ·     [address]       As we get older, changes in balance, gait, strength, vision, hearing, and cognition make even the most youthful senior more prone to accidents. Falls are one of the leading health risks for older people.  This increased risk of falling is related to:   Aging process (eg, cannot see clearly, you are more likely to fall. Important questions to ask yourself include:   Are lamp, electric, extension, and telephone cords placed out of the flow of traffic and maintained in good condition? Have frayed cords been replaced? Are all small rugs and runners slip resistant? If not, you can secure them to the floor with a special double-sided carpet tape. Are smoke detectors properly locatedone on every floor of your home and one outside of every sleeping area? Are they in good working order? Are batteries replaced at least once a year? Do you have a well-maintained carbon monoxide detector outside every sleeping are in your home? Does your furniture layout leave plenty of space to maneuver between and around chairs, tables, beds, and sofas? Are hallways, stairs and passages between rooms well lit? Can you reach a lamp without getting out of bed? Are floor surfaces well maintained? Shag rugs, high-pile carpeting, tile floors, and polished wood floors can be particularly slippery. Stairs should always have handrails and be carpeted or fitted with a non-skid tread. Is your telephone easily reachable. Is the cord safely tucked away? Room by Room   According to the [employer], bathrooms and krishna are the two most potentially hazardous rooms in your home. In the Kitchen    Be sure your stove is in proper working order and always make sure burners and the oven are off before you go out or go to sleep. Keep pots on the back burners, turn handles away from the front of the stove, and keep stove clean and free of grease build-up. Kitchen ventilation systems and range exhausts should be working properly. Keep flammable objects such as towels and pot holders away from the cooking area except when in use. Make sure kitchen curtains are tied back. Move cords and appliances away from the sink and hot surfaces.  If extension cords are needed, install wiring guides so they do not hang over the sink, range, or working areas. Look for coffee pots, kettles and toaster ovens with automatic shut-offs. Keep a mop handy in the kitchen so you can wipe up spills instantly. You should also have a small fire extinguisher. Arrange your kitchen with frequently used items on lower shelves to avoid the need to stand on a stepstool to reach them. Make sure countertops are well-lit to avoid injuries while cutting and preparing food. In the Bathroom    Use a non-slip mat or decals in the tub and shower, since wet, soapy tile or porcelain surfaces are extremely slippery. Make sure bathroom rugs are non-skid or tape them firmly to the floor. Bathtubs should have at least one, preferably two, grab bars, firmly attached to structural supports in the wall. (Do not use built-in soap holders or glass shower doors as grab bars.)    Tub seats fitted with non-slip material on the legs allow you to wash sitting down. For people with limited mobility, bathtub transfer benches allow you to slide safely into the tub. Raised toilet seats and toilet safety rails are helpful for those with knee or hip problems. In the [facility]    Make sure you use a nightlight and that the area around your bed is clear of potential obstacles. Be careful with electric blankets and never go to sleep with a heating pad, which can cause serious burns even if on a low setting. Use fire-resistant mattress covers and pillows, and NEVER smoke in bed. Keep a phone next to the bed that is programmed to dial 911 at the push of a button. If you have a chronic condition, you may want to sign on with an automatic call-in service. Typically the system includes a small pendant that connects directly to an emergency medical voice-response system. You should also make arrangements to stay in contact with someonefriend, neighbor, family memberon a regular schedule.    Fire Prevention   According to the [employer] (Smoke Alarms for Every) [facility], senior citizens are one of the two highest risk groups for death and serious injuries due to residential fires. When cooking, wear short-sleeved items, never a bulky long-sleeved robe. The [facility]'s Safety Checklist for Older Consumers emphasizes the importance of checking basements, garages, workshops and storage areas for fire hazards, such as volatile liquids, piles of old rags or clothing and overloaded circuits. Never smoke in bed or when lying down on a couch or recliner chair. Small portable electric or kerosene heaters are responsible for many home fires and should be used cautiously if at all. If you do use one, be sure to keep them away from flammable materials. In case of fire, make sure you have a pre-established emergency exit plan. Have a professional check your fireplace and other fuel-burning appliances yearly. [employer]   Baby boomers entering the soni years will continue to see the development of new products to help older adults live safely and independently in spite of age-related changes. Making Life More Livable  , by [employer], lists over 1,000 products for \"living well in the mature years,\" such as bathing and mobility aids, household security devices, ergonomically designed knives and peelers, and faucet valves and knobs for temperature control. Medical supply stores and organizations are good sources of information about products that improve your quality of life and insure your safety.      Last Reviewed: November 2009 Kevin Haq MD   Updated: 3/7/2011     ·

## 2021-10-13 ENCOUNTER — HOSPITAL ENCOUNTER (OUTPATIENT)
Dept: CT IMAGING | Age: 71
Discharge: HOME | End: 2021-10-13
Payer: MEDICARE

## 2021-10-13 DIAGNOSIS — R91.8 PULMONARY NODULES: ICD-10-CM

## 2021-10-13 PROCEDURE — 71250 CT THORAX DX C-: CPT

## 2021-10-18 ENCOUNTER — OFFICE VISIT (OUTPATIENT)
Dept: PULMONOLOGY | Age: 71
End: 2021-10-18
Payer: MEDICARE

## 2021-10-18 ENCOUNTER — TELEPHONE (OUTPATIENT)
Dept: PULMONOLOGY | Age: 71
End: 2021-10-18

## 2021-10-18 VITALS
SYSTOLIC BLOOD PRESSURE: 100 MMHG | WEIGHT: 101.8 LBS | RESPIRATION RATE: 20 BRPM | DIASTOLIC BLOOD PRESSURE: 62 MMHG | HEIGHT: 60 IN | BODY MASS INDEX: 19.99 KG/M2 | HEART RATE: 100 BPM | OXYGEN SATURATION: 93 %

## 2021-10-18 DIAGNOSIS — R93.89 ABNORMAL CT OF THE CHEST: ICD-10-CM

## 2021-10-18 DIAGNOSIS — Z87.891 HX OF SMOKING: ICD-10-CM

## 2021-10-18 DIAGNOSIS — R91.8 PULMONARY NODULES: ICD-10-CM

## 2021-10-18 DIAGNOSIS — J44.9 COPD, VERY SEVERE (HCC): Primary | ICD-10-CM

## 2021-10-18 DIAGNOSIS — J47.9 BRONCHIECTASIS WITHOUT COMPLICATION (HCC): ICD-10-CM

## 2021-10-18 DIAGNOSIS — R09.02 HYPOXIA: ICD-10-CM

## 2021-10-18 PROCEDURE — 1090F PRES/ABSN URINE INCON ASSESS: CPT | Performed by: INTERNAL MEDICINE

## 2021-10-18 PROCEDURE — 4040F PNEUMOC VAC/ADMIN/RCVD: CPT | Performed by: INTERNAL MEDICINE

## 2021-10-18 PROCEDURE — 99214 OFFICE O/P EST MOD 30 MIN: CPT | Performed by: INTERNAL MEDICINE

## 2021-10-18 PROCEDURE — 1123F ACP DISCUSS/DSCN MKR DOCD: CPT | Performed by: INTERNAL MEDICINE

## 2021-10-18 PROCEDURE — G8399 PT W/DXA RESULTS DOCUMENT: HCPCS | Performed by: INTERNAL MEDICINE

## 2021-10-18 PROCEDURE — G8484 FLU IMMUNIZE NO ADMIN: HCPCS | Performed by: INTERNAL MEDICINE

## 2021-10-18 PROCEDURE — G8926 SPIRO NO PERF OR DOC: HCPCS | Performed by: INTERNAL MEDICINE

## 2021-10-18 PROCEDURE — 3017F COLORECTAL CA SCREEN DOC REV: CPT | Performed by: INTERNAL MEDICINE

## 2021-10-18 PROCEDURE — G8420 CALC BMI NORM PARAMETERS: HCPCS | Performed by: INTERNAL MEDICINE

## 2021-10-18 PROCEDURE — 3023F SPIROM DOC REV: CPT | Performed by: INTERNAL MEDICINE

## 2021-10-18 PROCEDURE — G8427 DOCREV CUR MEDS BY ELIG CLIN: HCPCS | Performed by: INTERNAL MEDICINE

## 2021-10-18 PROCEDURE — 1036F TOBACCO NON-USER: CPT | Performed by: INTERNAL MEDICINE

## 2021-10-18 RX ORDER — PREDNISONE 10 MG/1
TABLET ORAL
Qty: 30 TABLET | Refills: 0 | Status: SHIPPED | OUTPATIENT
Start: 2021-10-18 | End: 2021-10-30

## 2021-10-18 RX ORDER — DOXYCYCLINE HYCLATE 100 MG/1
100 CAPSULE ORAL 2 TIMES DAILY
Qty: 10 CAPSULE | Refills: 0 | Status: SHIPPED | OUTPATIENT
Start: 2021-10-18 | End: 2021-10-23

## 2021-10-18 NOTE — PROGRESS NOTES
[facility]                                                            Outpatient Follow Up Note    CHIEF COMPLAINT: Follow up CT chest         HPI:  CT chest reviewed by me and noted below. Results were dicussed with patient and multiple good questions were answered. Some cough with milky white sputum  No hemoptysis   No smoking   Patient is compliant with inhaled bronchodilators and O2.    Uses Albuterol 4-5 times/day   Has not had her AFB done       Past Medical History:   Diagnosis Date    Acute on chronic respiratory failure with hypoxia ([address] 12/3/2009    Chronic airway obstruction, not elsewhere classified 3/4/08    oxygen 2L/min at HS and PRN through day    Chronic kidney disease     incontinent    COPD (chronic obstructive pulmonary disease) ([address]     Depression     Displacement of lumbar intervertebral disc without myelopathy 8/13/07    Edema 9/13/07    Emphysema of lung ([address]     Enthesopathy of hip region     Enthesopathy of hip region 3/5/07    Esophageal reflux 3/4/08    Hemorrhage of rectum and anus 11/14/07    Herpes zoster without complication     History of blood transfusion     Hyperlipidemia associated with type 2 diabetes mellitus ([address] 12/11/2017    Lumbar spondylosis 5/1/2018    Major depression, chronic 8/12/2015    Osteoporosis, unspecified 12/4/07    Other and unspecified hyperlipidemia 3/4/08    Pain in joint, shoulder region 3/4/08    Pneumonia     Pulmonary nodule     Recurrent major depressive disorder, in partial remission ([address] 5/4/2018    Rheumatic fever     S/P ileostomy ([address] 8/20/2011    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    Tension headache     Type 2 diabetes mellitus with hyperglycemia, without long-term current use of insulin ([address]     Unspecified asthma(493.90) 8/13/07    Unspecified chronic bronchitis ([address]     Ventral hernia 6/29/2014       Past Surgical History: Procedure Laterality Date    ABDOMEN SURGERY  5-19-11    total abdominal colectomy iliostomy    CARPAL TUNNEL RELEASE      right    CATARACT REMOVAL Bilateral     L 6/18/2013, R 07/01/2013    CATARACT REMOVAL WITH IMPLANT Right 7/1/13    COLECTOMY      and reversal     COLONOSCOPY  2009, 11/4/2011, 10/28/15    normal    CYST REMOVAL Right 6/20/14     [employer]; right ear pressure point cyst removal    DILATATION, ESOPHAGUS      ENDOSCOPY, COLON, DIAGNOSTIC      EPIDURAL STEROID INJECTION Bilateral 7/9/2019    BILATERAL LUMBAR FOUR TRANSFORAMINAL EPIDURAL STEROID INJECTION SITE CONFIRMED BY FLUOROSCOPY performed by León Greer MD at [address] Left 6/18/13    cataract with lens implant    HERNIA REPAIR  6/27/14    Open Vental Hernia Repair    HIP SURGERY      HYSTERECTOMY  1973    subtotal    NECK SURGERY      incision of windpipe, repair of windpipe defect    OTHER SURGICAL HISTORY  8/19/2011    hand assisted laparoscopic ileostomy reversal    OVARY REMOVAL      TRACHEAL SURGERY  2005    hx of trach     UMBILICAL HERNIA REPAIR  6/27/2014       Allergies:  has No Known Allergies. Social History:    TOBACCO:   reports that she quit smoking about 25 years ago. Her smoking use included cigarettes. She has a 30.00 pack-year smoking history. She has never used smokeless tobacco.  ETOH:   reports no history of alcohol use.       Family History:       Problem Relation Age of Onset    Asthma Daughter     Diabetes Daughter     Cancer Daughter         Uterine    Alcohol Abuse Daughter     Asthma Daughter     Diabetes Mother     Heart Failure Mother     Hypertension Mother     Heart Failure Brother     Diabetes Sister     Heart Failure Brother     Coronary Art Dis Son     Emphysema Neg Hx        Current Medications:    Current Outpatient Medications:     Roflumilast (DALIRESP) 500 MCG tablet, TAKE 1 TABLET EVERY DAY, Disp: 90 tablet, Rfl: 0    furosemide (LASIX) 40 MG tablet, TAKE 1 TABLET EVERY DAY, Disp: 90 tablet, Rfl: 0    esomeprazole (NEXIUM) 40 MG delayed release capsule, TAKE 1 CAPSULE EVERY MORNING BEFORE BREAKFAST, Disp: 90 capsule, Rfl: 0    simvastatin (ZOCOR) 20 MG tablet, TAKE 1 TABLET EVERY EVENING, Disp: 90 tablet, Rfl: 0    QUEtiapine (SEROQUEL) 25 MG tablet, TAKE 1 TO 2 TABLETS BY MOUTH AT BEDTIME, Disp: 180 tablet, Rfl: 0    potassium chloride (KLOR-CON M) 20 MEQ extended release tablet, TAKE 1 TABLET EVERY DAY, Disp: 90 tablet, Rfl: 0    venlafaxine (EFFEXOR XR) 75 MG extended release capsule, TAKE 1 CAPSULE EVERY DAY, Disp: 90 capsule, Rfl: 0    alendronate (FOSAMAX) 35 MG tablet, TAKE 1 TABLET EVERY 7 DAYS, Disp: 12 tablet, Rfl: 0    albuterol sulfate  (90 Base) MCG/ACT inhaler, INHALE 2 PUFFS INTO THE LUNGS EVERY 6 HOURS AS NEEDED FOR WHEEZING OR SHORTNESS OF BREATH, Disp: 54 g, Rfl: 5    metFORMIN (GLUCOPHAGE-XR) 500 MG extended release tablet, TAKE 2 TABLETS BY MOUTH TWICE DAILY, Disp: 360 tablet, Rfl: 0    JANUVIA 100 MG tablet, TAKE 1 TABLET BY MOUTH DAILY, Disp: 90 tablet, Rfl: 0    benzonatate (TESSALON PERLES) 100 MG capsule, Take 1 capsule by mouth 3 times daily as needed for Cough, Disp: 90 capsule, Rfl: 3    topiramate (TOPAMAX) 50 MG tablet, TAKE 1 TABLET BY MOUTH TWICE DAILY, Disp: 180 tablet, Rfl: 2    albuterol (PROVENTIL) (2.5 MG/3ML) 0.083% nebulizer solution, INHALE 3ML VIA NEBULIZATION ROUTE EVERY 6 HOURS AS NEEDED FOR WHEEZING OR SHORTNESS OF BREATH, Disp: 375 mL, Rfl: 5    fluticasone-salmeterol (WIXELA INHUB) 250-50 MCG/DOSE AEPB, INHALE 1 PUFF INTO THE LUNGS EVERY 12 HOURS, Disp: 3 Inhaler, Rfl: 1    blood glucose test strips (ACCU-CHEK SMARTVIEW) strip, TEST BLOOD SUGAR EVERY DAY.   DX;E11.9, Disp: 100 strip, Rfl: 3    SPIRIVA HANDIHALER 18 MCG inhalation capsule, INHALE THE CONTENTS OF 1 CAPSULE VIA INHALATION DEVICE EVERY DAY, Disp: 90 capsule, Rfl: 1    theophylline (THEODUR) 300 MG extended release tablet, Take 1 tablet by mouth daily, Disp: 90 tablet, Rfl: 1    gabapentin (NEURONTIN) 300 MG capsule, TAKE 1 CAPSULE BY MOUTH THREE TIMES DAILY FOR 30 DAYS, Disp: 90 capsule, Rfl: 0    calcium citrate-vitamin D (CITRACAL+D) 315-200 MG-UNIT per tablet, Take 2 tablets by mouth 2 times daily, Disp: 360 tablet, Rfl: 0    potassium chloride (MICRO-K) 10 MEQ extended release capsule, Take 20 mEq by mouth daily , Disp: , Rfl:     diltiazem (CARDIZEM CD) 120 MG extended release capsule, Take 1 capsule by mouth daily, Disp: 30 capsule, Rfl: 3    guaiFENesin (MUCINEX) 600 MG extended release tablet, Take 1 tablet by mouth 2 times daily, Disp: 60 tablet, Rfl: 1    Cyanocobalamin (VITAMIN B-12 PO), Take by mouth, Disp: , Rfl:     naproxen (NAPROSYN) 375 MG tablet, TAKE 1 TABLET TWICE DAILY, Disp: 180 tablet, Rfl: 0    Blood Glucose Monitoring Suppl (ACCU-CHEK GAURANG SMARTVIEW) w/Device KIT, Use to test once daily. DX:E11.9, Disp: 1 kit, Rfl: 0    Lancets Misc. (ACCU-CHEK FASTCLIX LANCET) KIT, Use to test once daily. DX:E11.9, Disp: 1 kit, Rfl: 2    vitamin D (CHOLECALCIFEROL) 400 UNITS TABS tablet, Take 2 tablets by mouth daily, Disp: 180 tablet, Rfl: 1    calcium carbonate (CALCIUM 600) 600 MG TABS tablet, Take 2 tablets by mouth daily, Disp: 180 tablet, Rfl: 1    Respiratory Therapy Supplies (NEBULIZER/TUBING/MOUTHPIECE) KIT, 1 kit by Does not apply route daily as needed DX COPD J44.9, Disp: 1 kit, Rfl: 0    Nebulizers (COMPRESSOR/NEBULIZER) MISC, 1 Device by Does not apply route as needed DX COPD J44.9, Disp: 1 each, Rfl: 0    fluticasone (FLONASE) 50 MCG/ACT nasal spray, 2 sprays by Nasal route daily. , Disp: 3 Bottle, Rfl: 0    Misc. Devices (ACAPELLA) MISC, by Does not apply route 4 times daily. , Disp: 1 each, Rfl: 0           Objective:   /62   Pulse 100   Resp 20   Ht 5' (1.524 m)   Wt 101 lb 12.8 oz (46.2 kg)   SpO2 93% Comment: 2L o2  BMI 19.88 kg/m²   Gen: No distress. Ill appearing   Eyes: PERRL. No sclera icterus. No conjunctival injection. ENT: No discharge. Pharynx clear. Neck: Trachea midline. No obvious mass. Resp: No accessory muscle use. No crackles. Few wheezes. No rhonchi. No dullness on percussion. Good air entry. CV: Regular rate. Regular rhythm. No murmur or rub. No edema. GI: Non-tender. Non-distended. No hernia. Skin: Warm and dry. No nodule on exposed extremities. Lymph: No cervical LAD. No supraclavicular LAD. M/S: No cyanosis. No joint deformity. No clubbing. Neuro: Awake. Alert. Moves all four extremities. Psych: Oriented x 3. No anxiety. DATA reviewed by me:   PFTs 07/26/2021 FEV1 0.63L(34%) TLC 5.41L(123%) DLCO 8.71(44%) 6MW 420 LO2 89%   PFTs 03/29/2017 FEV1 0.55L(27%) TLC 5.21L(117%) DLCO 8.47(42%) 6MW 770 2L exertion   PFTs 07/07/2015 FEV1 0.63L(31%) TLC 5.12L(114%) DLCO 9.6(48%)   PFTs 12/06/2011 FEV1 0.83L(45%) TLC 5.14L(125%) DLCO 8.6 (49%)   PFTs 07/10/2008 FEV1 0.77L           TLC 5.41L            DLCO 7.62  PFTs 01/15/2007 FEV1 0.59L           TLC 5.15L            DLCO 10.82          CT chest 7/8/2020 imaging reviewed by me and showed  Stable CT chest  No change in tree-in-bud nodularity right middle lobe  Unchanged few additional noncalcified nodules back to 2018  Emphysema with mild bronchiectasis    CT chest 7/8/2021 imaging reviewed by me and showed  Increased tree-in-bud nodularity lingula right lower lobe with new focal nodule left lower lobe and right lower lobe. Stable right middle lobe nodularity    CT chest 10/13/2021 imaging reviewed by me and showed  1. Stable right middle lobe pulmonary nodule. 2. Bronchiectasis with tree in bud nodularity in both lungs that would favor  a chronic history of bronchiolitis. No interval detrimental change. 3. Increasing peripheral airspace opacification in the left lower lobe. This  likely represents progressive interstitial change or atelectasis.                Assessment:      · Very severe COPD/Pulmonary emphysema  · Bronchiectasis  · Abnormal CT chest 1/8/2020 with right middle lobe new tree-in-bud nodules-favor inflammatory. Stable on repeat CT 10/13/21   · New tree-in-bud nodularity lingula, RLL, LLL on CT 7/8/2021- favor inflammatory/infection. Rule out NTM. RLL nodules resolved and stable LLL nodule on  repeat CT chest   10/13/21   · R pulmonary nodules. Stable over 2 years . · Hypoxia on exertion  · Off Daliresp given weight loss   · Has 7 dogs at home   · 20 pack years. Quit smoking 1996. Plan:       · Order for Sputum AFB x 3 stain and Cx   · CT chest in 6 months   · Continue Wixella BID, Spiriva INH daily and Albuterol INH/Neb PRN  · Continue Theophylline   · Continue O2 1-2LPM on exertion. Advised to titrate O2 using her pulse oximeter- target O2 sat 90-92%. · Prednisone taper and Doxycycline 100 mg BID x 5 days for COPD AE self management if needed. · Pulmonary rehab referral   · Patient is up to date with pneumococcal vaccine, and influenza vaccine   · Advised to get her Covid vaccine - she declines. .Risks, benefits and side effects were discussed with patient.    · Acapella and Mucinex   · Follow up after CT chest

## 2021-10-18 NOTE — TELEPHONE ENCOUNTER
Pt to complete sputum AFB x 3; watch for result    LOV: 10/18/21    Assessment:   · Very severe COPD/Pulmonary emphysema  · Bronchiectasis  · Abnormal CT chest 1/8/2020 with right middle lobe new tree-in-bud nodules-favor inflammatory. Stable on repeat CT 10/13/21   · New tree-in-bud nodularity lingula, RLL, LLL on CT 7/8/2021- favor inflammatory/infection. Rule out NTM. RLL nodules resolved and stable LLL nodule on  repeat CT chest   10/13/21   · R pulmonary nodules. Stable over 2 years . · Hypoxia on exertion  · Off Daliresp given weight loss   · Has 7 dogs at home   · 20 pack years. Quit smoking 1996. Plan:       · Order for Sputum AFB x 3 stain and Cx   · CT chest in 6 months   · Continue Wixella BID, Spiriva INH daily and Albuterol INH/Neb PRN  · Continue Theophylline   · Continue O2 1-2LPM on exertion. Advised to titrate O2 using her pulse oximeter- target O2 sat 90-92%. · Prednisone taper and Doxycycline 100 mg BID x 5 days for COPD AE self management if needed. · Pulmonary rehab referral   · Patient is up to date with pneumococcal vaccine, and influenza vaccine   · Advised to get her Covid vaccine - she declines. .Risks, benefits and side effects were discussed with patient.    · Acapella and Mucinex   · Follow up after CT chest

## 2021-10-19 NOTE — PROGRESS NOTES
Roma Cabrera   1950, 66 y.o. female   [id number]       Referring Provider: Jerzy De La Torre MD  Referral Type: In an order in [address] [place] [address]    Reason for Visit: Evaluation of suspected change in hearing, tinnitus, or balance. ADULT AUDIOLOGIC EVALUATION      Roma Cabrera is a 66 y.o. female seen today, 10/20/2021 , for an initial audiologic evaluation. Patient was seen by Jerzy De La Torre MD following today's evaluation. AUDIOLOGIC AND OTHER PERTINENT MEDICAL HISTORY:      Roma Cabrera noted a perceived gradual decline in hearing and long-standing history of tinnitus, bilaterally with left ear worse compared to right. Patient reports episodic dizziness described as room-spinning that often occurs when she stands up-denies recent falls with dizziness. She also notes family history of hearing loss in mother. No additional significant otologic or medical history was reported. Roma Cabrera denied otalgia, aural fullness, otorrhea, history of falls, history of occupational/recreational noise exposure, history of head trauma and history of ear surgery. Date: 10/20/2021     IMPRESSIONS:      Today's results revealed a symmetric sensorineural hearing loss with excellent word recognition, bilaterally. Air-bone gaps > 10 dBHL noted from 2-4kHz in the left ear and significant asymmetries noted from 1-8kHz and in word recognition with left ear (84%) worse than right (100%). Discussed benefits of amplification pending medical clearance. Follow medical recommendations of Jerzy De La Torre MD.     ASSESSMENT AND FINDINGS:     Otoscopy revealed: Clear ear canals bilaterally    RIGHT EAR:  Hearing Sensitivity:Within normal limits at 250Hz sloping to a mild to moderately-severe to profound sensorineural hearing loss. Speech Recognition Threshold: 25 dB HL  Word Recognition: Excellent (100%), based on NU-6 25-word list at 75 dBHL using recorded speech stimuli.     Tympanometry: Normal peak pressure and compliance, Type A tympanogram, consistent with normal middle ear function. Acoustic Reflexes: Ipsilateral: Did not test. Contralateral: Did not test.      LEFT EAR:  Hearing Sensitivity: Mild precipitously sloping to moderately-severe to profound mixed conductive and sensorineural hearing loss. Speech Recognition Threshold: 40 dB HL  Word Recognition: Good (84%), based on NU-6 25-word list at 85 dBHL masked using recorded speech stimuli. Tympanometry: Normal peak pressure with high compliance, Type Ad tympanogram, consistent with hypermobile tympanic membrane. Acoustic Reflexes: Ipsilateral: Could not maintain seal. Contralateral: Could not maintain seal.      Reliability: Good   Transducer: Inserts    **NOTE: Air-bone gaps > 10 dBHL noted from 2-4kHz in the left ear and significant asymmetries noted from 1-8kHz and in word recognition with left ear (84%) worse than right (100%). See scanned audiogram dated 10/20/2021  for results. PATIENT EDUCATION:       The following items were discussed with the patient:   - Good Communication Strategies  - Hearing Loss and Hearing Aids  - Tinnitus Management Strategies    - Dizziness    Educational information was shared in the After Visit Summary. RECOMMENDATIONS:                                                                                                                                                                                                                                                            The following items are recommended based on patient report and results from today's appointment:   - Continue medical follow-up with Ramón Comer MD.   - Retest hearing as medically indicated and/or sooner if a change in hearing is noted. - If desired, schedule a Hearing Aid Evaluation (HAE) appointment to discuss hearing aid options.   - Utilize \"Good Communication Strategies\" as discussed to assist in speech understanding with communication partners. - Maintain a sound enriched environment to assist in the management of tinnitus symptoms. - If medically indicated, consider vestibular evaluation to further investigate symptoms of dizziness.        Clare Dolan  Audiologist    Chart CC'd to: Veronica Parrish MD      Degree of   Hearing Sensitivity dB Range   Within Normal Limits (WNL) 0 - 20   Mild 20 - 40   Moderate 40 - 55   Moderately-Severe 55 - 70   Severe 70 - 90   Profound 90 +

## 2021-10-20 ENCOUNTER — PROCEDURE VISIT (OUTPATIENT)
Dept: AUDIOLOGY | Age: 71
End: 2021-10-20
Payer: MEDICARE

## 2021-10-20 ENCOUNTER — OFFICE VISIT (OUTPATIENT)
Dept: ENT CLINIC | Age: 71
End: 2021-10-20
Payer: MEDICARE

## 2021-10-20 VITALS
DIASTOLIC BLOOD PRESSURE: 71 MMHG | SYSTOLIC BLOOD PRESSURE: 113 MMHG | TEMPERATURE: 98.1 F | HEART RATE: 88 BPM | WEIGHT: 101 LBS | HEIGHT: 60 IN | BODY MASS INDEX: 19.83 KG/M2

## 2021-10-20 DIAGNOSIS — L98.9 SKIN LESION OF FACE: ICD-10-CM

## 2021-10-20 DIAGNOSIS — H93.13 TINNITUS, BILATERAL: ICD-10-CM

## 2021-10-20 DIAGNOSIS — H81.12 BENIGN PAROXYSMAL POSITIONAL VERTIGO, LEFT: ICD-10-CM

## 2021-10-20 DIAGNOSIS — H90.A32 MIXED CONDUCTIVE AND SENSORINEURAL HEARING LOSS OF LEFT EAR WITH RESTRICTED HEARING OF RIGHT EAR: ICD-10-CM

## 2021-10-20 DIAGNOSIS — H90.A21 SENSORINEURAL HEARING LOSS (SNHL) OF RIGHT EAR WITH RESTRICTED HEARING OF LEFT EAR: Primary | ICD-10-CM

## 2021-10-20 DIAGNOSIS — H91.8X9 ASYMMETRICAL HEARING LOSS: Primary | ICD-10-CM

## 2021-10-20 DIAGNOSIS — R42 DIZZINESS: ICD-10-CM

## 2021-10-20 PROBLEM — H91.8X3 ASYMMETRICAL HEARING LOSS: Status: ACTIVE | Noted: 2021-10-20

## 2021-10-20 PROCEDURE — 4040F PNEUMOC VAC/ADMIN/RCVD: CPT | Performed by: OTOLARYNGOLOGY

## 2021-10-20 PROCEDURE — G8420 CALC BMI NORM PARAMETERS: HCPCS | Performed by: OTOLARYNGOLOGY

## 2021-10-20 PROCEDURE — 1090F PRES/ABSN URINE INCON ASSESS: CPT | Performed by: OTOLARYNGOLOGY

## 2021-10-20 PROCEDURE — 92557 COMPREHENSIVE HEARING TEST: CPT | Performed by: AUDIOLOGIST

## 2021-10-20 PROCEDURE — 99204 OFFICE O/P NEW MOD 45 MIN: CPT | Performed by: OTOLARYNGOLOGY

## 2021-10-20 PROCEDURE — 97140 MANUAL THERAPY 1/> REGIONS: CPT | Performed by: OTOLARYNGOLOGY

## 2021-10-20 PROCEDURE — 3017F COLORECTAL CA SCREEN DOC REV: CPT | Performed by: OTOLARYNGOLOGY

## 2021-10-20 PROCEDURE — G8484 FLU IMMUNIZE NO ADMIN: HCPCS | Performed by: OTOLARYNGOLOGY

## 2021-10-20 PROCEDURE — G8399 PT W/DXA RESULTS DOCUMENT: HCPCS | Performed by: OTOLARYNGOLOGY

## 2021-10-20 PROCEDURE — 1036F TOBACCO NON-USER: CPT | Performed by: OTOLARYNGOLOGY

## 2021-10-20 PROCEDURE — 1123F ACP DISCUSS/DSCN MKR DOCD: CPT | Performed by: OTOLARYNGOLOGY

## 2021-10-20 PROCEDURE — G8427 DOCREV CUR MEDS BY ELIG CLIN: HCPCS | Performed by: OTOLARYNGOLOGY

## 2021-10-20 PROCEDURE — 92567 TYMPANOMETRY: CPT | Performed by: AUDIOLOGIST

## 2021-10-20 NOTE — Clinical Note
Dr. Gonsalo Hall,    Thank you for your referral for audiometric testing on this patient. Please see the scanned audiogram (under \"Media\" tab) and encounter note for details. If you have any questions, or if there is anything else you need, please let me know.       Magdaleno Marquez  Audiologist  ---  [id number] - Audiology

## 2021-10-20 NOTE — PATIENT INSTRUCTIONS
directly into a persons ear      Some additional items that may be helpful:   - Remain patient - this is important for both parties   - Write down items that still cannot be heard/understood. You may write with pen/paper or consider typing/texting on a cell phone or smart device. - If background noise is unavoidable, try to keep yourself in a good position in the room. By sitting at a esteban on the side of the restaurant (preferably a corner), it will be easier to communicate than if you were sitting at a table in the middle with background noise surrounding you. Keep yourself positioned away from music speakers or heavy foot traffic. Tinnitus: Overview and Management Strategies          Many people have some ringing sounds in their ears once in a while. You may hear a roar, a hiss, a tinkle, or a buzz. The sound usually lasts only a few minutes. If it goes on all the time, you may have tinnitus. Tinnitus is usually caused by long-term exposure to loud noise. This damages the nerves in the inner ear. It can occur with all types of hearing loss. It may be a symptom of almost any ear problem. Tinnitus may be caused by a buildup of earwax. Or, it may be caused by ear infections or certain medicines (especially antibiotics or large amounts of aspirin). You can also hear noises in your ears because of an injury to the ears, drinking too much alcohol or caffeine, or a medical condition. Other conditions may also contribute to tinnitus, including: head and neck trauma, temporomandibular joint disorder (TMJ), sinus pressure and barometric trauma, traumatic brain injury, metabolic disorders, autoimmune disorders, stress, and high blood pressure. You may need tests to evaluate your hearing and to find causes of long-lasting tinnitus. Your doctor may suggest one or more treatments to help you cope with the tinnitus. You can also do things at home to help reduce symptoms.     Follow-up care is a key part of your treatment and safety. Be sure to make and go to all appointments, and call your doctor if you are having problems. It's also a good idea to know your test results and keep a list of the medicines you take. How can you care for yourself at home? · Limit or cut out alcohol, caffeine, and sodium. They can make your symptoms worse. · Do not smoke or use other tobacco products. Nicotine reduces blood flow to the ear and makes tinnitus worse. If you need help quitting, talk to your doctor about stop-smoking programs and medicines. These can increase your chances of quitting for good. · Talk to your doctor about whether to stop taking aspirin and similar products such as ibuprofen or naproxen. · Get exercise often. It can help improve blood flow to the ear. Ways to manage/cope with tinnitus  Some tinnitus may last a long time. To manage your tinnitus, try to:  · Avoid noises that you think caused your tinnitus. If you can't avoid loud noises, wear earplugs or earmuffs. · Ignore the sound by paying attention to other things. Keeping your brain busy with other tasks or background noise can help your brain not focus on the tinnitus. · Try to not give the tinnitus an emotional reaction. Do your best to ignore the sound and not let it bother you. Relax using biofeedback, meditation, or yoga. Feeling stressed and being tired can make tinnitus worse. · Play music or white noise to help you sleep. Background noise may cover up the noise that you hear in your ears. You can buy a tabletop machine or a device that sits under your pillow to play soothing sounds, like ocean waves. · Smart phones have free apps, such as Whist, Relax Melodies, ReSound Relief, and White Noise Lite. These apps have different types of sounds/noise, some of which you can blend together to find sounds that are most soothing to you.   · Hearing aid technology, especially when there is some hearing loss, may help reduce tinnitus symptoms by giving your brain better access to the sounds it is missing. There are some hearing aids with built-in noise generator programs, which may help when amplification alone is not enough. Additional resources may be found through the [employer] at www.jeannine.org    When should you call for help? Call 911 anytime you think you may need emergency care. For example, call if:    · You have symptoms of a stroke. These may include:  ? Sudden numbness, tingling, weakness, or loss of movement in your face, arm, or leg, especially on only one side of your body. ? Sudden vision changes. ? Sudden trouble speaking. ? Sudden confusion or trouble understanding simple statements. ? Sudden problems with walking or balance. ? A sudden, severe headache that is different from past headaches. Call your doctor now or seek immediate medical care if:    · You develop other symptoms. These may include hearing loss (or worse hearing loss), balance problems, dizziness, nausea, or vomiting. Watch closely for changes in your health, and be sure to contact your doctor if:    · Your tinnitus moves from both ears to one ear. · Your hearing loss gets worse within 1 day after an ear injury. · Your tinnitus or hearing loss does not get better within 1 week after an ear injury. · Your tinnitus bothers you enough that you want to take medicines to help you cope with it. If you notice changes in your tinnitus and/or your hearing, it is recommended that you have your hearing tested by your audiologist and to follow-up with your physician that manages your hearing loss (such as your ENT or Primary Care doctor). Hearing Loss: Care Instructions  Your Care Instructions      Hearing loss is a sudden or slow decrease in how well you hear. It can range from mild to profound. Permanent hearing loss can occur with aging, and it can happen when you are exposed long-term to loud noise.  Examples include listening to loud music, riding motorcycles, or being around other loud machines. Hearing loss can affect your work and home life. It can make you feel lonely or depressed. You may feel that you have lost your independence. But hearing aids and other devices can help you hear better and feel connected to others. Follow-up care is a key part of your treatment and safety. Be sure to make and go to all appointments, and call your doctor if you are having problems. It's also a good idea to know your test results and keep a list of the medicines you take. How can you care for yourself at home? · Avoid loud noises whenever possible. This helps keep your hearing from getting worse. Always wear hearing protection around loud noises. · If appropriate, wear hearing aid(s) as directed. It is recommended that hearing aids are worn during all waking hours to keep your brain active and give it access to the sounds it is missing. · If you are beginning your process with hearing aid(s), schedule a \"Hearing Aid Evaluation\" with an audiologist to discuss your lifestyle, features of hearing aid technology, and styles of hearing aids available. It is recommended that you contact your insurance company to determine if you have a hearing aid benefit, as this may dictate who you can see for these services. · Have hearing tests as your doctor suggests. They can show whether your hearing has changed. Your hearing aid may need to be adjusted. · Use other assistive devices as needed. These may include:  ? Telephone amplifiers and hearing aids that can connect to a television, stereo, radio, or microphone. ? Devices that use lights or vibrations. These alert you to the doorbell, a ringing telephone, or a baby monitor. ? Television closed-captioning. This shows the words at the bottom of the screen. Most new TVs can do this. ? TTY (text telephone).  This lets you type messages back and forth on the telephone instead of talking or listening. These devices are also called TDD. When messages are typed on the keyboard, they are sent over the phone line to a receiving TTY. The message is shown on a monitor. · Use pagers, fax machines, text, and email if it is hard for you to communicate by telephone. · Try to learn a listening technique called speech-reading. It is not lip-reading. You pay attention to people's gestures, expressions, posture, and tone of voice. These clues can help you understand what a person is saying. Face the person you are talking to, and have him or her face you. Make sure the lighting is good. You need to see the other person's face clearly. · Think about counseling if you need help to adjust to your hearing loss. When should you call for help? Watch closely for changes in your health, and be sure to contact your doctor if:    · You think your hearing is getting worse. · You have new symptoms, such as dizziness or nausea. Dizziness: Care Instructions  Your Care Instructions  Dizziness is the feeling of unsteadiness or fuzziness in your head. It is different than having vertigo, which is a feeling that the room is spinning or that you are moving or falling. It is also different from lightheadedness, which is the feeling that you are about to faint. It can be hard to know what causes dizziness. Some people feel dizzy when they have migraine headaches. Sometimes bouts of flu can make you feel dizzy. Some medical conditions, such as heart problems or high blood pressure, can make you feel dizzy. Many medicines can cause dizziness, including medicines for high blood pressure, pain, or anxiety. If a medicine causes your symptoms, your doctor may recommend that you stop or change the medicine. If it is a problem with your heart, you may need medicine to help your heart work better.  If there is no clear reason for your symptoms, your doctor may suggest watching and waiting for a while to see if the dizziness goes away on its own. Follow-up care is a key part of your treatment and safety. Be sure to make and go to all appointments, and call your doctor if you are having problems. It's also a good idea to know your test results and keep a list of the medicines you take. How can you care for yourself at home? · If your doctor recommends or prescribes medicine, take it exactly as directed. Call your doctor if you think you are having a problem with your medicine. · Do not drive while you feel dizzy. · Try to prevent falls. Steps you can take include:  ? Using nonskid mats, adding grab bars near the tub, and using night-lights. ? Clearing your home so that walkways are free of anything you might trip on.  ? Letting family and friends know that you have been feeling dizzy. This will help them know how to help you. When should you call for help? Call 911 anytime you think you may need emergency care. For example, call if:    · You passed out (lost consciousness). · You have dizziness along with symptoms of a heart attack. These may include:  ? Chest pain or pressure, or a strange feeling in the chest.  ? Sweating. ? Shortness of breath. ? Nausea or vomiting. ? Pain, pressure, or a strange feeling in the back, neck, jaw, or upper belly or in one or both shoulders or arms. ? Lightheadedness or sudden weakness. ? A fast or irregular heartbeat. · You have symptoms of a stroke. These may include:  ? Sudden numbness, tingling, weakness, or loss of movement in your face, arm, or leg, especially on only one side of your body. ? Sudden vision changes. ? Sudden trouble speaking. ? Sudden confusion or trouble understanding simple statements. ? Sudden problems with walking or balance. ? A sudden, severe headache that is different from past headaches.     Call your doctor now or seek immediate medical care if:    · You feel dizzy and have a fever, headache, or ringing in your ears. · You have new or increased nausea and vomiting. · Your dizziness does not go away or comes back. Watch closely for changes in your health, and be sure to contact your doctor if:    · You do not get better as expected. Where can you learn more? Go to https://chpedeboraheweb.[email or web address]. org and sign in to your [employer] account. Enter X242 in the [employer] box to learn more about \"Dizziness: Care Instructions. \"     If you do not have an account, please click on the \"Sign Up Now\" link. Current as of: September 23, 2018  Content Version: 11.9  © 2061-8076 [employer]. Care instructions adapted under license by [facility] ([facility]). If you have questions about a medical condition or this instruction, always ask your healthcare professional. [address] any warranty or liability for your use of this information.

## 2021-10-20 NOTE — PROGRESS NOTES
[facility] PATIENT HISTORY AND PHYSICAL NOTE      Patient Name: Roma Cabrera  Medical Record Number:  [id number]  Primary Care Physician:  Samantha Young MD    ChiefComplaint     Chief Complaint   Patient presents with    New Patient     Bilateral hearing loss     History of Present Illness     Roma Cabrera is an 66 y.o. female with concern for bilateral hearing loss has been ongoing for the past 2-3 years, progressive over time without sudden decline; she states her left ear is worse than the right ear. States bilateral tinnitus, high-pitched, nonpulsatile, minimally intrusive; denies otorrhea, ear fullness. States intermittent otalgia to the right ear, sharp in nature, roughly 2-3 times/year, without radiation down the mandible or trapezius. She states vertigo that is been ongoing for several years, worse when she gets up from a supine position with room spinning lasting 2-3 seconds. Denies falls, nausea, emesis, roaring tinnitus or headache with episodes. Prior history of tracheostomy.     Past Medical History     Past Medical History:   Diagnosis Date    Acute on chronic respiratory failure with hypoxia ([address] 12/3/2009    Chronic airway obstruction, not elsewhere classified 3/4/08    oxygen 2L/min at HS and PRN through day    Chronic kidney disease     incontinent    COPD (chronic obstructive pulmonary disease) ([address]     Depression     Displacement of lumbar intervertebral disc without myelopathy 8/13/07    Edema 9/13/07    Emphysema of lung ([address]     Enthesopathy of hip region     Enthesopathy of hip region 3/5/07    Esophageal reflux 3/4/08    Hemorrhage of rectum and anus 11/14/07    Herpes zoster without complication     History of blood transfusion     Hyperlipidemia associated with type 2 diabetes mellitus ([address] 12/11/2017    Lumbar spondylosis 5/1/2018    Major depression, chronic 8/12/2015    Osteoporosis, unspecified 12/4/07    Other and unspecified hyperlipidemia 3/4/08    Pain in joint, shoulder region 3/4/08    Pneumonia     Pulmonary nodule     Recurrent major depressive disorder, in partial remission ([address] 5/4/2018    Rheumatic fever     S/P ileostomy ([address] 8/20/2011    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    Tension headache     Type 2 diabetes mellitus with hyperglycemia, without long-term current use of insulin ([address]     Unspecified asthma(493.90) 8/13/07    Unspecified chronic bronchitis ([address]     Ventral hernia 6/29/2014       Past Surgical History     Past Surgical History:   Procedure Laterality Date    ABDOMEN SURGERY  5-19-11    total abdominal colectomy iliostomy    CARPAL TUNNEL RELEASE      right    CATARACT REMOVAL Bilateral     L 6/18/2013, R 07/01/2013    CATARACT REMOVAL WITH IMPLANT Right 7/1/13    COLECTOMY      and reversal     COLONOSCOPY  2009, 11/4/2011, 10/28/15    normal    CYST REMOVAL Right 6/20/14    Dr. Hetal Ann; right ear pressure point cyst removal    DILATATION, ESOPHAGUS      ENDOSCOPY, COLON, DIAGNOSTIC      EPIDURAL STEROID INJECTION Bilateral 7/9/2019    BILATERAL LUMBAR FOUR TRANSFORAMINAL EPIDURAL STEROID INJECTION SITE CONFIRMED BY FLUOROSCOPY performed by Swetha William MD at [address] Left 6/18/13    cataract with lens implant    HERNIA REPAIR  6/27/14    Open Vental Hernia Repair    HIP SURGERY      HYSTERECTOMY  1973    subtotal    NECK SURGERY      incision of windpipe, repair of windpipe defect    OTHER SURGICAL HISTORY  8/19/2011    hand assisted laparoscopic ileostomy reversal    OVARY REMOVAL      TRACHEAL SURGERY  2005    hx of trach     UMBILICAL HERNIA REPAIR  6/27/2014       Family History     Family History   Problem Relation Age of Onset    Asthma Daughter     Diabetes Daughter     Cancer Daughter         Uterine    Alcohol Abuse Daughter     Asthma Daughter     Diabetes Mother     Heart Failure Mother  Hypertension Mother     Heart Failure Brother     Diabetes Sister     Heart Failure Brother     Coronary Art Dis Son     Emphysema Neg Hx        Social History     Social History     Tobacco Use    Smoking status: Former Smoker     Packs/day: 1.00     Years: 30.00     Pack years: 30.00     Types: Cigarettes     Quit date: 1996     Years since quittin.8    Smokeless tobacco: Never Used   Vaping Use    Vaping Use: Never used   Substance Use Topics    Alcohol use: No     Alcohol/week: 0.0 standard drinks    Drug use: No        Allergies     No Known Allergies    Medications     Current Outpatient Medications   Medication Sig Dispense Refill    predniSONE (DELTASONE) 10 MG tablet Take 40 mg by mouth for 3 days 30 mg for 3 days 20 mg for 3 days 10 mg for 3 days.  30 tablet 0    doxycycline hyclate (VIBRAMYCIN) 100 MG capsule Take 1 capsule by mouth 2 times daily for 5 days 10 capsule 0    Roflumilast (DALIRESP) 500 MCG tablet TAKE 1 TABLET EVERY DAY 90 tablet 0    furosemide (LASIX) 40 MG tablet TAKE 1 TABLET EVERY DAY 90 tablet 0    esomeprazole (NEXIUM) 40 MG delayed release capsule TAKE 1 CAPSULE EVERY MORNING BEFORE BREAKFAST 90 capsule 0    simvastatin (ZOCOR) 20 MG tablet TAKE 1 TABLET EVERY EVENING 90 tablet 0    QUEtiapine (SEROQUEL) 25 MG tablet TAKE 1 TO 2 TABLETS BY MOUTH AT BEDTIME 180 tablet 0    potassium chloride (KLOR-CON M) 20 MEQ extended release tablet TAKE 1 TABLET EVERY DAY 90 tablet 0    venlafaxine (EFFEXOR XR) 75 MG extended release capsule TAKE 1 CAPSULE EVERY DAY 90 capsule 0    alendronate (FOSAMAX) 35 MG tablet TAKE 1 TABLET EVERY 7 DAYS 12 tablet 0    albuterol sulfate  (90 Base) MCG/ACT inhaler INHALE 2 PUFFS INTO THE LUNGS EVERY 6 HOURS AS NEEDED FOR WHEEZING OR SHORTNESS OF BREATH 54 g 5    metFORMIN (GLUCOPHAGE-XR) 500 MG extended release tablet TAKE 2 TABLETS BY MOUTH TWICE DAILY 360 tablet 0    JANUVIA 100 MG tablet TAKE 1 TABLET BY MOUTH DAILY 90 tablet 0    benzonatate (TESSALON PERLES) 100 MG capsule Take 1 capsule by mouth 3 times daily as needed for Cough 90 capsule 3    topiramate (TOPAMAX) 50 MG tablet TAKE 1 TABLET BY MOUTH TWICE DAILY 180 tablet 2    albuterol (PROVENTIL) (2.5 MG/3ML) 0.083% nebulizer solution INHALE 3ML VIA NEBULIZATION ROUTE EVERY 6 HOURS AS NEEDED FOR WHEEZING OR SHORTNESS OF BREATH 375 mL 5    fluticasone-salmeterol (WIXELA INHUB) 250-50 MCG/DOSE AEPB INHALE 1 PUFF INTO THE LUNGS EVERY 12 HOURS 3 Inhaler 1    blood glucose test strips (ACCU-CHEK SMARTVIEW) strip TEST BLOOD SUGAR EVERY DAY. DX;E11.9 100 strip 3    SPIRIVA HANDIHALER 18 MCG inhalation capsule INHALE THE CONTENTS OF 1 CAPSULE VIA INHALATION DEVICE EVERY DAY 90 capsule 1    theophylline (THEODUR) 300 MG extended release tablet Take 1 tablet by mouth daily 90 tablet 1    gabapentin (NEURONTIN) 300 MG capsule TAKE 1 CAPSULE BY MOUTH THREE TIMES DAILY FOR 30 DAYS 90 capsule 0    calcium citrate-vitamin D (CITRACAL+D) 315-200 MG-UNIT per tablet Take 2 tablets by mouth 2 times daily 360 tablet 0    potassium chloride (MICRO-K) 10 MEQ extended release capsule Take 20 mEq by mouth daily       diltiazem (CARDIZEM CD) 120 MG extended release capsule Take 1 capsule by mouth daily 30 capsule 3    guaiFENesin (MUCINEX) 600 MG extended release tablet Take 1 tablet by mouth 2 times daily 60 tablet 1    Cyanocobalamin (VITAMIN B-12 PO) Take by mouth      naproxen (NAPROSYN) 375 MG tablet TAKE 1 TABLET TWICE DAILY 180 tablet 0    Blood Glucose Monitoring Suppl (ACCU-CHEK GAURANG SMARTVIEW) w/Device KIT Use to test once daily. DX:E11.9 1 kit 0    Lancets Misc. (ACCU-CHEK FASTCLIX LANCET) KIT Use to test once daily.  DX:E11.9 1 kit 2    vitamin D (CHOLECALCIFEROL) 400 UNITS TABS tablet Take 2 tablets by mouth daily 180 tablet 1    calcium carbonate (CALCIUM 600) 600 MG TABS tablet Take 2 tablets by mouth daily 180 tablet 1    Respiratory Therapy Supplies (NEBULIZER/TUBING/MOUTHPIECE) KIT 1 kit by Does not apply route daily as needed DX COPD J44.9 1 kit 0    Nebulizers (COMPRESSOR/NEBULIZER) MISC 1 Device by Does not apply route as needed DX COPD J44.9 1 each 0    fluticasone (FLONASE) 50 MCG/ACT nasal spray 2 sprays by Nasal route daily. 3 Bottle 0    Misc. Devices (ACAPELLA) MISC by Does not apply route 4 times daily. 1 each 0     No current facility-administered medications for this visit.        Review of Systems     REVIEW OF SYSTEMS  The following systems were reviewed and revealed the following in addition to any already discussed in the HPI:    CONSTITUTIONAL: No weight loss, no fever, no night sweats, no chills  EYES: no vision changes, no blurry vision, + right sided ocular drainage, intermittent, for the past month  EARS: + Changes in hearing, + otalgia  NOSE: no epistaxis, no rhinorrhea  RESPIRATORY: No difficulty breathing, no shortness of breath  CV: no chest pain, no peripheral vascular disease  HEME: No coagulation disorder, no bleeding disorder  NEURO: No TIA or stroke-like symptoms  SKIN: +new rashes in the head and neck (right face), no recent skin cancers  MOUTH: No new ulcers, no recent teeth infections  GASTROINTESTINAL: No diarrhea, stomach pain  PSYCH: No anxiety, no depression    PhysicalExam     Vitals:    10/20/21 1118   BP: 113/71   Site: Left Upper Arm   Position: Sitting   Pulse: 88   Temp: 98.1 °F (36.7 °C)   Weight: 101 lb (45.8 kg)   Height: 5' (1.524 m)       PHYSICAL EXAM  /71 (Site: Left Upper Arm, Position: Sitting)   Pulse 88   Temp 98.1 °F (36.7 °C)   Ht 5' (1.524 m)   Wt 101 lb (45.8 kg)   BMI 19.73 kg/m²     GENERAL: No Acute Distress, Alert and Oriented, no hoarseness  EYES: EOMI, Anti-icteric  NOSE: On anterior rhinoscopy there is no epistaxis, nasal mucosa within normal limits, no purulent drainage  EARS: Normal external appearance; on portable otomicroscopy:  -Right ear: External auditory canal without instructions for Epley maneuver for left posterior semicircular canal BPPV have been given to the patient  - CA MANUAL THER TECH,1+REGIONS,EA 15 MIN    3. Skin lesion of face  Right skin lesion inferomedial to the right medial canthus, will have her use antibiotic ointment over the site for 2 weeks and if no improvement will plan for biopsy    Return in about 1 week (around 10/27/2021). Ankita Grissom MD  [facility]  Department of Otolaryngology/Head and Neck Surgery  10/20/21    I have performed a head and neck physical exam personally or was physically present during the key or critical portions of the service. Medical Decision Making: The following items were considered in medical decision making:  Independent review of images  Review / order clinical lab tests  Review / order radiology tests  Decision to obtain old records  Review and summation of old records as accessed through [facility] (a summary of my findings in these old records: None)     Portions of this note were dictated using Dragon.  There may be linguistic errors secondary to the use of this program.

## 2021-10-20 NOTE — PATIENT INSTRUCTIONS
Canalith Repositioning (CRP) - \"Epley\" Maneuvers:          Post-Procedure Instructions:  -Keep chin straight forward to tipped down a bit  -Do not tip head back to drink water but use a straw  -Restrictions apply for the first 24 hours after the treatment. When reclining for sleep, sleep as near upright as possible. Ideally, the head should not tip backwards more than 30 degrees which means that the back cannot be tilted backwards more than 30 degrees.  -There is no limit to how many times the CRP (canalith repositioning procedures) can be re-done.  -If neurological symptoms (for example, weakness, numbness, visual changes other than vertigo) occur with the Epley maneuver please notify the clinic immediately     [facility]:B  STOP METFORMIN the day you get the MRI and do not restart it for 2 days afterwards.  On day 3, please get a blood draw to check your kidney function before you restart metformin

## 2021-10-25 ENCOUNTER — TELEPHONE (OUTPATIENT)
Dept: ENT CLINIC | Age: 71
End: 2021-10-25

## 2021-10-25 DIAGNOSIS — R42 VERTIGO: Primary | ICD-10-CM

## 2021-10-25 NOTE — TELEPHONE ENCOUNTER
The patient called regarding her upcoming MRI. She stated that according to the hospital, she will need to have lab work done prior to the MRI. She has been holding the Metformin as per your request. The order for the BMP for 48 hours after the contrast is already in.  Can you add another order for the BMP to be drawn per the hospitals request?

## 2021-10-26 ENCOUNTER — HOSPITAL ENCOUNTER (OUTPATIENT)
Age: 71
Discharge: HOME | End: 2021-10-26
Payer: MEDICARE

## 2021-10-26 DIAGNOSIS — H91.8X9 ASYMMETRICAL HEARING LOSS: ICD-10-CM

## 2021-10-26 DIAGNOSIS — J47.9 BRONCHIECTASIS WITHOUT COMPLICATION (HCC): ICD-10-CM

## 2021-10-26 LAB
ANION GAP SERPL CALCULATED.3IONS-SCNC: 12 MMOL/L (ref 3–16)
BUN BLDV-MCNC: 22 MG/DL (ref 7–20)
CALCIUM SERPL-MCNC: 9.6 MG/DL (ref 8.3–10.6)
CHLORIDE BLD-SCNC: 108 MMOL/L (ref 99–110)
CO2: 28 MMOL/L (ref 21–32)
CREAT SERPL-MCNC: 0.9 MG/DL (ref 0.6–1.2)
GFR AFRICAN AMERICAN: >60
GFR NON-AFRICAN AMERICAN: >60
GLUCOSE BLD-MCNC: 109 MG/DL (ref 70–99)
POTASSIUM SERPL-SCNC: 4.1 MMOL/L (ref 3.5–5.1)
SODIUM BLD-SCNC: 148 MMOL/L (ref 136–145)

## 2021-10-26 PROCEDURE — 87206 SMEAR FLUORESCENT/ACID STAI: CPT

## 2021-10-26 PROCEDURE — 87015 SPECIMEN INFECT AGNT CONCNTJ: CPT

## 2021-10-26 PROCEDURE — 36415 COLL VENOUS BLD VENIPUNCTURE: CPT

## 2021-10-26 PROCEDURE — 80048 BASIC METABOLIC PNL TOTAL CA: CPT

## 2021-10-26 PROCEDURE — 87116 MYCOBACTERIA CULTURE: CPT

## 2021-10-27 ENCOUNTER — HOSPITAL ENCOUNTER (OUTPATIENT)
Age: 71
Discharge: HOME | End: 2021-10-27
Payer: MEDICARE

## 2021-10-27 ENCOUNTER — TELEPHONE (OUTPATIENT)
Dept: ENT CLINIC | Age: 71
End: 2021-10-27

## 2021-10-27 ENCOUNTER — HOSPITAL ENCOUNTER (OUTPATIENT)
Dept: MRI IMAGING | Age: 71
Discharge: HOME | End: 2021-10-27
Payer: MEDICARE

## 2021-10-27 DIAGNOSIS — H91.8X9 ASYMMETRICAL HEARING LOSS: ICD-10-CM

## 2021-10-27 PROCEDURE — A9579 GAD-BASE MR CONTRAST NOS,1ML: HCPCS | Performed by: OTOLARYNGOLOGY

## 2021-10-27 PROCEDURE — 70553 MRI BRAIN STEM W/O & W/DYE: CPT

## 2021-10-27 PROCEDURE — 6360000004 HC RX CONTRAST MEDICATION: Performed by: OTOLARYNGOLOGY

## 2021-10-27 RX ADMIN — GADOTERIDOL 9 ML: 279.3 INJECTION, SOLUTION INTRAVENOUS at 08:00

## 2021-10-27 NOTE — TELEPHONE ENCOUNTER
MRI reviewed, no lesions identified-patient cleared for hearing aids.   Called patient with results, she is interested in pursuing hearing aids and will plan for hearing aid evaluation

## 2021-10-28 RX ORDER — SITAGLIPTIN 100 MG/1
100 TABLET, FILM COATED ORAL DAILY
Qty: 90 TABLET | Refills: 0 | Status: SHIPPED | OUTPATIENT
Start: 2021-10-28 | End: 2022-01-26

## 2021-10-28 RX ORDER — METFORMIN HYDROCHLORIDE 500 MG/1
TABLET, EXTENDED RELEASE ORAL
Qty: 360 TABLET | Refills: 0 | Status: SHIPPED | OUTPATIENT
Start: 2021-10-28 | End: 2022-01-26

## 2021-11-02 ENCOUNTER — HOSPITAL ENCOUNTER (OUTPATIENT)
Age: 71
Discharge: HOME | End: 2021-11-02
Payer: MEDICARE

## 2021-11-02 DIAGNOSIS — R42 VERTIGO: ICD-10-CM

## 2021-11-02 LAB
ANION GAP SERPL CALCULATED.3IONS-SCNC: 11 MMOL/L (ref 3–16)
BUN BLDV-MCNC: 25 MG/DL (ref 7–20)
CALCIUM SERPL-MCNC: 9.5 MG/DL (ref 8.3–10.6)
CHLORIDE BLD-SCNC: 108 MMOL/L (ref 99–110)
CO2: 26 MMOL/L (ref 21–32)
CREAT SERPL-MCNC: 0.9 MG/DL (ref 0.6–1.2)
GFR AFRICAN AMERICAN: >60
GFR NON-AFRICAN AMERICAN: >60
GLUCOSE BLD-MCNC: 107 MG/DL (ref 70–99)
POTASSIUM SERPL-SCNC: 4.5 MMOL/L (ref 3.5–5.1)
SODIUM BLD-SCNC: 145 MMOL/L (ref 136–145)

## 2021-11-02 PROCEDURE — 36415 COLL VENOUS BLD VENIPUNCTURE: CPT

## 2021-11-02 PROCEDURE — 80048 BASIC METABOLIC PNL TOTAL CA: CPT

## 2021-11-05 RX ORDER — BENZONATATE 100 MG/1
CAPSULE ORAL
Qty: 90 CAPSULE | Refills: 3 | Status: SHIPPED | OUTPATIENT
Start: 2021-11-05 | End: 2022-06-17

## 2021-11-10 RX ORDER — VENLAFAXINE HYDROCHLORIDE 75 MG/1
CAPSULE, EXTENDED RELEASE ORAL
Qty: 90 CAPSULE | Refills: 0 | Status: SHIPPED | OUTPATIENT
Start: 2021-11-10 | End: 2022-01-19

## 2021-11-10 RX ORDER — POTASSIUM CHLORIDE 20 MEQ/1
TABLET, EXTENDED RELEASE ORAL
Qty: 90 TABLET | Refills: 0 | Status: SHIPPED | OUTPATIENT
Start: 2021-11-10 | End: 2022-01-19

## 2021-11-21 ENCOUNTER — HOSPITAL ENCOUNTER (EMERGENCY)
Age: 71
Discharge: HOME | End: 2021-11-21
Payer: MEDICARE

## 2021-11-21 ENCOUNTER — APPOINTMENT (OUTPATIENT)
Dept: GENERAL RADIOLOGY | Age: 71
End: 2021-11-21
Payer: MEDICARE

## 2021-11-21 VITALS
BODY MASS INDEX: 20.22 KG/M2 | HEIGHT: 60 IN | RESPIRATION RATE: 16 BRPM | WEIGHT: 103 LBS | TEMPERATURE: 97.9 F | SYSTOLIC BLOOD PRESSURE: 115 MMHG | OXYGEN SATURATION: 97 % | HEART RATE: 99 BPM | DIASTOLIC BLOOD PRESSURE: 71 MMHG

## 2021-11-21 DIAGNOSIS — S93.402A SPRAIN OF LEFT ANKLE, UNSPECIFIED LIGAMENT, INITIAL ENCOUNTER: Primary | ICD-10-CM

## 2021-11-21 PROCEDURE — 73610 X-RAY EXAM OF ANKLE: CPT

## 2021-11-21 PROCEDURE — 99283 EMERGENCY DEPT VISIT LOW MDM: CPT

## 2021-11-21 RX ORDER — IBUPROFEN 400 MG/1
400 TABLET ORAL ONCE
Status: DISCONTINUED | OUTPATIENT
Start: 2021-11-21 | End: 2021-11-21 | Stop reason: HOSPADM

## 2021-11-21 RX ORDER — IBUPROFEN 400 MG/1
400 TABLET ORAL EVERY 6 HOURS PRN
Qty: 20 TABLET | Refills: 0 | Status: SHIPPED | OUTPATIENT
Start: 2021-11-21

## 2021-11-21 ASSESSMENT — PAIN SCALES - GENERAL
PAINLEVEL_OUTOF10: 0
PAINLEVEL_OUTOF10: 7

## 2021-11-21 ASSESSMENT — PAIN DESCRIPTION - LOCATION: LOCATION: FOOT

## 2021-11-21 ASSESSMENT — PAIN DESCRIPTION - DESCRIPTORS: DESCRIPTORS: THROBBING

## 2021-11-21 ASSESSMENT — PAIN DESCRIPTION - FREQUENCY: FREQUENCY: CONTINUOUS

## 2021-11-21 ASSESSMENT — PAIN DESCRIPTION - ORIENTATION: ORIENTATION: LEFT

## 2021-11-21 ASSESSMENT — PAIN DESCRIPTION - PAIN TYPE: TYPE: ACUTE PAIN

## 2021-11-21 ASSESSMENT — PAIN DESCRIPTION - ONSET: ONSET: GRADUAL

## 2021-11-21 NOTE — ED PROVIDER NOTES
[address] ED  EMERGENCY DEPARTMENT ENCOUNTER        Pt Name: Brandy Recio  MRN: [id number]  [address] 1950  Date of evaluation: 11/21/2021  Provider: SAUL Espana - CNP  PCP: José Miguel Wright MD  Note Started: 6:45 PM EST       MARIA ISABEL. I have evaluated this patient. My supervising physician was available for consultation. CHIEF COMPLAINT       Chief Complaint   Patient presents with    Foot Pain     pt states left foot pain for 5 days was seen at urgent care. pt states urgent care told her they think it is gout but she sould come here to be sure. HISTORY OF PRESENT ILLNESS   (Location, Timing/Onset, Context/Setting, Quality, Duration, Modifying Factors, Severity, Associated Signs and Symptoms)  Note limiting factors. Chief Complaint: Left foot pain    Brandy Recio is a 66 y.o. female with medical history of GERD, pulmonary nodule, bilateral knee pain, dizzy, headache, depression, insomnia, dysthymia, DM type II, HLD, spondylosis, emphysema, ventral hernia, ileostomy and colectomy, osteopenia, osteoporosis, COPD, herpes zoster, pneumonia, rheumatic fever presents the emergency department with complaint of pain, redness, and swelling to her left lateral ankle progressive over the past 5 days. She denies any injury or trauma that she recalls. She has noticed increased pain and swelling to her lateral ankle and increased pain with weightbearing and walking. She denies taking medication for these symptoms. She denies any ice or elevation. She has been able to ambulate without difficulty. Denies any associated numbness, tingling, weakness, or deformity. Nursing Notes were all reviewed and agreed with or any disagreements were addressed in the HPI. REVIEW OF SYSTEMS    (2-9 systems for level 4, 10 or more for level 5)     Review of Systems    Positives and Pertinent negatives as per HPI.  Except as noted above in the ROS, all other systems were reviewed and negative.        PAST MEDICAL HISTORY     Past Medical History:   Diagnosis Date    Acute on chronic respiratory failure with hypoxia ([address] 12/3/2009    Chronic airway obstruction, not elsewhere classified 3/4/08    oxygen 2L/min at HS and PRN through day    Chronic kidney disease     incontinent    COPD (chronic obstructive pulmonary disease) ([address]     Depression     Displacement of lumbar intervertebral disc without myelopathy 8/13/07    Edema 9/13/07    Emphysema of lung ([address]     Enthesopathy of hip region     Enthesopathy of hip region 3/5/07    Esophageal reflux 3/4/08    Hemorrhage of rectum and anus 11/14/07    Herpes zoster without complication     History of blood transfusion     Hyperlipidemia associated with type 2 diabetes mellitus ([address] 12/11/2017    Lumbar spondylosis 5/1/2018    Major depression, chronic 8/12/2015    Osteoporosis, unspecified 12/4/07    Other and unspecified hyperlipidemia 3/4/08    Pain in joint, shoulder region 3/4/08    Pneumonia     Pulmonary nodule     Recurrent major depressive disorder, in partial remission ([address] 5/4/2018    Rheumatic fever     S/P ileostomy ([address] 8/20/2011    Skin lesion of face 10/20/2021    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    Tension headache     Type 2 diabetes mellitus with hyperglycemia, without long-term current use of insulin ([address]     Unspecified asthma(493.90) 8/13/07    Unspecified chronic bronchitis ([address]     Ventral hernia 6/29/2014         SURGICAL HISTORY     Past Surgical History:   Procedure Laterality Date    ABDOMEN SURGERY  5-19-11    total abdominal colectomy iliostomy    CARPAL TUNNEL RELEASE      right    CATARACT REMOVAL Bilateral     L 6/18/2013, R 07/01/2013    CATARACT REMOVAL WITH IMPLANT Right 7/1/13    COLECTOMY      and reversal     COLONOSCOPY  2009, 11/4/2011, 10/28/15    normal    CYST REMOVAL Right 6/20/14    Dr. Holly Murphy; right ear pressure point cyst removal    DILATATION, ESOPHAGUS      ENDOSCOPY, COLON, DIAGNOSTIC      EPIDURAL STEROID INJECTION Bilateral 7/9/2019    BILATERAL LUMBAR FOUR TRANSFORAMINAL EPIDURAL STEROID INJECTION SITE CONFIRMED BY FLUOROSCOPY performed by Silvia Nixon MD at [address] Left 6/18/13    cataract with lens implant    HERNIA REPAIR  6/27/14    Open Vental Hernia Repair    HIP SURGERY      HYSTERECTOMY  1973    subtotal    NECK SURGERY      incision of windpipe, repair of windpipe defect    OTHER SURGICAL HISTORY  8/19/2011    hand assisted laparoscopic ileostomy reversal    OVARY REMOVAL      TRACHEAL SURGERY  2005    hx of trach     UMBILICAL HERNIA REPAIR  6/27/2014         CURRENTMEDICATIONS       Previous Medications    ALBUTEROL (PROVENTIL) (2.5 MG/3ML) 0.083% NEBULIZER SOLUTION    INHALE 3ML VIA NEBULIZATION ROUTE EVERY 6 HOURS AS NEEDED FOR WHEEZING OR SHORTNESS OF BREATH    ALBUTEROL SULFATE  (90 BASE) MCG/ACT INHALER    INHALE 2 PUFFS INTO THE LUNGS EVERY 6 HOURS AS NEEDED FOR WHEEZING OR SHORTNESS OF BREATH    ALENDRONATE (FOSAMAX) 35 MG TABLET    TAKE 1 TABLET EVERY 7 DAYS    BENZONATATE (TESSALON) 100 MG CAPSULE    TAKE 1 CAPSULE THREE TIMES DAILY AS NEEDED FOR COUGH    BLOOD GLUCOSE MONITORING SUPPL (ACCU-CHEK GAURANG SMARTVIEW) W/DEVICE KIT    Use to test once daily. DX:E11.9    BLOOD GLUCOSE TEST STRIPS (ACCU-CHEK SMARTVIEW) STRIP    TEST BLOOD SUGAR EVERY DAY. DX;E11.9    CALCIUM CARBONATE (CALCIUM 600) 600 MG TABS TABLET    Take 2 tablets by mouth daily    CALCIUM CITRATE-VITAMIN D (CITRACAL+D) 315-200 MG-UNIT PER TABLET    Take 2 tablets by mouth 2 times daily    CYANOCOBALAMIN (VITAMIN B-12 PO)    Take by mouth    DILTIAZEM (CARDIZEM CD) 120 MG EXTENDED RELEASE CAPSULE    Take 1 capsule by mouth daily    ESOMEPRAZOLE (NEXIUM) 40 MG DELAYED RELEASE CAPSULE    TAKE 1 CAPSULE EVERY MORNING BEFORE BREAKFAST    FLUTICASONE (FLONASE) 50 MCG/ACT NASAL SPRAY    2 sprays by Nasal route daily. FLUTICASONE-SALMETEROL (WIXELA INHUB) 250-50 MCG/DOSE AEPB    INHALE 1 PUFF INTO THE LUNGS EVERY 12 HOURS    FUROSEMIDE (LASIX) 40 MG TABLET    TAKE 1 TABLET EVERY DAY    GABAPENTIN (NEURONTIN) 300 MG CAPSULE    TAKE 1 CAPSULE BY MOUTH THREE TIMES DAILY FOR 30 DAYS    GUAIFENESIN (MUCINEX) 600 MG EXTENDED RELEASE TABLET    Take 1 tablet by mouth 2 times daily    JANUVIA 100 MG TABLET    TAKE 1 TABLET BY MOUTH DAILY    LANCETS MISC. (ACCU-CHEK FASTCLIX LANCET) KIT    Use to test once daily. DX:E11.9    METFORMIN (GLUCOPHAGE-XR) 500 MG EXTENDED RELEASE TABLET    TAKE 2 TABLETS BY MOUTH TWICE DAILY    MISC. DEVICES (ACAPELLA) MISC    by Does not apply route 4 times daily. NEBULIZERS (COMPRESSOR/NEBULIZER) MISC    1 Device by Does not apply route as needed DX COPD J44.9    POTASSIUM CHLORIDE (KLOR-CON M) 20 MEQ EXTENDED RELEASE TABLET    TAKE 1 TABLET EVERY DAY    POTASSIUM CHLORIDE (MICRO-K) 10 MEQ EXTENDED RELEASE CAPSULE    Take 20 mEq by mouth daily     QUETIAPINE (SEROQUEL) 25 MG TABLET    TAKE 1 TO 2 TABLETS BY MOUTH AT BEDTIME    RESPIRATORY THERAPY SUPPLIES (NEBULIZER/TUBING/MOUTHPIECE) KIT    1 kit by Does not apply route daily as needed DX COPD J44.9    ROFLUMILAST (DALIRESP) 500 MCG TABLET    TAKE 1 TABLET EVERY DAY    SIMVASTATIN (ZOCOR) 20 MG TABLET    TAKE 1 TABLET EVERY EVENING    SPIRIVA HANDIHALER 18 MCG INHALATION CAPSULE    INHALE THE CONTENTS OF 1 CAPSULE VIA INHALATION DEVICE EVERY DAY    THEOPHYLLINE (THEODUR) 300 MG EXTENDED RELEASE TABLET    Take 1 tablet by mouth daily    TOPIRAMATE (TOPAMAX) 50 MG TABLET    TAKE 1 TABLET BY MOUTH TWICE DAILY    VENLAFAXINE (EFFEXOR XR) 75 MG EXTENDED RELEASE CAPSULE    TAKE 1 CAPSULE EVERY DAY    VITAMIN D (CHOLECALCIFEROL) 400 UNITS TABS TABLET    Take 2 tablets by mouth daily         ALLERGIES     Patient has no known allergies.     FAMILYHISTORY       Family History   Problem Relation Age of Onset    Asthma Daughter     Diabetes Daughter     Cancer Daughter         Uterine    Alcohol Abuse Daughter     Asthma Daughter     Diabetes Mother     Heart Failure Mother     Hypertension Mother     Heart Failure Brother     Diabetes Sister     Heart Failure Brother     Coronary Art Dis Son     Emphysema Neg Hx           SOCIAL HISTORY       Social History     Tobacco Use    Smoking status: Former Smoker     Packs/day: 1.00     Years: 30.00     Pack years: 30.00     Types: Cigarettes     Quit date: 1996     Years since quittin.9    Smokeless tobacco: Never Used   Vaping Use    Vaping Use: Never used   Substance Use Topics    Alcohol use: No     Alcohol/week: 0.0 standard drinks    Drug use: No       SCREENINGS             PHYSICAL EXAM    (up to 7 for level 4, 8 or more for level 5)     ED Triage Vitals [21 1804]   BP Temp Temp Source Pulse Resp SpO2 Height Weight   115/71 97.9 °F (36.6 °C) Tympanic 99 16 97 % 5' (1.524 m) 103 lb (46.7 kg)       Physical Exam  Vitals and nursing note reviewed. Constitutional:       General: She is awake. Appearance: Normal appearance. She is well-developed and normal weight. HENT:      Head: Normocephalic and atraumatic. Right Ear: Decreased hearing noted. Left Ear: Decreased hearing noted. Nose: Nose normal.   Eyes:      General:         Right eye: No discharge. Left eye: No discharge. Cardiovascular:      Pulses:           Dorsalis pedis pulses are 2+ on the right side and 2+ on the left side. Pulmonary:      Effort: Pulmonary effort is normal. No respiratory distress. Musculoskeletal:         General: Normal range of motion. Cervical back: Normal range of motion. Right knee: Normal.      Left knee: Normal.      Right lower leg: No edema. Left lower leg: No edema. Right ankle: No swelling or deformity. Normal range of motion. Normal pulse. Right Achilles Tendon: Normal.      Left ankle: Swelling present.  No deformity, ecchymosis or lacerations. Tenderness present over the lateral malleolus and base of 5th metatarsal. Normal range of motion. Normal pulse. Left Achilles Tendon: Normal.      Right foot: Normal.      Left foot: Normal.   Skin:     General: Skin is warm and dry. Coloration: Skin is not pale. Neurological:      Mental Status: She is alert and oriented to person, place, and time. Psychiatric:         Behavior: Behavior normal. Behavior is cooperative. DIAGNOSTIC RESULTS   LABS:    Labs Reviewed - No data to display    When ordered only abnormal lab results are displayed. All other labs were within normal range or not returned as of this dictation. EKG: When ordered, EKG's are interpreted by the Emergency Department Physician in the absence of a cardiologist.  Please see their note for interpretation of EKG. RADIOLOGY:   Non-plain film images such as CT, Ultrasound and MRI are read by the radiologist. Plain radiographic images are visualized and preliminarily interpreted by the ED Provider with the below findings:        Interpretation per the Radiologist below, if available at the time of this note:    XR ANKLE LEFT (MIN 3 VIEWS)   Preliminary Result   Mild diffuse soft tissue at the left ankle, without acute fracture or   dislocation. No results found. PROCEDURES   Unless otherwise noted below, none     Procedures    CRITICAL CARE TIME   N/A    CONSULTS:  None      EMERGENCY DEPARTMENT COURSE and DIFFERENTIAL DIAGNOSIS/MDM:   Vitals:    Vitals:    11/21/21 1804   BP: 115/71   Pulse: 99   Resp: 16   Temp: 97.9 °F (36.6 °C)   TempSrc: Tympanic   SpO2: 97%   Weight: 103 lb (46.7 kg)   Height: 5' (1.524 m)       Patient was given the following medications:  Medications - No data to display        Care of this patient took place during the COVID-19 pandemic emergency. ED COURSE & MEDICAL DECISION MAKING    - The patient presented to the ER with complaints of  left ankle pain.  Vital signs ED  [facility]  [phone]  Go to   As needed      DISCHARGE MEDICATIONS:  New Prescriptions    IBUPROFEN (ADVIL;MOTRIN) 400 MG TABLET    Take 1 tablet by mouth every 6 hours as needed for Pain       DISCONTINUED MEDICATIONS:  Discontinued Medications    NAPROXEN (NAPROSYN) 375 MG TABLET    TAKE 1 TABLET TWICE DAILY              (Please note that portions of this note were completed with a voice recognition program.  Efforts were made to edit the dictations but occasionally words are mis-transcribed.)    SAUL Mathew CNP (electronically signed)    '      SAUL Mathew CNP  11/21/21 2008

## 2021-11-22 ENCOUNTER — OFFICE VISIT (OUTPATIENT)
Dept: ENT CLINIC | Age: 71
End: 2021-11-22
Payer: MEDICARE

## 2021-11-22 ENCOUNTER — TELEPHONE (OUTPATIENT)
Dept: INTERNAL MEDICINE CLINIC | Age: 71
End: 2021-11-22

## 2021-11-22 VITALS — BODY MASS INDEX: 20.22 KG/M2 | HEIGHT: 60 IN | TEMPERATURE: 97.5 F | WEIGHT: 103 LBS

## 2021-11-22 DIAGNOSIS — L98.9 SKIN LESION OF FACE: ICD-10-CM

## 2021-11-22 DIAGNOSIS — H81.12 BENIGN PAROXYSMAL POSITIONAL VERTIGO, LEFT: ICD-10-CM

## 2021-11-22 DIAGNOSIS — H91.8X9 ASYMMETRICAL HEARING LOSS: Primary | ICD-10-CM

## 2021-11-22 PROCEDURE — G8484 FLU IMMUNIZE NO ADMIN: HCPCS | Performed by: OTOLARYNGOLOGY

## 2021-11-22 PROCEDURE — G8399 PT W/DXA RESULTS DOCUMENT: HCPCS | Performed by: OTOLARYNGOLOGY

## 2021-11-22 PROCEDURE — G8420 CALC BMI NORM PARAMETERS: HCPCS | Performed by: OTOLARYNGOLOGY

## 2021-11-22 PROCEDURE — 99212 OFFICE O/P EST SF 10 MIN: CPT | Performed by: OTOLARYNGOLOGY

## 2021-11-22 PROCEDURE — G8427 DOCREV CUR MEDS BY ELIG CLIN: HCPCS | Performed by: OTOLARYNGOLOGY

## 2021-11-22 PROCEDURE — 3017F COLORECTAL CA SCREEN DOC REV: CPT | Performed by: OTOLARYNGOLOGY

## 2021-11-22 PROCEDURE — 1036F TOBACCO NON-USER: CPT | Performed by: OTOLARYNGOLOGY

## 2021-11-22 PROCEDURE — 1090F PRES/ABSN URINE INCON ASSESS: CPT | Performed by: OTOLARYNGOLOGY

## 2021-11-22 PROCEDURE — 1123F ACP DISCUSS/DSCN MKR DOCD: CPT | Performed by: OTOLARYNGOLOGY

## 2021-11-22 PROCEDURE — 4040F PNEUMOC VAC/ADMIN/RCVD: CPT | Performed by: OTOLARYNGOLOGY

## 2021-11-22 NOTE — PROGRESS NOTES
[address] [facility] follow-up note      Patient Name: Destin Francisco  Medical Record Number:  [id number]  Primary Care Physician:  Annamaria Barahona MD    ChiefComplaint     Chief Complaint   Patient presents with    Follow-up     Following up for hearing loss. States that things are still the same since last visit. History of Present Illness     Destin Francisco is an 66 y.o. female with concern for bilateral hearing loss has been ongoing for the past 2-3 years, progressive over time without sudden decline; she states her left ear is worse than the right ear. States bilateral tinnitus, high-pitched, nonpulsatile, minimally intrusive; denies otorrhea, ear fullness. States intermittent otalgia to the right ear, sharp in nature, roughly 2-3 times/year, without radiation down the mandible or trapezius. She states vertigo that is been ongoing for several years, worse when she gets up from a supine position with room spinning lasting 2-3 seconds. Denies falls, nausea, emesis, roaring tinnitus or headache with episodes. Prior history of tracheostomy. Interval history 11/22/2021: Patient states complete resolution of lesion close to the right medial canthus. No further episodes of vertigo, is planned for hearing aid evaluation 12/2021.     Past Medical History     Past Medical History:   Diagnosis Date    Acute on chronic respiratory failure with hypoxia ([address] 12/3/2009    Chronic airway obstruction, not elsewhere classified 3/4/08    oxygen 2L/min at HS and PRN through day    Chronic kidney disease     incontinent    COPD (chronic obstructive pulmonary disease) ([address]     Depression     Displacement of lumbar intervertebral disc without myelopathy 8/13/07    Edema 9/13/07    Emphysema of lung ([address]     Enthesopathy of hip region     Enthesopathy of hip region 3/5/07    Esophageal reflux 3/4/08    Hemorrhage of rectum and anus 11/14/07    Herpes zoster without complication     History of blood transfusion     Hyperlipidemia associated with type 2 diabetes mellitus ([address] 12/11/2017    Lumbar spondylosis 5/1/2018    Major depression, chronic 8/12/2015    Osteoporosis, unspecified 12/4/07    Other and unspecified hyperlipidemia 3/4/08    Pain in joint, shoulder region 3/4/08    Pneumonia     Pulmonary nodule     Recurrent major depressive disorder, in partial remission ([address] 5/4/2018    Rheumatic fever     S/P ileostomy ([address] 8/20/2011    Skin lesion of face 10/20/2021    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    Tension headache     Type 2 diabetes mellitus with hyperglycemia, without long-term current use of insulin ([address]     Unspecified asthma(493.90) 8/13/07    Unspecified chronic bronchitis ([address]     Ventral hernia 6/29/2014       Past Surgical History     Past Surgical History:   Procedure Laterality Date    ABDOMEN SURGERY  5-19-11    total abdominal colectomy iliostomy    CARPAL TUNNEL RELEASE      right    CATARACT REMOVAL Bilateral     L 6/18/2013, R 07/01/2013    CATARACT REMOVAL WITH IMPLANT Right 7/1/13    COLECTOMY      and reversal     COLONOSCOPY  2009, 11/4/2011, 10/28/15    normal    CYST REMOVAL Right 6/20/14    Dr. Trino Jaimes; right ear pressure point cyst removal    DILATATION, ESOPHAGUS      ENDOSCOPY, COLON, DIAGNOSTIC      EPIDURAL STEROID INJECTION Bilateral 7/9/2019    BILATERAL LUMBAR FOUR TRANSFORAMINAL EPIDURAL STEROID INJECTION SITE CONFIRMED BY FLUOROSCOPY performed by Tobin Yepez MD at [facility] Left 6/18/13    cataract with lens implant    HERNIA REPAIR  6/27/14    Open Vental Hernia Repair    HIP SURGERY      HYSTERECTOMY  1973    subtotal    NECK SURGERY      incision of windpipe, repair of windpipe defect    OTHER SURGICAL HISTORY  8/19/2011    hand assisted laparoscopic ileostomy reversal    OVARY REMOVAL      TRACHEAL SURGERY  2005    hx of trach     UMBILICAL HERNIA REPAIR  2014       Family History     Family History   Problem Relation Age of Onset    Asthma Daughter     Diabetes Daughter     Cancer Daughter         Uterine    Alcohol Abuse Daughter     Asthma Daughter     Diabetes Mother     Heart Failure Mother     Hypertension Mother     Heart Failure Brother     Diabetes Sister     Heart Failure Brother     Coronary Art Dis Son     Emphysema Neg Hx        Social History     Social History     Tobacco Use    Smoking status: Former Smoker     Packs/day: 1.00     Years: 30.00     Pack years: 30.00     Types: Cigarettes     Quit date: 1996     Years since quittin.9    Smokeless tobacco: Never Used   Vaping Use    Vaping Use: Never used   Substance Use Topics    Alcohol use: No     Alcohol/week: 0.0 standard drinks    Drug use: No        Allergies     No Known Allergies    Medications     Current Outpatient Medications   Medication Sig Dispense Refill    ibuprofen (ADVIL;MOTRIN) 400 MG tablet Take 1 tablet by mouth every 6 hours as needed for Pain 20 tablet 0    potassium chloride (KLOR-CON M) 20 MEQ extended release tablet TAKE 1 TABLET EVERY DAY 90 tablet 0    venlafaxine (EFFEXOR XR) 75 MG extended release capsule TAKE 1 CAPSULE EVERY DAY 90 capsule 0    benzonatate (TESSALON) 100 MG capsule TAKE 1 CAPSULE THREE TIMES DAILY AS NEEDED FOR COUGH 90 capsule 3    metFORMIN (GLUCOPHAGE-XR) 500 MG extended release tablet TAKE 2 TABLETS BY MOUTH TWICE DAILY 360 tablet 0    JANUVIA 100 MG tablet TAKE 1 TABLET BY MOUTH DAILY 90 tablet 0    alendronate (FOSAMAX) 35 MG tablet TAKE 1 TABLET EVERY 7 DAYS 12 tablet 0    Roflumilast (DALIRESP) 500 MCG tablet TAKE 1 TABLET EVERY DAY 90 tablet 0    furosemide (LASIX) 40 MG tablet TAKE 1 TABLET EVERY DAY 90 tablet 0    esomeprazole (NEXIUM) 40 MG delayed release capsule TAKE 1 CAPSULE EVERY MORNING BEFORE BREAKFAST 90 capsule 0    simvastatin (ZOCOR) 20 MG tablet TAKE 1 TABLET EVERY EVENING 90 tablet 0    QUEtiapine (SEROQUEL) 25 MG tablet TAKE 1 TO 2 TABLETS BY MOUTH AT BEDTIME 180 tablet 0    albuterol sulfate  (90 Base) MCG/ACT inhaler INHALE 2 PUFFS INTO THE LUNGS EVERY 6 HOURS AS NEEDED FOR WHEEZING OR SHORTNESS OF BREATH 54 g 5    topiramate (TOPAMAX) 50 MG tablet TAKE 1 TABLET BY MOUTH TWICE DAILY 180 tablet 2    albuterol (PROVENTIL) (2.5 MG/3ML) 0.083% nebulizer solution INHALE 3ML VIA NEBULIZATION ROUTE EVERY 6 HOURS AS NEEDED FOR WHEEZING OR SHORTNESS OF BREATH 375 mL 5    fluticasone-salmeterol (WIXELA INHUB) 250-50 MCG/DOSE AEPB INHALE 1 PUFF INTO THE LUNGS EVERY 12 HOURS 3 Inhaler 1    blood glucose test strips (ACCU-CHEK SMARTVIEW) strip TEST BLOOD SUGAR EVERY DAY. DX;E11.9 100 strip 3    SPIRIVA HANDIHALER 18 MCG inhalation capsule INHALE THE CONTENTS OF 1 CAPSULE VIA INHALATION DEVICE EVERY DAY 90 capsule 1    theophylline (THEODUR) 300 MG extended release tablet Take 1 tablet by mouth daily 90 tablet 1    potassium chloride (MICRO-K) 10 MEQ extended release capsule Take 20 mEq by mouth daily       diltiazem (CARDIZEM CD) 120 MG extended release capsule Take 1 capsule by mouth daily 30 capsule 3    guaiFENesin (MUCINEX) 600 MG extended release tablet Take 1 tablet by mouth 2 times daily 60 tablet 1    Cyanocobalamin (VITAMIN B-12 PO) Take by mouth      Blood Glucose Monitoring Suppl (ACCU-CHEK GAURANG SMARTVIEW) w/Device KIT Use to test once daily. DX:E11.9 1 kit 0    Lancets [facility]. (ACCU-CHEK FASTCLIX LANCET) KIT Use to test once daily.  DX:E11.9 1 kit 2    vitamin D (CHOLECALCIFEROL) 400 UNITS TABS tablet Take 2 tablets by mouth daily 180 tablet 1    calcium carbonate (CALCIUM 600) 600 MG TABS tablet Take 2 tablets by mouth daily 180 tablet 1    Respiratory Therapy Supplies (NEBULIZER/TUBING/MOUTHPIECE) KIT 1 kit by Does not apply route daily as needed DX COPD J44.9 1 kit 0    Nebulizers (COMPRESSOR/NEBULIZER) MISC 1 Device by Does not apply route as needed DX COPD J44.9 1 each 0    fluticasone (FLONASE) 50 MCG/ACT nasal spray 2 sprays by Nasal route daily. 3 Bottle 0    Misc. Devices (ACAPELLA) MISC by Does not apply route 4 times daily. 1 each 0    gabapentin (NEURONTIN) 300 MG capsule TAKE 1 CAPSULE BY MOUTH THREE TIMES DAILY FOR 30 DAYS 90 capsule 0    calcium citrate-vitamin D (CITRACAL+D) 315-200 MG-UNIT per tablet Take 2 tablets by mouth 2 times daily 360 tablet 0     No current facility-administered medications for this visit.        Review of Systems     REVIEW OF SYSTEMS  The following systems were reviewed and revealed the following in addition to any already discussed in the HPI:    CONSTITUTIONAL: No weight loss, no fever, no night sweats, no chills  EYES: no vision changes, no blurry vision, + right sided ocular drainage, intermittent, for the past month  EARS: No changes in hearing, + intermittent otalgia  NOSE: no epistaxis, no rhinorrhea  RESPIRATORY: No difficulty breathing, no shortness of breath  CV: no chest pain, no peripheral vascular disease  HEME: No coagulation disorder, no bleeding disorder  NEURO: No TIA or stroke-like symptoms  SKIN: Resolution of rash in the right face, no recent skin cancers  MOUTH: No new ulcers, no recent teeth infections  GASTROINTESTINAL: No diarrhea, stomach pain  PSYCH: No anxiety, no depression    PhysicalExam     Vitals:    11/22/21 1102   Temp: 97.5 °F (36.4 °C)   Weight: 103 lb (46.7 kg)   Height: 5' (1.524 m)       PHYSICAL EXAM  Temp 97.5 °F (36.4 °C)   Ht 5' (1.524 m)   Wt 103 lb (46.7 kg)   BMI 20.12 kg/m²     GENERAL: No Acute Distress, Alert and Oriented, no hoarseness  EYES: EOMI, Anti-icteric  NOSE: On anterior rhinoscopy there is no epistaxis, nasal mucosa within normal limits, no purulent drainage  EARS: Normal external appearance; on portable otomicroscopy:  -Right ear: External auditory canal without stenosis, tympanic membrane clear, no middle ear effusions or retractions  -Left ear: External auditory canal without stenosis, tympanic membrane clear, no middle ear effusions or retractions  FACE: 1/6 House-Brackmann Scale, symmetric, sensation equal bilaterally -4-5 mm excoriated area of skin inferomedial to the right medial canthus has resolved  ORAL CAVITY: No masses or lesions palpated, uvula is midline, moist mucous membranes, 0+ tonsils, absent dentition  NECK: Normal range of motion, no thyromegaly, trachea is midline, no lymphadenopathy, no neck masses, no crepitus  CHEST: Normal respiratory effort, no retractions, breathing comfortably  SKIN: No rashes, normal appearing skin, no evidence of skin lesions/tumors  NEURO: CN 2, 3, 4, 5, 6, 7, 11, 12 intact bilaterally   -Lázaro-Hallpike testing (posterior semicircular canal testing): Up beating, clockwise (geotropic) nystagmus noted on left Lázaro-Hallpike testing; none noted on right side, concerning for left posterior semicircular canal BPPV. Epley maneuver for the left side was performed. Prior images:          Data/Imaging Review            Procedure     As above, Epley maneuver performed    Assessment and Plan     1. Asymmetrical hearing loss  Ongoing for the past 3 years without acute hearing deficit, rare brief pure-tone asymmetry and word recognition asymmetries in the left ear. MRI without lesions, plan for hearing aid evaluation 12/2021.    2. Benign paroxysmal positional vertigo, left  Left BPPV, treated with Epley maneuver. No further vertigo    3. Skin lesion of face  Right skin lesion inferomedial to the right medial canthus, resolved    Return if symptoms worsen or fail to improve. Jade Painter MD  [address] [place] [address],[address]  Department of Otolaryngology/Head and Neck Surgery  11/22/21    I have performed a head and neck physical exam personally or was physically present during the key or critical portions of the service. Medical Decision Making:   The following items were considered in medical decision making:  Independent review of images  Review / order clinical lab tests  Review / order radiology tests  Decision to obtain old records  Review and summation of old records as accessed through [facility] (a summary of my findings in these old records: None)     Portions of this note were dictated using Dragon.  There may be linguistic errors secondary to the use of this program.

## 2021-11-22 NOTE — TELEPHONE ENCOUNTER
----- Message from Hanna Joseph MD sent at 11/22/2021  1:07 PM EST -----  Contact: [phone] (M)  Have her see DR Homero Spencer foot doctor. ----- Message -----  From: Henri Goldberg  Sent: 11/22/2021  12:48 PM EST  To: Hanna Joseph MD    Pt called and stated that her L foot is swollen and purple. It hurts her all the way up to her knee. She went to the ER, and they told her that she sprained her ankle. However, she disagrees and stated that she has not fallen or dropped anything on her foot. She is wondering if she can come in and see you?     Thank you

## 2021-11-29 ENCOUNTER — TELEPHONE (OUTPATIENT)
Dept: INTERNAL MEDICINE CLINIC | Age: 71
End: 2021-11-29

## 2021-11-29 RX ORDER — QUETIAPINE FUMARATE 25 MG/1
TABLET, FILM COATED ORAL
Qty: 180 TABLET | Refills: 0 | Status: SHIPPED | OUTPATIENT
Start: 2021-11-29 | End: 2022-02-25

## 2021-11-29 RX ORDER — FUROSEMIDE 40 MG/1
TABLET ORAL
Qty: 90 TABLET | Refills: 0 | Status: SHIPPED | OUTPATIENT
Start: 2021-11-29 | End: 2022-03-10

## 2021-11-29 RX ORDER — SIMVASTATIN 20 MG
TABLET ORAL
Qty: 90 TABLET | Refills: 0 | Status: SHIPPED | OUTPATIENT
Start: 2021-11-29 | End: 2022-03-10

## 2021-11-29 RX ORDER — ESOMEPRAZOLE MAGNESIUM 40 MG/1
CAPSULE, DELAYED RELEASE ORAL
Qty: 90 CAPSULE | Refills: 0 | Status: SHIPPED | OUTPATIENT
Start: 2021-11-29 | End: 2022-03-10

## 2021-11-29 NOTE — TELEPHONE ENCOUNTER
----- Message from Irine Hodgkins, MD sent at 11/22/2021  1:07 PM EST -----  Contact: [phone] (M)  Have her see DR Therese Singer foot doctor. ----- Message -----  From: Graham Yates  Sent: 11/22/2021  12:48 PM EST  To: Irine Hodgkins, MD    Pt called and stated that her L foot is swollen and purple. It hurts her all the way up to her knee. She went to the ER, and they told her that she sprained her ankle. However, she disagrees and stated that she has not fallen or dropped anything on her foot. She is wondering if she can come in and see you?     Thank you

## 2021-12-01 ENCOUNTER — PROCEDURE VISIT (OUTPATIENT)
Dept: AUDIOLOGY | Age: 71
End: 2021-12-01

## 2021-12-01 DIAGNOSIS — H93.13 TINNITUS, BILATERAL: Primary | ICD-10-CM

## 2021-12-01 DIAGNOSIS — H90.A21 SENSORINEURAL HEARING LOSS (SNHL) OF RIGHT EAR WITH RESTRICTED HEARING OF LEFT EAR: ICD-10-CM

## 2021-12-01 DIAGNOSIS — H90.A32 MIXED CONDUCTIVE AND SENSORINEURAL HEARING LOSS OF LEFT EAR WITH RESTRICTED HEARING OF RIGHT EAR: ICD-10-CM

## 2021-12-01 PROCEDURE — 99999 PR OFFICE/OUTPT VISIT,PROCEDURE ONLY: CPT | Performed by: AUDIOLOGIST

## 2021-12-01 NOTE — PROGRESS NOTES
Diana Rodriguez  [birth year]70 y.o. female   [id number]      HEARING [address] was seen today, 12/1/2021 , for a Hearing Aid Evaluation following audiologic evaluation on 10/20/21. Patient has no prior history of hearing aid use. Patient was found to have no insurance benefit for hearing aids-possible access to third party  program ([address] [place] [address]). Patient is responsible for any cost of devices. Hearing aid benefit worksheet scanned into media tab. Hearing aid benefit worksheet scanned into media tab. DATE: 12/1/2021     PROCEDURES:     SOUNDFIELD TESTING:     Name of test Type of amplification Level of signal Level of noise % Correct        Nu-6  None 55dBHL N/A 52%        Nu-6  None 45dBHL N/A 0%    QuickSIN None 70dBHL varied 11.5 SNR loss       LIFESTYLE EVALUATION:      How do you spend most of your day? Patient sits at home and watches TV, plays games on tablet, read books, runs errands, and sometimes goes to eat with daughter. Which ear do you use on the phone? (turns up volume and reportedly does not hear well)        Land line or cell phone  Cell phone (andriod) Libby     Has anyone recommended you wear HAs before? No      If yes, have you tried HAs? If no, why not? Do you feel like your hearing loss limits or hampers your personal or social life in any way? No    Does a hearing problem cause you to feel embarrassed when you meet new people? Yes    How do you compensate for things you miss or misunderstand? Ask to repeat     Does a hearing problem cause you to feel frustrated when talking to members of your family? Yes (mostly self)       Do they get frustrated with you? No    How does it make you feel when you miss things? Aggravated     Does your hearing problem cause you difficulty when visiting friends, relatives, or neighbors? Yes    Does your hearing problem cause you difficulty when listening to a TV or radio?   Yes    Do others feel that your TV/radio is too loud? Yes    Does a hearing problem cause you difficulty when in a restaurant with relatives or friends? Yes    What % of conversation do you feel that you hear in groups/restaurants? < 50%    When is your hearing loss most problematic?  - talking with daughter     In what situation would you most like to hear better? 1. Talking to daughter   2. Listening to physicians   3. Television     After a discussion of evaluation results, discussion of levels of technology and prices, and lifestyle assessment, Gena Riley has decided to consider the options and contact Audiology when a decision has been made. .     Technology to be considered: Lauren Pryor (rechargeable TBD). Picked color 90,  power 2(85), 6mm double dome, AU. Patient cost due at time of fitting: TBD dependent on battery selected. Patient quoted $2,100.00 for rechargeable and $1,800.00 for standard battery. PATIENT EDUCATION:      - Verbally reviewed benefits and limitations of devices and available features in hearing aids. - Verbally discussed realistic expectations of hearing aid use     Information was shared verbally with patient during appointment. RECOMMENDATIONS:      - Contact Audiology when a decision has been made to pursue hearing aids.       Magdaleno Elkins  Audiologist

## 2021-12-14 LAB
AFB CULTURE (MYCOBACTERIA): NORMAL
AFB SMEAR: NORMAL

## 2021-12-27 RX ORDER — ALENDRONATE SODIUM 35 MG/1
TABLET ORAL
Qty: 12 TABLET | Refills: 0 | Status: SHIPPED | OUTPATIENT
Start: 2021-12-27 | End: 2022-03-04

## 2022-01-19 RX ORDER — VENLAFAXINE HYDROCHLORIDE 75 MG/1
CAPSULE, EXTENDED RELEASE ORAL
Qty: 90 CAPSULE | Refills: 0 | Status: SHIPPED | OUTPATIENT
Start: 2022-01-19 | End: 2022-03-28

## 2022-01-19 RX ORDER — POTASSIUM CHLORIDE 20 MEQ/1
TABLET, EXTENDED RELEASE ORAL
Qty: 90 TABLET | Refills: 0 | Status: SHIPPED | OUTPATIENT
Start: 2022-01-19 | End: 2022-03-28

## 2022-01-21 ENCOUNTER — HOSPITAL ENCOUNTER (OUTPATIENT)
Age: 72
Discharge: HOME | End: 2022-01-21
Payer: MEDICARE

## 2022-01-21 ENCOUNTER — OFFICE VISIT (OUTPATIENT)
Dept: INTERNAL MEDICINE CLINIC | Age: 72
End: 2022-01-21

## 2022-01-21 VITALS
DIASTOLIC BLOOD PRESSURE: 80 MMHG | BODY MASS INDEX: 19.63 KG/M2 | HEART RATE: 96 BPM | SYSTOLIC BLOOD PRESSURE: 130 MMHG | WEIGHT: 100 LBS | RESPIRATION RATE: 97 BRPM | HEIGHT: 60 IN

## 2022-01-21 DIAGNOSIS — E78.5 HYPERLIPIDEMIA ASSOCIATED WITH TYPE 2 DIABETES MELLITUS (HCC): ICD-10-CM

## 2022-01-21 DIAGNOSIS — F51.01 PRIMARY INSOMNIA: ICD-10-CM

## 2022-01-21 DIAGNOSIS — E11.69 HYPERLIPIDEMIA ASSOCIATED WITH TYPE 2 DIABETES MELLITUS (HCC): ICD-10-CM

## 2022-01-21 DIAGNOSIS — K21.9 GASTROESOPHAGEAL REFLUX DISEASE, UNSPECIFIED WHETHER ESOPHAGITIS PRESENT: ICD-10-CM

## 2022-01-21 DIAGNOSIS — N39.41 URGE INCONTINENCE: ICD-10-CM

## 2022-01-21 DIAGNOSIS — F33.41 RECURRENT MAJOR DEPRESSIVE DISORDER, IN PARTIAL REMISSION (HCC): ICD-10-CM

## 2022-01-21 DIAGNOSIS — F32.9 MAJOR DEPRESSION, CHRONIC: ICD-10-CM

## 2022-01-21 DIAGNOSIS — E11.65 TYPE 2 DIABETES MELLITUS WITH HYPERGLYCEMIA, WITHOUT LONG-TERM CURRENT USE OF INSULIN (HCC): ICD-10-CM

## 2022-01-21 DIAGNOSIS — E11.65 TYPE 2 DIABETES MELLITUS WITH HYPERGLYCEMIA, WITHOUT LONG-TERM CURRENT USE OF INSULIN (HCC): Primary | ICD-10-CM

## 2022-01-21 DIAGNOSIS — J96.11 CHRONIC RESPIRATORY FAILURE WITH HYPOXIA (HCC): ICD-10-CM

## 2022-01-21 DIAGNOSIS — J43.8 OTHER EMPHYSEMA (HCC): ICD-10-CM

## 2022-01-21 LAB
A/G RATIO: 1.4 (ref 1.1–2.2)
ALBUMIN SERPL-MCNC: 4.2 G/DL (ref 3.4–5)
ALP BLD-CCNC: 73 U/L (ref 40–129)
ALT SERPL-CCNC: 8 U/L (ref 10–40)
ANION GAP SERPL CALCULATED.3IONS-SCNC: 12 MMOL/L (ref 3–16)
AST SERPL-CCNC: 15 U/L (ref 15–37)
BASOPHILS ABSOLUTE: 0 K/UL (ref 0–0.2)
BASOPHILS RELATIVE PERCENT: 0.4 %
BILIRUB SERPL-MCNC: <0.2 MG/DL (ref 0–1)
BUN BLDV-MCNC: 22 MG/DL (ref 7–20)
CALCIUM SERPL-MCNC: 9.4 MG/DL (ref 8.3–10.6)
CHLORIDE BLD-SCNC: 105 MMOL/L (ref 99–110)
CO2: 26 MMOL/L (ref 21–32)
CREAT SERPL-MCNC: 0.8 MG/DL (ref 0.6–1.2)
EOSINOPHILS ABSOLUTE: 0.1 K/UL (ref 0–0.6)
EOSINOPHILS RELATIVE PERCENT: 1.2 %
GFR AFRICAN AMERICAN: >60
GFR NON-AFRICAN AMERICAN: >60
GLUCOSE BLD-MCNC: 120 MG/DL (ref 70–99)
HCT VFR BLD CALC: 41.3 % (ref 36–48)
HEMOGLOBIN: 13.3 G/DL (ref 12–16)
LYMPHOCYTES ABSOLUTE: 1.5 K/UL (ref 1–5.1)
LYMPHOCYTES RELATIVE PERCENT: 19.2 %
MCH RBC QN AUTO: 27.5 PG (ref 26–34)
MCHC RBC AUTO-ENTMCNC: 32.2 G/DL (ref 31–36)
MCV RBC AUTO: 85.6 FL (ref 80–100)
MONOCYTES ABSOLUTE: 0.9 K/UL (ref 0–1.3)
MONOCYTES RELATIVE PERCENT: 11.6 %
NEUTROPHILS ABSOLUTE: 5.5 K/UL (ref 1.7–7.7)
NEUTROPHILS RELATIVE PERCENT: 67.6 %
PDW BLD-RTO: 15.9 % (ref 12.4–15.4)
PLATELET # BLD: 309 K/UL (ref 135–450)
PMV BLD AUTO: 8.7 FL (ref 5–10.5)
POTASSIUM SERPL-SCNC: 3.6 MMOL/L (ref 3.5–5.1)
RBC # BLD: 4.83 M/UL (ref 4–5.2)
SODIUM BLD-SCNC: 143 MMOL/L (ref 136–145)
TOTAL PROTEIN: 7.1 G/DL (ref 6.4–8.2)
URIC ACID, SERUM: 4.3 MG/DL (ref 2.6–6)
WBC # BLD: 8.1 K/UL (ref 4–11)

## 2022-01-21 PROCEDURE — 99214 OFFICE O/P EST MOD 30 MIN: CPT | Performed by: INTERNAL MEDICINE

## 2022-01-21 PROCEDURE — 36415 COLL VENOUS BLD VENIPUNCTURE: CPT

## 2022-01-21 PROCEDURE — 84550 ASSAY OF BLOOD/URIC ACID: CPT

## 2022-01-21 PROCEDURE — 3023F SPIROM DOC REV: CPT | Performed by: INTERNAL MEDICINE

## 2022-01-21 PROCEDURE — 0509F URINE INCON PLAN DOCD: CPT | Performed by: INTERNAL MEDICINE

## 2022-01-21 PROCEDURE — 85025 COMPLETE CBC W/AUTO DIFF WBC: CPT

## 2022-01-21 PROCEDURE — 1123F ACP DISCUSS/DSCN MKR DOCD: CPT | Performed by: INTERNAL MEDICINE

## 2022-01-21 PROCEDURE — 2022F DILAT RTA XM EVC RTNOPTHY: CPT | Performed by: INTERNAL MEDICINE

## 2022-01-21 PROCEDURE — 3046F HEMOGLOBIN A1C LEVEL >9.0%: CPT | Performed by: INTERNAL MEDICINE

## 2022-01-21 PROCEDURE — G8399 PT W/DXA RESULTS DOCUMENT: HCPCS | Performed by: INTERNAL MEDICINE

## 2022-01-21 PROCEDURE — 83036 HEMOGLOBIN GLYCOSYLATED A1C: CPT

## 2022-01-21 PROCEDURE — G8427 DOCREV CUR MEDS BY ELIG CLIN: HCPCS | Performed by: INTERNAL MEDICINE

## 2022-01-21 PROCEDURE — 1036F TOBACCO NON-USER: CPT | Performed by: INTERNAL MEDICINE

## 2022-01-21 PROCEDURE — 3017F COLORECTAL CA SCREEN DOC REV: CPT | Performed by: INTERNAL MEDICINE

## 2022-01-21 PROCEDURE — 4040F PNEUMOC VAC/ADMIN/RCVD: CPT | Performed by: INTERNAL MEDICINE

## 2022-01-21 PROCEDURE — 1090F PRES/ABSN URINE INCON ASSESS: CPT | Performed by: INTERNAL MEDICINE

## 2022-01-21 PROCEDURE — G8484 FLU IMMUNIZE NO ADMIN: HCPCS | Performed by: INTERNAL MEDICINE

## 2022-01-21 PROCEDURE — 80053 COMPREHEN METABOLIC PANEL: CPT

## 2022-01-21 PROCEDURE — G8420 CALC BMI NORM PARAMETERS: HCPCS | Performed by: INTERNAL MEDICINE

## 2022-01-21 ASSESSMENT — ENCOUNTER SYMPTOMS
WHEEZING: 0
EYE DISCHARGE: 0
SHORTNESS OF BREATH: 1
RHINORRHEA: 0
COUGH: 0
ABDOMINAL PAIN: 0
BACK PAIN: 1
VOMITING: 0
NAUSEA: 0

## 2022-01-21 NOTE — PROGRESS NOTES
Subjective:      Patient ID: Sharona Chapman is a 66 y.o. female. HPI  Patient is here for follow up on her diabetes, asthma, chronic respiratory failure, hyperlipidemia and arthritis. She has frequent productive cough. She is on Oxygen 2L at night and as needed during the day. Her diabetes is well controlled. It is between 90s-100s. Her weight is stable. She has severe COPD and asthma. Recent testing showed low oxygen. She does get some dyspnea off and on. Has intermittent productive coughing. Her pulmonary nodule is stable. Chest CT done 1/8/2020, showing some new tree in bud group of micronodules. Emphysema is unchanged from the previous CT in 2018. Her hyperlipidemia is controlled. No myalgias. Her arthritis has been stable. Her tension HA are controlled. She is on Topamax. She has GERD. She is on Nexium. No heartburn. She has depression. She is on Effexor. It is stable. She is sleeping ok. Her weight is about the same. She is unvaccinated. She has noted urge incontinence. She has been having accidents. Review of Systems   Constitutional: Negative for appetite change, chills, fatigue and fever. HENT: Negative for ear pain, postnasal drip and rhinorrhea. Eyes: Negative for discharge. Respiratory: Positive for shortness of breath. Negative for cough and wheezing. Cardiovascular: Positive for chest pain. Negative for palpitations. Gastrointestinal: Negative for abdominal pain, nausea and vomiting. Endocrine: Negative for polydipsia and polyphagia. Musculoskeletal: Positive for back pain. Left hip pain     Skin: Negative for rash. Psychiatric/Behavioral: Positive for sleep disturbance. The patient is nervous/anxious.       Current Outpatient Medications on File Prior to Visit   Medication Sig Dispense Refill    potassium chloride (KLOR-CON M) 20 MEQ extended release tablet TAKE 1 TABLET EVERY DAY 90 tablet 0    venlafaxine (EFFEXOR XR) 75 MG extended release capsule TAKE 1 CAPSULE EVERY DAY 90 capsule 0    alendronate (FOSAMAX) 35 MG tablet TAKE 1 TABLET EVERY 7 DAYS 12 tablet 0    Roflumilast (DALIRESP) 500 MCG tablet TAKE 1 TABLET EVERY DAY 90 tablet 0    simvastatin (ZOCOR) 20 MG tablet TAKE 1 TABLET EVERY EVENING 90 tablet 0    furosemide (LASIX) 40 MG tablet TAKE 1 TABLET EVERY DAY 90 tablet 0    esomeprazole (NEXIUM) 40 MG delayed release capsule TAKE 1 CAPSULE EVERY MORNING BEFORE BREAKFAST 90 capsule 0    QUEtiapine (SEROQUEL) 25 MG tablet TAKE 1 TO 2 TABLETS BY MOUTH AT BEDTIME 180 tablet 0    fluticasone-salmeterol (WIXELA INHUB) 250-50 MCG/DOSE AEPB INHALE 1 PUFF INTO THE LUNGS EVERY 12 HOURS 180 each 1    ibuprofen (ADVIL;MOTRIN) 400 MG tablet Take 1 tablet by mouth every 6 hours as needed for Pain 20 tablet 0    benzonatate (TESSALON) 100 MG capsule TAKE 1 CAPSULE THREE TIMES DAILY AS NEEDED FOR COUGH 90 capsule 3    metFORMIN (GLUCOPHAGE-XR) 500 MG extended release tablet TAKE 2 TABLETS BY MOUTH TWICE DAILY 360 tablet 0    JANUVIA 100 MG tablet TAKE 1 TABLET BY MOUTH DAILY 90 tablet 0    albuterol sulfate  (90 Base) MCG/ACT inhaler INHALE 2 PUFFS INTO THE LUNGS EVERY 6 HOURS AS NEEDED FOR WHEEZING OR SHORTNESS OF BREATH 54 g 5    topiramate (TOPAMAX) 50 MG tablet TAKE 1 TABLET BY MOUTH TWICE DAILY 180 tablet 2    albuterol (PROVENTIL) (2.5 MG/3ML) 0.083% nebulizer solution INHALE 3ML VIA NEBULIZATION ROUTE EVERY 6 HOURS AS NEEDED FOR WHEEZING OR SHORTNESS OF BREATH 375 mL 5    blood glucose test strips (ACCU-CHEK SMARTVIEW) strip TEST BLOOD SUGAR EVERY DAY.   DX;E11.9 100 strip 3    SPIRIVA HANDIHALER 18 MCG inhalation capsule INHALE THE CONTENTS OF 1 CAPSULE VIA INHALATION DEVICE EVERY DAY 90 capsule 1    theophylline (THEODUR) 300 MG extended release tablet Take 1 tablet by mouth daily 90 tablet 1    gabapentin (NEURONTIN) 300 MG capsule TAKE 1 CAPSULE BY MOUTH THREE TIMES DAILY FOR 30 DAYS 90 capsule 0    calcium citrate-vitamin D (CITRACAL+D) 315-200 MG-UNIT per tablet Take 2 tablets by mouth 2 times daily 360 tablet 0    potassium chloride (MICRO-K) 10 MEQ extended release capsule Take 20 mEq by mouth daily       diltiazem (CARDIZEM CD) 120 MG extended release capsule Take 1 capsule by mouth daily 30 capsule 3    guaiFENesin (MUCINEX) 600 MG extended release tablet Take 1 tablet by mouth 2 times daily 60 tablet 1    Cyanocobalamin (VITAMIN B-12 PO) Take by mouth      Blood Glucose Monitoring Suppl (ACCU-CHEK GAURANG SMARTVIEW) w/Device KIT Use to test once daily. DX:E11.9 1 kit 0    Lancets Misc. (ACCU-CHEK FASTCLIX LANCET) KIT Use to test once daily. DX:E11.9 1 kit 2    vitamin D (CHOLECALCIFEROL) 400 UNITS TABS tablet Take 2 tablets by mouth daily 180 tablet 1    calcium carbonate (CALCIUM 600) 600 MG TABS tablet Take 2 tablets by mouth daily 180 tablet 1    Respiratory Therapy Supplies (NEBULIZER/TUBING/MOUTHPIECE) KIT 1 kit by Does not apply route daily as needed DX COPD J44.9 1 kit 0    Nebulizers (COMPRESSOR/NEBULIZER) MISC 1 Device by Does not apply route as needed DX COPD J44.9 1 each 0    fluticasone (FLONASE) 50 MCG/ACT nasal spray 2 sprays by Nasal route daily. 3 Bottle 0    Misc. Devices (ACAPELLA) MISC by Does not apply route 4 times daily. 1 each 0     No current facility-administered medications on file prior to visit. There are no changes to past medical history, family history, social history or review of systems(except as noted in the history section) since prior note (all reviewed with patient). /80 (Site: Left Upper Arm, Position: Sitting, Cuff Size: Medium Adult)   Pulse 96   Resp (!) 97   Ht 5' (1.524 m)   Wt 100 lb (45.4 kg)   BMI 19.53 kg/m²      Objective:   Physical Exam  Constitutional:       Appearance: She is underweight. HENT:      Head: Normocephalic. Right Ear: No drainage. Tympanic membrane is not injected. Left Ear: No drainage. Tympanic membrane is not injected. Eyes:      Conjunctiva/sclera: Conjunctivae normal.      Pupils: Pupils are equal, round, and reactive to light. Neck:      Thyroid: No thyroid mass or thyromegaly. Vascular: No carotid bruit or JVD. Trachea: Trachea normal.   Cardiovascular:      Rate and Rhythm: Normal rate and regular rhythm. Heart sounds: Normal heart sounds. No murmur heard. No gallop. Pulmonary:      Effort: Pulmonary effort is normal. No respiratory distress. Breath sounds: Decreased air movement present. Decreased breath sounds present. No wheezing or rales. Chest:      Chest wall: No tenderness. Abdominal:      General: Bowel sounds are normal. There is no distension. Palpations: Abdomen is soft. There is no hepatomegaly, splenomegaly or mass. Tenderness: There is no abdominal tenderness. Musculoskeletal:      Cervical back: Normal range of motion and neck supple. Left knee: No swelling or effusion. Normal range of motion. Lymphadenopathy:      Cervical: No cervical adenopathy. Skin:     General: Skin is warm and dry. Neurological:      Mental Status: She is alert and oriented to person, place, and time. Psychiatric:         Behavior: Behavior normal.         Thought Content: Thought content normal.         Judgment: Judgment normal.               Assessment:       Diagnosis Orders   1. Type 2 diabetes mellitus with hyperglycemia, without long-term current use of insulin (HCC)  Comprehensive Metabolic Panel    CBC Auto Differential    Uric Acid    Hemoglobin A1C   2. Hyperlipidemia associated with type 2 diabetes mellitus ([address]     3. Major depression, chronic     4. Gastroesophageal reflux disease, unspecified whether esophagitis present     5. Primary insomnia     6. Chronic respiratory failure with hypoxia (HCC)     7. Other emphysema ([address]     8. Urge incontinence     9.  Recurrent major depressive disorder, in partial remission ([address]            Plan:        DM type 2: well controlled. Check labs. COPD: she is using 2 L of oxygen at hs and prn during the day. Dyspnea likely related to her lungs. Refused covid vaccine. Chronic respiratory failure. On 2 l.  HA are stable. On topamax. Sees neurology. Depression is stable on meds. GERD is stale. Edema is stable. 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 Urge incontinence. Try Myrbetriq 50 mg daily. The current medical regimen is effective;  continue present plan and medications. See orders.

## 2022-01-22 LAB
ESTIMATED AVERAGE GLUCOSE: 119.8 MG/DL
HBA1C MFR BLD: 5.8 %

## 2022-01-26 RX ORDER — SITAGLIPTIN 100 MG/1
100 TABLET, FILM COATED ORAL DAILY
Qty: 90 TABLET | Refills: 0 | Status: SHIPPED | OUTPATIENT
Start: 2022-01-26 | End: 2022-04-26

## 2022-01-26 RX ORDER — METFORMIN HYDROCHLORIDE 500 MG/1
TABLET, EXTENDED RELEASE ORAL
Qty: 360 TABLET | Refills: 0 | Status: SHIPPED | OUTPATIENT
Start: 2022-01-26 | End: 2022-04-26

## 2022-02-07 ENCOUNTER — TELEPHONE (OUTPATIENT)
Dept: PULMONOLOGY | Age: 72
End: 2022-02-07

## 2022-02-07 RX ORDER — DOXYCYCLINE HYCLATE 100 MG/1
100 CAPSULE ORAL 2 TIMES DAILY
Qty: 10 CAPSULE | Refills: 0 | Status: SHIPPED | OUTPATIENT
Start: 2022-02-07 | End: 2022-02-12

## 2022-02-07 RX ORDER — PREDNISONE 10 MG/1
TABLET ORAL
Qty: 30 TABLET | Refills: 0 | Status: SHIPPED | OUTPATIENT
Start: 2022-02-07 | End: 2022-02-17

## 2022-02-07 NOTE — TELEPHONE ENCOUNTER
Pt called asking for abx. Pt states that PCP advised pt to get a covid test, but pt can't wait 2 days until she can get a covid test, is worried she will go into Pneumonia. Please advise. Do you have the following symptoms? Shortness of Breath  yes  Wheezing  yes  Cough  yes  Cough Characteristics:  Productive    yes  Sputum Color    Deep gray  Hemoptysis   no  Consistency of sputum   thick     Fever:    no    Temp:n/a  Chills/Sweats:  no  What other symptoms are you having?:  None noted    How long have you had these symptoms? 3 days     Pharmacy: [facility]    Have you been vaccinated for covid? No  Have you received a booster vaccine? No          Review medications and allergies: Allergies? No Known Allergies          Currently on Antibiotics? (Drug/Dose/Frequency and how long on?) none        Systemic Steroids? (Drug/Dose/Frequency and how long on?) none      Last sick call taken on n/a. Meds prescribed were n/a, by n/a. Last OV 10/18/21 with Dr. Rola Calabrese   (pull in last visit note assessment/plan)   Assessment:   · Very severe COPD/Pulmonary emphysema  · Bronchiectasis  · Abnormal CT chest 1/8/2020 with right middle lobe new tree-in-bud nodules-favor inflammatory. Stable on repeat CT 10/13/21   · New tree-in-bud nodularity lingula, RLL, LLL on CT 7/8/2021- favor inflammatory/infection. Rule out NTM. RLL nodules resolved and stable LLL nodule on  repeat CT chest   10/13/21   · R pulmonary nodules. Stable over 2 years . · Hypoxia on exertion  · Off Daliresp given weight loss   · Has 7 dogs at home   · 20 pack years. Quit smoking 1996. Plan:       · Order for Sputum AFB x 3 stain and Cx   · CT chest in 6 months   · Continue Wixella BID, Spiriva INH daily and Albuterol INH/Neb PRN  · Continue Theophylline   · Continue O2 1-2LPM on exertion. Advised to titrate O2 using her pulse oximeter- target O2 sat 90-92%.   · Prednisone taper and Doxycycline 100 mg BID x 5 days for COPD AE self management if needed. · Pulmonary rehab referral   · Patient is up to date with pneumococcal vaccine, and influenza vaccine   · Advised to get her Covid vaccine - she declines. .Risks, benefits and side effects were discussed with patient.    · Acapella and Mucinex   · Follow up after CT chest

## 2022-02-24 ENCOUNTER — OFFICE VISIT (OUTPATIENT)
Dept: NEUROLOGY | Age: 72
End: 2022-02-24
Payer: MEDICARE

## 2022-02-24 VITALS
SYSTOLIC BLOOD PRESSURE: 118 MMHG | BODY MASS INDEX: 18.46 KG/M2 | HEART RATE: 116 BPM | DIASTOLIC BLOOD PRESSURE: 80 MMHG | HEIGHT: 60 IN | OXYGEN SATURATION: 95 % | WEIGHT: 94 LBS

## 2022-02-24 DIAGNOSIS — G44.221 CHRONIC TENSION-TYPE HEADACHE, INTRACTABLE: Primary | ICD-10-CM

## 2022-02-24 DIAGNOSIS — E11.65 TYPE 2 DIABETES MELLITUS WITH HYPERGLYCEMIA, WITHOUT LONG-TERM CURRENT USE OF INSULIN (HCC): ICD-10-CM

## 2022-02-24 DIAGNOSIS — F34.1 DYSTHYMIA: ICD-10-CM

## 2022-02-24 DIAGNOSIS — E78.2 MIXED HYPERLIPIDEMIA: ICD-10-CM

## 2022-02-24 PROCEDURE — G8399 PT W/DXA RESULTS DOCUMENT: HCPCS | Performed by: PSYCHIATRY & NEUROLOGY

## 2022-02-24 PROCEDURE — 3017F COLORECTAL CA SCREEN DOC REV: CPT | Performed by: PSYCHIATRY & NEUROLOGY

## 2022-02-24 PROCEDURE — G8484 FLU IMMUNIZE NO ADMIN: HCPCS | Performed by: PSYCHIATRY & NEUROLOGY

## 2022-02-24 PROCEDURE — 1036F TOBACCO NON-USER: CPT | Performed by: PSYCHIATRY & NEUROLOGY

## 2022-02-24 PROCEDURE — 3044F HG A1C LEVEL LT 7.0%: CPT | Performed by: PSYCHIATRY & NEUROLOGY

## 2022-02-24 PROCEDURE — G8427 DOCREV CUR MEDS BY ELIG CLIN: HCPCS | Performed by: PSYCHIATRY & NEUROLOGY

## 2022-02-24 PROCEDURE — G8419 CALC BMI OUT NRM PARAM NOF/U: HCPCS | Performed by: PSYCHIATRY & NEUROLOGY

## 2022-02-24 PROCEDURE — 1090F PRES/ABSN URINE INCON ASSESS: CPT | Performed by: PSYCHIATRY & NEUROLOGY

## 2022-02-24 PROCEDURE — 2022F DILAT RTA XM EVC RTNOPTHY: CPT | Performed by: PSYCHIATRY & NEUROLOGY

## 2022-02-24 PROCEDURE — 99213 OFFICE O/P EST LOW 20 MIN: CPT | Performed by: PSYCHIATRY & NEUROLOGY

## 2022-02-24 PROCEDURE — 4040F PNEUMOC VAC/ADMIN/RCVD: CPT | Performed by: PSYCHIATRY & NEUROLOGY

## 2022-02-24 PROCEDURE — 1123F ACP DISCUSS/DSCN MKR DOCD: CPT | Performed by: PSYCHIATRY & NEUROLOGY

## 2022-02-24 NOTE — PROGRESS NOTES
The patient came today for follow up regarding: chronic headaches    Since her last visit, she continues to take Topamax 50 mg twice daily. No side effect from Topamax. Headaches are sporadic but not weekly. The same frequency and duration per last visit. The same description with holocranial pain. Triggered by stress or weather. No weakness or numbness or tingling. She does not take any risk medication frequently. No side effect from Topamax. She is diabetic and last A1c 5.8. She is on a statin and aspirin. No recent fever. No worsening of her depression or episodic insomnia. Other review of system was unremarkable.       Past Medical History:   Diagnosis Date    Acute on chronic respiratory failure with hypoxia ([address] 12/3/2009    Chronic airway obstruction, not elsewhere classified 3/4/08    oxygen 2L/min at HS and PRN through day    Chronic kidney disease     incontinent    COPD (chronic obstructive pulmonary disease) ([address]     Depression     Displacement of lumbar intervertebral disc without myelopathy 8/13/07    Edema 9/13/07    Emphysema of lung ([address]     Enthesopathy of hip region     Enthesopathy of hip region 3/5/07    Esophageal reflux 3/4/08    Hemorrhage of rectum and anus 11/14/07    Herpes zoster without complication     History of blood transfusion     Hyperlipidemia associated with type 2 diabetes mellitus ([address] 12/11/2017    Lumbar spondylosis 5/1/2018    Major depression, chronic 8/12/2015    Osteoporosis, unspecified 12/4/07    Other and unspecified hyperlipidemia 3/4/08    Pain in joint, shoulder region 3/4/08    Pneumonia     Pulmonary nodule     Recurrent major depressive disorder, in partial remission ([address] 5/4/2018    Rheumatic fever     S/P ileostomy ([address] 8/20/2011    Skin lesion of face 10/20/2021    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    Tension headache     Type 2 diabetes mellitus with hyperglycemia, without long-term current use of insulin ([facility]     Unspecified asthma(493.90) 8/13/07    Unspecified chronic bronchitis ([facility]     Ventral hernia 6/29/2014     Prior to Visit Medications    Medication Sig Taking?  Authorizing Provider   mirabegron (MYRBETRIQ) 50 MG TB24 Take 50 mg by mouth daily Yes Lennie Chapman MD   metFORMIN (GLUCOPHAGE-XR) 500 MG extended release tablet TAKE 2 TABLETS BY MOUTH TWICE DAILY Yes Ángela Bautista MD   JANUVIA 100 MG tablet TAKE 1 TABLET BY MOUTH DAILY Yes Ángela Bautista MD   potassium chloride (KLOR-CON M) 20 MEQ extended release tablet TAKE 1 TABLET EVERY DAY Yes Ángela Bautista MD   venlafaxine (EFFEXOR XR) 75 MG extended release capsule TAKE 1 CAPSULE EVERY DAY Yes Ángela Bautista MD   alendronate (FOSAMAX) 35 MG tablet TAKE 1 TABLET EVERY 7 DAYS Yes Ángela Bautista MD   Roflumilast (DALIRESP) 500 MCG tablet TAKE 1 TABLET EVERY DAY Yes Ángela Bautista MD   simvastatin (ZOCOR) 20 MG tablet TAKE 1 TABLET EVERY EVENING Yes Ángela Bautista MD   furosemide (LASIX) 40 MG tablet TAKE 1 TABLET EVERY DAY Yes Ángela Bautista MD   esomeprazole (NEXIUM) 40 MG delayed release capsule TAKE 1 Francis Hipps Yes Ángela Bautista MD   QUEtiapine (SEROQUEL) 25 MG tablet TAKE 1 TO 2 TABLETS BY MOUTH AT BEDTIME Yes Ángela Bautista MD   fluticasone-salmeterol (Erskin Carwin) 250-50 MCG/DOSE AEPB INHALE 1 PUFF INTO THE LUNGS EVERY 12 HOURS Yes Jose Montesinos MD   ibuprofen (ADVIL;MOTRIN) 400 MG tablet Take 1 tablet by mouth every 6 hours as needed for Pain Yes SAUL Alberts - CNP   benzonatate (TESSALON) 100 MG capsule TAKE 1 CAPSULE THREE TIMES DAILY AS NEEDED FOR COUGH Yes Noreen Wahl PA-C   albuterol sulfate  (90 Base) MCG/ACT inhaler INHALE 2 PUFFS INTO THE LUNGS EVERY 6 HOURS AS NEEDED FOR WHEEZING OR SHORTNESS OF BREATH Yes Jose Montesinos MD   topiramate (TOPAMAX) 50 MG tablet TAKE 1 TABLET BY Shaun Smith MD   albuterol (PROVENTIL) (2.5 MG/3ML) 0.083% nebulizer solution INHALE 3ML VIA NEBULIZATION ROUTE EVERY 6 HOURS AS NEEDED FOR WHEEZING OR SHORTNESS OF BREATH Yes Jose Montesinos MD   blood glucose test strips (ACCU-CHEK SMARTVIEW) strip TEST BLOOD SUGAR EVERY DAY. DX;E11.9 Yes Ángela Bautista MD   SPIRIVA HANDIHALER 18 MCG inhalation capsule INHALE THE CONTENTS OF 1 CAPSULE VIA INHALATION DEVICE EVERY DAY Yes Jose Montesinos MD   theophylline (THEODUR) 300 MG extended release tablet Take 1 tablet by mouth daily Yes Jose Montesinos MD   diltiazem (CARDIZEM CD) 120 MG extended release capsule Take 1 capsule by mouth daily Yes Brendon Rivera MD   guaiFENesin (MUCINEX) 600 MG extended release tablet Take 1 tablet by mouth 2 times daily Yes Brendon Rivera MD   Cyanocobalamin (VITAMIN B-12 PO) Take by mouth Yes Amauri Lee MD   Blood Glucose Monitoring Suppl (ACCU-CHEK GAURANG SMARTVIEW) w/Device KIT Use to test once daily. DX:E11.9 Yes Ángela Bautista MD   Lancets Misc. (ACCU-CHEK FASTCLIX LANCET) KIT Use to test once daily. DX:E11.9 Yes Ángela Bautista MD   vitamin D (CHOLECALCIFEROL) 400 UNITS TABS tablet Take 2 tablets by mouth daily Yes Jose Montesinos MD   calcium carbonate (CALCIUM 600) 600 MG TABS tablet Take 2 tablets by mouth daily Yes Jose Montesinos MD   Respiratory Therapy Supplies (NEBULIZER/TUBING/MOUTHPIECE) KIT 1 kit by Does not apply route daily as needed DX COPD J44.9 Yes Jose Montesinos MD   Nebulizers (COMPRESSOR/NEBULIZER) MISC 1 Device by Does not apply route as needed DX COPD J44.9 Yes Jose Montesinos MD   fluticasone (FLONASE) 50 MCG/ACT nasal spray 2 sprays by Nasal route daily. Yes Ángela Bautista MD   Misc. Devices (ACAPELLA) MISC by Does not apply route 4 times daily.  Yes Jose Montesinos MD   gabapentin (NEURONTIN) 300 MG capsule TAKE 1 CAPSULE BY MOUTH THREE TIMES DAILY FOR 30 DAYS  Ángela Bautista MD   calcium citrate-vitamin D (CITRACAL+D) 315-200 MG-UNIT per tablet Take 2 tablets by mouth 2 times daily  Charlie Lopez MD     No Known Allergies  Social History     Tobacco Use    Smoking status: Former Smoker     Packs/day: 1.00     Years: 30.00     Pack years: 30.00     Types: Cigarettes     Quit date: 1996     Years since quittin.1    Smokeless tobacco: Never Used   Substance Use Topics    Alcohol use: No     Alcohol/week: 0.0 standard drinks         Exam:   Constitutional:   Vitals:    22 1119   BP: 118/80   Site: Left Wrist   Position: Sitting   Pulse: 116   SpO2: 95%   Weight: 94 lb (42.6 kg)   Height: 5' (1.524 m)       General appearance: well-nourished. Mental Status:   Oriented to person, place, problem, and time. Fluent speech. Good fund of knowledge. Normal attention span and concentration. Intact recent and remote memory  Cranial Nerves:   II: Pupils: equal, round, reactive to light  III,IV,VI: Extra Ocular Movements are intact. No nystagmus  V: Facial sensation is intact t  VII: Facial strength and movements: intact and symmetric  XII: Tongue movements are normal  Musculoskeletal: 5/5 in all 4 extremities. Normal tone. Coordination: No tremors or dysmetria  Sensation: normal to all modalities. Gait/Posture: steady        ROS : A 10-12 system review of constitutional, cardiovascular, respiratory, musculoskeletal, endocrine, hematological, skin, SHEENT, genitourinary, psychiatric and neurologic systems was obtained and updated today which is unremarkable except as mentioned in my HPI  The same. Medical decision making:   I personally reviewed and updated social history, past medical history, medications, allergy, surgical history, and family history as documented in the patient's electronic health records. Labs  other test results reviewed and discussed with the patient. Reviewed notes from other physicians.     Provided patient education regarding risk, benefits and treatment options as well as adherence to medication regimen and side effect from these medications. Assessment:   Diagnosis Orders   1. Chronic tension-type headache, intractable     2. Type 2 diabetes mellitus with hyperglycemia, without long-term current use of insulin (HCC)     3. Mixed hyperlipidemia     4.  Dysthymia             Plan:  Continue Topamax 50 mg twice daily  Refill for Topamax  Diabetic control and follow A1c  Hydration  Avoid OTC  Blood sugar monitor at home  Aspirin  Statin  Stroke education was provided today  Follow-up 6-month

## 2022-02-25 RX ORDER — QUETIAPINE FUMARATE 25 MG/1
TABLET, FILM COATED ORAL
Qty: 180 TABLET | Refills: 0 | Status: SHIPPED | OUTPATIENT
Start: 2022-02-25 | End: 2022-05-26

## 2022-03-04 RX ORDER — ALENDRONATE SODIUM 35 MG/1
TABLET ORAL
Qty: 12 TABLET | Refills: 0 | Status: SHIPPED | OUTPATIENT
Start: 2022-03-04 | End: 2022-05-09

## 2022-03-10 RX ORDER — SIMVASTATIN 20 MG
TABLET ORAL
Qty: 90 TABLET | Refills: 0 | Status: SHIPPED | OUTPATIENT
Start: 2022-03-10 | End: 2022-05-11

## 2022-03-10 RX ORDER — FUROSEMIDE 40 MG/1
TABLET ORAL
Qty: 90 TABLET | Refills: 0 | Status: SHIPPED | OUTPATIENT
Start: 2022-03-10 | End: 2022-05-11

## 2022-03-10 RX ORDER — ESOMEPRAZOLE MAGNESIUM 40 MG/1
CAPSULE, DELAYED RELEASE ORAL
Qty: 90 CAPSULE | Refills: 0 | Status: SHIPPED | OUTPATIENT
Start: 2022-03-10 | End: 2022-05-11

## 2022-03-28 RX ORDER — VENLAFAXINE HYDROCHLORIDE 75 MG/1
CAPSULE, EXTENDED RELEASE ORAL
Qty: 90 CAPSULE | Refills: 0 | Status: SHIPPED | OUTPATIENT
Start: 2022-03-28 | End: 2022-06-06

## 2022-03-28 RX ORDER — POTASSIUM CHLORIDE 20 MEQ/1
TABLET, EXTENDED RELEASE ORAL
Qty: 90 TABLET | Refills: 0 | Status: SHIPPED | OUTPATIENT
Start: 2022-03-28 | End: 2022-06-06

## 2022-03-30 DIAGNOSIS — E11.65 TYPE 2 DIABETES MELLITUS WITH HYPERGLYCEMIA, WITHOUT LONG-TERM CURRENT USE OF INSULIN (HCC): ICD-10-CM

## 2022-03-30 RX ORDER — BLOOD SUGAR DIAGNOSTIC
STRIP MISCELLANEOUS
Qty: 100 STRIP | Refills: 3 | Status: SHIPPED | OUTPATIENT
Start: 2022-03-30 | End: 2022-08-22

## 2022-04-18 ENCOUNTER — HOSPITAL ENCOUNTER (OUTPATIENT)
Dept: CT IMAGING | Age: 72
Discharge: HOME | End: 2022-04-18
Payer: MEDICARE

## 2022-04-18 DIAGNOSIS — R91.8 PULMONARY NODULES: ICD-10-CM

## 2022-04-18 PROCEDURE — 71250 CT THORAX DX C-: CPT

## 2022-04-20 ENCOUNTER — OFFICE VISIT (OUTPATIENT)
Dept: PULMONOLOGY | Age: 72
End: 2022-04-20
Payer: MEDICARE

## 2022-04-20 VITALS
WEIGHT: 92.4 LBS | BODY MASS INDEX: 18.14 KG/M2 | RESPIRATION RATE: 20 BRPM | OXYGEN SATURATION: 97 % | TEMPERATURE: 96.5 F | SYSTOLIC BLOOD PRESSURE: 115 MMHG | DIASTOLIC BLOOD PRESSURE: 80 MMHG | HEART RATE: 115 BPM | HEIGHT: 60 IN

## 2022-04-20 DIAGNOSIS — Z87.891 PERSONAL HISTORY OF TOBACCO USE: Primary | ICD-10-CM

## 2022-04-20 DIAGNOSIS — J47.9 BRONCHIECTASIS WITHOUT COMPLICATION (HCC): ICD-10-CM

## 2022-04-20 DIAGNOSIS — R93.89 ABNORMAL CT OF THE CHEST: ICD-10-CM

## 2022-04-20 DIAGNOSIS — J44.9 STAGE 3 SEVERE COPD BY GOLD CLASSIFICATION (HCC): ICD-10-CM

## 2022-04-20 DIAGNOSIS — R09.02 HYPOXIA: ICD-10-CM

## 2022-04-20 PROCEDURE — 99214 OFFICE O/P EST MOD 30 MIN: CPT | Performed by: INTERNAL MEDICINE

## 2022-04-20 PROCEDURE — 3023F SPIROM DOC REV: CPT | Performed by: INTERNAL MEDICINE

## 2022-04-20 PROCEDURE — 1123F ACP DISCUSS/DSCN MKR DOCD: CPT | Performed by: INTERNAL MEDICINE

## 2022-04-20 PROCEDURE — 3017F COLORECTAL CA SCREEN DOC REV: CPT | Performed by: INTERNAL MEDICINE

## 2022-04-20 PROCEDURE — G8399 PT W/DXA RESULTS DOCUMENT: HCPCS | Performed by: INTERNAL MEDICINE

## 2022-04-20 PROCEDURE — G8419 CALC BMI OUT NRM PARAM NOF/U: HCPCS | Performed by: INTERNAL MEDICINE

## 2022-04-20 PROCEDURE — G8427 DOCREV CUR MEDS BY ELIG CLIN: HCPCS | Performed by: INTERNAL MEDICINE

## 2022-04-20 PROCEDURE — 1090F PRES/ABSN URINE INCON ASSESS: CPT | Performed by: INTERNAL MEDICINE

## 2022-04-20 PROCEDURE — 1036F TOBACCO NON-USER: CPT | Performed by: INTERNAL MEDICINE

## 2022-04-20 PROCEDURE — 4040F PNEUMOC VAC/ADMIN/RCVD: CPT | Performed by: INTERNAL MEDICINE

## 2022-04-20 RX ORDER — DOXYCYCLINE HYCLATE 100 MG
100 TABLET ORAL 2 TIMES DAILY
Qty: 10 TABLET | Refills: 0 | Status: SHIPPED | OUTPATIENT
Start: 2022-04-20 | End: 2022-04-25

## 2022-04-20 RX ORDER — PREDNISONE 10 MG/1
TABLET ORAL
Qty: 30 TABLET | Refills: 0 | Status: SHIPPED | OUTPATIENT
Start: 2022-04-20 | End: 2022-05-02

## 2022-04-20 RX ORDER — THEOPHYLLINE 300 MG/1
300 TABLET, EXTENDED RELEASE ORAL DAILY
Qty: 90 TABLET | Refills: 1 | Status: SHIPPED | OUTPATIENT
Start: 2022-04-20 | End: 2022-08-04

## 2022-04-20 RX ORDER — TIOTROPIUM BROMIDE 18 UG/1
18 CAPSULE ORAL; RESPIRATORY (INHALATION) DAILY
Qty: 90 CAPSULE | Refills: 1 | Status: SHIPPED | OUTPATIENT
Start: 2022-04-20 | End: 2022-11-03 | Stop reason: SDUPTHER

## 2022-04-20 NOTE — PROGRESS NOTES
[facility]                                                            Outpatient Follow Up Note    CHIEF COMPLAINT: Follow up CT chest        HPI:  CT chest  reviewed by me and noted below. Results were dicussed with patient and multiple good questions were answered. Doing good  Some cough with milky white sputum  No hemoptysis   Patient is compliant with inhaled bronchodilators and O2.          Past Medical History:   Diagnosis Date    Acute on chronic respiratory failure with hypoxia ([address] 12/3/2009    Chronic airway obstruction, not elsewhere classified 3/4/08    oxygen 2L/min at HS and PRN through day    Chronic kidney disease     incontinent    COPD (chronic obstructive pulmonary disease) ([address]     Depression     Displacement of lumbar intervertebral disc without myelopathy 8/13/07    Edema 9/13/07    Emphysema of lung ([address]     Enthesopathy of hip region     Enthesopathy of hip region 3/5/07    Esophageal reflux 3/4/08    Hemorrhage of rectum and anus 11/14/07    Herpes zoster without complication     History of blood transfusion     Hyperlipidemia associated with type 2 diabetes mellitus ([address] 12/11/2017    Lumbar spondylosis 5/1/2018    Major depression, chronic 8/12/2015    Osteoporosis, unspecified 12/4/07    Other and unspecified hyperlipidemia 3/4/08    Pain in joint, shoulder region 3/4/08    Pneumonia     Pulmonary nodule     Recurrent major depressive disorder, in partial remission ([address] 5/4/2018    Rheumatic fever     S/P ileostomy ([address] 8/20/2011    Skin lesion of face 10/20/2021    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    Tension headache     Type 2 diabetes mellitus with hyperglycemia, without long-term current use of insulin ([address]     Unspecified asthma(493.90) 8/13/07    Unspecified chronic bronchitis ([address]     Ventral hernia 6/29/2014       Past Surgical History:        Procedure Laterality Date    ABDOMEN SURGERY  5-19-11    total abdominal colectomy iliostomy    CARPAL TUNNEL RELEASE      right    CATARACT REMOVAL Bilateral     L 6/18/2013, R 07/01/2013    CATARACT REMOVAL WITH IMPLANT Right 7/1/13    COLECTOMY      and reversal     COLONOSCOPY  2009, 11/4/2011, 10/28/15    normal    CYST REMOVAL Right 6/20/14    Dr. Connor Valles; right ear pressure point cyst removal    DILATATION, ESOPHAGUS      ENDOSCOPY, COLON, DIAGNOSTIC      EPIDURAL STEROID INJECTION Bilateral 7/9/2019    BILATERAL LUMBAR FOUR TRANSFORAMINAL EPIDURAL STEROID INJECTION SITE CONFIRMED BY FLUOROSCOPY performed by Diana Thacker MD at [address] Left 6/18/13    cataract with lens implant    HERNIA REPAIR  6/27/14    Open Vental Hernia Repair    HIP SURGERY      HYSTERECTOMY  1973    subtotal    NECK SURGERY      incision of windpipe, repair of windpipe defect    OTHER SURGICAL HISTORY  8/19/2011    hand assisted laparoscopic ileostomy reversal    OVARY REMOVAL      TRACHEAL SURGERY  2005    hx of trach     UMBILICAL HERNIA REPAIR  6/27/2014       Allergies:  has No Known Allergies. Social History:    TOBACCO:   reports that she quit smoking about 26 years ago. Her smoking use included cigarettes. She has a 30.00 pack-year smoking history. She has never used smokeless tobacco.  ETOH:   reports no history of alcohol use. Family History:       Problem Relation Age of Onset    Asthma Daughter     Diabetes Daughter     Cancer Daughter         Uterine    Alcohol Abuse Daughter     Asthma Daughter     Diabetes Mother     Heart Failure Mother     Hypertension Mother     Heart Failure Brother     Diabetes Sister     Heart Failure Brother     Coronary Art Dis Son     Emphysema Neg Hx        Current Medications:    Current Outpatient Medications:     blood glucose test strips (ACCU-CHEK SMARTVIEW) strip, TEST BLOOD SUGAR EVERY DAY.   DX:E11.65, Disp: 100 strip, Rfl: 3   venlafaxine (EFFEXOR XR) 75 MG extended release capsule, TAKE 1 CAPSULE EVERY DAY, Disp: 90 capsule, Rfl: 0    potassium chloride (KLOR-CON M) 20 MEQ extended release tablet, TAKE 1 TABLET EVERY DAY, Disp: 90 tablet, Rfl: 0    esomeprazole (NEXIUM) 40 MG delayed release capsule, TAKE 1 CAPSULE EVERY MORNING BEFORE BREAKFAST, Disp: 90 capsule, Rfl: 0    furosemide (LASIX) 40 MG tablet, TAKE 1 TABLET EVERY DAY, Disp: 90 tablet, Rfl: 0    simvastatin (ZOCOR) 20 MG tablet, TAKE 1 TABLET EVERY EVENING, Disp: 90 tablet, Rfl: 0    alendronate (FOSAMAX) 35 MG tablet, TAKE 1 TABLET EVERY 7 DAYS, Disp: 12 tablet, Rfl: 0    Roflumilast (DALIRESP) 500 MCG tablet, TAKE 1 TABLET EVERY DAY, Disp: 90 tablet, Rfl: 0    QUEtiapine (SEROQUEL) 25 MG tablet, TAKE 1 TO 2 TABLETS BY MOUTH AT BEDTIME, Disp: 180 tablet, Rfl: 0    mirabegron (MYRBETRIQ) 50 MG TB24, Take 50 mg by mouth daily, Disp: 30 tablet, Rfl: 2    metFORMIN (GLUCOPHAGE-XR) 500 MG extended release tablet, TAKE 2 TABLETS BY MOUTH TWICE DAILY, Disp: 360 tablet, Rfl: 0    JANUVIA 100 MG tablet, TAKE 1 TABLET BY MOUTH DAILY, Disp: 90 tablet, Rfl: 0    fluticasone-salmeterol (WIXELA INHUB) 250-50 MCG/DOSE AEPB, INHALE 1 PUFF INTO THE LUNGS EVERY 12 HOURS, Disp: 180 each, Rfl: 1    ibuprofen (ADVIL;MOTRIN) 400 MG tablet, Take 1 tablet by mouth every 6 hours as needed for Pain, Disp: 20 tablet, Rfl: 0    benzonatate (TESSALON) 100 MG capsule, TAKE 1 CAPSULE THREE TIMES DAILY AS NEEDED FOR COUGH, Disp: 90 capsule, Rfl: 3    albuterol sulfate  (90 Base) MCG/ACT inhaler, INHALE 2 PUFFS INTO THE LUNGS EVERY 6 HOURS AS NEEDED FOR WHEEZING OR SHORTNESS OF BREATH, Disp: 54 g, Rfl: 5    topiramate (TOPAMAX) 50 MG tablet, TAKE 1 TABLET BY MOUTH TWICE DAILY, Disp: 180 tablet, Rfl: 2    albuterol (PROVENTIL) (2.5 MG/3ML) 0.083% nebulizer solution, INHALE 3ML VIA NEBULIZATION ROUTE EVERY 6 HOURS AS NEEDED FOR WHEEZING OR SHORTNESS OF BREATH, Disp: 375 mL, Rfl: 5   [place] 18 MCG inhalation capsule, INHALE THE CONTENTS OF 1 CAPSULE VIA INHALATION DEVICE EVERY DAY, Disp: 90 capsule, Rfl: 1    theophylline (THEODUR) 300 MG extended release tablet, Take 1 tablet by mouth daily, Disp: 90 tablet, Rfl: 1    diltiazem (CARDIZEM CD) 120 MG extended release capsule, Take 1 capsule by mouth daily, Disp: 30 capsule, Rfl: 3    guaiFENesin (MUCINEX) 600 MG extended release tablet, Take 1 tablet by mouth 2 times daily, Disp: 60 tablet, Rfl: 1    Cyanocobalamin (VITAMIN B-12 PO), Take by mouth, Disp: , Rfl:     Blood Glucose Monitoring Suppl (ACCU-CHEK GAURANG SMARTVIEW) w/Device KIT, Use to test once daily. DX:E11.9, Disp: 1 kit, Rfl: 0    Lancets Misc. (ACCU-CHEK FASTCLIX LANCET) KIT, Use to test once daily. DX:E11.9, Disp: 1 kit, Rfl: 2    vitamin D (CHOLECALCIFEROL) 400 UNITS TABS tablet, Take 2 tablets by mouth daily, Disp: 180 tablet, Rfl: 1    calcium carbonate (CALCIUM 600) 600 MG TABS tablet, Take 2 tablets by mouth daily, Disp: 180 tablet, Rfl: 1    Respiratory Therapy Supplies (NEBULIZER/TUBING/MOUTHPIECE) KIT, 1 kit by Does not apply route daily as needed DX COPD J44.9, Disp: 1 kit, Rfl: 0    Nebulizers (COMPRESSOR/NEBULIZER) MISC, 1 Device by Does not apply route as needed DX COPD J44.9, Disp: 1 each, Rfl: 0    fluticasone (FLONASE) 50 MCG/ACT nasal spray, 2 sprays by Nasal route daily. , Disp: 3 Bottle, Rfl: 0    Misc. Devices (ACAPELLA) MISC, by Does not apply route 4 times daily. , Disp: 1 each, Rfl: 0    gabapentin (NEURONTIN) 300 MG capsule, TAKE 1 CAPSULE BY MOUTH THREE TIMES DAILY FOR 30 DAYS, Disp: 90 capsule, Rfl: 0    calcium citrate-vitamin D (CITRACAL+D) 315-200 MG-UNIT per tablet, Take 2 tablets by mouth 2 times daily, Disp: 360 tablet, Rfl: 0           Objective:   /80   Pulse 115   Temp 96.5 °F (35.8 °C)   Resp 20   Ht 5' (1.524 m)   Wt 92 lb 6.4 oz (41.9 kg)   SpO2 97% Comment: RA  BMI 18.05 kg/m²   Gen: No distress.  Ill appearing Eyes: PERRL. No sclera icterus. No conjunctival injection. ENT: No discharge. Pharynx clear. Neck: Trachea midline. No obvious mass. Resp: No accessory muscle use. No crackles. No wheezes. No rhonchi. No dullness on percussion. Decreased air entry. CV: Regular rate. Regular rhythm. No murmur or rub. No edema. GI: Non-tender. Non-distended. No hernia. Skin: Warm and dry. No nodule on exposed extremities. Lymph: No cervical LAD. No supraclavicular LAD. M/S: No cyanosis. No joint deformity. No clubbing. Neuro: Awake. Alert. Moves all four extremities. Psych: Oriented x 3. No anxiety. DATA reviewed by me:   PFTs 07/26/2021 FEV1 0.63L(34%) TLC 5.41L(123%) DLCO 8.71(44%) 6MW 420 LO2 89%   PFTs 03/29/2017 FEV1 0.55L(27%) TLC 5.21L(117%) DLCO 8.47(42%) 6MW 770 2L exertion   PFTs 07/07/2015 FEV1 0.63L(31%) TLC 5.12L(114%) DLCO 9.6(48%)   PFTs 12/06/2011 FEV1 0.83L(45%) TLC 5.14L(125%) DLCO 8.6 (49%)   PFTs 07/10/2008 FEV1 0.77L           TLC 5.41L            DLCO 7.62  PFTs 01/15/2007 FEV1 0.59L           TLC 5.15L            DLCO 10.82          CT chest 7/8/2020 imaging reviewed by me and showed  Stable CT chest  No change in tree-in-bud nodularity right middle lobe  Unchanged few additional noncalcified nodules back to 2018  Emphysema with mild bronchiectasis    CT chest 7/8/2021 imaging reviewed by me and showed  Increased tree-in-bud nodularity lingula right lower lobe with new focal nodule left lower lobe and right lower lobe. Stable right middle lobe nodularity    CT chest 10/13/2021 imaging reviewed by me and showed  1. Stable right middle lobe pulmonary nodule. 2. Bronchiectasis with tree in bud nodularity in both lungs that would favor  a chronic history of bronchiolitis. No interval detrimental change. 3. Increasing peripheral airspace opacification in the left lower lobe. This  likely represents progressive interstitial change or atelectasis.         CT chest 4/18/2022 imaging reviewed by me and showed  Stable benign granulomatous changes  No suspicious abnormalities       Sputum AFB 10/26/2021 negative      Assessment:      · Very severe COPD/Pulmonary emphysema  · Pulmonary Bronchiectasis  · Abnormal CT chest 1/8/2020 with right middle lobe new tree-in-bud nodules-favor inflammatory. Stable on repeat CT chest.  Most recent 4/18/2022. · New tree-in-bud nodularity lingula, RLL, LLL on CT 7/8/2021- favor inflammatory/infection. Negative sputum AFB. Improvement/resolution on repeat CT, most recent 4/18/2022. · R pulmonary nodules. Stable over 2 years . · Hypoxia on exertion  · Off Daliresp given weight loss   · Has 7 dogs at home   · 20 pack years. Quit smoking 1996. Plan:       · CT chest 12 months  · Continue [place] BID, Spiriva INH daily and Albuterol INH/Neb PRN  · Continue Theophylline   · Continue O2 1-2LPM on exertion. Advised to titrate O2 using her pulse oximeter- target O2 sat 90-92%. · Prednisone taper and Doxycycline 100 mg BID x 5 days for COPD AE self management if needed. · Patient is up to date with pneumococcal vaccine, and influenza vaccine   · Advised to get her Covid vaccine - she declines. .Risks, benefits and side effects were discussed with patient.    · Acapella and Mucinex   · Follow up in 6 months or sooner if needed

## 2022-04-26 RX ORDER — SITAGLIPTIN 100 MG/1
100 TABLET, FILM COATED ORAL DAILY
Qty: 90 TABLET | Refills: 0 | Status: SHIPPED | OUTPATIENT
Start: 2022-04-26 | End: 2022-07-25

## 2022-04-26 RX ORDER — METFORMIN HYDROCHLORIDE 500 MG/1
TABLET, EXTENDED RELEASE ORAL
Qty: 360 TABLET | Refills: 0 | Status: SHIPPED | OUTPATIENT
Start: 2022-04-26 | End: 2022-07-25

## 2022-05-09 ENCOUNTER — OFFICE VISIT (OUTPATIENT)
Dept: INTERNAL MEDICINE CLINIC | Age: 72
End: 2022-05-09

## 2022-05-09 VITALS
WEIGHT: 94 LBS | SYSTOLIC BLOOD PRESSURE: 120 MMHG | DIASTOLIC BLOOD PRESSURE: 80 MMHG | RESPIRATION RATE: 12 BRPM | HEIGHT: 60 IN | BODY MASS INDEX: 18.46 KG/M2 | HEART RATE: 70 BPM

## 2022-05-09 DIAGNOSIS — E43 SEVERE PROTEIN-CALORIE MALNUTRITION (GOMEZ: LESS THAN 60% OF STANDARD WEIGHT) (HCC): ICD-10-CM

## 2022-05-09 DIAGNOSIS — E78.5 HYPERLIPIDEMIA ASSOCIATED WITH TYPE 2 DIABETES MELLITUS (HCC): ICD-10-CM

## 2022-05-09 DIAGNOSIS — E11.65 TYPE 2 DIABETES MELLITUS WITH HYPERGLYCEMIA, WITHOUT LONG-TERM CURRENT USE OF INSULIN (HCC): Primary | ICD-10-CM

## 2022-05-09 DIAGNOSIS — E11.69 HYPERLIPIDEMIA ASSOCIATED WITH TYPE 2 DIABETES MELLITUS (HCC): ICD-10-CM

## 2022-05-09 DIAGNOSIS — E11.65 TYPE 2 DIABETES MELLITUS WITH HYPERGLYCEMIA, WITHOUT LONG-TERM CURRENT USE OF INSULIN (HCC): ICD-10-CM

## 2022-05-09 DIAGNOSIS — F32.9 MAJOR DEPRESSION, CHRONIC: ICD-10-CM

## 2022-05-09 DIAGNOSIS — K21.9 GASTROESOPHAGEAL REFLUX DISEASE, UNSPECIFIED WHETHER ESOPHAGITIS PRESENT: ICD-10-CM

## 2022-05-09 DIAGNOSIS — J43.8 OTHER EMPHYSEMA (HCC): ICD-10-CM

## 2022-05-09 DIAGNOSIS — R63.4 WEIGHT LOSS: ICD-10-CM

## 2022-05-09 LAB
A/G RATIO: 2.2 (ref 1.1–2.2)
ALBUMIN SERPL-MCNC: 4.6 G/DL (ref 3.4–5)
ALP BLD-CCNC: 82 U/L (ref 40–129)
ALT SERPL-CCNC: 7 U/L (ref 10–40)
ANION GAP SERPL CALCULATED.3IONS-SCNC: 12 MMOL/L (ref 3–16)
AST SERPL-CCNC: 13 U/L (ref 15–37)
BASOPHILS ABSOLUTE: 0.1 K/UL (ref 0–0.2)
BASOPHILS RELATIVE PERCENT: 0.6 %
BILIRUB SERPL-MCNC: <0.2 MG/DL (ref 0–1)
BUN BLDV-MCNC: 11 MG/DL (ref 7–20)
CALCIUM SERPL-MCNC: 10.5 MG/DL (ref 8.3–10.6)
CHLORIDE BLD-SCNC: 107 MMOL/L (ref 99–110)
CHOLESTEROL, TOTAL: 165 MG/DL (ref 0–199)
CO2: 23 MMOL/L (ref 21–32)
CREAT SERPL-MCNC: 0.8 MG/DL (ref 0.6–1.2)
EOSINOPHILS ABSOLUTE: 0.1 K/UL (ref 0–0.6)
EOSINOPHILS RELATIVE PERCENT: 1.6 %
GFR AFRICAN AMERICAN: >60
GFR NON-AFRICAN AMERICAN: >60
GLUCOSE BLD-MCNC: 114 MG/DL (ref 70–99)
HCT VFR BLD CALC: 42 % (ref 36–48)
HDLC SERPL-MCNC: 82 MG/DL (ref 40–60)
HEMOGLOBIN: 13.2 G/DL (ref 12–16)
LDL CHOLESTEROL CALCULATED: 73 MG/DL
LYMPHOCYTES ABSOLUTE: 1.3 K/UL (ref 1–5.1)
LYMPHOCYTES RELATIVE PERCENT: 15.4 %
MCH RBC QN AUTO: 28 PG (ref 26–34)
MCHC RBC AUTO-ENTMCNC: 31.4 G/DL (ref 31–36)
MCV RBC AUTO: 89.1 FL (ref 80–100)
MONOCYTES ABSOLUTE: 0.8 K/UL (ref 0–1.3)
MONOCYTES RELATIVE PERCENT: 9.4 %
NEUTROPHILS ABSOLUTE: 6.4 K/UL (ref 1.7–7.7)
NEUTROPHILS RELATIVE PERCENT: 73 %
PDW BLD-RTO: 18.4 % (ref 12.4–15.4)
PLATELET # BLD: 298 K/UL (ref 135–450)
PMV BLD AUTO: 8.9 FL (ref 5–10.5)
POTASSIUM SERPL-SCNC: 4.5 MMOL/L (ref 3.5–5.1)
RBC # BLD: 4.72 M/UL (ref 4–5.2)
SODIUM BLD-SCNC: 142 MMOL/L (ref 136–145)
TOTAL PROTEIN: 6.7 G/DL (ref 6.4–8.2)
TRIGL SERPL-MCNC: 49 MG/DL (ref 0–150)
TSH REFLEX FT4: 2.57 UIU/ML (ref 0.27–4.2)
URIC ACID, SERUM: 4.7 MG/DL (ref 2.6–6)
VLDLC SERPL CALC-MCNC: 10 MG/DL
WBC # BLD: 8.8 K/UL (ref 4–11)

## 2022-05-09 PROCEDURE — 3017F COLORECTAL CA SCREEN DOC REV: CPT | Performed by: INTERNAL MEDICINE

## 2022-05-09 PROCEDURE — 1123F ACP DISCUSS/DSCN MKR DOCD: CPT | Performed by: INTERNAL MEDICINE

## 2022-05-09 PROCEDURE — G8428 CUR MEDS NOT DOCUMENT: HCPCS | Performed by: INTERNAL MEDICINE

## 2022-05-09 PROCEDURE — 3023F SPIROM DOC REV: CPT | Performed by: INTERNAL MEDICINE

## 2022-05-09 PROCEDURE — 2022F DILAT RTA XM EVC RTNOPTHY: CPT | Performed by: INTERNAL MEDICINE

## 2022-05-09 PROCEDURE — 1090F PRES/ABSN URINE INCON ASSESS: CPT | Performed by: INTERNAL MEDICINE

## 2022-05-09 PROCEDURE — 99214 OFFICE O/P EST MOD 30 MIN: CPT | Performed by: INTERNAL MEDICINE

## 2022-05-09 PROCEDURE — 4040F PNEUMOC VAC/ADMIN/RCVD: CPT | Performed by: INTERNAL MEDICINE

## 2022-05-09 PROCEDURE — 1036F TOBACCO NON-USER: CPT | Performed by: INTERNAL MEDICINE

## 2022-05-09 PROCEDURE — 3044F HG A1C LEVEL LT 7.0%: CPT | Performed by: INTERNAL MEDICINE

## 2022-05-09 PROCEDURE — G8399 PT W/DXA RESULTS DOCUMENT: HCPCS | Performed by: INTERNAL MEDICINE

## 2022-05-09 PROCEDURE — G8419 CALC BMI OUT NRM PARAM NOF/U: HCPCS | Performed by: INTERNAL MEDICINE

## 2022-05-09 RX ORDER — LACTOSE-REDUCED FOOD 0.06G-1/ML
LIQUID (ML) ORAL
Qty: 60 EACH | Refills: 2 | Status: SHIPPED | OUTPATIENT
Start: 2022-05-09 | End: 2022-08-25

## 2022-05-09 RX ORDER — ALENDRONATE SODIUM 35 MG/1
TABLET ORAL
Qty: 12 TABLET | Refills: 0 | Status: SHIPPED | OUTPATIENT
Start: 2022-05-09 | End: 2022-10-11 | Stop reason: SDUPTHER

## 2022-05-09 ASSESSMENT — ENCOUNTER SYMPTOMS
COUGH: 0
EYE DISCHARGE: 0
NAUSEA: 0
WHEEZING: 0
BACK PAIN: 1
RHINORRHEA: 0
SHORTNESS OF BREATH: 1
ABDOMINAL PAIN: 0
VOMITING: 0

## 2022-05-09 NOTE — PROGRESS NOTES
Subjective:      Patient ID: Brayan Walters is a 66 y.o. female. HPI  Patient is here for follow up on her diabetes, asthma, chronic respiratory failure, hyperlipidemia and arthritis. She has frequent productive cough. She is on Oxygen 2L at night and as needed during the day. She has been falling more often. She has severe emphysema and not able to ambulate as well. She has is wanting a power wheelchair. She had one but it has gone bad. Her diabetes is well controlled. It is between 90s-100s. Her weight is stable. She has severe COPD and asthma. Recent testing showed low oxygen. She does get some dyspnea off and on. Has intermittent productive coughing. Her pulmonary nodule is stable. Chest CT done 1/8/2020, showing some new tree in bud group of micronodules. Emphysema is unchanged from the previous CT in 2018. She sees pulmonary regularly. Her hyperlipidemia is controlled. No myalgias. Her arthritis has been stable. Her tension HA are controlled. She is on Topamax. She has GERD. She is on Nexium. No heartburn. She has depression. She is on Effexor. It is stable. She is sleeping ok. Her weight is worse. She is unvaccinated. Review of Systems   Constitutional: Negative for appetite change, chills, fatigue and fever. HENT: Negative for ear pain, postnasal drip and rhinorrhea. Eyes: Negative for discharge. Respiratory: Positive for shortness of breath. Negative for cough and wheezing. Cardiovascular: Positive for chest pain. Negative for palpitations. Gastrointestinal: Negative for abdominal pain, nausea and vomiting. Endocrine: Negative for polydipsia and polyphagia. Musculoskeletal: Positive for back pain. Left hip pain     Skin: Negative for rash. Psychiatric/Behavioral: Positive for sleep disturbance. The patient is nervous/anxious.       Current Outpatient Medications   Medication Instructions    albuterol (PROVENTIL) (2.5 MG/3ML) 0.083% nebulizer solution INHALE 3ML VIA NEBULIZATION ROUTE EVERY 6 HOURS AS NEEDED FOR WHEEZING OR SHORTNESS OF BREATH    albuterol sulfate  (90 Base) MCG/ACT inhaler INHALE 2 PUFFS INTO THE LUNGS EVERY 6 HOURS AS NEEDED FOR WHEEZING OR SHORTNESS OF BREATH    alendronate (FOSAMAX) 35 MG tablet TAKE 1 TABLET EVERY 7 DAYS    benzonatate (TESSALON) 100 MG capsule TAKE 1 CAPSULE THREE TIMES DAILY AS NEEDED FOR COUGH    Blood Glucose Monitoring Suppl (ACCU-CHEK GAURANG SMARTVIEW) w/Device KIT Use to test once daily. DX:E11.9    blood glucose test strips (ACCU-CHEK SMARTVIEW) strip TEST BLOOD SUGAR EVERY DAY. DX:E11.65    calcium carbonate (CALCIUM 600) 1,200 mg, Oral, DAILY    calcium citrate-vitamin D (CITRACAL+D) 315-200 MG-UNIT per tablet 2 tablets, Oral, 2 TIMES DAILY    Cyanocobalamin (VITAMIN B-12 PO) Oral    dilTIAZem (CARDIZEM CD) 120 mg, Oral, DAILY    esomeprazole (NEXIUM) 40 MG delayed release capsule TAKE 1 CAPSULE EVERY MORNING BEFORE BREAKFAST    fluticasone (FLONASE) 50 MCG/ACT nasal spray 2 sprays, Nasal, DAILY    fluticasone-salmeterol (WIXELA INHUB) 250-50 MCG/DOSE AEPB INHALE 1 PUFF INTO THE LUNGS EVERY 12 HOURS    furosemide (LASIX) 40 MG tablet TAKE 1 TABLET EVERY DAY    gabapentin (NEURONTIN) 300 MG capsule TAKE 1 CAPSULE BY MOUTH THREE TIMES DAILY FOR 30 DAYS    guaiFENesin (MUCINEX) 600 mg, Oral, 2 TIMES DAILY    ibuprofen (ADVIL;MOTRIN) 400 mg, Oral, EVERY 6 HOURS PRN    Januvia 100 mg, Oral, DAILY    Lancets Misc. (ACCU-CHEK FASTCLIX LANCET) KIT Use to test once daily. DX:E11.9    metFORMIN (GLUCOPHAGE-XR) 500 MG extended release tablet TAKE 2 TABLETS BY MOUTH TWICE DAILY    mirabegron (MYRBETRIQ) 50 mg, Oral, DAILY    Misc.  Devices (ACAPELLA) MISC Does not apply, 4 TIMES DAILY    Nebulizers (COMPRESSOR/NEBULIZER) MISC 1 Device, Does not apply, PRN, DX COPD J44.9    Nutritional Supplements (BOOST HIGH PROTEIN) LIQD One with every meal    potassium chloride (KLOR-CON M) 20 MEQ extended release tablet TAKE 1 TABLET EVERY DAY    QUEtiapine (SEROQUEL) 25 MG tablet TAKE 1 TO 2 TABLETS BY MOUTH AT BEDTIME    Respiratory Therapy Supplies (NEBULIZER/TUBING/MOUTHPIECE) KIT 1 kit, Does not apply, DAILY PRN, DX COPD J44.9    Roflumilast (DALIRESP) 500 MCG tablet TAKE 1 TABLET EVERY DAY    simvastatin (ZOCOR) 20 MG tablet TAKE 1 TABLET EVERY EVENING    Spiriva [place]iHal[place] 18 mcg, Inhalation, DAILY    theophylline (THEODUR) 300 mg, Oral, DAILY    topiramate (TOPAMAX) 50 MG tablet TAKE 1 TABLET BY MOUTH TWICE DAILY    venlafaxine (EFFEXOR XR) 75 MG extended release capsule TAKE 1 CAPSULE EVERY DAY    vitamin D (CHOLECALCIFEROL) 800 Units, Oral, DAILY           There are no changes to past medical history, family history, social history or review of systems(except as noted in the history section) since prior note (all reviewed with patient). /80 (Site: Right Upper Arm, Position: Sitting, Cuff Size: Medium Adult)   Pulse 70   Resp 12   Ht 5' (1.524 m)   Wt 94 lb (42.6 kg)   BMI 18.36 kg/m²      Objective:   Physical Exam  Constitutional:       Appearance: She is underweight. HENT:      Head: Normocephalic. Right Ear: No drainage. Tympanic membrane is not injected. Left Ear: No drainage. Tympanic membrane is not injected. Eyes:      Conjunctiva/sclera: Conjunctivae normal.      Pupils: Pupils are equal, round, and reactive to light. Neck:      Thyroid: No thyroid mass or thyromegaly. Vascular: No carotid bruit or JVD. Trachea: Trachea normal.   Cardiovascular:      Rate and Rhythm: Normal rate and regular rhythm. Heart sounds: Normal heart sounds. No murmur heard. No gallop. Pulmonary:      Effort: Pulmonary effort is normal. No respiratory distress. Breath sounds: Decreased air movement present. Decreased breath sounds present. No wheezing or rales. Chest:      Chest wall: No tenderness.    Abdominal:      General: Bowel sounds are normal. There is no distension. Palpations: Abdomen is soft. There is no hepatomegaly, splenomegaly or mass. Tenderness: There is no abdominal tenderness. Musculoskeletal:      Cervical back: Normal range of motion and neck supple. Left knee: No swelling or effusion. Normal range of motion. Lymphadenopathy:      Cervical: No cervical adenopathy. Skin:     General: Skin is warm and dry. Neurological:      Mental Status: She is alert and oriented to person, place, and time. Psychiatric:         Behavior: Behavior normal.         Thought Content: Thought content normal.         Judgment: Judgment normal.               Assessment:       Diagnosis Orders   1. Type 2 diabetes mellitus with hyperglycemia, without long-term current use of insulin (HCC)  Comprehensive Metabolic Panel    CBC with Auto Differential    Uric Acid    Lipid Panel    Hemoglobin A1C   2. Other emphysema ([address]     3. Gastroesophageal reflux disease, unspecified whether esophagitis present     4. Major depression, chronic     5. Hyperlipidemia associated with type 2 diabetes mellitus (HCC)  TSH with Reflex to FT4   6. Weight loss     7. Severe protein-calorie malnutrition Lang Sailors: less than 60% of standard weight) ([facility] [address]            Plan:        DM type 2: well controlled. Check labs. COPD: she is using 2 L of oxygen at hs and prn during the day. Dyspnea likely related to her lungs. Refused covid vaccine. Chronic respiratory failure. On 2 l.  HA are stable. On topamax. Sees neurology. Depression is stable on meds. GERD is stale. Edema is stable. 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 Severe PCM. Start Boost   Having trouble with ambulation interfering with ADL. Will benefit from power wheel chair. The current medical regimen is effective;  continue present plan and medications. See orders.

## 2022-05-10 LAB
ESTIMATED AVERAGE GLUCOSE: 116.9 MG/DL
HBA1C MFR BLD: 5.7 %

## 2022-05-11 RX ORDER — FUROSEMIDE 40 MG/1
TABLET ORAL
Qty: 90 TABLET | Refills: 0 | Status: SHIPPED | OUTPATIENT
Start: 2022-05-11 | End: 2022-10-11 | Stop reason: SDUPTHER

## 2022-05-11 RX ORDER — SIMVASTATIN 20 MG
TABLET ORAL
Qty: 90 TABLET | Refills: 0 | Status: SHIPPED | OUTPATIENT
Start: 2022-05-11

## 2022-05-11 RX ORDER — ESOMEPRAZOLE MAGNESIUM 40 MG/1
CAPSULE, DELAYED RELEASE ORAL
Qty: 90 CAPSULE | Refills: 0 | Status: SHIPPED | OUTPATIENT
Start: 2022-05-11 | End: 2022-10-12 | Stop reason: SDUPTHER

## 2022-05-18 RX ORDER — MIRABEGRON 50 MG/1
TABLET, FILM COATED, EXTENDED RELEASE ORAL
Qty: 30 TABLET | Refills: 2 | Status: SHIPPED | OUTPATIENT
Start: 2022-05-18 | End: 2022-08-26

## 2022-05-26 RX ORDER — QUETIAPINE FUMARATE 25 MG/1
TABLET, FILM COATED ORAL
Qty: 180 TABLET | Refills: 0 | Status: SHIPPED | OUTPATIENT
Start: 2022-05-26 | End: 2022-08-24

## 2022-06-01 ENCOUNTER — OFFICE VISIT (OUTPATIENT)
Dept: INTERNAL MEDICINE CLINIC | Age: 72
End: 2022-06-01

## 2022-06-01 VITALS
HEIGHT: 60 IN | HEART RATE: 100 BPM | OXYGEN SATURATION: 95 % | WEIGHT: 98 LBS | SYSTOLIC BLOOD PRESSURE: 110 MMHG | RESPIRATION RATE: 20 BRPM | DIASTOLIC BLOOD PRESSURE: 60 MMHG | BODY MASS INDEX: 19.24 KG/M2

## 2022-06-01 DIAGNOSIS — J44.9 STAGE 3 SEVERE COPD BY GOLD CLASSIFICATION (HCC): ICD-10-CM

## 2022-06-01 DIAGNOSIS — R53.1 WEAKNESS: Primary | ICD-10-CM

## 2022-06-01 DIAGNOSIS — E43 SEVERE PROTEIN-CALORIE MALNUTRITION (GOMEZ: LESS THAN 60% OF STANDARD WEIGHT) (HCC): ICD-10-CM

## 2022-06-01 PROCEDURE — 1090F PRES/ABSN URINE INCON ASSESS: CPT | Performed by: NURSE PRACTITIONER

## 2022-06-01 PROCEDURE — 3023F SPIROM DOC REV: CPT | Performed by: NURSE PRACTITIONER

## 2022-06-01 PROCEDURE — 1036F TOBACCO NON-USER: CPT | Performed by: NURSE PRACTITIONER

## 2022-06-01 PROCEDURE — G8427 DOCREV CUR MEDS BY ELIG CLIN: HCPCS | Performed by: NURSE PRACTITIONER

## 2022-06-01 PROCEDURE — 1123F ACP DISCUSS/DSCN MKR DOCD: CPT | Performed by: NURSE PRACTITIONER

## 2022-06-01 PROCEDURE — G8399 PT W/DXA RESULTS DOCUMENT: HCPCS | Performed by: NURSE PRACTITIONER

## 2022-06-01 PROCEDURE — 3017F COLORECTAL CA SCREEN DOC REV: CPT | Performed by: NURSE PRACTITIONER

## 2022-06-01 PROCEDURE — 99215 OFFICE O/P EST HI 40 MIN: CPT | Performed by: NURSE PRACTITIONER

## 2022-06-01 PROCEDURE — G8420 CALC BMI NORM PARAMETERS: HCPCS | Performed by: NURSE PRACTITIONER

## 2022-06-01 ASSESSMENT — ENCOUNTER SYMPTOMS
SHORTNESS OF BREATH: 1
COUGH: 1
BACK PAIN: 1

## 2022-06-01 NOTE — PROGRESS NOTES
Chief Complaint:   Gary Zavala is a 66 y.o. female who presents to discuss motorized wheelchair and the need for mobility examination. HPI    She had a motorized wheelchair before. This is for a replacement for that one. She has decreased muscle mass. She has worsening COPD. She states she can't make it to the mailbox without getting so short of breath  She states she has incontinence of urine and stool because she can't get to the bathroom. She has had multiple falls. States her knees are giving out. She has trouble getting in the shower and bath. She has a shower chair. She does not cook anymore. She has a pinched nerve in her back and is unable to stand a cook. She can dress/groom herself if she can sit down. She does not have the strength to use a regular wheelchair. She cannot use a scooter because it would be hard on her back. She has a cane, but patient's has severe dyspnea with minimal exertion due to Severe COPD  Patient has physical and mental capabilities to operate power mobility device. She is willing and motivated to do this. She wears oxygen at night and as needed. She has portable oxygen to take with her places. Review of Systems  Review of Systems   Respiratory: Positive for cough and shortness of breath. Musculoskeletal: Positive for arthralgias (legs) and back pain. Neurological: Positive for weakness. Allergies  Patient has no known allergies.       Vitals  /60 (Site: Left Upper Arm, Position: Sitting)   Pulse 100   Resp 20   Ht 5' (1.524 m)   Wt 98 lb (44.5 kg)   SpO2 95%   BMI 19.14 kg/m²     Current Medications  Current Outpatient Medications   Medication Sig Dispense Refill    QUEtiapine (SEROQUEL) 25 MG tablet TAKE 1 TO 2 TABLETS BY MOUTH AT BEDTIME 180 tablet 0    MYRBETRIQ 50 MG TB24 TAKE 1 TABLET BY MOUTH DAILY 30 tablet 2    esomeprazole (NEXIUM) 40 MG delayed release capsule TAKE 1 CAPSULE EVERY MORNING BEFORE BREAKFAST 90 capsule 0    furosemide (LASIX) 40 MG tablet TAKE 1 TABLET EVERY DAY 90 tablet 0    simvastatin (ZOCOR) 20 MG tablet TAKE 1 TABLET EVERY EVENING 90 tablet 0    alendronate (FOSAMAX) 35 MG tablet TAKE 1 TABLET EVERY 7 DAYS 12 tablet 0    Nutritional Supplements (BOOST HIGH PROTEIN) LIQD One with every meal 60 each 2    Roflumilast (DALIRESP) 500 MCG tablet TAKE 1 TABLET EVERY DAY 90 tablet 0    metFORMIN (GLUCOPHAGE-XR) 500 MG extended release tablet TAKE 2 TABLETS BY MOUTH TWICE DAILY 360 tablet 0    JANUVIA 100 MG tablet TAKE 1 TABLET BY MOUTH DAILY 90 tablet 0    tiotropium (SPIRIVA HANDIHALER) 18 MCG inhalation capsule Inhale 1 capsule into the lungs daily 90 capsule 1    theophylline (THEODUR) 300 MG extended release tablet Take 1 tablet by mouth daily 90 tablet 1    blood glucose test strips (ACCU-CHEK SMARTVIEW) strip TEST BLOOD SUGAR EVERY DAY.   DX:E11.65 100 strip 3    venlafaxine (EFFEXOR XR) 75 MG extended release capsule TAKE 1 CAPSULE EVERY DAY 90 capsule 0    potassium chloride (KLOR-CON M) 20 MEQ extended release tablet TAKE 1 TABLET EVERY DAY 90 tablet 0    fluticasone-salmeterol (WIXELA INHUB) 250-50 MCG/DOSE AEPB INHALE 1 PUFF INTO THE LUNGS EVERY 12 HOURS 180 each 1    ibuprofen (ADVIL;MOTRIN) 400 MG tablet Take 1 tablet by mouth every 6 hours as needed for Pain 20 tablet 0    benzonatate (TESSALON) 100 MG capsule TAKE 1 CAPSULE THREE TIMES DAILY AS NEEDED FOR COUGH 90 capsule 3    albuterol sulfate  (90 Base) MCG/ACT inhaler INHALE 2 PUFFS INTO THE LUNGS EVERY 6 HOURS AS NEEDED FOR WHEEZING OR SHORTNESS OF BREATH 54 g 5    topiramate (TOPAMAX) 50 MG tablet TAKE 1 TABLET BY MOUTH TWICE DAILY 180 tablet 2    albuterol (PROVENTIL) (2.5 MG/3ML) 0.083% nebulizer solution INHALE 3ML VIA NEBULIZATION ROUTE EVERY 6 HOURS AS NEEDED FOR WHEEZING OR SHORTNESS OF BREATH 375 mL 5    gabapentin (NEURONTIN) 300 MG capsule TAKE 1 CAPSULE BY MOUTH THREE TIMES DAILY FOR 30 DAYS 90 capsule 0    calcium citrate-vitamin D (CITRACAL+D) 315-200 MG-UNIT per tablet Take 2 tablets by mouth 2 times daily 360 tablet 0    diltiazem (CARDIZEM CD) 120 MG extended release capsule Take 1 capsule by mouth daily 30 capsule 3    guaiFENesin (MUCINEX) 600 MG extended release tablet Take 1 tablet by mouth 2 times daily 60 tablet 1    Cyanocobalamin (VITAMIN B-12 PO) Take by mouth      Blood Glucose Monitoring Suppl (ACCU-CHEK GAURANG SMARTVIEW) w/Device KIT Use to test once daily. DX:E11.9 1 kit 0    Lancets Misc. (ACCU-CHEK FASTCLIX LANCET) KIT Use to test once daily. DX:E11.9 1 kit 2    vitamin D (CHOLECALCIFEROL) 400 UNITS TABS tablet Take 2 tablets by mouth daily 180 tablet 1    calcium carbonate (CALCIUM 600) 600 MG TABS tablet Take 2 tablets by mouth daily 180 tablet 1    Respiratory Therapy Supplies (NEBULIZER/TUBING/MOUTHPIECE) KIT 1 kit by Does not apply route daily as needed DX COPD J44.9 1 kit 0    Nebulizers (COMPRESSOR/NEBULIZER) MISC 1 Device by Does not apply route as needed DX COPD J44.9 1 each 0    fluticasone (FLONASE) 50 MCG/ACT nasal spray 2 sprays by Nasal route daily. 3 Bottle 0    Misc. Devices (ACAPELLA) MISC by Does not apply route 4 times daily. 1 each 0     No current facility-administered medications for this visit.        Past Medical History  Past Medical History:   Diagnosis Date    Acute on chronic respiratory failure with hypoxia ([address] 12/3/2009    Chronic airway obstruction, not elsewhere classified 3/4/08    oxygen 2L/min at HS and PRN through day    Chronic kidney disease     incontinent    COPD (chronic obstructive pulmonary disease) ([address]     Depression     Displacement of lumbar intervertebral disc without myelopathy 8/13/07    Edema 9/13/07    Emphysema of lung ([address]     Enthesopathy of hip region     Enthesopathy of hip region 3/5/07    Esophageal reflux 3/4/08    Hemorrhage of rectum and anus 11/14/07    Herpes zoster without complication     History of blood transfusion     Hyperlipidemia associated with type 2 diabetes mellitus [facility] 2017    Lumbar spondylosis 2018    Major depression, chronic 2015    Osteoporosis, unspecified 07    Other and unspecified hyperlipidemia 3/4/08    Pain in joint, shoulder region 3/4/08    Pneumonia     Pulmonary nodule     Recurrent major depressive disorder, in partial remission ([facility] 2018    Rheumatic fever     S/P ileostomy ([facility] 2011    Skin lesion of face 10/20/2021    Steroid-induced osteopenia 2016    Tension headache     Type 2 diabetes mellitus with hyperglycemia, without long-term current use of insulin ([facility]     Unspecified asthma(493.90) 07    Unspecified chronic bronchitis ([facility]     Ventral hernia 2014       Social History  Social History     Socioeconomic History    Marital status:      Spouse name: Not on file    Number of children: Not on file    Years of education: Not on file    Highest education level: Not on file   Occupational History    Not on file   Tobacco Use    Smoking status: Former Smoker     Packs/day: 1.00     Years: 30.00     Pack years: 30.00     Types: Cigarettes     Quit date: 1996     Years since quittin.4    Smokeless tobacco: Never Used   Vaping Use    Vaping Use: Never used   Substance and Sexual Activity    Alcohol use: No     Alcohol/week: 0.0 standard drinks    Drug use: No    Sexual activity: Not Currently     Partners: Male   Other Topics Concern    Not on file   Social History Narrative    Not on file     Social Determinants of Health     Financial Resource Strain:     Difficulty of Paying Living Expenses: Not on file   Food Insecurity:     Worried About Running Out of Food in the Last Year: Not on file    Ada of Food in the Last Year: Not on file   Transportation Needs:     Lack of Transportation (Medical): Not on file    Lack of Transportation (Non-Medical):  Not on file Physical Activity:     Days of Exercise per Week: Not on file    Minutes of Exercise per Session: Not on file   Stress:     Feeling of Stress : Not on file   Social Connections:     Frequency of Communication with Friends and Family: Not on file    Frequency of Social Gatherings with Friends and Family: Not on file    Attends Religion Services: Not on file    Active Member of [address] [place] [address] or Organizations: Not on file    Attends Club or Organization Meetings: Not on file    Marital Status: Not on file   Intimate Partner Violence:     Fear of Current or Ex-Partner: Not on file    Emotionally Abused: Not on file    Physically Abused: Not on file    Sexually Abused: Not on file   Housing Stability:     Unable to Pay for Housing in the Last Year: Not on file    Number of [place] in the Last Year: Not on file    Unstable Housing in the Last Year: Not on file       SurgicalHistory  Past Surgical History:   Procedure Laterality Date    ABDOMEN SURGERY  5-19-11    total abdominal colectomy iliostomy    CARPAL TUNNEL RELEASE      right    CATARACT REMOVAL Bilateral     L 6/18/2013, R 07/01/2013    CATARACT REMOVAL WITH IMPLANT Right 7/1/13    COLECTOMY      and reversal     COLONOSCOPY  2009, 11/4/2011, 10/28/15    normal    CYST REMOVAL Right 6/20/14    Dr. Iggy Schuler; right ear pressure point cyst removal    DILATATION, ESOPHAGUS      ENDOSCOPY, COLON, DIAGNOSTIC      EPIDURAL STEROID INJECTION Bilateral 7/9/2019    BILATERAL LUMBAR FOUR TRANSFORAMINAL EPIDURAL STEROID INJECTION SITE CONFIRMED BY FLUOROSCOPY performed by Eli Zapien MD at [facility] Left 6/18/13    cataract with lens implant    HERNIA REPAIR  6/27/14    Open Vental Hernia Repair    HIP SURGERY      HYSTERECTOMY  1973    subtotal    NECK SURGERY      incision of windpipe, repair of windpipe defect    OTHER SURGICAL HISTORY  8/19/2011    hand assisted laparoscopic ileostomy reversal    OVARY REMOVAL      TRACHEAL SURGERY  2005    hx of trach     UMBILICAL HERNIA REPAIR  6/27/2014       Physical Exam  Physical Exam  Constitutional:       Appearance: She is underweight. HENT:      Head: Normocephalic and atraumatic. Eyes:      Conjunctiva/sclera: Conjunctivae normal.      Pupils: Pupils are equal, round, and reactive to light. Cardiovascular:      Rate and Rhythm: Normal rate and regular rhythm. Pulses: Normal pulses. Heart sounds: Normal heart sounds. No murmur heard. No friction rub. No gallop. Pulmonary:      Effort: Accessory muscle usage present. Breath sounds: Normal breath sounds. Abdominal:      General: Abdomen is flat. There is no distension. Palpations: Abdomen is soft. Tenderness: There is no abdominal tenderness. Musculoskeletal:      Right shoulder: Normal range of motion. Decreased strength. Left shoulder: Normal range of motion. Decreased strength. Right elbow: Normal range of motion. Left elbow: Normal range of motion. Right hand: Decreased strength. Left hand: Decreased strength. Thoracic back: Decreased range of motion. Lumbar back: Decreased range of motion. Right lower leg: No edema. Left lower leg: No edema. Lymphadenopathy:      Cervical: No cervical adenopathy. Skin:     General: Skin is warm and dry. Capillary Refill: Capillary refill takes less than 2 seconds. Neurological:      General: No focal deficit present. Mental Status: She is alert and oriented to person, place, and time. Cranial Nerves: Cranial nerves are intact. Sensory: Sensation is intact. Motor: Weakness and atrophy present. Gait: Gait abnormal (unsteady gait; only able to walk a few step without assistance). Comments: Strength 3/5 all extremities   Psychiatric:         Mood and Affect: Mood normal.         Behavior: Behavior normal.         Thought Content:  Thought content normal.         Judgment: Judgment normal.           Assessment and Plan  She had a motorized wheelchair before. This is for a replacement for that one. She has decreased muscle mass. She has worsening COPD. She states she can't make it to the mailbox without getting so short of breath  She states she has incontinence of urine and stool because she can't get to the bathroom. She has had multiple falls. States her knees are giving out. She has trouble getting in the shower and bath. She has a shower chair. She does not cook anymore. She has a pinched nerve in her back and is unable to stand a cook. She can dress/groom herself if she can sit down. She does not have the strength to use a regular wheelchair. She cannot use a scooter because it would be hard on her back. She has a cane, but patient's has severe dyspnea with minimal exertion due to Severe COPD  Patient has physical and mental capabilities to operate power mobility device. She is willing and motivated to do this. She wears oxygen at night and as needed. She has portable oxygen to take with her places. Patient would benefit immensely from having a power mobility device. It would greatly improve her current quality of life.      Anali Inman FNP-C  6/1/2022

## 2022-06-06 RX ORDER — VENLAFAXINE HYDROCHLORIDE 75 MG/1
CAPSULE, EXTENDED RELEASE ORAL
Qty: 90 CAPSULE | Refills: 0 | Status: SHIPPED | OUTPATIENT
Start: 2022-06-06 | End: 2022-10-11 | Stop reason: SDUPTHER

## 2022-06-06 RX ORDER — POTASSIUM CHLORIDE 20 MEQ/1
TABLET, EXTENDED RELEASE ORAL
Qty: 90 TABLET | Refills: 0 | Status: SHIPPED | OUTPATIENT
Start: 2022-06-06 | End: 2022-10-11 | Stop reason: SDUPTHER

## 2022-06-19 RX ORDER — BENZONATATE 100 MG/1
CAPSULE ORAL
Qty: 90 CAPSULE | Refills: 3 | Status: SHIPPED | OUTPATIENT
Start: 2022-06-19

## 2022-06-23 RX ORDER — FLUTICASONE PROPIONATE AND SALMETEROL 250; 50 UG/1; UG/1
POWDER RESPIRATORY (INHALATION)
Qty: 180 EACH | Refills: 1 | Status: SHIPPED | OUTPATIENT
Start: 2022-06-23 | End: 2022-10-10 | Stop reason: SDUPTHER

## 2022-06-23 NOTE — TELEPHONE ENCOUNTER
Next Appt:  10/20/2022    Last filled:  11/24/2021    LOV:  4/20/2022      Assessment:   · Very severe COPD/Pulmonary emphysema  · Pulmonary Bronchiectasis  · Abnormal CT chest 1/8/2020 with right middle lobe new tree-in-bud nodules-favor inflammatory. Stable on repeat CT chest.  Most recent 4/18/2022. · New tree-in-bud nodularity lingula, RLL, LLL on CT 7/8/2021- favor inflammatory/infection. Negative sputum AFB. Improvement/resolution on repeat CT, most recent 4/18/2022. · R pulmonary nodules. Stable over 2 years . · Hypoxia on exertion  · Off Daliresp given weight loss   · Has 7 dogs at home   · 20 pack years. Quit smoking 1996. Plan:       · CT chest 12 months  · Continue Wixella BID, Spiriva INH daily and Albuterol INH/Neb PRN  · Continue Theophylline   · Continue O2 1-2LPM on exertion. Advised to titrate O2 using her pulse oximeter- target O2 sat 90-92%. · Prednisone taper and Doxycycline 100 mg BID x 5 days for COPD AE self management if needed. · Patient is up to date with pneumococcal vaccine, and influenza vaccine   · Advised to get her Covid vaccine - she declines. .Risks, benefits and side effects were discussed with patient.    · Acapella and Mucinex   · Follow up in 6 months or sooner if needed

## 2022-07-25 RX ORDER — METFORMIN HYDROCHLORIDE 500 MG/1
TABLET, EXTENDED RELEASE ORAL
Qty: 360 TABLET | Refills: 0 | Status: SHIPPED | OUTPATIENT
Start: 2022-07-25 | End: 2022-10-24

## 2022-07-25 RX ORDER — SITAGLIPTIN 100 MG/1
100 TABLET, FILM COATED ORAL DAILY
Qty: 90 TABLET | Refills: 0 | Status: SHIPPED | OUTPATIENT
Start: 2022-07-25 | End: 2022-10-11 | Stop reason: SDUPTHER

## 2022-08-02 NOTE — TELEPHONE ENCOUNTER
Anesthesia Post Evaluation    Patient: Vel Banda    Procedure(s) Performed: Procedure(s) (LRB):  EGD (ESOPHAGOGASTRODUODENOSCOPY) (N/A)  COLONOSCOPY (N/A)    Final Anesthesia Type: general      Patient location during evaluation: PACU  Patient participation: Yes- Able to Participate  Level of consciousness: awake and alert and oriented  Post-procedure vital signs: reviewed and stable  Pain management: adequate  Airway patency: patent    PONV status at discharge: No PONV  Anesthetic complications: no      Cardiovascular status: blood pressure returned to baseline  Respiratory status: unassisted, spontaneous ventilation and room air  Hydration status: euvolemic  Follow-up not needed.          Vitals Value Taken Time   /67 08/01/22 1520   Temp 36.7 °C (98.1 °F) 08/01/22 1500   Pulse 54 08/01/22 1520   Resp 15 08/01/22 1520   SpO2 98 % 08/01/22 1520         Event Time   Out of Recovery 15:42:21         Pain/William Score: William Score: 10 (8/1/2022  3:20 PM)         Per Dr Jose Capellan, patient informed to go to ER. She voiced understanding.

## 2022-08-04 RX ORDER — THEOPHYLLINE 300 MG/1
300 TABLET, EXTENDED RELEASE ORAL DAILY
Qty: 90 TABLET | Refills: 2 | Status: SHIPPED | OUTPATIENT
Start: 2022-08-04 | End: 2022-10-11 | Stop reason: SDUPTHER

## 2022-08-08 ENCOUNTER — TELEPHONE (OUTPATIENT)
Dept: INTERNAL MEDICINE CLINIC | Age: 72
End: 2022-08-08

## 2022-08-08 NOTE — TELEPHONE ENCOUNTER
----- Message from Evan Latif sent at 8/8/2022  2:55 PM EDT -----  Contact: [phone] Daryl Leger)  Okay per Dr. Bolivar Garnica.  ----- Message -----  From: Nubia Raya MA  Sent: 8/8/2022   1:32 PM EDT  To: MD Dr Anders Jamison needs an RX for a handicap placard needs it by Wednesday, that is when she is getting her licensed renewed. Pt will .

## 2022-08-12 RX ORDER — ALBUTEROL SULFATE 90 UG/1
AEROSOL, METERED RESPIRATORY (INHALATION)
Qty: 54 G | Refills: 5 | Status: SHIPPED | OUTPATIENT
Start: 2022-08-12 | End: 2022-10-11 | Stop reason: SDUPTHER

## 2022-08-22 ENCOUNTER — TELEPHONE (OUTPATIENT)
Dept: INTERNAL MEDICINE CLINIC | Age: 72
End: 2022-08-22

## 2022-08-22 DIAGNOSIS — E11.65 TYPE 2 DIABETES MELLITUS WITH HYPERGLYCEMIA, WITHOUT LONG-TERM CURRENT USE OF INSULIN (HCC): Primary | ICD-10-CM

## 2022-08-22 RX ORDER — GLUCOSAMINE HCL/CHONDROITIN SU 500-400 MG
CAPSULE ORAL
Qty: 100 STRIP | Refills: 3 | Status: SHIPPED | OUTPATIENT
Start: 2022-08-22 | End: 2022-10-11 | Stop reason: SDUPTHER

## 2022-08-22 RX ORDER — LANCETS 30 GAUGE
EACH MISCELLANEOUS
Qty: 100 EACH | Refills: 3 | Status: SHIPPED | OUTPATIENT
Start: 2022-08-22 | End: 2022-11-03 | Stop reason: SDUPTHER

## 2022-08-24 RX ORDER — QUETIAPINE FUMARATE 25 MG/1
TABLET, FILM COATED ORAL
Qty: 180 TABLET | Refills: 0 | Status: SHIPPED | OUTPATIENT
Start: 2022-08-24

## 2022-08-25 ENCOUNTER — OFFICE VISIT (OUTPATIENT)
Dept: NEUROLOGY | Age: 72
End: 2022-08-25
Payer: MEDICARE

## 2022-08-25 VITALS
HEIGHT: 60 IN | WEIGHT: 101.4 LBS | DIASTOLIC BLOOD PRESSURE: 77 MMHG | SYSTOLIC BLOOD PRESSURE: 113 MMHG | HEART RATE: 87 BPM | BODY MASS INDEX: 19.91 KG/M2 | OXYGEN SATURATION: 92 %

## 2022-08-25 DIAGNOSIS — E78.2 MIXED HYPERLIPIDEMIA: ICD-10-CM

## 2022-08-25 DIAGNOSIS — E11.65 TYPE 2 DIABETES MELLITUS WITH HYPERGLYCEMIA, WITHOUT LONG-TERM CURRENT USE OF INSULIN (HCC): ICD-10-CM

## 2022-08-25 DIAGNOSIS — G25.0 ESSENTIAL TREMOR: ICD-10-CM

## 2022-08-25 DIAGNOSIS — G44.221 CHRONIC TENSION-TYPE HEADACHE, INTRACTABLE: Primary | ICD-10-CM

## 2022-08-25 PROCEDURE — 2022F DILAT RTA XM EVC RTNOPTHY: CPT | Performed by: PSYCHIATRY & NEUROLOGY

## 2022-08-25 PROCEDURE — 3044F HG A1C LEVEL LT 7.0%: CPT | Performed by: PSYCHIATRY & NEUROLOGY

## 2022-08-25 PROCEDURE — 1123F ACP DISCUSS/DSCN MKR DOCD: CPT | Performed by: PSYCHIATRY & NEUROLOGY

## 2022-08-25 PROCEDURE — 99213 OFFICE O/P EST LOW 20 MIN: CPT | Performed by: PSYCHIATRY & NEUROLOGY

## 2022-08-25 PROCEDURE — G8399 PT W/DXA RESULTS DOCUMENT: HCPCS | Performed by: PSYCHIATRY & NEUROLOGY

## 2022-08-25 PROCEDURE — G8420 CALC BMI NORM PARAMETERS: HCPCS | Performed by: PSYCHIATRY & NEUROLOGY

## 2022-08-25 PROCEDURE — 1036F TOBACCO NON-USER: CPT | Performed by: PSYCHIATRY & NEUROLOGY

## 2022-08-25 PROCEDURE — G8427 DOCREV CUR MEDS BY ELIG CLIN: HCPCS | Performed by: PSYCHIATRY & NEUROLOGY

## 2022-08-25 PROCEDURE — 1090F PRES/ABSN URINE INCON ASSESS: CPT | Performed by: PSYCHIATRY & NEUROLOGY

## 2022-08-25 PROCEDURE — 3017F COLORECTAL CA SCREEN DOC REV: CPT | Performed by: PSYCHIATRY & NEUROLOGY

## 2022-08-25 RX ORDER — TOPIRAMATE 50 MG/1
50 TABLET, FILM COATED ORAL 2 TIMES DAILY
Qty: 180 TABLET | Refills: 1 | Status: SHIPPED | OUTPATIENT
Start: 2022-08-25 | End: 2022-10-11 | Stop reason: SDUPTHER

## 2022-08-25 NOTE — PROGRESS NOTES
The patient came today for follow up regarding: chronic headaches      The patient came today by herself. She denies any worsening of her headaches since her last visit. She takes Topamax 50 mg twice daily. No side effect of Topamax. Headaches are sporadic can be once every several days. Degree is mild to moderate and duration is minutes. The same description with holocranial pain but no visual changes, weakness or numbness of falling. No other relieving or aggravating factors. She denies any worsening of memory or mood. She is independent. No recent falling or MVA. He is diabetic with A1c 5.6. She takes aspirin and statin. Other review of system was unremarkable except for occasional tremors in her hand.       Past Medical History:   Diagnosis Date    Acute on chronic respiratory failure with hypoxia ([address] 12/3/2009    Chronic airway obstruction, not elsewhere classified 3/4/08    oxygen 2L/min at HS and PRN through day    Chronic kidney disease     incontinent    COPD (chronic obstructive pulmonary disease) (HCC)     Depression     Displacement of lumbar intervertebral disc without myelopathy 8/13/07    Edema 9/13/07    Emphysema of lung ([facility])     Enthesopathy of hip region     Enthesopathy of hip region 3/5/07    Esophageal reflux 3/4/08    Hemorrhage of rectum and anus 11/14/07    Herpes zoster without complication     History of blood transfusion     Hyperlipidemia associated with type 2 diabetes mellitus ([address] 12/11/2017    Lumbar spondylosis 5/1/2018    Major depression, chronic 8/12/2015    Osteoporosis, unspecified 12/4/07    Other and unspecified hyperlipidemia 3/4/08    Pain in joint, shoulder region 3/4/08    Pneumonia     Pulmonary nodule     Recurrent major depressive disorder, in partial remission ([address] 5/4/2018    Rheumatic fever     S/P ileostomy ([address] 8/20/2011    Skin lesion of face 10/20/2021    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    Tension headache     Type 2 diabetes mellitus Jluis Melgar MD   venlafaxine (EFFEXOR XR) 75 MG extended release capsule TAKE 1 CAPSULE EVERY DAY Yes Jluis Melgar MD   MYRBETRIQ 50 MG TB24 TAKE 1 TABLET BY MOUTH DAILY Yes Jluis Melgar MD   esomeprazole ([address]) 40 MG delayed release capsule TAKE 1 Nicholas Finders Yes Jluis Melgar MD   furosemide (LASIX) 40 MG tablet TAKE 1 TABLET EVERY DAY Yes Jluis Melgar MD   simvastatin (ZOCOR) 20 MG tablet TAKE 1 TABLET EVERY EVENING Yes Jluis Melgar MD   alendronate (FOSAMAX) 35 MG tablet TAKE 1 TABLET EVERY 7 DAYS Yes Jluis Melgar MD   Roflumilast (DALIRESP) 500 MCG tablet TAKE 1 TABLET EVERY DAY Yes Jluis Melgar MD   tiotropium (SPIRIVA HANDIHALER) 18 MCG inhalation capsule Inhale 1 capsule into the lungs daily Yes Prabha Chaidez MD   ibuprofen (ADVIL;MOTRIN) 400 MG tablet Take 1 tablet by mouth every 6 hours as needed for Pain Yes SUAL Gonzalez CNP   albuterol (PROVENTIL) (2.5 MG/3ML) 0.083% nebulizer solution INHALE 3ML VIA NEBULIZATION ROUTE EVERY 6 HOURS AS NEEDED FOR WHEEZING OR SHORTNESS OF BREATH Yes Prabha Chaidez MD   diltiazem (CARDIZEM CD) 120 MG extended release capsule Take 1 capsule by mouth daily Yes Manda Portillo MD   guaiFENesin (MUCINEX) 600 MG extended release tablet Take 1 tablet by mouth 2 times daily Yes Manda Portillo MD   Cyanocobalamin (VITAMIN B-12 PO) Take by mouth Yes Historical Provider, MD   Blood Glucose Monitoring Suppl (ACCU-CHEK GAURANG SMARTVIEW) w/Device KIT Use to test once daily. DX:E11.9 Yes Jluis Melgar MD   Lancets Misc. (ACCU-CHEK FASTCLIX LANCET) KIT Use to test once daily.  DX:E11.9 Yes Jluis Melgar MD   vitamin D (CHOLECALCIFEROL) 400 UNITS TABS tablet Take 2 tablets by mouth daily Yes Prabha Chaidez MD   calcium carbonate (CALCIUM 600) 600 MG TABS tablet Take 2 tablets by mouth daily Yes Prabha Chaidez MD   Respiratory Therapy Supplies (NEBULIZER/TUBING/MOUTHPIECE) KIT 1 kit by Does not apply route daily as needed DX COPD J44.9 Yes Adenike Tee MD   Nebulizers (COMPRESSOR/NEBULIZER) MISC 1 Device by Does not apply route as needed DX COPD J44.9 Yes Adenike Tee MD   fluticasone (FLONASE) 50 MCG/ACT nasal spray 2 sprays by Nasal route daily. Yes Reggie Diaz MD   Misc. Devices (ACAPELLA) MISC by Does not apply route 4 times daily. Yes Adenike Tee MD   gabapentin (NEURONTIN) 300 MG capsule TAKE 1 CAPSULE BY MOUTH THREE TIMES DAILY FOR 30 DAYS  Reggie Diaz MD   calcium citrate-vitamin D (CITRACAL+D) 315-200 MG-UNIT per tablet Take 2 tablets by mouth 2 times daily  Reggie Diaz MD     No Known Allergies  Social History     Tobacco Use    Smoking status: Former     Packs/day: 1.00     Years: 30.00     Pack years: 30.00     Types: Cigarettes     Quit date: 1996     Years since quittin.6    Smokeless tobacco: Never   Substance Use Topics    Alcohol use: No     Alcohol/week: 0.0 standard drinks         Exam:   Constitutional:   Vitals:    22 1225   BP: 113/77   Site: Left Wrist   Position: Sitting   Pulse: 87   SpO2: 92%   Weight: 101 lb 6.4 oz (46 kg)   Height: 5' (1.524 m)       General appearance: well-nourished. Mental Status:   Oriented to person, place, problem, and time. Fluent speech. Good fund of knowledge. Normal attention span and concentration. Intact recent and remote memory  Cranial Nerves:   II: Pupils: equal, round, reactive to light  III,IV,VI: Extra Ocular Movements are intact. No nystagmus  V: Facial sensation is intact t  VII: Facial strength and movements: intact and symmetric  XII: Tongue movements are normal  Musculoskeletal: 5/5 in all 4 extremities. Normal tone. Mild postural hand tremors. No resting tremors. Coordination: No tremors or dysmetria  Sensation: normal to all modalities.   Gait/Posture: steady        ROS : A 10-12 system review of constitutional, cardiovascular, respiratory, musculoskeletal, endocrine, hematological, skin, SHEENT, genitourinary, psychiatric and neurologic systems was obtained and updated today which is unremarkable except as mentioned in my HPI  The same. Medical decision making:   I personally reviewed and updated social history, past medical history, medications, allergy, surgical history, and family history as documented in the patient's electronic health records. Labs  results reviewed and discussed with the patient. From 5-22   Reviewed notes from other physicians. Provided patient education regarding risk, benefits and treatment options as well as adherence to medication regimen and side effect from these medications. Assessment:   Diagnosis Orders   1. Chronic tension-type headache, intractable  topiramate (TOPAMAX) 50 MG tablet      2. Type 2 diabetes mellitus with hyperglycemia, without long-term current use of insulin ([facility])        3. Mixed hyperlipidemia        4. Essential tremor, new diagnosis. No signs of Parkinson tremors. No need to add new medication at this time giving risk of polypharmacy.                    Plan:  Continue Topamax 50 mg twice daily  6-month refill  Side effect discussed  Continue diabetic control  Follow A1c  Aspirin  Statin  Showed the patient regarding her benign tremors  Improving sleep hygiene  Continue current blood pressure medications  Melatonin as needed  Follow-up next year

## 2022-08-26 RX ORDER — MIRABEGRON 50 MG/1
TABLET, FILM COATED, EXTENDED RELEASE ORAL
Qty: 30 TABLET | Refills: 2 | Status: SHIPPED | OUTPATIENT
Start: 2022-08-26

## 2022-09-08 ENCOUNTER — OFFICE VISIT (OUTPATIENT)
Dept: INTERNAL MEDICINE CLINIC | Age: 72
End: 2022-09-08

## 2022-09-08 VITALS
HEART RATE: 70 BPM | RESPIRATION RATE: 12 BRPM | SYSTOLIC BLOOD PRESSURE: 130 MMHG | BODY MASS INDEX: 19.83 KG/M2 | DIASTOLIC BLOOD PRESSURE: 80 MMHG | HEIGHT: 60 IN | WEIGHT: 101 LBS

## 2022-09-08 DIAGNOSIS — E11.65 TYPE 2 DIABETES MELLITUS WITH HYPERGLYCEMIA, WITHOUT LONG-TERM CURRENT USE OF INSULIN (HCC): ICD-10-CM

## 2022-09-08 DIAGNOSIS — K21.9 GASTROESOPHAGEAL REFLUX DISEASE, UNSPECIFIED WHETHER ESOPHAGITIS PRESENT: ICD-10-CM

## 2022-09-08 DIAGNOSIS — J43.8 OTHER EMPHYSEMA (HCC): ICD-10-CM

## 2022-09-08 DIAGNOSIS — G44.221 CHRONIC TENSION-TYPE HEADACHE, INTRACTABLE: ICD-10-CM

## 2022-09-08 DIAGNOSIS — J96.11 CHRONIC RESPIRATORY FAILURE WITH HYPOXIA (HCC): ICD-10-CM

## 2022-09-08 DIAGNOSIS — F33.41 RECURRENT MAJOR DEPRESSIVE DISORDER, IN PARTIAL REMISSION (HCC): ICD-10-CM

## 2022-09-08 DIAGNOSIS — F51.01 PRIMARY INSOMNIA: ICD-10-CM

## 2022-09-08 DIAGNOSIS — E43 SEVERE PROTEIN-CALORIE MALNUTRITION (GOMEZ: LESS THAN 60% OF STANDARD WEIGHT) (HCC): ICD-10-CM

## 2022-09-08 DIAGNOSIS — E11.69 HYPERLIPIDEMIA ASSOCIATED WITH TYPE 2 DIABETES MELLITUS (HCC): ICD-10-CM

## 2022-09-08 DIAGNOSIS — Z23 NEED FOR INFLUENZA VACCINATION: ICD-10-CM

## 2022-09-08 DIAGNOSIS — E78.5 HYPERLIPIDEMIA ASSOCIATED WITH TYPE 2 DIABETES MELLITUS (HCC): ICD-10-CM

## 2022-09-08 DIAGNOSIS — E11.65 TYPE 2 DIABETES MELLITUS WITH HYPERGLYCEMIA, WITHOUT LONG-TERM CURRENT USE OF INSULIN (HCC): Primary | ICD-10-CM

## 2022-09-08 DIAGNOSIS — J20.9 ACUTE BRONCHITIS, UNSPECIFIED ORGANISM: ICD-10-CM

## 2022-09-08 LAB
BILIRUBIN, POC: NORMAL
BLOOD URINE, POC: NORMAL
CLARITY, POC: NORMAL
COLOR, POC: NORMAL
GLUCOSE URINE, POC: NORMAL
KETONES, POC: NORMAL
LEUKOCYTE EST, POC: NORMAL
NITRITE, POC: NORMAL
PH, POC: NORMAL
PROTEIN, POC: NORMAL
SPECIFIC GRAVITY, POC: NORMAL
UROBILINOGEN, POC: NORMAL

## 2022-09-08 PROCEDURE — G8399 PT W/DXA RESULTS DOCUMENT: HCPCS | Performed by: INTERNAL MEDICINE

## 2022-09-08 PROCEDURE — 3044F HG A1C LEVEL LT 7.0%: CPT | Performed by: INTERNAL MEDICINE

## 2022-09-08 PROCEDURE — 2022F DILAT RTA XM EVC RTNOPTHY: CPT | Performed by: INTERNAL MEDICINE

## 2022-09-08 PROCEDURE — 90662 IIV NO PRSV INCREASED AG IM: CPT | Performed by: INTERNAL MEDICINE

## 2022-09-08 PROCEDURE — 3017F COLORECTAL CA SCREEN DOC REV: CPT | Performed by: INTERNAL MEDICINE

## 2022-09-08 PROCEDURE — 1036F TOBACCO NON-USER: CPT | Performed by: INTERNAL MEDICINE

## 2022-09-08 PROCEDURE — 1090F PRES/ABSN URINE INCON ASSESS: CPT | Performed by: INTERNAL MEDICINE

## 2022-09-08 PROCEDURE — 1123F ACP DISCUSS/DSCN MKR DOCD: CPT | Performed by: INTERNAL MEDICINE

## 2022-09-08 PROCEDURE — 81002 URINALYSIS NONAUTO W/O SCOPE: CPT | Performed by: INTERNAL MEDICINE

## 2022-09-08 PROCEDURE — 99214 OFFICE O/P EST MOD 30 MIN: CPT | Performed by: INTERNAL MEDICINE

## 2022-09-08 PROCEDURE — G8427 DOCREV CUR MEDS BY ELIG CLIN: HCPCS | Performed by: INTERNAL MEDICINE

## 2022-09-08 PROCEDURE — 3023F SPIROM DOC REV: CPT | Performed by: INTERNAL MEDICINE

## 2022-09-08 PROCEDURE — G0008 ADMIN INFLUENZA VIRUS VAC: HCPCS | Performed by: INTERNAL MEDICINE

## 2022-09-08 PROCEDURE — G8420 CALC BMI NORM PARAMETERS: HCPCS | Performed by: INTERNAL MEDICINE

## 2022-09-08 RX ORDER — FLUTICASONE PROPIONATE 50 MCG
2 SPRAY, SUSPENSION (ML) NASAL DAILY
Qty: 3 EACH | Refills: 0 | Status: SHIPPED | OUTPATIENT
Start: 2022-09-08 | End: 2022-10-12 | Stop reason: SDUPTHER

## 2022-09-08 RX ORDER — DOXYCYCLINE HYCLATE 100 MG
100 TABLET ORAL 2 TIMES DAILY
Qty: 14 TABLET | Refills: 0 | Status: SHIPPED | OUTPATIENT
Start: 2022-09-08 | End: 2022-09-15

## 2022-09-08 ASSESSMENT — ENCOUNTER SYMPTOMS
SHORTNESS OF BREATH: 1
NAUSEA: 0
COUGH: 0
ABDOMINAL PAIN: 0
EYE DISCHARGE: 0
WHEEZING: 0
BACK PAIN: 1
VOMITING: 0
RHINORRHEA: 0

## 2022-09-08 ASSESSMENT — PATIENT HEALTH QUESTIONNAIRE - PHQ9
SUM OF ALL RESPONSES TO PHQ QUESTIONS 1-9: 0
2. FEELING DOWN, DEPRESSED OR HOPELESS: 0
SUM OF ALL RESPONSES TO PHQ QUESTIONS 1-9: 0
SUM OF ALL RESPONSES TO PHQ QUESTIONS 1-9: 0
SUM OF ALL RESPONSES TO PHQ9 QUESTIONS 1 & 2: 0
1. LITTLE INTEREST OR PLEASURE IN DOING THINGS: 0
SUM OF ALL RESPONSES TO PHQ QUESTIONS 1-9: 0

## 2022-09-08 NOTE — PROGRESS NOTES
Subjective:      Patient ID: Alan Goel is a 67 y.o. female. HPI  Patient is here for follow up on her diabetes, asthma, chronic respiratory failure, hyperlipidemia and arthritis. She is on Oxygen 2L at night and as needed during the day. Her diabetes is well controlled. It is between 90s-100s. Her weight is stable. She has severe COPD and asthma. Recent testing showed low oxygen. She does get some dyspnea off and on. Has intermittent productive coughing. She was exposed to little kids at a birthday party and has a cough. She has phlegm. No fever or chills. Her pulmonary nodule is stable. Chest CT done 1/8/2020, showing some new tree in bud group of micronodules. Emphysema is unchanged from the previous CT in 2018. She sees pulmonary regularly. Her hyperlipidemia is controlled. No myalgias. Her arthritis has been stable. Her tension HA are controlled. She is on Topamax. She has GERD. She is on Nexium. No heartburn. She has depression. She is on Effexor. It is stable. She is sleeping ok. She has gained weight. She is unvaccinated. Review of Systems   Constitutional:  Negative for appetite change, chills, fatigue and fever. HENT:  Negative for ear pain, postnasal drip and rhinorrhea. Eyes:  Negative for discharge. Respiratory:  Positive for shortness of breath. Negative for cough and wheezing. Cardiovascular:  Negative for chest pain and palpitations. Gastrointestinal:  Negative for abdominal pain, nausea and vomiting. Endocrine: Negative for polydipsia and polyphagia. Musculoskeletal:  Positive for back pain. Left hip pain     Skin:  Negative for rash. Psychiatric/Behavioral:  Positive for sleep disturbance. The patient is nervous/anxious.       Current Outpatient Medications   Medication Instructions    albuterol (PROVENTIL) (2.5 MG/3ML) 0.083% nebulizer solution INHALE 3ML VIA NEBULIZATION ROUTE EVERY 6 HOURS AS NEEDED FOR WHEEZING OR SHORTNESS OF BREATH albuterol sulfate HFA (PROVENTIL;VENTOLIN;PROAIR) 108 (90 Base) MCG/ACT inhaler INHALE 2 PUFFS INTO THE LUNGS EVERY 6 HOURS AS NEEDED FOR WHEEZING OR SHORTNESS OF BREATH    alendronate (FOSAMAX) 35 MG tablet TAKE 1 TABLET EVERY 7 DAYS    benzonatate (TESSALON) 100 MG capsule TAKE 1 CAPSULE THREE TIMES DAILY AS NEEDED FOR COUGH    blood glucose monitor kit and supplies Dispense sufficient amount for indicated testing frequency of once daily additional to accommodate PRN testing needs. Dispense all needed supplies to include: monitor, strips, lancing device, lancets, control solutions, alcohol swabs. blood glucose monitor strips Test once a day & as needed for symptoms of irregular blood glucose. Dispense sufficient amount for indicated testing frequency plus additional to accommodate PRN testing needs. Blood Glucose Monitoring Suppl (ACCU-CHEK GAURANG SMARTVIEW) w/Device KIT Use to test once daily. DX:E11.9    calcium carbonate (CALCIUM 600) 1,200 mg, Oral, DAILY    calcium citrate-vitamin D (CITRACAL+D) 315-200 MG-UNIT per tablet 2 tablets, Oral, 2 TIMES DAILY    Cyanocobalamin (VITAMIN B-12 PO) Oral    dilTIAZem (CARDIZEM CD) 120 mg, Oral, DAILY    doxycycline hyclate (VIBRA-TABS) 100 mg, Oral, 2 TIMES DAILY    esomeprazole (NEXIUM) 40 MG delayed release capsule TAKE 1 CAPSULE EVERY MORNING BEFORE BREAKFAST    fluticasone (FLONASE) 50 MCG/ACT nasal spray 2 sprays, Nasal, DAILY    fluticasone-salmeterol (WIXELA INHUB) 250-50 MCG/ACT AEPB diskus inhaler INHALE 1 PUFF INTO THE LUNGS EVERY 12 HOURS    furosemide (LASIX) 40 MG tablet TAKE 1 TABLET EVERY DAY    gabapentin (NEURONTIN) 300 MG capsule TAKE 1 CAPSULE BY MOUTH THREE TIMES DAILY FOR 30 DAYS    guaiFENesin (MUCINEX) 600 mg, Oral, 2 TIMES DAILY    ibuprofen (ADVIL;MOTRIN) 400 mg, Oral, EVERY 6 HOURS PRN    Januvia 100 mg, Oral, DAILY    Lancets Misc. (ACCU-CHEK FASTCLIX LANCET) KIT Use to test once daily. DX:E11.9    Lancets MISC Test once daily.  DX: E11.9 metFORMIN (GLUCOPHAGE-XR) 500 MG extended release tablet TAKE 2 TABLETS BY MOUTH TWICE DAILY    Misc. Devices (ACAPELLA) MISC Does not apply, 4 TIMES DAILY    MYRBETRIQ 50 MG TB24 TAKE 1 TABLET BY MOUTH DAILY    Nebulizers (COMPRESSOR/NEBULIZER) MISC 1 Device, Does not apply, PRN, DX COPD J44.9    potassium chloride (KLOR-CON M) 20 MEQ extended release tablet TAKE 1 TABLET EVERY DAY    QUEtiapine (SEROQUEL) 25 MG tablet TAKE 1 TO 2 TABLETS BY MOUTH AT BEDTIME    Respiratory Therapy Supplies (NEBULIZER/TUBING/MOUTHPIECE) KIT 1 kit, Does not apply, DAILY PRN, DX COPD J44.9    Roflumilast (DALIRESP) 500 MCG tablet TAKE 1 TABLET EVERY DAY    simvastatin (ZOCOR) 20 MG tablet TAKE 1 TABLET EVERY EVENING    Spiriva HandiHaler 18 mcg, Inhalation, DAILY    theophylline (THEODUR) 300 mg, Oral, DAILY    topiramate (TOPAMAX) 50 mg, Oral, 2 TIMES DAILY    venlafaxine (EFFEXOR XR) 75 MG extended release capsule TAKE 1 CAPSULE EVERY DAY    vitamin D (CHOLECALCIFEROL) 800 Units, Oral, DAILY           There are no changes to past medical history, family history, social history or review of systems(except as noted in the history section) since prior note (all reviewed with patient). /80 (Site: Right Upper Arm, Position: Sitting, Cuff Size: Medium Adult)   Pulse 70   Resp 12   Ht 5' (1.524 m)   Wt 101 lb (45.8 kg)   BMI 19.73 kg/m²      Objective:   Physical Exam  Constitutional:       Appearance: She is underweight. HENT:      Head: Normocephalic. Right Ear: No drainage. Tympanic membrane is not injected. Left Ear: No drainage. Tympanic membrane is not injected. Eyes:      Conjunctiva/sclera: Conjunctivae normal.      Pupils: Pupils are equal, round, and reactive to light. Neck:      Thyroid: No thyroid mass or thyromegaly. Vascular: No carotid bruit or JVD. Trachea: Trachea normal.   Cardiovascular:      Rate and Rhythm: Normal rate and regular rhythm. Heart sounds: Normal heart sounds. No murmur heard. No gallop. Pulmonary:      Effort: Pulmonary effort is normal. No respiratory distress. Breath sounds: Decreased air movement present. Decreased breath sounds present. No wheezing or rales. Chest:      Chest wall: No tenderness. Abdominal:      General: Bowel sounds are normal. There is no distension. Palpations: Abdomen is soft. There is no hepatomegaly, splenomegaly or mass. Tenderness: There is no abdominal tenderness. Musculoskeletal:      Cervical back: Normal range of motion and neck supple. Left knee: No swelling or effusion. Normal range of motion. Lymphadenopathy:      Cervical: No cervical adenopathy. Skin:     General: Skin is warm and dry. Neurological:      Mental Status: She is alert and oriented to person, place, and time. Psychiatric:         Behavior: Behavior normal.         Thought Content: Thought content normal.         Judgment: Judgment normal.             Assessment:       Diagnosis Orders   1. Type 2 diabetes mellitus with hyperglycemia, without long-term current use of insulin ([facility])  Uric Acid    POCT Urinalysis no Micro    Microalbumin / Creatinine Urine Ratio    Hemoglobin A1C    HM DIABETES FOOT EXAM      2. Chronic tension-type headache, intractable        3. Hyperlipidemia associated with type 2 diabetes mellitus ([address]        4. Severe protein-calorie malnutrition Marcos Fam: less than 60% of standard weight) ([address]        5. Gastroesophageal reflux disease, unspecified whether esophagitis present        6. Chronic respiratory failure with hypoxia ([facility])        7. Other emphysema ([address]        8. Primary insomnia        9. Acute bronchitis, unspecified organism        10. Recurrent major depressive disorder, in partial remission ([facility]        11. Need for influenza vaccination  Influenza, FLUZONE HIGH-DOSE, (age 72 y+), IM, Preservative Free, 0.7 mL             Plan:          Acute bronchitis. Treat with Doxy.  Her lungs are clear but she has a cough. DM type 2: well controlled. Check labs. COPD: she is using 2 L of oxygen at hs and prn during the day. Dyspnea likely related to her lungs. Refused covid vaccine. Chronic respiratory failure. On 2 l.  HA are stable. On topamax. Sees neurology. Depression is stable on meds. GERD is stale. Edema is stable. 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 Severe PCM. Doing better. She has a Hoveround     The current medical regimen is effective;  continue present plan and medications. See orders.

## 2022-09-09 LAB
CREATININE URINE: 115 MG/DL (ref 28–259)
ESTIMATED AVERAGE GLUCOSE: 125.5 MG/DL
HBA1C MFR BLD: 6 %
MICROALBUMIN UR-MCNC: <1.2 MG/DL
MICROALBUMIN/CREAT UR-RTO: NORMAL MG/G (ref 0–30)
URIC ACID, SERUM: 7.3 MG/DL (ref 2.6–6)

## 2022-10-06 ENCOUNTER — TELEPHONE (OUTPATIENT)
Dept: INTERNAL MEDICINE CLINIC | Age: 72
End: 2022-10-06

## 2022-10-06 RX ORDER — PREDNISONE 20 MG/1
40 TABLET ORAL DAILY
Qty: 10 TABLET | Refills: 0 | Status: SHIPPED | OUTPATIENT
Start: 2022-10-06 | End: 2022-10-11

## 2022-10-06 NOTE — TELEPHONE ENCOUNTER
----- Message from Stephanie Jaramillo sent at 10/6/2022  2:17 PM EDT -----  Contact: [phone] (M)  Per Dr. Linda De La O- prednisone 40 mg daily for 5 days  ----- Message -----  From: Rubén Kruger MA  Sent: 10/6/2022   1:37 PM EDT  To: Daphine Paget, MD    Pt called she is having sob this past week with some slight chest congestion she is using her inhalers more frequently and is request a steroid to be called in please.  She is having no other symptoms         [facility] [address] [place] [address], [address] [place] [address], S.. (Ph: [phone])

## 2022-10-10 DIAGNOSIS — E11.65 TYPE 2 DIABETES MELLITUS WITH HYPERGLYCEMIA, WITHOUT LONG-TERM CURRENT USE OF INSULIN (HCC): ICD-10-CM

## 2022-10-10 DIAGNOSIS — G44.221 CHRONIC TENSION-TYPE HEADACHE, INTRACTABLE: ICD-10-CM

## 2022-10-10 RX ORDER — FLUTICASONE PROPIONATE AND SALMETEROL 250; 50 UG/1; UG/1
POWDER RESPIRATORY (INHALATION)
Qty: 180 EACH | Refills: 1 | Status: SHIPPED | OUTPATIENT
Start: 2022-10-10

## 2022-10-11 RX ORDER — DILTIAZEM HYDROCHLORIDE 120 MG/1
120 CAPSULE, COATED, EXTENDED RELEASE ORAL DAILY
Qty: 90 CAPSULE | Refills: 0 | Status: SHIPPED | OUTPATIENT
Start: 2022-10-11

## 2022-10-11 RX ORDER — FUROSEMIDE 40 MG/1
TABLET ORAL
Qty: 90 TABLET | Refills: 0 | Status: SHIPPED | OUTPATIENT
Start: 2022-10-11

## 2022-10-11 RX ORDER — VENLAFAXINE HYDROCHLORIDE 75 MG/1
CAPSULE, EXTENDED RELEASE ORAL
Qty: 90 CAPSULE | Refills: 0 | Status: SHIPPED | OUTPATIENT
Start: 2022-10-11

## 2022-10-11 RX ORDER — THEOPHYLLINE 300 MG/1
300 TABLET, EXTENDED RELEASE ORAL DAILY
Qty: 90 TABLET | Refills: 0 | Status: SHIPPED | OUTPATIENT
Start: 2022-10-11

## 2022-10-11 RX ORDER — POTASSIUM CHLORIDE 20 MEQ/1
TABLET, EXTENDED RELEASE ORAL
Qty: 90 TABLET | Refills: 0 | Status: SHIPPED | OUTPATIENT
Start: 2022-10-11

## 2022-10-11 RX ORDER — TOPIRAMATE 50 MG/1
50 TABLET, FILM COATED ORAL 2 TIMES DAILY
Qty: 180 TABLET | Refills: 0 | Status: SHIPPED | OUTPATIENT
Start: 2022-10-11

## 2022-10-11 RX ORDER — ALENDRONATE SODIUM 35 MG/1
TABLET ORAL
Qty: 12 TABLET | Refills: 0 | Status: SHIPPED | OUTPATIENT
Start: 2022-10-11

## 2022-10-11 RX ORDER — GLUCOSAMINE HCL/CHONDROITIN SU 500-400 MG
CAPSULE ORAL
Qty: 100 STRIP | Refills: 3 | Status: SHIPPED | OUTPATIENT
Start: 2022-10-11

## 2022-10-11 RX ORDER — ALBUTEROL SULFATE 90 UG/1
AEROSOL, METERED RESPIRATORY (INHALATION)
Qty: 54 G | Refills: 0 | Status: SHIPPED | OUTPATIENT
Start: 2022-10-11

## 2022-10-12 RX ORDER — FLUTICASONE PROPIONATE 50 MCG
2 SPRAY, SUSPENSION (ML) NASAL DAILY
Qty: 3 EACH | Refills: 0 | Status: SHIPPED | OUTPATIENT
Start: 2022-10-12

## 2022-10-12 RX ORDER — ESOMEPRAZOLE MAGNESIUM 40 MG/1
CAPSULE, DELAYED RELEASE ORAL
Qty: 90 CAPSULE | Refills: 0 | Status: SHIPPED | OUTPATIENT
Start: 2022-10-12

## 2022-10-20 ENCOUNTER — OFFICE VISIT (OUTPATIENT)
Dept: PULMONOLOGY | Age: 72
End: 2022-10-20
Payer: MEDICARE

## 2022-10-20 VITALS
HEIGHT: 60 IN | WEIGHT: 105 LBS | BODY MASS INDEX: 20.62 KG/M2 | DIASTOLIC BLOOD PRESSURE: 72 MMHG | HEART RATE: 94 BPM | OXYGEN SATURATION: 96 % | RESPIRATION RATE: 28 BRPM | SYSTOLIC BLOOD PRESSURE: 123 MMHG

## 2022-10-20 DIAGNOSIS — J44.9 COPD, VERY SEVERE (HCC): Primary | ICD-10-CM

## 2022-10-20 DIAGNOSIS — R93.89 ABNORMAL CT OF THE CHEST: ICD-10-CM

## 2022-10-20 DIAGNOSIS — J47.9 BRONCHIECTASIS WITHOUT COMPLICATION (HCC): ICD-10-CM

## 2022-10-20 PROCEDURE — 3023F SPIROM DOC REV: CPT | Performed by: INTERNAL MEDICINE

## 2022-10-20 PROCEDURE — 3017F COLORECTAL CA SCREEN DOC REV: CPT | Performed by: INTERNAL MEDICINE

## 2022-10-20 PROCEDURE — G8420 CALC BMI NORM PARAMETERS: HCPCS | Performed by: INTERNAL MEDICINE

## 2022-10-20 PROCEDURE — 1090F PRES/ABSN URINE INCON ASSESS: CPT | Performed by: INTERNAL MEDICINE

## 2022-10-20 PROCEDURE — 1036F TOBACCO NON-USER: CPT | Performed by: INTERNAL MEDICINE

## 2022-10-20 PROCEDURE — 1123F ACP DISCUSS/DSCN MKR DOCD: CPT | Performed by: INTERNAL MEDICINE

## 2022-10-20 PROCEDURE — G8399 PT W/DXA RESULTS DOCUMENT: HCPCS | Performed by: INTERNAL MEDICINE

## 2022-10-20 PROCEDURE — G8427 DOCREV CUR MEDS BY ELIG CLIN: HCPCS | Performed by: INTERNAL MEDICINE

## 2022-10-20 PROCEDURE — G8484 FLU IMMUNIZE NO ADMIN: HCPCS | Performed by: INTERNAL MEDICINE

## 2022-10-20 PROCEDURE — 99214 OFFICE O/P EST MOD 30 MIN: CPT | Performed by: INTERNAL MEDICINE

## 2022-10-20 RX ORDER — DOXYCYCLINE HYCLATE 100 MG/1
100 CAPSULE ORAL 2 TIMES DAILY
Qty: 10 CAPSULE | Refills: 0 | Status: SHIPPED | OUTPATIENT
Start: 2022-10-20 | End: 2022-10-25

## 2022-10-20 RX ORDER — PREDNISONE 10 MG/1
TABLET ORAL
Qty: 30 TABLET | Refills: 0 | Status: SHIPPED | OUTPATIENT
Start: 2022-10-20 | End: 2022-10-30

## 2022-10-20 NOTE — PROGRESS NOTES
[facility]                                                            Outpatient Follow Up Note    CHIEF COMPLAINT: Follow up COPD        HPI:  Doing good  Some cough with milky white sputum  No hemoptysis   Patient is compliant with inhaled bronchodilators and O2.          Past Medical History:   Diagnosis Date    Acute on chronic respiratory failure with hypoxia ([address] 12/3/2009    Chronic airway obstruction, not elsewhere classified 3/4/08    oxygen 2L/min at HS and PRN through day    Chronic kidney disease     incontinent    COPD (chronic obstructive pulmonary disease) (HCC)     Depression     Displacement of lumbar intervertebral disc without myelopathy 8/13/07    Edema 9/13/07    Emphysema of lung ([facility])     Enthesopathy of hip region     Enthesopathy of hip region 3/5/07    Esophageal reflux 3/4/08    Hemorrhage of rectum and anus 11/14/07    Herpes zoster without complication     History of blood transfusion     Hyperlipidemia associated with type 2 diabetes mellitus ([address] 12/11/2017    Lumbar spondylosis 5/1/2018    Major depression, chronic 8/12/2015    Osteoporosis, unspecified 12/4/07    Other and unspecified hyperlipidemia 3/4/08    Pain in joint, shoulder region 3/4/08    Pneumonia     Pulmonary nodule     Recurrent major depressive disorder, in partial remission ([address] 5/4/2018    Rheumatic fever     S/P ileostomy ([address] 8/20/2011    Skin lesion of face 10/20/2021    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    Tension headache     Type 2 diabetes mellitus with hyperglycemia, without long-term current use of insulin ([facility])     Unspecified asthma(493.90) 8/13/07    Unspecified chronic bronchitis ([address]     Ventral hernia 6/29/2014       Past Surgical History:        Procedure Laterality Date    ABDOMEN SURGERY  5-19-11    total abdominal colectomy iliostomy    CARPAL TUNNEL RELEASE      right    CATARACT REMOVAL Bilateral     L 6/18/2013, R 07/01/2013    CATARACT REMOVAL WITH IMPLANT Right 7/1/13    COLECTOMY      and reversal     COLONOSCOPY  2009, 11/4/2011, 10/28/15    normal    CYST REMOVAL Right 6/20/14    Dr. Gracie Harrison; right ear pressure point cyst removal    DILATATION, ESOPHAGUS      ENDOSCOPY, COLON, DIAGNOSTIC      EPIDURAL STEROID INJECTION Bilateral 7/9/2019    BILATERAL LUMBAR FOUR TRANSFORAMINAL EPIDURAL STEROID INJECTION SITE CONFIRMED BY FLUOROSCOPY performed by López Buck MD at [facility] Left 6/18/13    cataract with lens implant    HERNIA REPAIR  6/27/14    Open Vental Hernia Repair    HIP SURGERY      HYSTERECTOMY (CERVIX STATUS UNKNOWN)  1973    subtotal    NECK SURGERY      incision of windpipe, repair of windpipe defect    OTHER SURGICAL HISTORY  8/19/2011    hand assisted laparoscopic ileostomy reversal    OVARY REMOVAL      TRACHEAL SURGERY  2005    hx of trach     UMBILICAL HERNIA REPAIR  6/27/2014       Allergies:  has No Known Allergies. Social History:    TOBACCO:   reports that she quit smoking about 26 years ago. Her smoking use included cigarettes. She has a 30.00 pack-year smoking history. She has never used smokeless tobacco.  ETOH:   reports no history of alcohol use.       Family History:       Problem Relation Age of Onset    Asthma Daughter     Diabetes Daughter     Cancer Daughter         Uterine    Alcohol Abuse Daughter     Asthma Daughter     Diabetes Mother     Heart Failure Mother     Hypertension Mother     Heart Failure Brother     Diabetes Sister     Heart Failure Brother     Coronary Art Dis Son     Emphysema Neg Hx        Current Medications:    Current Outpatient Medications:     esomeprazole (NEXIUM) 40 MG delayed release capsule, TAKE 1 CAPSULE EVERY MORNING BEFORE BREAKFAST, Disp: 90 capsule, Rfl: 0    fluticasone (FLONASE) 50 MCG/ACT nasal spray, 2 sprays by Nasal route daily, Disp: 3 each, Rfl: 0    venlafaxine (EFFEXOR XR) 75 MG extended release capsule, TAKE 1 CAPSULE EVERY DAY, Disp: 90 capsule, Rfl: 0    dilTIAZem (CARDIZEM CD) 120 MG extended release capsule, Take 1 capsule by mouth daily, Disp: 90 capsule, Rfl: 0    theophylline (THEODUR) 300 MG extended release tablet, Take 1 tablet by mouth daily, Disp: 90 tablet, Rfl: 0    topiramate (TOPAMAX) 50 MG tablet, Take 1 tablet by mouth 2 times daily, Disp: 180 tablet, Rfl: 0    SITagliptin (JANUVIA) 100 MG tablet, Take 1 tablet by mouth daily, Disp: 90 tablet, Rfl: 0    furosemide (LASIX) 40 MG tablet, TAKE 1 TABLET EVERY DAY, Disp: 90 tablet, Rfl: 0    potassium chloride (KLOR-CON M) 20 MEQ extended release tablet, TAKE 1 TABLET EVERY DAY, Disp: 90 tablet, Rfl: 0    albuterol sulfate HFA (PROVENTIL;VENTOLIN;PROAIR) 108 (90 Base) MCG/ACT inhaler, INHALE 2 PUFFS INTO THE LUNGS EVERY 6 HOURS AS NEEDED FOR WHEEZING OR SHORTNESS OF BREATH, Disp: 54 g, Rfl: 0    alendronate (FOSAMAX) 35 MG tablet, TAKE 1 TABLET EVERY 7 DAYS, Disp: 12 tablet, Rfl: 0    fluticasone-salmeterol (WIXELA INHUB) 250-50 MCG/ACT AEPB diskus inhaler, INHALE 1 PUFF INTO THE LUNGS EVERY 12 HOURS, Disp: 180 each, Rfl: 1    MYRBETRIQ 50 MG TB24, TAKE 1 TABLET BY MOUTH DAILY, Disp: 30 tablet, Rfl: 2    QUEtiapine (SEROQUEL) 25 MG tablet, TAKE 1 TO 2 TABLETS BY MOUTH AT BEDTIME (Patient taking differently: 2-3 tabs nightly), Disp: 180 tablet, Rfl: 0    metFORMIN (GLUCOPHAGE-XR) 500 MG extended release tablet, TAKE 2 TABLETS BY MOUTH TWICE DAILY, Disp: 360 tablet, Rfl: 0    benzonatate (TESSALON) 100 MG capsule, TAKE 1 CAPSULE THREE TIMES DAILY AS NEEDED FOR COUGH, Disp: 90 capsule, Rfl: 3    simvastatin (ZOCOR) 20 MG tablet, TAKE 1 TABLET EVERY EVENING, Disp: 90 tablet, Rfl: 0    Roflumilast (DALIRESP) 500 MCG tablet, TAKE 1 TABLET EVERY DAY, Disp: 90 tablet, Rfl: 0    tiotropium (SPIRIVA HANDIHALER) 18 MCG inhalation capsule, Inhale 1 capsule into the lungs daily, Disp: 90 capsule, Rfl: 1    ibuprofen (ADVIL;MOTRIN) 400 MG tablet, Take 1 tablet by mouth every 6 hours as needed for Pain, Disp: 20 tablet, Rfl: 0    albuterol (PROVENTIL) (2.5 MG/3ML) 0.083% nebulizer solution, INHALE 3ML VIA NEBULIZATION ROUTE EVERY 6 HOURS AS NEEDED FOR WHEEZING OR SHORTNESS OF BREATH, Disp: 375 mL, Rfl: 5    gabapentin (NEURONTIN) 300 MG capsule, TAKE 1 CAPSULE BY MOUTH THREE TIMES DAILY FOR 30 DAYS, Disp: 90 capsule, Rfl: 0    calcium citrate-vitamin D (CITRACAL+D) 315-200 MG-UNIT per tablet, Take 2 tablets by mouth 2 times daily, Disp: 360 tablet, Rfl: 0    guaiFENesin (MUCINEX) 600 MG extended release tablet, Take 1 tablet by mouth 2 times daily, Disp: 60 tablet, Rfl: 1    Cyanocobalamin (VITAMIN B-12 PO), Take by mouth, Disp: , Rfl:     vitamin D (CHOLECALCIFEROL) 400 UNITS TABS tablet, Take 2 tablets by mouth daily, Disp: 180 tablet, Rfl: 1    calcium carbonate (CALCIUM 600) 600 MG TABS tablet, Take 2 tablets by mouth daily, Disp: 180 tablet, Rfl: 1    blood glucose monitor strips, Test once a day & as needed for symptoms of irregular blood glucose. Dispense sufficient amount for indicated testing frequency plus additional to accommodate PRN testing needs. , Disp: 100 strip, Rfl: 3    blood glucose monitor kit and supplies, Dispense sufficient amount for indicated testing frequency of once daily additional to accommodate PRN testing needs. Dispense all needed supplies to include: monitor, strips, lancing device, lancets, control solutions, alcohol swabs., Disp: 1 kit, Rfl: 0    Lancets MISC, Test once daily. DX: E11.9, Disp: 100 each, Rfl: 3    Blood Glucose Monitoring Suppl (ACCU-CHEK GAURANG SMARTVIEW) w/Device KIT, Use to test once daily. DX:E11.9, Disp: 1 kit, Rfl: 0    Lancets Misc. (ACCU-CHEK FASTCLIX LANCET) KIT, Use to test once daily.  DX:E11.9, Disp: 1 kit, Rfl: 2    Respiratory Therapy Supplies (NEBULIZER/TUBING/MOUTHPIECE) KIT, 1 kit by Does not apply route daily as needed DX COPD J44.9, Disp: 1 kit, Rfl: 0    Nebulizers (COMPRESSOR/NEBULIZER) MISC, 1 Device by Does not apply route as needed DX COPD J44.9, Disp: 1 each, Rfl: 0    Misc. Devices (ACAPELLA) MISC, by Does not apply route 4 times daily. , Disp: 1 each, Rfl: 0           Objective:   /72   Pulse 94   Resp 28   Ht 5' (1.524 m)   Wt 105 lb (47.6 kg)   SpO2 96% Comment: on Ra  BMI 20.51 kg/m²   Gen: No distress. Ill appearing   Eyes: PERRL. No sclera icterus. No conjunctival injection. ENT: No discharge. Pharynx clear. Neck: Trachea midline. No obvious mass. Resp: No accessory muscle use. No crackles. No wheezes. No rhonchi. No dullness on percussion. Decreased air entry. CV: Regular rate. Regular rhythm. No murmur or rub. No edema. GI: Non-tender. Non-distended. No hernia. Skin: Warm and dry. No nodule on exposed extremities. Lymph: No cervical LAD. No supraclavicular LAD. M/S: No cyanosis. No joint deformity. No clubbing. Neuro: Awake. Alert. Moves all four extremities. Psych: Oriented x 3. No anxiety. DATA reviewed by me:   PFTs 07/26/2021 FEV1 0.63L(34%) TLC 5.41L(123%) DLCO 8.71(44%) 6MW 420 LO2 89%   PFTs 03/29/2017 FEV1 0.55L(27%) TLC 5.21L(117%) DLCO 8.47(42%) 6MW 770 2L exertion   PFTs 07/07/2015 FEV1 0.63L(31%) TLC 5.12L(114%) DLCO 9.6(48%)   PFTs 12/06/2011 FEV1 0.83L(45%) TLC 5.14L(125%) DLCO 8.6 (49%)   PFTs 07/10/2008 FEV1 0.77L           TLC 5.41L            DLCO 7.62  PFTs 01/15/2007 FEV1 0.59L           TLC 5.15L            DLCO 10.82          CT chest 7/8/2020   Stable CT chest  No change in tree-in-bud nodularity right middle lobe  Unchanged few additional noncalcified nodules back to 2018  Emphysema with mild bronchiectasis    CT chest 7/8/2021   Increased tree-in-bud nodularity lingula right lower lobe with new focal nodule left lower lobe and right lower lobe. Stable right middle lobe nodularity    CT chest 10/13/2021   1. Stable right middle lobe pulmonary nodule.   2. Bronchiectasis with tree in bud nodularity in both lungs that would favor  a chronic history of bronchiolitis. No interval detrimental change. 3. Increasing peripheral airspace opacification in the left lower lobe. This  likely represents progressive interstitial change or atelectasis. CT chest 4/18/2022   Stable benign granulomatous changes  No suspicious abnormalities       Sputum AFB 10/26/2021 negative      Assessment:      Very severe COPD/Pulmonary emphysema  Pulmonary Bronchiectasis  Abnormal CT chest 1/8/2020 with right middle lobe new tree-in-bud nodules-favor inflammatory. Stable on repeat CT chest.  Most recent 4/18/2022. New tree-in-bud nodularity lingula, RLL, LLL on CT 7/8/2021- favor inflammatory/infection. Negative sputum AFB. Improvement/resolution on repeat CT, most recent 4/18/2022. R pulmonary nodules. Stable over 2 years . Hypoxia on exertion  Off Daliresp given weight loss   Has 7 dogs at home   20 pack years. Quit smoking 1996. Plan:       CT chest April 2023  Continue Wixella BID, Spiriva INH daily and Albuterol INH/Neb PRN  Continue Theophylline   Prednisone taper and Doxycycline 100 mg BID x 5 days for COPD AE self management if needed. Continue O2 1-2LPM on exertion.  Advised to titrate O2 using her pulse oximeter- target O2 sat 90-92%  Patient is up to date with pneumococcal vaccine, and influenza vaccine   Declined Covid vaccine   Acapella and Mucinex   Follow up after CT chest

## 2022-10-24 RX ORDER — METFORMIN HYDROCHLORIDE 500 MG/1
TABLET, EXTENDED RELEASE ORAL
Qty: 360 TABLET | Refills: 0 | Status: SHIPPED | OUTPATIENT
Start: 2022-10-24

## 2022-10-24 RX ORDER — SITAGLIPTIN 100 MG/1
TABLET, FILM COATED ORAL
Qty: 90 TABLET | Refills: 0 | OUTPATIENT
Start: 2022-10-24

## 2022-11-03 DIAGNOSIS — J44.9 STAGE 3 SEVERE COPD BY GOLD CLASSIFICATION (HCC): ICD-10-CM

## 2022-11-03 DIAGNOSIS — E11.65 TYPE 2 DIABETES MELLITUS WITH HYPERGLYCEMIA, WITHOUT LONG-TERM CURRENT USE OF INSULIN (HCC): ICD-10-CM

## 2022-11-03 RX ORDER — LANCETS 30 GAUGE
EACH MISCELLANEOUS
Qty: 100 EACH | Refills: 3 | Status: SHIPPED | OUTPATIENT
Start: 2022-11-03 | End: 2022-11-07 | Stop reason: SDUPTHER

## 2022-11-03 RX ORDER — TIOTROPIUM BROMIDE 18 UG/1
18 CAPSULE ORAL; RESPIRATORY (INHALATION) DAILY
Qty: 90 CAPSULE | Refills: 0 | Status: SHIPPED | OUTPATIENT
Start: 2022-11-03

## 2022-11-03 RX ORDER — ALBUTEROL SULFATE 2.5 MG/3ML
SOLUTION RESPIRATORY (INHALATION)
Qty: 1080 ML | Refills: 0 | Status: SHIPPED | OUTPATIENT
Start: 2022-11-03

## 2022-11-03 RX ORDER — BLOOD-GLUCOSE METER
EACH MISCELLANEOUS
Qty: 1 KIT | Refills: 0 | Status: SHIPPED | OUTPATIENT
Start: 2022-11-03

## 2022-11-07 DIAGNOSIS — E11.65 TYPE 2 DIABETES MELLITUS WITH HYPERGLYCEMIA, WITHOUT LONG-TERM CURRENT USE OF INSULIN (HCC): ICD-10-CM

## 2022-11-07 RX ORDER — LANCETS 30 GAUGE
EACH MISCELLANEOUS
Qty: 100 EACH | Refills: 3 | Status: SHIPPED | OUTPATIENT
Start: 2022-11-07

## 2022-11-21 RX ORDER — TIOTROPIUM BROMIDE 18 UG/1
CAPSULE ORAL; RESPIRATORY (INHALATION)
Qty: 90 CAPSULE | Refills: 0 | OUTPATIENT
Start: 2022-11-21

## 2022-11-22 RX ORDER — QUETIAPINE FUMARATE 25 MG/1
TABLET, FILM COATED ORAL
Qty: 180 TABLET | Refills: 0 | Status: SHIPPED | OUTPATIENT
Start: 2022-11-22

## 2022-11-22 RX ORDER — ALENDRONATE SODIUM 35 MG/1
TABLET ORAL
Qty: 12 TABLET | Refills: 0 | Status: SHIPPED | OUTPATIENT
Start: 2022-11-22

## 2022-11-26 DIAGNOSIS — G44.221 CHRONIC TENSION-TYPE HEADACHE, INTRACTABLE: ICD-10-CM

## 2022-11-29 RX ORDER — VENLAFAXINE HYDROCHLORIDE 75 MG/1
CAPSULE, EXTENDED RELEASE ORAL
Qty: 90 CAPSULE | Refills: 3 | Status: SHIPPED | OUTPATIENT
Start: 2022-11-29

## 2022-11-29 RX ORDER — POTASSIUM CHLORIDE 20 MEQ/1
TABLET, EXTENDED RELEASE ORAL
Qty: 90 TABLET | Refills: 3 | Status: SHIPPED | OUTPATIENT
Start: 2022-11-29

## 2022-11-29 RX ORDER — ALBUTEROL SULFATE 90 UG/1
AEROSOL, METERED RESPIRATORY (INHALATION)
Qty: 34 G | Refills: 3 | Status: SHIPPED | OUTPATIENT
Start: 2022-11-29

## 2022-11-29 RX ORDER — FUROSEMIDE 40 MG/1
TABLET ORAL
Qty: 90 TABLET | Refills: 3 | Status: SHIPPED | OUTPATIENT
Start: 2022-11-29

## 2022-11-29 RX ORDER — TOPIRAMATE 50 MG/1
TABLET, FILM COATED ORAL
Qty: 180 TABLET | Refills: 3 | Status: SHIPPED | OUTPATIENT
Start: 2022-11-29

## 2022-12-13 RX ORDER — ESOMEPRAZOLE MAGNESIUM 40 MG/1
CAPSULE, DELAYED RELEASE ORAL
Qty: 90 CAPSULE | Refills: 0 | Status: SHIPPED | OUTPATIENT
Start: 2022-12-13

## 2022-12-14 RX ORDER — SITAGLIPTIN 100 MG/1
TABLET, FILM COATED ORAL
Qty: 90 TABLET | Refills: 0 | Status: SHIPPED | OUTPATIENT
Start: 2022-12-14

## 2022-12-14 RX ORDER — THEOPHYLLINE 300 MG/1
300 TABLET, EXTENDED RELEASE ORAL DAILY
Qty: 90 TABLET | Refills: 0 | Status: SHIPPED | OUTPATIENT
Start: 2022-12-14

## 2022-12-14 RX ORDER — DILTIAZEM HYDROCHLORIDE 120 MG/1
120 CAPSULE, COATED, EXTENDED RELEASE ORAL DAILY
Qty: 90 CAPSULE | Refills: 0 | Status: SHIPPED | OUTPATIENT
Start: 2022-12-14

## 2022-12-20 RX ORDER — TIOTROPIUM BROMIDE 18 UG/1
CAPSULE ORAL; RESPIRATORY (INHALATION)
Qty: 90 CAPSULE | Refills: 0 | Status: SHIPPED | OUTPATIENT
Start: 2022-12-20

## 2023-01-09 ENCOUNTER — OFFICE VISIT (OUTPATIENT)
Dept: INTERNAL MEDICINE CLINIC | Age: 73
End: 2023-01-09

## 2023-01-09 VITALS
OXYGEN SATURATION: 96 % | WEIGHT: 99 LBS | BODY MASS INDEX: 19.44 KG/M2 | HEIGHT: 60 IN | RESPIRATION RATE: 12 BRPM | HEART RATE: 120 BPM | DIASTOLIC BLOOD PRESSURE: 80 MMHG | SYSTOLIC BLOOD PRESSURE: 130 MMHG

## 2023-01-09 DIAGNOSIS — G44.221 CHRONIC TENSION-TYPE HEADACHE, INTRACTABLE: ICD-10-CM

## 2023-01-09 DIAGNOSIS — E11.65 TYPE 2 DIABETES MELLITUS WITH HYPERGLYCEMIA, WITHOUT LONG-TERM CURRENT USE OF INSULIN (HCC): ICD-10-CM

## 2023-01-09 DIAGNOSIS — J96.11 CHRONIC RESPIRATORY FAILURE WITH HYPOXIA (HCC): ICD-10-CM

## 2023-01-09 DIAGNOSIS — E78.2 MIXED HYPERLIPIDEMIA: ICD-10-CM

## 2023-01-09 DIAGNOSIS — E11.69 HYPERLIPIDEMIA ASSOCIATED WITH TYPE 2 DIABETES MELLITUS (HCC): ICD-10-CM

## 2023-01-09 DIAGNOSIS — E78.5 HYPERLIPIDEMIA ASSOCIATED WITH TYPE 2 DIABETES MELLITUS (HCC): ICD-10-CM

## 2023-01-09 DIAGNOSIS — R25.1 TREMOR: ICD-10-CM

## 2023-01-09 DIAGNOSIS — F51.01 PRIMARY INSOMNIA: ICD-10-CM

## 2023-01-09 DIAGNOSIS — J44.9 STAGE 3 SEVERE COPD BY GOLD CLASSIFICATION (HCC): ICD-10-CM

## 2023-01-09 DIAGNOSIS — Z00.00 MEDICARE ANNUAL WELLNESS VISIT, SUBSEQUENT: Primary | ICD-10-CM

## 2023-01-09 LAB
A/G RATIO: 1.1 (ref 1.1–2.2)
ALBUMIN SERPL-MCNC: 3.7 G/DL (ref 3.4–5)
ALP BLD-CCNC: 88 U/L (ref 40–129)
ALT SERPL-CCNC: 6 U/L (ref 10–40)
ANION GAP SERPL CALCULATED.3IONS-SCNC: 16 MMOL/L (ref 3–16)
AST SERPL-CCNC: 11 U/L (ref 15–37)
BASOPHILS ABSOLUTE: 0.1 K/UL (ref 0–0.2)
BASOPHILS RELATIVE PERCENT: 0.7 %
BILIRUB SERPL-MCNC: <0.2 MG/DL (ref 0–1)
BILIRUBIN, POC: NORMAL
BLOOD URINE, POC: NORMAL
BUN BLDV-MCNC: 17 MG/DL (ref 7–20)
CALCIUM SERPL-MCNC: 9.7 MG/DL (ref 8.3–10.6)
CHLORIDE BLD-SCNC: 105 MMOL/L (ref 99–110)
CHOLESTEROL, TOTAL: 162 MG/DL (ref 0–199)
CLARITY, POC: NORMAL
CO2: 24 MMOL/L (ref 21–32)
COLOR, POC: NORMAL
CREAT SERPL-MCNC: 0.7 MG/DL (ref 0.6–1.2)
CREATININE URINE: 18.9 MG/DL (ref 28–259)
EOSINOPHILS ABSOLUTE: 0.3 K/UL (ref 0–0.6)
EOSINOPHILS RELATIVE PERCENT: 1.9 %
GFR SERPL CREATININE-BSD FRML MDRD: >60 ML/MIN/{1.73_M2}
GLUCOSE BLD-MCNC: 103 MG/DL (ref 70–99)
GLUCOSE URINE, POC: NORMAL
HCT VFR BLD CALC: 40 % (ref 36–48)
HDLC SERPL-MCNC: 58 MG/DL (ref 40–60)
HEMOGLOBIN: 12.4 G/DL (ref 12–16)
KETONES, POC: NORMAL
LDL CHOLESTEROL CALCULATED: 86 MG/DL
LEUKOCYTE EST, POC: NORMAL
LYMPHOCYTES ABSOLUTE: 2.3 K/UL (ref 1–5.1)
LYMPHOCYTES RELATIVE PERCENT: 16 %
MCH RBC QN AUTO: 25.8 PG (ref 26–34)
MCHC RBC AUTO-ENTMCNC: 30.9 G/DL (ref 31–36)
MCV RBC AUTO: 83.5 FL (ref 80–100)
MICROALBUMIN UR-MCNC: <1.2 MG/DL
MICROALBUMIN/CREAT UR-RTO: ABNORMAL MG/G (ref 0–30)
MONOCYTES ABSOLUTE: 1.1 K/UL (ref 0–1.3)
MONOCYTES RELATIVE PERCENT: 7.8 %
NEUTROPHILS ABSOLUTE: 10.6 K/UL (ref 1.7–7.7)
NEUTROPHILS RELATIVE PERCENT: 73.6 %
NITRITE, POC: NORMAL
PDW BLD-RTO: 17.2 % (ref 12.4–15.4)
PH, POC: NORMAL
PLATELET # BLD: 361 K/UL (ref 135–450)
PMV BLD AUTO: 9.3 FL (ref 5–10.5)
POTASSIUM SERPL-SCNC: 4.5 MMOL/L (ref 3.5–5.1)
PROTEIN, POC: NORMAL
RBC # BLD: 4.79 M/UL (ref 4–5.2)
SODIUM BLD-SCNC: 145 MMOL/L (ref 136–145)
SPECIFIC GRAVITY, POC: NORMAL
TOTAL PROTEIN: 7 G/DL (ref 6.4–8.2)
TRIGL SERPL-MCNC: 91 MG/DL (ref 0–150)
TSH SERPL DL<=0.05 MIU/L-ACNC: 1.24 UIU/ML (ref 0.27–4.2)
URIC ACID, SERUM: 5.3 MG/DL (ref 2.6–6)
UROBILINOGEN, POC: NORMAL
VLDLC SERPL CALC-MCNC: 18 MG/DL
WBC # BLD: 14.4 K/UL (ref 4–11)

## 2023-01-09 PROCEDURE — G8484 FLU IMMUNIZE NO ADMIN: HCPCS | Performed by: INTERNAL MEDICINE

## 2023-01-09 PROCEDURE — 81002 URINALYSIS NONAUTO W/O SCOPE: CPT | Performed by: INTERNAL MEDICINE

## 2023-01-09 PROCEDURE — 1123F ACP DISCUSS/DSCN MKR DOCD: CPT | Performed by: INTERNAL MEDICINE

## 2023-01-09 PROCEDURE — 3017F COLORECTAL CA SCREEN DOC REV: CPT | Performed by: INTERNAL MEDICINE

## 2023-01-09 PROCEDURE — 3046F HEMOGLOBIN A1C LEVEL >9.0%: CPT | Performed by: INTERNAL MEDICINE

## 2023-01-09 PROCEDURE — G0439 PPPS, SUBSEQ VISIT: HCPCS | Performed by: INTERNAL MEDICINE

## 2023-01-09 ASSESSMENT — PATIENT HEALTH QUESTIONNAIRE - PHQ9
SUM OF ALL RESPONSES TO PHQ QUESTIONS 1-9: 5
SUM OF ALL RESPONSES TO PHQ QUESTIONS 1-9: 5
1. LITTLE INTEREST OR PLEASURE IN DOING THINGS: 3
SUM OF ALL RESPONSES TO PHQ9 QUESTIONS 1 & 2: 5
SUM OF ALL RESPONSES TO PHQ QUESTIONS 1-9: 5
SUM OF ALL RESPONSES TO PHQ QUESTIONS 1-9: 5
2. FEELING DOWN, DEPRESSED OR HOPELESS: 2

## 2023-01-09 ASSESSMENT — LIFESTYLE VARIABLES
HOW OFTEN DO YOU HAVE A DRINK CONTAINING ALCOHOL: NEVER
HOW MANY STANDARD DRINKS CONTAINING ALCOHOL DO YOU HAVE ON A TYPICAL DAY: PATIENT DOES NOT DRINK

## 2023-01-09 NOTE — PROGRESS NOTES
Medicare Annual Wellness Visit    Apolinar Cornell is here for Medicare AWV    Assessment & Plan   Medicare annual wellness visit, subsequent  Type 2 diabetes mellitus with hyperglycemia, without long-term current use of insulin ([facility]  -     Comprehensive Metabolic Panel; Future  -     CBC with Auto Differential; Future  -     Hemoglobin A1C; Future  -     Lipid Panel; Future  -     POCT Urinalysis no Micro  -     Microalbumin / Creatinine Urine Ratio  -     Uric Acid; Future  Hyperlipidemia associated with type 2 diabetes mellitus (HCC)  Mixed hyperlipidemia  -     TSH; Future  Chronic tension-type headache, intractable  Chronic respiratory failure with hypoxia (HCC)  Primary insomnia  Tremor  Stage 3 severe COPD by GOLD classification ([facility]        -Medicare physical done  - Severe COPD. Seeing Dr Patty Berkowitz. I co manage with him  - HA stable on Topamax  - tremor some worse. See neurology  - Insomnia. Stable  -HLD. Check labs. - on 2 L of oxygen.  -Tachycardia due to inhalers-albuterol. Recommendations for Preventive Services Due: see orders and patient instructions/AVS.  Recommended screening schedule for the next 5-10 years is provided to the patient in written form: see Patient Instructions/AVS.     No follow-ups on file. Subjective   The following acute and/or chronic problems were also addressed today:      Patient is here for follow up on her diabetes, asthma, chronic respiratory failure, hyperlipidemia and arthritis. She is on Oxygen 2L at night and as needed during the day. She was in the ER and Urgent care for left foot issues. She saw a podiatrist. It is better. She had pain and some discoloration. No trauma. Her diabetes is well controlled. It is between 90s-100s. Her weight is down. She has severe COPD and asthma. Recent testing showed low oxygen. She does get some dyspnea off and on. Has chronic productive coughing. She sees pulmonary. She is on 2 L of oxygen.    Her pulmonary nodule is stable. Chest CT done 1/8/2020, showing some new tree in bud group of micronodules. Her hyperlipidemia is controlled. No myalgias. Her arthritis has been stable. Her tension HA are controlled. She is on Topamax. She has GERD. She is on Nexium. No heartburn. She has depression. She is on Effexor. It is stable. She is sleeping ok. She has tremor. She feels it is some worse. She will talk to Dr Radhames Gu about it. She is unvaccinated. Patient's complete Health Risk Assessment and screening values have been reviewed and are found in Flowsheets. The following problems were reviewed today and where indicated follow up appointments were made and/or referrals ordered. Positive Risk Factor Screenings with Interventions:    Fall Risk:  Do you feel unsteady or are you worried about falling? : (!) yes  2 or more falls in past year?: (!) yes  Fall with injury in past year?: no     Interventions:    Patient comments: her leg has given out. No injuries     Depression:  PHQ-2 Score: 5  PHQ-9 Total Score: 5    Interpretation:   1-4 = minimal  5-9 = mild  10-14 = moderate  15-19 = moderately severe  20-27 = severe  Interventions:  Patient comments: she feels her depression is worse. I will increase dose of Effexor XR. General HRA Questions:  Select all that apply: (!) New or Increased Pain, New or Increased Fatigue    Pain Interventions:  Patient comments: had foot pain that is resolved. Fatigue Interventions:  Check labs.        Social and Emotional Support:  Do you get the social and emotional support that you need?: (!) No  Interventions:  Patient declined any further interventions or treatment    Weight and Activity:  Physical Activity: Inactive    Days of Exercise per Week: 0 days    Minutes of Exercise per Session: 0 min     On average, how many days per week do you engage in moderate to strenuous exercise (like a brisk walk)?: 0 days  Have you lost any weight without trying in the past 3 months?: (!) Yes  Body mass index: 19.33    Inactivity Interventions:  Patient declined any further interventions or treatment    Unintentional Weight Loss Interventions:  Patient comments: had a sinus infection and lost her appetite. She is eating better      Dentist Screen:  Have you seen the dentist within the past year?: (!) No    Intervention:  She has no teeth    Hearing Screen:  Do you or your family notice any trouble with your hearing that hasn't been managed with hearing aids?: (!) Yes    Interventions:  Patient declines any further evaluation or treatment                       Objective   Vitals:    01/09/23 0856   BP: 130/80   Site: Right Upper Arm   Position: Sitting   Cuff Size: Medium Adult   Pulse: (!) 120   Resp: 12   SpO2: 96%   Weight: 99 lb (44.9 kg)   Height: 5' (1.524 m)      Body mass index is 19.33 kg/m².       General Appearance: alert and oriented to person, place and time, well developed and thin, in no acute distress  Skin: warm and dry, no rash or erythema  Head: normocephalic and atraumatic  Eyes: pupils equal, round, and reactive to light, extraocular eye movements intact, conjunctivae normal  ENT: tympanic membrane, external ear and ear canal normal bilaterally, nose without deformity, nasal mucosa and turbinates normal without polyps  Neck: supple and non-tender without mass, no thyromegaly or thyroid nodules, no cervical lymphadenopathy  Pulmonary/Chest: clear to auscultation bilaterally- no wheezes, rales or rhonchi, diminished air movement, no respiratory distress  Cardiovascular: normal rate, regular rhythm, normal S1 and S2, no murmurs, rubs, clicks, or gallops, distal pulses intact, no carotid bruits  Abdomen: soft, non-tender, non-distended, normal bowel sounds, no masses or organomegaly  Extremities: no cyanosis, clubbing or edema  Musculoskeletal: normal range of motion, no joint swelling, deformity or tenderness  Neurologic: reflexes normal and symmetric, no cranial nerve deficit, gait, coordination and speech normal       No Known Allergies  Prior to Visit Medications    Medication Sig Taking? Authorizing Provider   Manjit Glass 18 MCG inhalation capsule INHALE THE CONTENTS OF 1 CAPSULE BY MOUTH VIA HANDIHALER DAILY  Nayely García MD   JANUVIA 100 MG tablet TAKE 1 TABLET BY MOUTH  DAILY  Ida Bocanegra MD   theophylline (THEODUR) 300 MG extended release tablet TAKE 1 TABLET BY MOUTH  DAILY  Ida Bocanegra MD   dilTIAZem (CARDIZEM CD) 120 MG extended release capsule TAKE 1 CAPSULE BY MOUTH  DAILY  Ida Bocanegra MD   esomeprazole (NEXIUM) 40 MG delayed release capsule TAKE 1 CAPSULE BY MOUTH IN  THE MORNING BEFORE  BREAKFAST  Ida Bocanegra MD   albuterol sulfate HFA (PROVENTIL;VENTOLIN;PROAIR) 108 (90 Base) MCG/ACT inhaler INHALE 2 INHALATIONS BY  MOUTH INTO THE LUNGS EVERY  6 HOURS AS NEEDED FOR  WHEEZING OR SHORTNESS OF  BREATH  Ida Bocanegra MD   potassium chloride (KLOR-CON M) 20 MEQ extended release tablet TAKE 1 TABLET BY MOUTH  DAILY  Ida Bocanegra MD   venlafaxine (EFFEXOR XR) 75 MG extended release capsule TAKE 1 CAPSULE BY MOUTH  DAILY  Ida Bocanegra MD   furosemide (LASIX) 40 MG tablet TAKE 1 TABLET BY MOUTH  DAILY  Ida Bocanegra MD   topiramate (TOPAMAX) 50 MG tablet TAKE 1 TABLET BY MOUTH  TWICE DAILY  Ida Bocanegra MD   alendronate (FOSAMAX) 35 MG tablet TAKE 1 TABLET BY MOUTH  WEEKLY WITH 8 OZ OF PLAIN  WATER 30 MINUTES BEFORE  FIRST FOOD, [address] OR [place]S. [place] FOR [address] Christo Lanza MD   QUEtiapine (SEROQUEL) 25 MG tablet TAKE 1 TO 2 TABLETS BY MOUTH AT BEDTIME  Ida Bocanegra MD   Lancets MISC Test twice daily. DX: E11.9  Ida Bocanegra MD   Blood Glucose Monitoring Suppl (ONE TOUCH ULTRA 2) w/Device KIT Use to test sugar twice daily.   DX:E11.9  Ida Bocanegra MD   albuterol (PROVENTIL) (2.5 MG/3ML) 0.083% nebulizer solution INHALE 3ML VIA NEBULIZATION ROUTE EVERY 6 HOURS AS NEEDED FOR WHEEZING OR SHORTNESS OF BREATH  Bello Mesa MD   metFORMIN (GLUCOPHAGE-XR) 500 MG extended release tablet TAKE 2 TABLETS BY MOUTH TWICE DAILY  Bello Mesa MD   fluticasone (FLONASE) 50 MCG/ACT nasal spray 2 sprays by Nasal route daily  Bello Mesa MD   blood glucose monitor strips Test once a day & as needed for symptoms of irregular blood glucose. Dispense sufficient amount for indicated testing frequency plus additional to accommodate PRN testing needs. Bello Mesa MD   fluticasone-salmeterol ([place]) 250-50 MCG/ACT AEPB diskus inhaler INHALE 1 PUFF INTO THE LUNGS EVERY 12 HOURS  Gallo Cutler MD   MYRBETRIQ 48 MG TB24 TAKE 1 TABLET BY MOUTH DAILY  Bello Mesa MD   blood glucose monitor kit and supplies Dispense sufficient amount for indicated testing frequency of once daily additional to accommodate PRN testing needs. Dispense all needed supplies to include: monitor, strips, lancing device, lancets, control solutions, alcohol swabs.   Bello Mesa MD   benzonatate (TESSALON) 100 MG capsule TAKE 1 CAPSULE THREE TIMES DAILY AS NEEDED FOR COUGH  Sam Enciso MD   simvastatin (ZOCOR) 20 MG tablet TAKE 1 TABLET EVERY EVENING  Bello Mesa MD   Roflumilast (DALIRESP) 500 MCG tablet TAKE 1 TABLET EVERY DAY  Bello Mesa MD   ibuprofen (ADVIL;MOTRIN) 400 MG tablet Take 1 tablet by mouth every 6 hours as needed for Pain  SAUL Morales - CNP   gabapentin (NEURONTIN) 300 MG capsule TAKE 1 CAPSULE BY MOUTH THREE TIMES DAILY FOR 30 DAYS  Bello Mesa MD   calcium citrate-vitamin D (CITRACAL+D) 315-200 MG-UNIT per tablet Take 2 tablets by mouth 2 times daily  Bello Mesa MD   guaiFENesin (MUCINEX) 600 MG extended release tablet Take 1 tablet by mouth 2 times daily  Isabelle Heller MD   Cyanocobalamin (VITAMIN B-12 PO) Take by mouth  Amauri Lee MD   Blood Glucose Monitoring Suppl (ACCU-CHEK GAURANG SMARTVIEW) w/Device KIT Use to test once daily. DX:E11.9  Cleveland Miller MD   Lancets Misc. (ACCU-CHEK FASTCLIX LANCET) KIT Use to test once daily. DX:E11.9  Cleveland Miller MD   vitamin D (CHOLECALCIFEROL) 400 UNITS TABS tablet Take 2 tablets by mouth daily  Kerrie Gramajo MD   calcium carbonate (CALCIUM 600) 600 MG TABS tablet Take 2 tablets by mouth daily  Kerrie Gramajo MD   Respiratory Therapy Supplies (NEBULIZER/TUBING/MOUTHPIECE) KIT 1 kit by Does not apply route daily as needed DX COPD J44.9  Kerrie Gramajo MD   Nebulizers (COMPRESSOR/NEBULIZER) MISC 1 Device by Does not apply route as needed DX COPD J44.9  Kerrie Gramajo MD   Misc. Devices (ACAPELLA) MISC by Does not apply route 4 times daily.   Kerrie Gramajo MD       [facility] (Including outside providers/suppliers regularly involved in providing care):   Patient Care Team:  Cleveland Miller MD as PCP - Grace Arreaga MD as PCP - [facility] Empaneled Provider  Kerrie Gramajo MD as Consulting Physician (Pulmonology)  Kailey Lisa MD (Orthopedic Surgery)     Reviewed and updated this visit:  Tobacco  Allergies  Meds  Problems  Med Hx  Surg Hx  Soc Hx  Fam Hx

## 2023-01-09 NOTE — PATIENT INSTRUCTIONS
Preventing Falls: Care Instructions  Overview     Getting around your home safely can be a challenge if you have injuries or health problems that make it easy for you to fall. Loose rugs and furniture in walkways are among the dangers for many older people who have problems walking or who have poor eyesight. People who have conditions such as arthritis, osteoporosis, or dementia also have to be careful not to fall. You can make your home safer with a few simple measures. Follow-up care is a key part of your treatment and safety. Be sure to make and go to all appointments, and call your doctor if you are having problems. It's also a good idea to know your test results and keep a list of the medicines you take. How can you care for yourself at home? Taking care of yourself  Exercise regularly to improve your strength, muscle tone, and balance. Walk if you can. Swimming may be a good choice if you cannot walk easily. Have your vision and hearing checked each year or any time you notice a change. If you have trouble seeing and hearing, you might not be able to avoid objects and could lose your balance. Know the side effects of the medicines you take. Ask your doctor or pharmacist whether the medicines you take can affect your balance. Sleeping pills or sedatives can affect your balance. Limit the amount of alcohol you drink. Alcohol can impair your balance and other senses. Ask your doctor whether calluses or corns on your feet need to be removed. If you wear loose-fitting shoes because of calluses or corns, you can lose your balance and fall. Talk to your doctor if you have numbness in your feet. You may get dizzy if you do not drink enough water. To prevent dehydration, drink plenty of fluids. Choose water and other clear liquids. If you have kidney, heart, or liver disease and have to limit fluids, talk with your doctor before you increase the amount of fluids you drink.   Preventing falls at home  Remove raised doorway thresholds, throw rugs, and clutter. Repair loose carpet or raised areas in the floor. Move furniture and electrical cords to keep them out of walking paths. Use nonskid floor wax, and wipe up spills right away, especially on ceramic tile floors. If you use a walker or cane, put rubber tips on it. If you use crutches, clean the bottoms of them regularly with an abrasive pad, such as steel wool. Keep your house well lit, especially stairways, porches, and outside walkways. Use night-lights in areas such as hallways and bathrooms. Add extra light switches or use remote switches (such as switches that go on or off when you clap your hands) to make it easier to turn lights on if you have to get up during the night. Install sturdy handrails on stairways. Move items in your cabinets so that the things you use a lot are on the lower shelves (about waist level). Keep a cordless phone and a flashlight with new batteries by your bed. If possible, put a phone in each of the main rooms of your house, or carry a cell phone in case you fall and cannot reach a phone. Or, you can wear a device around your neck or wrist. You push a button that sends a signal for help. Wear low-heeled shoes that fit well and give your feet good support. Use footwear with nonskid soles. Check the heels and soles of your shoes for wear. Repair or replace worn heels or soles. Do not wear socks without shoes on smooth floors, such as wood. Walk on the grass when the sidewalks are slippery. If you live in an area that gets snow and ice in the winter, sprinkle salt on slippery steps and sidewalks. Or ask a family member or friend to do this for you. Preventing falls in the bath  Install grab bars and nonskid mats inside and outside your shower or tub and near the toilet and sinks. Use shower chairs and bath benches. Use a hand-held shower head that will allow you to sit while showering.   Get into a tub or shower by putting the weaker leg in first. Get out of a tub or shower with your strong side first.  Repair loose toilet seats and consider installing a raised toilet seat to make getting on and off the toilet easier. Keep your bathroom door unlocked while you are in the shower. Where can you learn more? Go to http://www.fowler.com/ and enter G117 to learn more about \"Preventing Falls: Care Instructions. \"  Current as of: May 4, 2022               Content Version: 13.5  © 5630-1295 Healthwise, [employer]. Care instructions adapted under license by [facility] ([facility]). If you have questions about a medical condition or this instruction, always ask your healthcare professional. [address] any warranty or liability for your use of this information. Learning About Mindfulness for Stress  What are mindfulness and stress? Stress is what you feel when you have to handle more than you are used to. A lot of things can cause stress. You may feel stress when you go on a job interview, take a test, or run a race. This kind of short-term stress is normal and even useful. It can help you if you need to work hard or react quickly. Stress also can last a long time. Long-term stress is caused by stressful situations or events. Examples of long-term stress include long-term health problems, ongoing problems at work, and conflicts in your family. Long-term stress can harm your health. Mindfulness is a focus only on things happening in the present moment. It's a process of purposefully paying attention to and being aware of your surroundings, your emotions, your thoughts, and how your body feels. You are aware of these things, but you aren't judging these experiences as \"good\" or \"bad. \" Mindfulness can help you learn to calm your mind and body to help you cope with illness, pain, and stress. How does mindfulness help to relieve stress? Mindfulness can help quiet your mind and relax your body. Studies show that it can help some people sleep better, feel less anxious, and bring their blood pressure down. And it's been shown to help some people live and cope better with certain health problems like heart disease, depression, chronic pain, and cancer. How do you practice mindfulness? To be mindful is to pay attention, to be present, and to be accepting. When you're mindful, you do just one thing and you pay close attention to that one thing. For example, you may sit quietly and notice your emotions or how your food tastes and smells. When you're present, you focus on the things that are happening right now. You let go of your thoughts about the past and the future. When you dwell on the past or the future, you miss moments that can heal and strengthen you. You may miss moments like hearing a child laugh or seeing a friendly face when you think you're all alone. When you're accepting, you don't  the present moment. Instead you accept your thoughts and feelings as they come. You can practice anytime, anywhere, and in any way you choose. You can practice in many ways. Here are a few ideas:  While doing your chores, like washing the dishes, let your mind focus on what's in your hand. What does the dish feel like? Is the water warm or cold? Go outside and take a few deep breaths. What is the air like? Is it warm or cold? When you can, take some time at the start of your day to sit alone and think. Take a slow walk by yourself. Count your steps while you breathe in and out. Try yoga breathing exercises, stretches, and poses to strengthen and relax your muscles. At work, if you can, try to stop for a few moments each hour. Note how your body feels. Let yourself regroup and let your mind settle before you return to what you were doing. If you struggle with anxiety or \"worry thoughts,\" imagine your mind as a blue hayder and your worry thoughts as clouds.  Now imagine those worry thoughts floating across your mind's hayder. Just let them pass by as you watch. Follow-up care is a key part of your treatment and safety. Be sure to make and go to all appointments, and call your doctor if you are having problems. It's also a good idea to know your test results and keep a list of the medicines you take. Where can you learn more? Go to http://www.fowler.com/ and enter M676 to learn more about \"Learning About Mindfulness for Stress. \"  Current as of: February 9, 2022               Content Version: 13.5  © 1818-1184 [employer]. Care instructions adapted under license by [facility] ([facility]). If you have questions about a medical condition or this instruction, always ask your healthcare professional. [address] any warranty or liability for your use of this information. Fatigue: Care Instructions  Your Care Instructions     Fatigue is a feeling of tiredness, exhaustion, or lack of energy. You may feel fatigue because of too much or not enough activity. It can also come from stress, lack of sleep, boredom, and poor diet. Many medical problems, such as viral infections, can cause fatigue. Emotional problems, especially depression, are often the cause of fatigue. Fatigue is most often a symptom of another problem. Treatment for fatigue depends on the cause. For example, if you have fatigue because you have a certain health problem, treating this problem also treats your fatigue. If depression or anxiety is the cause, treatment may help. Follow-up care is a key part of your treatment and safety. Be sure to make and go to all appointments, and call your doctor if you are having problems. It's also a good idea to know your test results and keep a list of the medicines you take. How can you care for yourself at home? Get regular exercise. But don't overdo it. Go back and forth between rest and exercise. Get plenty of rest.  Eat a healthy diet.  Do not skip meals, especially breakfast.  Reduce your use of caffeine, tobacco, and alcohol. Caffeine is most often found in coffee, tea, cola drinks, and chocolate. Limit medicines that can cause fatigue. This includes tranquilizers and cold and allergy medicines. When should you call for help? Watch closely for changes in your health, and be sure to contact your doctor if:    You have new symptoms such as fever or a rash.     Your fatigue gets worse.     You have been feeling down, depressed, or hopeless. Or you may have lost interest in things that you usually enjoy.     You are not getting better as expected. Where can you learn more? Go to http://www.woods.com/ and enter U910 to learn more about \"Fatigue: Care Instructions. \"  Current as of: February 9, 2022               Content Version: 13.5  © 3611-3792 Healthwise, [employer]. Care instructions adapted under license by [facility] ([facility]). If you have questions about a medical condition or this instruction, always ask your healthcare professional. [place] [address] any warranty or liability for your use of this information. For more information on your local Area Agency on Aging or [place] on Aging please visit the appropriate web site below:    [place]: MobileCycles.pl    [facility]: https://aging. [place].gov/    [facility]: https://aging.[place].gov/    [place]: InsuranceSquad.es           Learning About Being Active as an Older Adult  Why is being active important as you get older? Being active is one of the best things you can do for your health. And it's never too late to start. Being active--or getting active, if you aren't already--has definite benefits. It can:  Give you more energy,  Keep your mind sharp. Improve balance to reduce your risk of falls. Help you manage chronic illness with fewer medicines.   No matter how old you are, how fit you are, or what health problems you have, there is a form of activity that will work for you. And the more physical activity you can do, the better your overall health will be. What kinds of activity can help you stay healthy? Being more active will make your daily activities easier. Physical activity includes planned exercise and things you do in daily life. There are four types of activity:  Aerobic. Doing aerobic activity makes your heart and lungs strong. Includes walking, dancing, and gardening. Aim for at least 2½ hours spread throughout the week. It improves your energy and can help you sleep better. Muscle-strengthening. This type of activity can help maintain muscle and strengthen bones. Includes climbing stairs, using resistance bands, and lifting or carrying heavy loads. Aim for at least twice a week. It can help protect the knees and other joints. Stretching. Stretching gives you better range of motion in joints and muscles. Includes upper arm stretches, calf stretches, and gentle yoga. Aim for at least twice a week, preferably after your muscles are warmed up from other activities. It can help you function better in daily life. Balancing. This helps you stay coordinated and have good posture. Includes heel-to-toe walking, moe chi, and certain types of yoga. Aim for at least 3 days a week. It can reduce your risk of falling. Even if you have a hard time meeting the recommendations, it's better to be more active than less active. All activity done in each category counts toward your weekly total. You'd be surprised how daily things like carrying groceries, keeping up with grandchildren, and taking the stairs can add up. What keeps you from being active? If you've had a hard time being more active, you're not alone. Maybe you remember being able to do more. Or maybe you've never thought of yourself as being active. It's frustrating when you can't do the things you want. Being more active can help. What's holding you back?   Getting started. Have a goal, but break it into easy tasks. Small steps build into big accomplishments. Staying motivated. If you feel like skipping your activity, remember your goal. Maybe you want to move better and stay independent. Every activity gets you one step closer. Not feeling your best.  Start with 5 minutes of an activity you enjoy. Prove to yourself you can do it. As you get comfortable, increase your time. You may not be where you want to be. But you're in the process of getting there. Everyone starts somewhere. How can you find safe ways to stay active? Talk with your doctor about any physical challenges you're facing. Make a plan with your doctor if you have a health problem or aren't sure how to get started with activity. If you're already active, ask your doctor if there is anything you should change to stay safe as your body and health change. If you tend to feel dizzy after you take medicine, avoid activity at that time. Try being active before you take your medicine. This will reduce your risk of falls. If you plan to be active at home, make sure to clear your space before you get started. Remove things like TV cords, coffee tables, and throw rugs. It's safest to have plenty of space to move freely. The key to getting more active is to take it slow and steady. Try to improve only a little bit at a time. Pick just one area to improve on at first. And if an activity hurts, stop and talk to your doctor. Where can you learn more? Go to http://www.fowler.com/ and enter P600 to learn more about \"Learning About Being Active as an Older Adult. \"  Current as of: October 10, 2022               Content Version: 13.5  © 7186-6495 Healthwise, Incorporated. Care instructions adapted under license by [facility] ([facility]).  If you have questions about a medical condition or this instruction, always ask your healthcare professional. [address] any warranty or liability for your use of this information. Learning About Dental Care for Older Adults  Dental care for older adults: Overview  Dental care for older people is much the same as for younger adults. But older adults do have concerns that younger adults do not. Older adults may have problems with gum disease and decay on the roots of their teeth. They may need missing teeth replaced or broken fillings fixed. Or they may have dentures that need to be cared for. Some older adults may have trouble holding a toothbrush. You can help remind the person you are caring for to brush and floss their teeth or to clean their dentures. In some cases, you may need to do the brushing and other dental care tasks. People who have trouble using their hands or who have dementia may need this extra help. How can you help with dental care? Normal dental care  To keep the teeth and gums healthy:  Brush the teeth with fluoride toothpaste twice a day--in the morning and at night--and floss at least once a day. Plaque can quickly build up on the teeth of older adults. Watch for the signs of gum disease. These signs include gums that bleed after brushing or after eating hard foods, such as apples. See a dentist regularly. Many experts recommend checkups every 6 months. Keep the dentist up to date on any new medications the person is taking. Encourage a balanced diet that includes whole grains, vegetables, and fruits, and that is low in saturated fat and sodium. Encourage the person you're caring for not to use tobacco products. They can affect dental and general health. Many older adults have a fixed income and feel that they can't afford dental care. But most [facility] and [facility] have programs in which dentists help older adults by lowering fees. Contact your area's public health offices or  for information about dental care in your area.   Using a toothbrush  Older adults with arthritis sometimes have trouble brushing their teeth because they can't easily hold the toothbrush. Their hands and fingers may be stiff, painful, or weak. If this is the case, you can: Offer an electric toothbrush. Enlarge the handle of a non-electric toothbrush by wrapping a sponge, an elastic bandage, or adhesive tape around it. Push the toothbrush handle through a ball made of rubber or soft foam.  Make the handle longer and thicker by taping Popsicle sticks or tongue depressors to it. You may also be able to buy special toothbrushes, toothpaste dispensers, and floss holders. Your doctor may recommend a soft-bristle toothbrush if the person you care for bleeds easily. Bleeding can happen because of a health problem or from certain medicines. A toothpaste for sensitive teeth may help if the person you care for has sensitive teeth. How do you brush and floss someone's teeth? If the person you are caring for has a hard time cleaning their teeth on their own, you may need to brush and floss their teeth for them. It may be easiest to have the person sit and face away from you, and to sit or stand behind them. That way you can steady their head against your arm as you reach around to floss and brush their teeth. Choose a place that has good lighting and is comfortable for both of you. Before you begin, gather your supplies. You will need gloves, floss, a toothbrush, and a container to hold water if you are not near a sink. Wash and dry your hands well and put on gloves. Start by flossing:  Gently work a piece of floss between each of the teeth toward the gums. A plastic flossing tool may make this easier, and they are available at most [facility]. Curve the floss around each tooth into a U-shape and gently slide it under the gum line. Move the floss firmly up and down several times to scrape off the plaque. After you've finished flossing, throw away the used floss and begin brushing:  Wet the brush and apply toothpaste.   Place the brush at a 45-degree angle where the teeth meet the gums. Press firmly, and move the brush in small circles over the surface of the teeth. Be careful not to brush too hard. Vigorous brushing can make the gums pull away from the teeth and can scratch the tooth enamel. Brush all surfaces of the teeth, on the tongue side and on the cheek side. Pay special attention to the front teeth and all surfaces of the back teeth. Brush chewing surfaces with short back-and-forth strokes. After you've finished, help the person rinse the remaining toothpaste from their mouth. Where can you learn more? Go to http://www.woods.com/ and enter F944 to learn more about \"Learning About Dental Care for Older Adults. \"  Current as of: June 16, 2022               Content Version: 13.5  © 0780-9070 Healthwise, [employer]. Care instructions adapted under license by [facility] ([facility]). If you have questions about a medical condition or this instruction, always ask your healthcare professional. [place] [address] any warranty or liability for your use of this information. Hearing Loss: Care Instructions  Overview     Hearing loss is a sudden or slow decrease in how well you hear. It can range from mild to severe. Permanent hearing loss can occur with aging. It also can happen when you are exposed long-term to loud noise. Examples include listening to loud music, riding motorcycles, or being around other loud machines. Hearing loss can affect your work and home life. It can make you feel lonely or depressed. You may feel that you have lost your independence. But hearing aids and other devices can help you hear better and feel connected to others. Follow-up care is a key part of your treatment and safety. Be sure to make and go to all appointments, and call your doctor if you are having problems. It's also a good idea to know your test results and keep a list of the medicines you take.   How can you care for yourself at home?  Avoid loud noises whenever possible. This helps keep your hearing from getting worse. Always wear hearing protection around loud noises. Wear a hearing aid as directed. See a professional who can help you pick a hearing aid that fits you. Have hearing tests as your doctor suggests. They can show whether your hearing has changed. Your hearing aid may need to be adjusted. Use other devices as needed. These may include:  Telephone amplifiers and hearing aids that can connect to a television, stereo, radio, or microphone. Devices that use lights or vibrations. These alert you to the doorbell, a ringing telephone, or a baby monitor. Television closed-captioning. This shows the words at the bottom of the screen. Most new TVs can do this. TTY (text telephone). This lets you type messages back and forth on the telephone instead of talking or listening. These devices are also called TDD. When messages are typed on the keyboard, they are sent over the phone line to a receiving TTY. The message is shown on a monitor. Use text messaging, social media, and email if it is hard for you to communicate by telephone. Try to learn a listening technique called speechreading. It is not lipreading. You pay attention to people's gestures, expressions, posture, and tone of voice. These clues can help you understand what a person is saying. Face the person you are talking to, and have them face you. Make sure the lighting is good. You need to see the other person's face clearly. Think about counseling if you need help to adjust to your hearing loss. When should you call for help? Watch closely for changes in your health, and be sure to contact your doctor if:    You think your hearing is getting worse.     You have new symptoms, such as dizziness or nausea. Where can you learn more? Go to http://www.fowler.com/ and enter U935 to learn more about \"Hearing Loss: Care Instructions. \"  Current as of:  May 4, 0412               QYDKMGU Version: 13.5  © 3540-5529 Healthwise, [employer]. Care instructions adapted under license by [facility] ([facility]). If you have questions about a medical condition or this instruction, always ask your healthcare professional. [address] any warranty or liability for your use of this information. Advance Directives: Care Instructions  Overview  An advance directive is a legal way to state your wishes at the end of your life. It tells your family and your doctor what to do if you can't say what you want. There are two main types of advance directives. You can change them any time your wishes change. Living will. This form tells your family and your doctor your wishes about life support and other treatment. The form is also called a declaration. Medical power of . This form lets you name a person to make treatment decisions for you when you can't speak for yourself. This person is called a health care agent (health care proxy, health care surrogate). The form is also called a durable power of  for health care. If you do not have an advance directive, decisions about your medical care may be made by a family member, or by a doctor or a  who doesn't know you. It may help to think of an advance directive as a gift to the people who care for you. If you have one, they won't have to make tough decisions by themselves. For more information, including forms for your state, see the [address] website (www.caringinfo.org/planning/advance-directives/). Follow-up care is a key part of your treatment and safety. Be sure to make and go to all appointments, and call your doctor if you are having problems. It's also a good idea to know your test results and keep a list of the medicines you take. What should you include in an advance directive? Many states have a unique advance directive form.  (It may ask you to address specific issues.) Or you might use a universal form that's approved by many states. If your form doesn't tell you what to address, it may be hard to know what to include in your advance directive. Use the questions below to help you get started. Who do you want to make decisions about your medical care if you are not able to? What life-support measures do you want if you have a serious illness that gets worse over time or can't be cured? What are you most afraid of that might happen? (Maybe you're afraid of having pain, losing your independence, or being kept alive by machines.)  Where would you prefer to die? (Your home? A hospital? A nursing home?)  Do you want to donate your organs when you die? Do you want certain Scientology practices performed before you die? When should you call for help? Be sure to contact your doctor if you have any questions. Where can you learn more? Go to http://www.fowler.com/ and enter R264 to learn more about \"Advance Directives: Care Instructions. \"  Current as of: June 16, 2022               Content Version: 13.5  © 6497-3001 Healthwise, Incorporated. Care instructions adapted under license by [facility] ([facility]). If you have questions about a medical condition or this instruction, always ask your healthcare professional. [address] any warranty or liability for your use of this information. Personalized Preventive Plan for Brandy Recio - 1/9/2023  Medicare offers a range of preventive health benefits. Some of the tests and screenings are paid in full while other may be subject to a deductible, co-insurance, and/or copay. Some of these benefits include a comprehensive review of your medical history including lifestyle, illnesses that may run in your family, and various assessments and screenings as appropriate.     After reviewing your medical record and screening and assessments performed today your provider may have ordered immunizations, labs, imaging, and/or referrals for you. A list of these orders (if applicable) as well as your Preventive Care list are included within your After Visit Summary for your review. Other Preventive Recommendations:    A preventive eye exam performed by an eye specialist is recommended every 1-2 years to screen for glaucoma; cataracts, macular degeneration, and other eye disorders. A preventive dental visit is recommended every 6 months. Try to get at least 150 minutes of exercise per week or 10,000 steps per day on a pedometer . Order or download the FREE \"Exercise & Physical Activity: Your Everyday Guide\" from [employer]. Call 9-843.704.7706 or search [employer] online. You need 3483-1761 mg of calcium and 0659-1309 IU of vitamin D per day. It is possible to meet your calcium requirement with diet alone, but a vitamin D supplement is usually necessary to meet this goal.  When exposed to the sun, use a sunscreen that protects against both UVA and UVB radiation with an SPF of 30 or greater. Reapply every 2 to 3 hours or after sweating, drying off with a towel, or swimming. Always wear a seat belt when traveling in a car. Always wear a helmet when riding a bicycle or motorcycle.

## 2023-01-10 LAB
ESTIMATED AVERAGE GLUCOSE: 125.5 MG/DL
HBA1C MFR BLD: 6 %

## 2023-02-03 RX ORDER — METFORMIN HYDROCHLORIDE 500 MG/1
TABLET, EXTENDED RELEASE ORAL
Qty: 360 TABLET | Refills: 0 | Status: SHIPPED | OUTPATIENT
Start: 2023-02-03

## 2023-02-09 RX ORDER — ALENDRONATE SODIUM 35 MG/1
TABLET ORAL
Qty: 12 TABLET | Refills: 0 | Status: SHIPPED | OUTPATIENT
Start: 2023-02-09

## 2023-02-20 DIAGNOSIS — E11.65 TYPE 2 DIABETES MELLITUS WITH HYPERGLYCEMIA, WITHOUT LONG-TERM CURRENT USE OF INSULIN (HCC): ICD-10-CM

## 2023-02-21 RX ORDER — BLOOD SUGAR DIAGNOSTIC
STRIP MISCELLANEOUS
Qty: 200 STRIP | Refills: 3 | Status: SHIPPED | OUTPATIENT
Start: 2023-02-21

## 2023-02-22 RX ORDER — MIRABEGRON 50 MG/1
TABLET, FILM COATED, EXTENDED RELEASE ORAL
Qty: 30 TABLET | Refills: 0 | Status: SHIPPED | OUTPATIENT
Start: 2023-02-22

## 2023-03-15 RX ORDER — TIOTROPIUM BROMIDE 18 UG/1
CAPSULE ORAL; RESPIRATORY (INHALATION)
Qty: 30 CAPSULE | Refills: 0 | Status: SHIPPED | OUTPATIENT
Start: 2023-03-15 | End: 2023-04-28

## 2023-03-28 RX ORDER — FLUTICASONE PROPIONATE AND SALMETEROL 250; 50 UG/1; UG/1
POWDER RESPIRATORY (INHALATION)
Qty: 180 EACH | Refills: 3 | Status: SHIPPED | OUTPATIENT
Start: 2023-03-28

## 2023-04-05 RX ORDER — THEOPHYLLINE 300 MG/1
300 TABLET, EXTENDED RELEASE ORAL DAILY
Qty: 90 TABLET | Refills: 0 | Status: SHIPPED | OUTPATIENT
Start: 2023-04-05

## 2023-04-05 RX ORDER — ESOMEPRAZOLE MAGNESIUM 40 MG/1
CAPSULE, DELAYED RELEASE ORAL
Qty: 90 CAPSULE | Refills: 0 | Status: SHIPPED | OUTPATIENT
Start: 2023-04-05

## 2023-04-05 RX ORDER — DILTIAZEM HYDROCHLORIDE 120 MG/1
120 CAPSULE, COATED, EXTENDED RELEASE ORAL DAILY
Qty: 90 CAPSULE | Refills: 0 | Status: SHIPPED | OUTPATIENT
Start: 2023-04-05

## 2023-04-05 RX ORDER — SITAGLIPTIN 100 MG/1
TABLET, FILM COATED ORAL
Qty: 90 TABLET | Refills: 0 | Status: SHIPPED | OUTPATIENT
Start: 2023-04-05

## 2023-04-06 RX ORDER — METFORMIN HYDROCHLORIDE 500 MG/1
TABLET, EXTENDED RELEASE ORAL
Qty: 360 TABLET | Refills: 0 | Status: SHIPPED | OUTPATIENT
Start: 2023-04-06

## 2023-04-11 DIAGNOSIS — D64.9 ANEMIA, UNSPECIFIED TYPE: ICD-10-CM

## 2023-04-11 LAB
BASOPHILS # BLD: 0 K/UL (ref 0–0.2)
BASOPHILS NFR BLD: 0.5 %
DEPRECATED RDW RBC AUTO: 20.5 % (ref 12.4–15.4)
EOSINOPHIL # BLD: 0.1 K/UL (ref 0–0.6)
EOSINOPHIL NFR BLD: 1.3 %
HCT VFR BLD AUTO: 39.6 % (ref 36–48)
HGB BLD-MCNC: 12.5 G/DL (ref 12–16)
LYMPHOCYTES # BLD: 1.2 K/UL (ref 1–5.1)
LYMPHOCYTES NFR BLD: 15.7 %
MCH RBC QN AUTO: 27.9 PG (ref 26–34)
MCHC RBC AUTO-ENTMCNC: 31.7 G/DL (ref 31–36)
MCV RBC AUTO: 87.9 FL (ref 80–100)
MONOCYTES # BLD: 0.7 K/UL (ref 0–1.3)
MONOCYTES NFR BLD: 9.5 %
NEUTROPHILS # BLD: 5.6 K/UL (ref 1.7–7.7)
NEUTROPHILS NFR BLD: 73 %
PLATELET # BLD AUTO: 233 K/UL (ref 135–450)
PMV BLD AUTO: 9.2 FL (ref 5–10.5)
RBC # BLD AUTO: 4.5 M/UL (ref 4–5.2)
WBC # BLD AUTO: 7.6 K/UL (ref 4–11)

## 2023-04-12 LAB
ALBUMIN SERPL-MCNC: 4 G/DL (ref 3.4–5)
ALBUMIN/GLOB SERPL: 1.9 {RATIO} (ref 1.1–2.2)
ALP SERPL-CCNC: 75 U/L (ref 40–129)
ALT SERPL-CCNC: 8 U/L (ref 10–40)
ANION GAP SERPL CALCULATED.3IONS-SCNC: 10 MMOL/L (ref 3–16)
AST SERPL-CCNC: 13 U/L (ref 15–37)
BILIRUB SERPL-MCNC: <0.2 MG/DL (ref 0–1)
BUN SERPL-MCNC: 24 MG/DL (ref 7–20)
CALCIUM SERPL-MCNC: 9.6 MG/DL (ref 8.3–10.6)
CHLORIDE SERPL-SCNC: 109 MMOL/L (ref 99–110)
CO2 SERPL-SCNC: 28 MMOL/L (ref 21–32)
CREAT SERPL-MCNC: 0.7 MG/DL (ref 0.6–1.2)
GFR SERPLBLD CREATININE-BSD FMLA CKD-EPI: >60 ML/MIN/{1.73_M2}
GLUCOSE SERPL-MCNC: 107 MG/DL (ref 70–99)
POTASSIUM SERPL-SCNC: 4.9 MMOL/L (ref 3.5–5.1)
PROT SERPL-MCNC: 6.1 G/DL (ref 6.4–8.2)
SODIUM SERPL-SCNC: 147 MMOL/L (ref 136–145)

## 2023-04-19 ENCOUNTER — HOSPITAL ENCOUNTER (OUTPATIENT)
Dept: CT IMAGING | Age: 73
Discharge: HOME | End: 2023-04-19
Payer: MEDICARE

## 2023-04-19 DIAGNOSIS — Z87.891 PERSONAL HISTORY OF TOBACCO USE: ICD-10-CM

## 2023-04-19 PROCEDURE — 71271 CT THORAX LUNG CANCER SCR C-: CPT

## 2023-04-24 ENCOUNTER — OFFICE VISIT (OUTPATIENT)
Dept: PULMONOLOGY | Age: 73
End: 2023-04-24
Payer: MEDICARE

## 2023-04-24 VITALS
OXYGEN SATURATION: 95 % | SYSTOLIC BLOOD PRESSURE: 110 MMHG | BODY MASS INDEX: 19.44 KG/M2 | DIASTOLIC BLOOD PRESSURE: 62 MMHG | HEART RATE: 110 BPM | WEIGHT: 99 LBS | HEIGHT: 60 IN

## 2023-04-24 DIAGNOSIS — J44.9 COPD, VERY SEVERE (HCC): Primary | ICD-10-CM

## 2023-04-24 DIAGNOSIS — J47.9 BRONCHIECTASIS WITHOUT COMPLICATION (HCC): ICD-10-CM

## 2023-04-24 DIAGNOSIS — R93.89 ABNORMAL CT OF THE CHEST: ICD-10-CM

## 2023-04-24 PROCEDURE — 1123F ACP DISCUSS/DSCN MKR DOCD: CPT | Performed by: INTERNAL MEDICINE

## 2023-04-24 PROCEDURE — 99214 OFFICE O/P EST MOD 30 MIN: CPT | Performed by: INTERNAL MEDICINE

## 2023-04-24 PROCEDURE — G8399 PT W/DXA RESULTS DOCUMENT: HCPCS | Performed by: INTERNAL MEDICINE

## 2023-04-24 PROCEDURE — 1090F PRES/ABSN URINE INCON ASSESS: CPT | Performed by: INTERNAL MEDICINE

## 2023-04-24 PROCEDURE — G8420 CALC BMI NORM PARAMETERS: HCPCS | Performed by: INTERNAL MEDICINE

## 2023-04-24 PROCEDURE — 3017F COLORECTAL CA SCREEN DOC REV: CPT | Performed by: INTERNAL MEDICINE

## 2023-04-24 PROCEDURE — 1036F TOBACCO NON-USER: CPT | Performed by: INTERNAL MEDICINE

## 2023-04-24 PROCEDURE — 3023F SPIROM DOC REV: CPT | Performed by: INTERNAL MEDICINE

## 2023-04-24 PROCEDURE — G8427 DOCREV CUR MEDS BY ELIG CLIN: HCPCS | Performed by: INTERNAL MEDICINE

## 2023-04-24 RX ORDER — ALBUTEROL SULFATE 90 UG/1
AEROSOL, METERED RESPIRATORY (INHALATION)
Qty: 34 G | Refills: 5 | Status: SHIPPED | OUTPATIENT
Start: 2023-04-24

## 2023-04-24 NOTE — PROGRESS NOTES
[facility]                                                            Outpatient Follow Up Note    CHIEF COMPLAINT: Follow up COPD/CT chest          HPI:  CT chest reviewed by me and noted below. Results were dicussed with patient and multiple good questions were answered. Doing good  Some cough with clear sputum  No hemoptysis   Patient is compliant with inhaled bronchodilators and O2.    No smoking   Active walk as far as she wants       Past Medical History:   Diagnosis Date    Acute on chronic respiratory failure with hypoxia ([address] 12/3/2009    Chronic airway obstruction, not elsewhere classified 3/4/08    oxygen 2L/min at HS and PRN through day    Chronic kidney disease     incontinent    COPD (chronic obstructive pulmonary disease) ([address]     Depression     Displacement of lumbar intervertebral disc without myelopathy 8/13/07    Edema 9/13/07    Emphysema of lung (HCC)     Enthesopathy of hip region     Enthesopathy of hip region 3/5/07    Esophageal reflux 3/4/08    Hemorrhage of rectum and anus 11/14/07    Herpes zoster without complication     History of blood transfusion     Hyperlipidemia associated with type 2 diabetes mellitus ([address] 12/11/2017    Lumbar spondylosis 5/1/2018    Major depression, chronic 8/12/2015    Osteoporosis, unspecified 12/4/07    Other and unspecified hyperlipidemia 3/4/08    Pain in joint, shoulder region 3/4/08    Pneumonia     Pulmonary nodule     Recurrent major depressive disorder, in partial remission ([address] 5/4/2018    Rheumatic fever     S/P ileostomy ([address] 8/20/2011    Skin lesion of face 10/20/2021    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

## 2023-04-28 RX ORDER — TIOTROPIUM BROMIDE 18 UG/1
CAPSULE ORAL; RESPIRATORY (INHALATION)
Qty: 30 CAPSULE | Refills: 2 | Status: SHIPPED | OUTPATIENT
Start: 2023-04-28

## 2023-05-02 RX ORDER — ALENDRONATE SODIUM 35 MG/1
TABLET ORAL
Qty: 12 TABLET | Refills: 0 | Status: SHIPPED | OUTPATIENT
Start: 2023-05-02

## 2023-05-12 ENCOUNTER — TELEPHONE (OUTPATIENT)
Dept: INTERNAL MEDICINE CLINIC | Age: 73
End: 2023-05-12

## 2023-05-12 NOTE — TELEPHONE ENCOUNTER
----- Message from Mani Arnlod sent at 5/12/2023  4:02 PM EDT -----  Contact: [phone]  Pt states she tested neg on Wednesday and her daughter was admitted Monday evening. Please advise.  ----- Message -----  From: Ileana Toribio MD  Sent: 5/12/2023   3:27 PM EDT  To: Mani Arnold    He should test and call with results   ----- Message -----  From: Paolo Wright  Sent: 5/12/2023   2:58 PM EDT  To: Ileana Toribio MD    Patient states daughter has tested positive for Covid and is currently admitted in [address] [place] [address]. Patient was told to call her PCP to request antibiotics and steroid for prevention due to exposure. Please advise.     [facility] [address] [place] [address], [address] [place] [id number]   Phone:  [phone]  Fax:  [phone]

## 2023-05-14 ENCOUNTER — HOSPITAL ENCOUNTER (INPATIENT)
Age: 73
LOS: 4 days | Discharge: HOME | DRG: 871 | End: 2023-05-18
Attending: EMERGENCY MEDICINE | Admitting: INTERNAL MEDICINE
Payer: MEDICARE

## 2023-05-14 ENCOUNTER — APPOINTMENT (OUTPATIENT)
Dept: GENERAL RADIOLOGY | Age: 73
DRG: 871 | End: 2023-05-14
Payer: MEDICARE

## 2023-05-14 ENCOUNTER — APPOINTMENT (OUTPATIENT)
Dept: CT IMAGING | Age: 73
DRG: 871 | End: 2023-05-14
Payer: MEDICARE

## 2023-05-14 DIAGNOSIS — J18.9 PNEUMONIA DUE TO INFECTIOUS ORGANISM, UNSPECIFIED LATERALITY, UNSPECIFIED PART OF LUNG: Primary | ICD-10-CM

## 2023-05-14 DIAGNOSIS — J44.1 COPD EXACERBATION (HCC): ICD-10-CM

## 2023-05-14 LAB
ALBUMIN SERPL-MCNC: 4.2 G/DL (ref 3.4–5)
ALBUMIN/GLOB SERPL: 1.1 {RATIO} (ref 1.1–2.2)
ALP SERPL-CCNC: 79 U/L (ref 40–129)
ALT SERPL-CCNC: 7 U/L (ref 10–40)
ANION GAP SERPL CALCULATED.3IONS-SCNC: 12 MMOL/L (ref 3–16)
AST SERPL-CCNC: 10 U/L (ref 15–37)
BASOPHILS # BLD: 0 K/UL (ref 0–0.2)
BASOPHILS NFR BLD: 0.3 %
BILIRUB SERPL-MCNC: 0.3 MG/DL (ref 0–1)
BUN SERPL-MCNC: 32 MG/DL (ref 7–20)
CALCIUM SERPL-MCNC: 10.3 MG/DL (ref 8.3–10.6)
CHLORIDE SERPL-SCNC: 97 MMOL/L (ref 99–110)
CO2 SERPL-SCNC: 30 MMOL/L (ref 21–32)
CREAT SERPL-MCNC: 0.9 MG/DL (ref 0.6–1.2)
D DIMER: 1.01 UG/ML FEU (ref 0–0.6)
DEPRECATED RDW RBC AUTO: 18.6 % (ref 12.4–15.4)
EOSINOPHIL # BLD: 0 K/UL (ref 0–0.6)
EOSINOPHIL NFR BLD: 0 %
FLUAV RNA RESP QL NAA+PROBE: NOT DETECTED
FLUBV RNA RESP QL NAA+PROBE: NOT DETECTED
GFR SERPLBLD CREATININE-BSD FMLA CKD-EPI: >60 ML/MIN/{1.73_M2}
GLUCOSE BLD-MCNC: 137 MG/DL (ref 70–99)
GLUCOSE SERPL-MCNC: 198 MG/DL (ref 70–99)
HCT VFR BLD AUTO: 43.2 % (ref 36–48)
HGB BLD-MCNC: 13.9 G/DL (ref 12–16)
LACTATE BLDV-SCNC: 2 MMOL/L (ref 0.4–2)
LYMPHOCYTES # BLD: 0.4 K/UL (ref 1–5.1)
LYMPHOCYTES NFR BLD: 2.3 %
MCH RBC QN AUTO: 27.3 PG (ref 26–34)
MCHC RBC AUTO-ENTMCNC: 32.1 G/DL (ref 31–36)
MCV RBC AUTO: 85 FL (ref 80–100)
MONOCYTES # BLD: 0.6 K/UL (ref 0–1.3)
MONOCYTES NFR BLD: 3.9 %
NEUTROPHILS # BLD: 15.4 K/UL (ref 1.7–7.7)
NEUTROPHILS NFR BLD: 93.5 %
NT-PROBNP SERPL-MCNC: 111 PG/ML (ref 0–124)
PERFORMED ON: ABNORMAL
PLATELET # BLD AUTO: 331 K/UL (ref 135–450)
PMV BLD AUTO: 8.8 FL (ref 5–10.5)
POTASSIUM SERPL-SCNC: 4.1 MMOL/L (ref 3.5–5.1)
PROT SERPL-MCNC: 8.1 G/DL (ref 6.4–8.2)
RBC # BLD AUTO: 5.09 M/UL (ref 4–5.2)
SARS-COV-2 RNA RESP QL NAA+PROBE: NOT DETECTED
SODIUM SERPL-SCNC: 139 MMOL/L (ref 136–145)
TROPONIN, HIGH SENSITIVITY: 13 NG/L (ref 0–14)
TROPONIN, HIGH SENSITIVITY: 14 NG/L (ref 0–14)
WBC # BLD AUTO: 16.5 K/UL (ref 4–11)

## 2023-05-14 PROCEDURE — 71260 CT THORAX DX C+: CPT

## 2023-05-14 PROCEDURE — 83880 ASSAY OF NATRIURETIC PEPTIDE: CPT

## 2023-05-14 PROCEDURE — 96375 TX/PRO/DX INJ NEW DRUG ADDON: CPT

## 2023-05-14 PROCEDURE — 1200000000 HC SEMI PRIVATE

## 2023-05-14 PROCEDURE — 96365 THER/PROPH/DIAG IV INF INIT: CPT

## 2023-05-14 PROCEDURE — 85379 FIBRIN DEGRADATION QUANT: CPT

## 2023-05-14 PROCEDURE — 2580000003 HC RX 258: Performed by: INTERNAL MEDICINE

## 2023-05-14 PROCEDURE — 6370000000 HC RX 637 (ALT 250 FOR IP): Performed by: INTERNAL MEDICINE

## 2023-05-14 PROCEDURE — 6360000002 HC RX W HCPCS: Performed by: NURSE PRACTITIONER

## 2023-05-14 PROCEDURE — 80053 COMPREHEN METABOLIC PANEL: CPT

## 2023-05-14 PROCEDURE — 36415 COLL VENOUS BLD VENIPUNCTURE: CPT

## 2023-05-14 PROCEDURE — 84484 ASSAY OF TROPONIN QUANT: CPT

## 2023-05-14 PROCEDURE — 93005 ELECTROCARDIOGRAM TRACING: CPT | Performed by: EMERGENCY MEDICINE

## 2023-05-14 PROCEDURE — 94761 N-INVAS EAR/PLS OXIMETRY MLT: CPT

## 2023-05-14 PROCEDURE — 96361 HYDRATE IV INFUSION ADD-ON: CPT

## 2023-05-14 PROCEDURE — 6360000004 HC RX CONTRAST MEDICATION: Performed by: NURSE PRACTITIONER

## 2023-05-14 PROCEDURE — 87040 BLOOD CULTURE FOR BACTERIA: CPT

## 2023-05-14 PROCEDURE — 2580000003 HC RX 258: Performed by: NURSE PRACTITIONER

## 2023-05-14 PROCEDURE — 85025 COMPLETE CBC W/AUTO DIFF WBC: CPT

## 2023-05-14 PROCEDURE — 83605 ASSAY OF LACTIC ACID: CPT

## 2023-05-14 PROCEDURE — 99285 EMERGENCY DEPT VISIT HI MDM: CPT

## 2023-05-14 PROCEDURE — 87636 SARSCOV2 & INF A&B AMP PRB: CPT

## 2023-05-14 PROCEDURE — 96368 THER/DIAG CONCURRENT INF: CPT

## 2023-05-14 PROCEDURE — 2700000000 HC OXYGEN THERAPY PER DAY

## 2023-05-14 PROCEDURE — 71045 X-RAY EXAM CHEST 1 VIEW: CPT

## 2023-05-14 PROCEDURE — 83036 HEMOGLOBIN GLYCOSYLATED A1C: CPT

## 2023-05-14 RX ORDER — POLYETHYLENE GLYCOL 3350 17 G/17G
17 POWDER, FOR SOLUTION ORAL DAILY PRN
Status: DISCONTINUED | OUTPATIENT
Start: 2023-05-14 | End: 2023-05-18 | Stop reason: HOSPADM

## 2023-05-14 RX ORDER — ROFLUMILAST 500 UG/1
500 TABLET ORAL DAILY
Status: DISCONTINUED | OUTPATIENT
Start: 2023-05-15 | End: 2023-05-14

## 2023-05-14 RX ORDER — BENZONATATE 100 MG/1
100 CAPSULE ORAL 3 TIMES DAILY PRN
Status: DISCONTINUED | OUTPATIENT
Start: 2023-05-14 | End: 2023-05-14

## 2023-05-14 RX ORDER — ACETAMINOPHEN 650 MG/1
650 SUPPOSITORY RECTAL EVERY 6 HOURS PRN
Status: DISCONTINUED | OUTPATIENT
Start: 2023-05-14 | End: 2023-05-18 | Stop reason: HOSPADM

## 2023-05-14 RX ORDER — 0.9 % SODIUM CHLORIDE 0.9 %
500 INTRAVENOUS SOLUTION INTRAVENOUS ONCE
Status: COMPLETED | OUTPATIENT
Start: 2023-05-14 | End: 2023-05-14

## 2023-05-14 RX ORDER — FLUTICASONE PROPIONATE 50 MCG
1 SPRAY, SUSPENSION (ML) NASAL DAILY
Status: DISCONTINUED | OUTPATIENT
Start: 2023-05-15 | End: 2023-05-18 | Stop reason: HOSPADM

## 2023-05-14 RX ORDER — POTASSIUM CHLORIDE 20 MEQ/1
20 TABLET, EXTENDED RELEASE ORAL
Status: DISCONTINUED | OUTPATIENT
Start: 2023-05-15 | End: 2023-05-18 | Stop reason: HOSPADM

## 2023-05-14 RX ORDER — OMEGA-3S/DHA/EPA/FISH OIL/D3 300MG-1000
800 CAPSULE ORAL DAILY
Status: DISCONTINUED | OUTPATIENT
Start: 2023-05-15 | End: 2023-05-15 | Stop reason: SDUPTHER

## 2023-05-14 RX ORDER — THEOPHYLLINE 300 MG/1
300 TABLET, EXTENDED RELEASE ORAL DAILY
Status: DISCONTINUED | OUTPATIENT
Start: 2023-05-15 | End: 2023-05-18 | Stop reason: HOSPADM

## 2023-05-14 RX ORDER — ONDANSETRON 4 MG/1
4 TABLET, ORALLY DISINTEGRATING ORAL EVERY 8 HOURS PRN
Status: DISCONTINUED | OUTPATIENT
Start: 2023-05-14 | End: 2023-05-18 | Stop reason: HOSPADM

## 2023-05-14 RX ORDER — ALBUTEROL SULFATE 2.5 MG/3ML
2.5 SOLUTION RESPIRATORY (INHALATION) 4 TIMES DAILY
Status: DISCONTINUED | OUTPATIENT
Start: 2023-05-14 | End: 2023-05-14

## 2023-05-14 RX ORDER — INSULIN LISPRO 100 [IU]/ML
0-4 INJECTION, SOLUTION INTRAVENOUS; SUBCUTANEOUS NIGHTLY
Status: DISCONTINUED | OUTPATIENT
Start: 2023-05-14 | End: 2023-05-18 | Stop reason: HOSPADM

## 2023-05-14 RX ORDER — KETOROLAC TROMETHAMINE 30 MG/ML
15 INJECTION, SOLUTION INTRAMUSCULAR; INTRAVENOUS EVERY 6 HOURS PRN
Status: DISCONTINUED | OUTPATIENT
Start: 2023-05-14 | End: 2023-05-14 | Stop reason: HOSPADM

## 2023-05-14 RX ORDER — AZITHROMYCIN 250 MG/1
500 TABLET, FILM COATED ORAL EVERY 24 HOURS
Status: COMPLETED | OUTPATIENT
Start: 2023-05-15 | End: 2023-05-17

## 2023-05-14 RX ORDER — PREDNISONE 20 MG/1
40 TABLET ORAL DAILY
Status: DISCONTINUED | OUTPATIENT
Start: 2023-05-17 | End: 2023-05-18 | Stop reason: HOSPADM

## 2023-05-14 RX ORDER — PANTOPRAZOLE SODIUM 40 MG/1
40 TABLET, DELAYED RELEASE ORAL
Status: DISCONTINUED | OUTPATIENT
Start: 2023-05-15 | End: 2023-05-18 | Stop reason: HOSPADM

## 2023-05-14 RX ORDER — ATORVASTATIN CALCIUM 10 MG/1
10 TABLET, FILM COATED ORAL DAILY
Status: DISCONTINUED | OUTPATIENT
Start: 2023-05-15 | End: 2023-05-18 | Stop reason: HOSPADM

## 2023-05-14 RX ORDER — SODIUM CHLORIDE 0.9 % (FLUSH) 0.9 %
5-40 SYRINGE (ML) INJECTION EVERY 12 HOURS SCHEDULED
Status: DISCONTINUED | OUTPATIENT
Start: 2023-05-14 | End: 2023-05-18 | Stop reason: HOSPADM

## 2023-05-14 RX ORDER — BUDESONIDE AND FORMOTEROL FUMARATE DIHYDRATE 160; 4.5 UG/1; UG/1
2 AEROSOL RESPIRATORY (INHALATION) 2 TIMES DAILY
Status: DISCONTINUED | OUTPATIENT
Start: 2023-05-15 | End: 2023-05-18 | Stop reason: HOSPADM

## 2023-05-14 RX ORDER — ALBUTEROL SULFATE 2.5 MG/3ML
2.5 SOLUTION RESPIRATORY (INHALATION) 4 TIMES DAILY
Status: DISCONTINUED | OUTPATIENT
Start: 2023-05-15 | End: 2023-05-18

## 2023-05-14 RX ORDER — VENLAFAXINE HYDROCHLORIDE 75 MG/1
75 CAPSULE, EXTENDED RELEASE ORAL
Status: DISCONTINUED | OUTPATIENT
Start: 2023-05-15 | End: 2023-05-18 | Stop reason: HOSPADM

## 2023-05-14 RX ORDER — ONDANSETRON 2 MG/ML
4 INJECTION INTRAMUSCULAR; INTRAVENOUS EVERY 6 HOURS PRN
Status: DISCONTINUED | OUTPATIENT
Start: 2023-05-14 | End: 2023-05-18 | Stop reason: HOSPADM

## 2023-05-14 RX ORDER — ENOXAPARIN SODIUM 100 MG/ML
30 INJECTION SUBCUTANEOUS DAILY
Status: DISCONTINUED | OUTPATIENT
Start: 2023-05-15 | End: 2023-05-18 | Stop reason: HOSPADM

## 2023-05-14 RX ORDER — TOPIRAMATE 25 MG/1
50 TABLET ORAL 2 TIMES DAILY
Status: DISCONTINUED | OUTPATIENT
Start: 2023-05-14 | End: 2023-05-18 | Stop reason: HOSPADM

## 2023-05-14 RX ORDER — ACETAMINOPHEN 325 MG/1
650 TABLET ORAL EVERY 6 HOURS PRN
Status: DISCONTINUED | OUTPATIENT
Start: 2023-05-14 | End: 2023-05-18 | Stop reason: HOSPADM

## 2023-05-14 RX ORDER — SODIUM CHLORIDE 9 MG/ML
INJECTION, SOLUTION INTRAVENOUS PRN
Status: DISCONTINUED | OUTPATIENT
Start: 2023-05-14 | End: 2023-05-18 | Stop reason: HOSPADM

## 2023-05-14 RX ORDER — INSULIN LISPRO 100 [IU]/ML
0-4 INJECTION, SOLUTION INTRAVENOUS; SUBCUTANEOUS
Status: DISCONTINUED | OUTPATIENT
Start: 2023-05-15 | End: 2023-05-18 | Stop reason: HOSPADM

## 2023-05-14 RX ORDER — QUETIAPINE FUMARATE 25 MG/1
25 TABLET, FILM COATED ORAL NIGHTLY
Status: DISCONTINUED | OUTPATIENT
Start: 2023-05-14 | End: 2023-05-18 | Stop reason: HOSPADM

## 2023-05-14 RX ORDER — GUAIFENESIN 600 MG/1
600 TABLET, EXTENDED RELEASE ORAL 2 TIMES DAILY
Status: DISCONTINUED | OUTPATIENT
Start: 2023-05-14 | End: 2023-05-18 | Stop reason: HOSPADM

## 2023-05-14 RX ORDER — TROSPIUM CHLORIDE 20 MG/1
20 TABLET, FILM COATED ORAL
Status: DISCONTINUED | OUTPATIENT
Start: 2023-05-15 | End: 2023-05-18 | Stop reason: HOSPADM

## 2023-05-14 RX ORDER — FUROSEMIDE 40 MG/1
40 TABLET ORAL DAILY
Status: DISCONTINUED | OUTPATIENT
Start: 2023-05-15 | End: 2023-05-18 | Stop reason: HOSPADM

## 2023-05-14 RX ORDER — BUDESONIDE AND FORMOTEROL FUMARATE DIHYDRATE 160; 4.5 UG/1; UG/1
2 AEROSOL RESPIRATORY (INHALATION) 2 TIMES DAILY
Status: DISCONTINUED | OUTPATIENT
Start: 2023-05-14 | End: 2023-05-14

## 2023-05-14 RX ORDER — ALOGLIPTIN 6.25 MG/1
6.25 TABLET, FILM COATED ORAL DAILY
Status: DISCONTINUED | OUTPATIENT
Start: 2023-05-15 | End: 2023-05-18 | Stop reason: HOSPADM

## 2023-05-14 RX ORDER — SODIUM CHLORIDE 0.9 % (FLUSH) 0.9 %
5-40 SYRINGE (ML) INJECTION PRN
Status: DISCONTINUED | OUTPATIENT
Start: 2023-05-14 | End: 2023-05-18 | Stop reason: HOSPADM

## 2023-05-14 RX ORDER — DEXTROSE MONOHYDRATE 100 MG/ML
INJECTION, SOLUTION INTRAVENOUS CONTINUOUS PRN
Status: DISCONTINUED | OUTPATIENT
Start: 2023-05-14 | End: 2023-05-18 | Stop reason: HOSPADM

## 2023-05-14 RX ORDER — PHENOL 1.4 %
2 AEROSOL, SPRAY (ML) MUCOUS MEMBRANE DAILY
Status: DISCONTINUED | OUTPATIENT
Start: 2023-05-15 | End: 2023-05-15 | Stop reason: SDUPTHER

## 2023-05-14 RX ORDER — DILTIAZEM HYDROCHLORIDE 120 MG/1
120 CAPSULE, EXTENDED RELEASE ORAL DAILY
Status: DISCONTINUED | OUTPATIENT
Start: 2023-05-15 | End: 2023-05-17 | Stop reason: SDUPTHER

## 2023-05-14 RX ADMIN — SODIUM CHLORIDE 500 ML: 9 INJECTION, SOLUTION INTRAVENOUS at 19:26

## 2023-05-14 RX ADMIN — AZITHROMYCIN DIHYDRATE 500 MG: 500 INJECTION, POWDER, LYOPHILIZED, FOR SOLUTION INTRAVENOUS at 18:30

## 2023-05-14 RX ADMIN — TOPIRAMATE 50 MG: 25 TABLET, FILM COATED ORAL at 23:00

## 2023-05-14 RX ADMIN — SODIUM CHLORIDE, PRESERVATIVE FREE 10 ML: 5 INJECTION INTRAVENOUS at 23:00

## 2023-05-14 RX ADMIN — KETOROLAC TROMETHAMINE 15 MG: 30 INJECTION, SOLUTION INTRAMUSCULAR; INTRAVENOUS at 19:26

## 2023-05-14 RX ADMIN — GUAIFENESIN 600 MG: 600 TABLET, EXTENDED RELEASE ORAL at 23:00

## 2023-05-14 RX ADMIN — IOPAMIDOL 75 ML: 755 INJECTION, SOLUTION INTRAVENOUS at 17:45

## 2023-05-14 RX ADMIN — WATER 60 MG: 1 INJECTION INTRAMUSCULAR; INTRAVENOUS; SUBCUTANEOUS at 16:47

## 2023-05-14 RX ADMIN — CEFTRIAXONE SODIUM 1000 MG: 1 INJECTION, POWDER, FOR SOLUTION INTRAMUSCULAR; INTRAVENOUS at 17:54

## 2023-05-14 ASSESSMENT — PAIN DESCRIPTION - DESCRIPTORS: DESCRIPTORS: ACHING;DISCOMFORT

## 2023-05-14 ASSESSMENT — PAIN DESCRIPTION - ORIENTATION: ORIENTATION: RIGHT

## 2023-05-14 ASSESSMENT — ENCOUNTER SYMPTOMS
NAUSEA: 0
CHEST TIGHTNESS: 1
EYE REDNESS: 0
FACIAL SWELLING: 0
CHOKING: 0
EYE DISCHARGE: 0
COLOR CHANGE: 0
COUGH: 1
WHEEZING: 1
SHORTNESS OF BREATH: 1
ABDOMINAL PAIN: 0
APNEA: 0
VOMITING: 0

## 2023-05-14 ASSESSMENT — PAIN DESCRIPTION - LOCATION
LOCATION: CHEST
LOCATION: ABDOMEN;RIB CAGE
LOCATION: HEAD

## 2023-05-14 ASSESSMENT — PAIN SCALES - GENERAL
PAINLEVEL_OUTOF10: 4
PAINLEVEL_OUTOF10: 7
PAINLEVEL_OUTOF10: 6

## 2023-05-14 ASSESSMENT — PAIN - FUNCTIONAL ASSESSMENT: PAIN_FUNCTIONAL_ASSESSMENT: 0-10

## 2023-05-15 ENCOUNTER — APPOINTMENT (OUTPATIENT)
Dept: GENERAL RADIOLOGY | Age: 73
DRG: 871 | End: 2023-05-15
Payer: MEDICARE

## 2023-05-15 ENCOUNTER — TELEPHONE (OUTPATIENT)
Dept: INTERNAL MEDICINE CLINIC | Age: 73
End: 2023-05-15

## 2023-05-15 PROBLEM — J44.1 COPD EXACERBATION (HCC): Status: ACTIVE | Noted: 2023-05-15

## 2023-05-15 LAB
ANION GAP SERPL CALCULATED.3IONS-SCNC: 13 MMOL/L (ref 3–16)
BASOPHILS # BLD: 0 K/UL (ref 0–0.2)
BASOPHILS NFR BLD: 0 %
BUN SERPL-MCNC: 29 MG/DL (ref 7–20)
CALCIUM SERPL-MCNC: 9.6 MG/DL (ref 8.3–10.6)
CHLORIDE SERPL-SCNC: 104 MMOL/L (ref 99–110)
CO2 SERPL-SCNC: 25 MMOL/L (ref 21–32)
CREAT SERPL-MCNC: 0.7 MG/DL (ref 0.6–1.2)
DEPRECATED RDW RBC AUTO: 18.2 % (ref 12.4–15.4)
EKG ATRIAL RATE: 112 BPM
EKG DIAGNOSIS: NORMAL
EKG P AXIS: 81 DEGREES
EKG P-R INTERVAL: 158 MS
EKG Q-T INTERVAL: 300 MS
EKG QRS DURATION: 88 MS
EKG QTC CALCULATION (BAZETT): 409 MS
EKG R AXIS: 84 DEGREES
EKG T AXIS: -49 DEGREES
EKG VENTRICULAR RATE: 112 BPM
EOSINOPHIL # BLD: 0 K/UL (ref 0–0.6)
EOSINOPHIL NFR BLD: 0 %
EST. AVERAGE GLUCOSE BLD GHB EST-MCNC: 128.4 MG/DL
GFR SERPLBLD CREATININE-BSD FMLA CKD-EPI: >60 ML/MIN/{1.73_M2}
GLUCOSE BLD-MCNC: 136 MG/DL (ref 70–99)
GLUCOSE BLD-MCNC: 202 MG/DL (ref 70–99)
GLUCOSE BLD-MCNC: 203 MG/DL (ref 70–99)
GLUCOSE BLD-MCNC: 225 MG/DL (ref 70–99)
GLUCOSE SERPL-MCNC: 146 MG/DL (ref 70–99)
HBA1C MFR BLD: 6.1 %
HCT VFR BLD AUTO: 39.9 % (ref 36–48)
HGB BLD-MCNC: 12.3 G/DL (ref 12–16)
LYMPHOCYTES # BLD: 1.1 K/UL (ref 1–5.1)
LYMPHOCYTES NFR BLD: 6.6 %
MCH RBC QN AUTO: 26.9 PG (ref 26–34)
MCHC RBC AUTO-ENTMCNC: 30.8 G/DL (ref 31–36)
MCV RBC AUTO: 87.4 FL (ref 80–100)
MONOCYTES # BLD: 1 K/UL (ref 0–1.3)
MONOCYTES NFR BLD: 6 %
NEUTROPHILS # BLD: 13.9 K/UL (ref 1.7–7.7)
NEUTROPHILS NFR BLD: 87.4 %
PERFORMED ON: ABNORMAL
PLATELET # BLD AUTO: 312 K/UL (ref 135–450)
PMV BLD AUTO: 8.9 FL (ref 5–10.5)
POTASSIUM SERPL-SCNC: 4 MMOL/L (ref 3.5–5.1)
PROCALCITONIN SERPL IA-MCNC: 0.44 NG/ML (ref 0–0.15)
RBC # BLD AUTO: 4.56 M/UL (ref 4–5.2)
SODIUM SERPL-SCNC: 142 MMOL/L (ref 136–145)
WBC # BLD AUTO: 15.9 K/UL (ref 4–11)

## 2023-05-15 PROCEDURE — 99232 SBSQ HOSP IP/OBS MODERATE 35: CPT | Performed by: INTERNAL MEDICINE

## 2023-05-15 PROCEDURE — 1200000000 HC SEMI PRIVATE

## 2023-05-15 PROCEDURE — 6370000000 HC RX 637 (ALT 250 FOR IP): Performed by: HOSPITALIST

## 2023-05-15 PROCEDURE — 87633 RESP VIRUS 12-25 TARGETS: CPT

## 2023-05-15 PROCEDURE — 87205 SMEAR GRAM STAIN: CPT

## 2023-05-15 PROCEDURE — 6360000002 HC RX W HCPCS: Performed by: INTERNAL MEDICINE

## 2023-05-15 PROCEDURE — 2700000000 HC OXYGEN THERAPY PER DAY

## 2023-05-15 PROCEDURE — 87070 CULTURE OTHR SPECIMN AEROBIC: CPT

## 2023-05-15 PROCEDURE — 6370000000 HC RX 637 (ALT 250 FOR IP): Performed by: INTERNAL MEDICINE

## 2023-05-15 PROCEDURE — 97530 THERAPEUTIC ACTIVITIES: CPT

## 2023-05-15 PROCEDURE — 97165 OT EVAL LOW COMPLEX 30 MIN: CPT

## 2023-05-15 PROCEDURE — 94761 N-INVAS EAR/PLS OXIMETRY MLT: CPT

## 2023-05-15 PROCEDURE — 99223 1ST HOSP IP/OBS HIGH 75: CPT | Performed by: INTERNAL MEDICINE

## 2023-05-15 PROCEDURE — 80048 BASIC METABOLIC PNL TOTAL CA: CPT

## 2023-05-15 PROCEDURE — 87449 NOS EACH ORGANISM AG IA: CPT

## 2023-05-15 PROCEDURE — 36415 COLL VENOUS BLD VENIPUNCTURE: CPT

## 2023-05-15 PROCEDURE — 94640 AIRWAY INHALATION TREATMENT: CPT

## 2023-05-15 PROCEDURE — 93010 ELECTROCARDIOGRAM REPORT: CPT | Performed by: INTERNAL MEDICINE

## 2023-05-15 PROCEDURE — 2580000003 HC RX 258: Performed by: INTERNAL MEDICINE

## 2023-05-15 PROCEDURE — 97162 PT EVAL MOD COMPLEX 30 MIN: CPT

## 2023-05-15 PROCEDURE — 84145 PROCALCITONIN (PCT): CPT

## 2023-05-15 PROCEDURE — 71046 X-RAY EXAM CHEST 2 VIEWS: CPT

## 2023-05-15 PROCEDURE — 85025 COMPLETE CBC W/AUTO DIFF WBC: CPT

## 2023-05-15 RX ORDER — ALBUTEROL SULFATE 2.5 MG/3ML
2.5 SOLUTION RESPIRATORY (INHALATION) EVERY 4 HOURS PRN
Status: DISCONTINUED | OUTPATIENT
Start: 2023-05-15 | End: 2023-05-18 | Stop reason: HOSPADM

## 2023-05-15 RX ORDER — VITAMIN B COMPLEX
5000 TABLET ORAL NIGHTLY
Status: DISCONTINUED | OUTPATIENT
Start: 2023-05-15 | End: 2023-05-18 | Stop reason: HOSPADM

## 2023-05-15 RX ORDER — THEOPHYLLINE 300 MG/1
300 TABLET, EXTENDED RELEASE ORAL NIGHTLY
COMMUNITY

## 2023-05-15 RX ORDER — CALCIUM CARBONATE 200(500)MG
1000 TABLET,CHEWABLE ORAL NIGHTLY
Status: DISCONTINUED | OUTPATIENT
Start: 2023-05-15 | End: 2023-05-18 | Stop reason: HOSPADM

## 2023-05-15 RX ORDER — BUDESONIDE 0.5 MG/2ML
0.5 INHALANT ORAL 2 TIMES DAILY
Status: DISCONTINUED | OUTPATIENT
Start: 2023-05-15 | End: 2023-05-16

## 2023-05-15 RX ADMIN — ALBUTEROL SULFATE 2.5 MG: 2.5 SOLUTION RESPIRATORY (INHALATION) at 11:05

## 2023-05-15 RX ADMIN — ALBUTEROL SULFATE 2.5 MG: 2.5 SOLUTION RESPIRATORY (INHALATION) at 07:16

## 2023-05-15 RX ADMIN — DILTIAZEM HYDROCHLORIDE 120 MG: 120 CAPSULE, EXTENDED RELEASE ORAL at 08:45

## 2023-05-15 RX ADMIN — ALOGLIPTIN 6.25 MG: 6.25 TABLET, FILM COATED ORAL at 08:44

## 2023-05-15 RX ADMIN — ALBUTEROL SULFATE 2.5 MG: 2.5 SOLUTION RESPIRATORY (INHALATION) at 21:20

## 2023-05-15 RX ADMIN — METHYLPREDNISOLONE SODIUM SUCCINATE 40 MG: 40 INJECTION INTRAMUSCULAR; INTRAVENOUS at 23:35

## 2023-05-15 RX ADMIN — TOPIRAMATE 50 MG: 25 TABLET, FILM COATED ORAL at 21:58

## 2023-05-15 RX ADMIN — BUDESONIDE 500 MCG: 0.5 SUSPENSION RESPIRATORY (INHALATION) at 21:28

## 2023-05-15 RX ADMIN — QUETIAPINE FUMARATE 25 MG: 25 TABLET ORAL at 23:35

## 2023-05-15 RX ADMIN — Medication 2 PUFF: at 21:28

## 2023-05-15 RX ADMIN — ENOXAPARIN SODIUM 30 MG: 100 INJECTION SUBCUTANEOUS at 08:45

## 2023-05-15 RX ADMIN — METHYLPREDNISOLONE SODIUM SUCCINATE 40 MG: 40 INJECTION INTRAMUSCULAR; INTRAVENOUS at 19:05

## 2023-05-15 RX ADMIN — SODIUM CHLORIDE: 9 INJECTION, SOLUTION INTRAVENOUS at 19:06

## 2023-05-15 RX ADMIN — TOPIRAMATE 50 MG: 25 TABLET, FILM COATED ORAL at 08:45

## 2023-05-15 RX ADMIN — INSULIN LISPRO 1 UNITS: 100 INJECTION, SOLUTION INTRAVENOUS; SUBCUTANEOUS at 12:14

## 2023-05-15 RX ADMIN — FUROSEMIDE 40 MG: 40 TABLET ORAL at 08:45

## 2023-05-15 RX ADMIN — GUAIFENESIN 600 MG: 600 TABLET, EXTENDED RELEASE ORAL at 08:45

## 2023-05-15 RX ADMIN — ALBUTEROL SULFATE 2.5 MG: 2.5 SOLUTION RESPIRATORY (INHALATION) at 15:10

## 2023-05-15 RX ADMIN — METHYLPREDNISOLONE SODIUM SUCCINATE 40 MG: 40 INJECTION INTRAMUSCULAR; INTRAVENOUS at 12:14

## 2023-05-15 RX ADMIN — Medication 2 PUFF: at 07:16

## 2023-05-15 RX ADMIN — CALCIUM CARBONATE 1000 MG: 500 TABLET, CHEWABLE ORAL at 23:35

## 2023-05-15 RX ADMIN — POTASSIUM CHLORIDE 20 MEQ: 1500 TABLET, EXTENDED RELEASE ORAL at 08:45

## 2023-05-15 RX ADMIN — AZITHROMYCIN 500 MG: 250 TABLET, FILM COATED ORAL at 21:58

## 2023-05-15 RX ADMIN — GUAIFENESIN 600 MG: 600 TABLET, EXTENDED RELEASE ORAL at 21:58

## 2023-05-15 RX ADMIN — THEOPHYLLINE 300 MG: 300 TABLET, EXTENDED RELEASE ORAL at 08:45

## 2023-05-15 RX ADMIN — INSULIN LISPRO 1 UNITS: 100 INJECTION, SOLUTION INTRAVENOUS; SUBCUTANEOUS at 16:47

## 2023-05-15 RX ADMIN — METHYLPREDNISOLONE SODIUM SUCCINATE 40 MG: 40 INJECTION INTRAMUSCULAR; INTRAVENOUS at 07:32

## 2023-05-15 RX ADMIN — FLUTICASONE PROPIONATE 1 SPRAY: 50 SPRAY, METERED NASAL at 09:01

## 2023-05-15 RX ADMIN — SODIUM CHLORIDE, PRESERVATIVE FREE 10 ML: 5 INJECTION INTRAVENOUS at 08:49

## 2023-05-15 RX ADMIN — CEFTRIAXONE SODIUM 1000 MG: 1 INJECTION, POWDER, FOR SOLUTION INTRAMUSCULAR; INTRAVENOUS at 19:08

## 2023-05-15 RX ADMIN — Medication 5000 UNITS: at 23:35

## 2023-05-15 RX ADMIN — TIOTROPIUM BROMIDE INHALATION SPRAY 2 PUFF: 3.12 SPRAY, METERED RESPIRATORY (INHALATION) at 07:16

## 2023-05-15 RX ADMIN — PANTOPRAZOLE SODIUM 40 MG: 40 TABLET, DELAYED RELEASE ORAL at 07:32

## 2023-05-15 RX ADMIN — VENLAFAXINE HYDROCHLORIDE 75 MG: 75 CAPSULE, EXTENDED RELEASE ORAL at 08:45

## 2023-05-15 ASSESSMENT — PAIN SCALES - GENERAL
PAINLEVEL_OUTOF10: 0
PAINLEVEL_OUTOF10: 0

## 2023-05-15 NOTE — ED PROVIDER NOTES
I independently examined and evaluated Ambreen Patrick. In brief, is a 71-year-old female presents to the emergency department for evaluation of shortness of breath and chest pain. Focused exam revealed thin, female, with wheezing on auscultation and satting greater than 90% on her home 2 L nasal cannula O2. Troponin is 14 then 13. D-dimer elevated. COVID-negative. CTPE shows no evidence of acute pulmonary embolism. Admit. All diagnostic, treatment, and disposition decisions were made by myself in conjunction with the advanced practice provider/resident physician. I personally saw the patient and performed a substantive portion of the visit including aspects of the medical decision making. Comment: Please note this report has been produced using speech recognition software and may contain errors related to that system including errors in grammar, punctuation, and spelling, as well as words and phrases that may be inappropriate. If there are any questions or concerns please feel free to contact the dictating provider for clarification. For all further details of the patient's emergency department visit, please see the advanced practice provider's documentation.         Mandy Rico MD  05/15/23 9115

## 2023-05-15 NOTE — TELEPHONE ENCOUNTER
----- Message from Andreas Bergeron sent at 5/12/2023  4:56 PM EDT -----  Contact: [phone]  Per Dr. Elias Whiting- retest for covid. Left vm for pt to return call.  ----- Message -----  From: Andreas Bergeron  Sent: 5/12/2023   4:10 PM EDT  To: Mack Hidalgo MD    Pt states she tested neg on Wednesday and her daughter was admitted Monday evening. Please advise.  ----- Message -----  From: Mack Hidalgo MD  Sent: 5/12/2023   3:27 PM EDT  To: Andreas Bergeron    He should test and call with results   ----- Message -----  From: Deri Litten  Sent: 5/12/2023   2:58 PM EDT  To: Mack Hidalgo MD    Patient states daughter has tested positive for Covid and is currently admitted in [address] [place] [address]. Patient was told to call her PCP to request antibiotics and steroid for prevention due to exposure. Please advise.     [facility] [address] [facility], [address] [place] [id number]   Phone:  [phone]  Fax:  [phone]

## 2023-05-15 NOTE — PLAN OF CARE
Problem: Discharge Planning  Goal: Discharge to home or other facility with appropriate resources  5/15/2023 1053 by Katie Esparza RN  Outcome: Progressing  5/14/2023 2322 by Amos Simmons RN  Outcome: Progressing     Problem: Pain  Goal: Verbalizes/displays adequate comfort level or baseline comfort level  Outcome: Progressing     Problem: ABCDS Injury Assessment  Goal: Absence of physical injury  Outcome: Progressing     Problem: Safety - Adult  Goal: Free from fall injury  5/15/2023 1053 by Katie Esparza RN  Outcome: Progressing  5/14/2023 2322 by Amos Simmons RN  Outcome: Progressing     Problem: Chronic Conditions and Co-morbidities  Goal: Patient's chronic conditions and co-morbidity symptoms are monitored and maintained or improved  Outcome: Progressing

## 2023-05-15 NOTE — H&P
Hospitalist Admission Note    NAME: Kareen Barry   :  [id number]   MRN:  [id number]     Date/Time:  2023 8:16 PM    Patient PCP: Ash Farr MD  _____________________________________________________________________  Given the patient's current clinical presentation, I have a high level of concern for decompensation if discharged from the emergency department. Complex decision making was performed, which includes reviewing the patient's available past medical records, laboratory results, and x-ray films. My assessment of this patient's clinical condition and my plan of care is as follows. Assessment / Plan:  69-year-old female that comes to the emergency room with increased shortness of breath, productive cough with greenish-brown mucus and left sided chest pain. Her last echo was  with estimated EF 55%          Community-acquired pneumonia  COPD exacerbation  SIRS criteria met with HR  97, Resp 29, WBC 16.5  Pulmn nodules   Acute  on chronc  resp failure  She is still requiring 3 L of nasal cannula oxygen over top of her baseline 2 L. Admit hospitalist service  Iv cxt/azithro  Iv steroid  Neb rx  Oxygen support  Pulmn consult          Pmhx  DM-2   ssi check a1c  HLD  History of pulmonary nodule  chronic respiratory failure with hypoxia, 2 L chronically at home of oxygen  Emphysema    GERD  Depression    Resume home meds as  per med rec. IMPRESSION:  1. No evidence of acute pulmonary embolism or acute aortic disease. 2. Underlying emphysema. Redemonstration of multiple pulmonary nodules also  previously noted which remain unchanged. 3. New pulmonary infiltrates which are partly nodular in both lower lobes,  not previously noted and likely represent pneumonia. Redemonstration of mild  bronchiectatic changes both lower lobes. No evidence of pleural effusion or  pneumothorax.          Code Status: full  DVT Prophylaxis: sq lovenox  GI Prophylaxis: not indicated

## 2023-05-15 NOTE — FLOWSHEET NOTE
05/15/23 0830   Vital Signs   Temp 97.6 °F (36.4 °C)   Temp Source Oral   Pulse (!) 107   Heart Rate Source Monitor   Respirations 19   /68   MAP (Calculated) 84   BP Location Left upper arm   BP Method Automatic   Patient Position Semi fowlers   Level of Consciousness 0   MEWS Score 2   Pain Assessment   Pain Assessment None - Denies Pain   Pain Level 0   Opioid-Induced Sedation   POSS Score 1   Oxygen Therapy   SpO2 98 %   O2 Device Nasal cannula   O2 Flow Rate (L/min) 2 L/min     AM assessment completed, see flow sheet. Pt is alert and oriented. Vital signs Above. No complaints voiced. Pt denies needs at this time. SR up x 2, and bed in low position. Call light is within reach.

## 2023-05-15 NOTE — ACP (ADVANCE CARE PLANNING)
Advance Care Planning     General Advance Care Planning (ACP) Conversation    Date of Conversation: 5/14/2023  Conducted with: Patient with Decision Making Capacity    Healthcare Decision Maker:  No healthcare decision makers have been documented. Click here to complete [address] including selection of the Healthcare Decision Maker Relationship (ie \"Primary\")   Today we documented Decision Maker(s) consistent with Legal Next of Kin hierarchy. Content/Action Overview: Has ACP document(s) NOT on file - requested patient to provide--unable to view what is in chart.  Asked pt to bring in  Reviewed DNR/DNI and patient elects Full Code (Attempt Resuscitation)        Length of Voluntary ACP Conversation in minutes:  <16 minutes (Non-Billable)    Clifton Sutherland RN

## 2023-05-15 NOTE — CARE COORDINATION
Case Management Assessment  Initial Evaluation    Date/Time of Evaluation: 5/15/2023 9:46 AM  Assessment Completed by: Vanda Sanchez RN    If patient is discharged prior to next notation, then this note serves as note for discharge by case management. Patient Name: Lorelei Wagner                   YOB: 1950  Diagnosis: COPD exacerbation ([facility] [J44.1]  Pneumonia due to infectious organism, unspecified laterality, unspecified part of lung [J18.9]  Community acquired pneumonia, unspecified laterality [J18.9]                   Date / Time: 5/14/2023  3:24 PM    Patient Admission Status: Inpatient   Readmission Risk (Low < 19, Mod (19-27), High > 27): Readmission Risk Score: 11.7    Current PCP: Earl Hall MD  PCP verified by CM? Yes (kyle)    Chart Reviewed: Yes      History Provided by: Patient  Patient Orientation: Alert and Oriented    Patient Cognition: Alert    Hospitalization in the last 30 days (Readmission):  No    If yes, Readmission Assessment in  Navigator will be completed. Advance Directives:      Code Status: Full Code   Patient's Primary Decision Maker is: Legal Next of Kin      Discharge Planning:    Patient lives with: Children Type of Home: House  Primary Care Giver: Self  Patient Support Systems include: Children   Current Financial resources: Medicare, [employer] [place]  Current community resources: None  Current services prior to admission: Oxygen Therapy (O2 2 liters from [employer])            Current DME:              Type of Home Care services:  None    ADLS  Prior functional level: Independent in ADLs/IADLs  Current functional level: Independent in ADLs/IADLs    PT AM-PAC:   /24  OT AM-PAC:   /24    Family can provide assistance at DC: Yes  Would you like Case Management to discuss the discharge plan with any other family members/significant others, and if so, who?  No  Plans to Return to Present Housing: Yes  Other Identified Issues/Barriers to RETURNING to current

## 2023-05-15 NOTE — CONSULTS
P Pulmonary[facility]                                 Pulmonary Consult /Progress Note :                                                                  CHIEF COMPLAINT: SOB ,cough     Consulting provider: Dr Irma Graham     HPI:   69-year-old female with 30  PPY smoking history and Very severe COPD,Fev1 35 %  chronic hypoxia on 2 L known to Dr Russell Dejesus  that comes to the emergency room with increased shortness of breath, productive cough with greenish-brown mucus and left sided chest pain.  Her last echo was 2015 with estimated EF  She stated that her breathing has been rough over the last few days    She use home oxygen and she uses [employer] at home and rescue inhalers as well however these did not provide her with extra help    She feels also tight    She mentions some cough however most of it to dry sometimes she will bring yellow thick sputum          Past Medical History:   Diagnosis Date    Acute on chronic respiratory failure with hypoxia ([address] 12/3/2009    Chronic airway obstruction, not elsewhere classified 3/4/08    oxygen 2L/min at HS and PRN through day    Chronic kidney disease     incontinent    COPD (chronic obstructive pulmonary disease) ([address]     Depression     Displacement of lumbar intervertebral disc without myelopathy 8/13/07    Edema 9/13/07    Emphysema of lung ([address]     Enthesopathy of hip region     Enthesopathy of hip region 3/5/07    Esophageal reflux 3/4/08    Hemorrhage of rectum and anus 11/14/07    Herpes zoster without complication     History of blood transfusion     Hyperlipidemia associated with type 2 diabetes mellitus ([address] 12/11/2017    Lumbar spondylosis 5/1/2018    Major depression, chronic 8/12/2015    Osteoporosis, unspecified 12/4/07    Other and unspecified hyperlipidemia 3/4/08    Pain in joint, shoulder region 3/4/08    Pneumonia     Pulmonary nodule     Recurrent major depressive disorder, in partial remission ([address] 5/4/2018

## 2023-05-15 NOTE — ED NOTES
Report called to [facility] spoke to [facility]. Will be down to transport pt.      [address] RN  05/14/23 2050

## 2023-05-16 ENCOUNTER — TELEPHONE (OUTPATIENT)
Dept: INTERNAL MEDICINE CLINIC | Age: 73
End: 2023-05-16

## 2023-05-16 LAB
ANION GAP SERPL CALCULATED.3IONS-SCNC: 8 MMOL/L (ref 3–16)
BASOPHILS # BLD: 0 K/UL (ref 0–0.2)
BASOPHILS NFR BLD: 0.1 %
BUN SERPL-MCNC: 35 MG/DL (ref 7–20)
CALCIUM SERPL-MCNC: 9.5 MG/DL (ref 8.3–10.6)
CHLORIDE SERPL-SCNC: 105 MMOL/L (ref 99–110)
CO2 SERPL-SCNC: 27 MMOL/L (ref 21–32)
CREAT SERPL-MCNC: 0.7 MG/DL (ref 0.6–1.2)
DEPRECATED RDW RBC AUTO: 18.5 % (ref 12.4–15.4)
EOSINOPHIL # BLD: 0 K/UL (ref 0–0.6)
EOSINOPHIL NFR BLD: 0 %
GFR SERPLBLD CREATININE-BSD FMLA CKD-EPI: >60 ML/MIN/{1.73_M2}
GLUCOSE BLD-MCNC: 173 MG/DL (ref 70–99)
GLUCOSE BLD-MCNC: 217 MG/DL (ref 70–99)
GLUCOSE BLD-MCNC: 271 MG/DL (ref 70–99)
GLUCOSE BLD-MCNC: 272 MG/DL (ref 70–99)
GLUCOSE SERPL-MCNC: 210 MG/DL (ref 70–99)
HCT VFR BLD AUTO: 35.5 % (ref 36–48)
HGB BLD-MCNC: 11.3 G/DL (ref 12–16)
LEGIONELLA AG UR QL: NORMAL
LYMPHOCYTES # BLD: 0.5 K/UL (ref 1–5.1)
LYMPHOCYTES NFR BLD: 4.5 %
MCH RBC QN AUTO: 27.3 PG (ref 26–34)
MCHC RBC AUTO-ENTMCNC: 31.9 G/DL (ref 31–36)
MCV RBC AUTO: 85.6 FL (ref 80–100)
MONOCYTES # BLD: 0.4 K/UL (ref 0–1.3)
MONOCYTES NFR BLD: 3.7 %
NEUTROPHILS # BLD: 11.1 K/UL (ref 1.7–7.7)
NEUTROPHILS NFR BLD: 91.7 %
ORGANISM: ABNORMAL
PERFORMED ON: ABNORMAL
PLATELET # BLD AUTO: 254 K/UL (ref 135–450)
PMV BLD AUTO: 9 FL (ref 5–10.5)
PNEUMONIA PANEL MOLECULAR: ABNORMAL
POTASSIUM SERPL-SCNC: 4.6 MMOL/L (ref 3.5–5.1)
RBC # BLD AUTO: 4.14 M/UL (ref 4–5.2)
REPORT: NORMAL
S PNEUM AG UR QL: ABNORMAL
SODIUM SERPL-SCNC: 140 MMOL/L (ref 136–145)
WBC # BLD AUTO: 12.1 K/UL (ref 4–11)

## 2023-05-16 PROCEDURE — 80048 BASIC METABOLIC PNL TOTAL CA: CPT

## 2023-05-16 PROCEDURE — 6360000002 HC RX W HCPCS: Performed by: INTERNAL MEDICINE

## 2023-05-16 PROCEDURE — 2580000003 HC RX 258: Performed by: INTERNAL MEDICINE

## 2023-05-16 PROCEDURE — 6370000000 HC RX 637 (ALT 250 FOR IP): Performed by: INTERNAL MEDICINE

## 2023-05-16 PROCEDURE — 85025 COMPLETE CBC W/AUTO DIFF WBC: CPT

## 2023-05-16 PROCEDURE — 2700000000 HC OXYGEN THERAPY PER DAY

## 2023-05-16 PROCEDURE — 99233 SBSQ HOSP IP/OBS HIGH 50: CPT | Performed by: INTERNAL MEDICINE

## 2023-05-16 PROCEDURE — 94640 AIRWAY INHALATION TREATMENT: CPT

## 2023-05-16 PROCEDURE — 94669 MECHANICAL CHEST WALL OSCILL: CPT

## 2023-05-16 PROCEDURE — 99232 SBSQ HOSP IP/OBS MODERATE 35: CPT | Performed by: INTERNAL MEDICINE

## 2023-05-16 PROCEDURE — 1200000000 HC SEMI PRIVATE

## 2023-05-16 PROCEDURE — 36415 COLL VENOUS BLD VENIPUNCTURE: CPT

## 2023-05-16 PROCEDURE — 94761 N-INVAS EAR/PLS OXIMETRY MLT: CPT

## 2023-05-16 RX ORDER — BUDESONIDE 0.5 MG/2ML
0.5 INHALANT ORAL 2 TIMES DAILY
Status: DISCONTINUED | OUTPATIENT
Start: 2023-05-16 | End: 2023-05-18 | Stop reason: HOSPADM

## 2023-05-16 RX ADMIN — Medication 2 PUFF: at 07:13

## 2023-05-16 RX ADMIN — AZITHROMYCIN 500 MG: 250 TABLET, FILM COATED ORAL at 21:16

## 2023-05-16 RX ADMIN — BUDESONIDE 500 MCG: 0.5 SUSPENSION RESPIRATORY (INHALATION) at 19:52

## 2023-05-16 RX ADMIN — TIOTROPIUM BROMIDE INHALATION SPRAY 2 PUFF: 3.12 SPRAY, METERED RESPIRATORY (INHALATION) at 07:13

## 2023-05-16 RX ADMIN — CALCIUM CARBONATE 1000 MG: 500 TABLET, CHEWABLE ORAL at 21:15

## 2023-05-16 RX ADMIN — TOPIRAMATE 50 MG: 25 TABLET, FILM COATED ORAL at 21:16

## 2023-05-16 RX ADMIN — INSULIN LISPRO 1 UNITS: 100 INJECTION, SOLUTION INTRAVENOUS; SUBCUTANEOUS at 17:02

## 2023-05-16 RX ADMIN — DILTIAZEM HYDROCHLORIDE 120 MG: 120 CAPSULE, EXTENDED RELEASE ORAL at 07:58

## 2023-05-16 RX ADMIN — PANTOPRAZOLE SODIUM 40 MG: 40 TABLET, DELAYED RELEASE ORAL at 06:01

## 2023-05-16 RX ADMIN — GUAIFENESIN 600 MG: 600 TABLET, EXTENDED RELEASE ORAL at 21:16

## 2023-05-16 RX ADMIN — METHYLPREDNISOLONE SODIUM SUCCINATE 40 MG: 40 INJECTION INTRAMUSCULAR; INTRAVENOUS at 06:01

## 2023-05-16 RX ADMIN — VENLAFAXINE HYDROCHLORIDE 75 MG: 75 CAPSULE, EXTENDED RELEASE ORAL at 07:57

## 2023-05-16 RX ADMIN — POTASSIUM CHLORIDE 20 MEQ: 1500 TABLET, EXTENDED RELEASE ORAL at 07:58

## 2023-05-16 RX ADMIN — BUDESONIDE 500 MCG: 0.5 SUSPENSION RESPIRATORY (INHALATION) at 07:13

## 2023-05-16 RX ADMIN — FUROSEMIDE 40 MG: 40 TABLET ORAL at 07:58

## 2023-05-16 RX ADMIN — ALBUTEROL SULFATE 2.5 MG: 2.5 SOLUTION RESPIRATORY (INHALATION) at 07:12

## 2023-05-16 RX ADMIN — QUETIAPINE FUMARATE 25 MG: 25 TABLET ORAL at 21:15

## 2023-05-16 RX ADMIN — TROSPIUM CHLORIDE 20 MG: 20 TABLET, FILM COATED ORAL at 07:58

## 2023-05-16 RX ADMIN — METHYLPREDNISOLONE SODIUM SUCCINATE 40 MG: 40 INJECTION INTRAMUSCULAR; INTRAVENOUS at 17:37

## 2023-05-16 RX ADMIN — CEFTRIAXONE SODIUM 1000 MG: 1 INJECTION, POWDER, FOR SOLUTION INTRAMUSCULAR; INTRAVENOUS at 17:37

## 2023-05-16 RX ADMIN — Medication 5000 UNITS: at 21:15

## 2023-05-16 RX ADMIN — ENOXAPARIN SODIUM 30 MG: 100 INJECTION SUBCUTANEOUS at 08:46

## 2023-05-16 RX ADMIN — FLUTICASONE PROPIONATE 1 SPRAY: 50 SPRAY, METERED NASAL at 08:01

## 2023-05-16 RX ADMIN — METHYLPREDNISOLONE SODIUM SUCCINATE 40 MG: 40 INJECTION INTRAMUSCULAR; INTRAVENOUS at 23:22

## 2023-05-16 RX ADMIN — SODIUM CHLORIDE, PRESERVATIVE FREE 10 ML: 5 INJECTION INTRAVENOUS at 21:15

## 2023-05-16 RX ADMIN — GUAIFENESIN 600 MG: 600 TABLET, EXTENDED RELEASE ORAL at 07:58

## 2023-05-16 RX ADMIN — ALOGLIPTIN 6.25 MG: 6.25 TABLET, FILM COATED ORAL at 07:58

## 2023-05-16 RX ADMIN — ALBUTEROL SULFATE 2.5 MG: 2.5 SOLUTION RESPIRATORY (INHALATION) at 11:16

## 2023-05-16 RX ADMIN — ALBUTEROL SULFATE 2.5 MG: 2.5 SOLUTION RESPIRATORY (INHALATION) at 15:10

## 2023-05-16 RX ADMIN — ATORVASTATIN CALCIUM 10 MG: 10 TABLET, FILM COATED ORAL at 07:58

## 2023-05-16 RX ADMIN — SODIUM CHLORIDE, PRESERVATIVE FREE 10 ML: 5 INJECTION INTRAVENOUS at 08:01

## 2023-05-16 RX ADMIN — TROSPIUM CHLORIDE 20 MG: 20 TABLET, FILM COATED ORAL at 16:59

## 2023-05-16 RX ADMIN — METHYLPREDNISOLONE SODIUM SUCCINATE 40 MG: 40 INJECTION INTRAMUSCULAR; INTRAVENOUS at 11:53

## 2023-05-16 RX ADMIN — THEOPHYLLINE 300 MG: 300 TABLET, EXTENDED RELEASE ORAL at 07:58

## 2023-05-16 RX ADMIN — Medication 2 PUFF: at 19:55

## 2023-05-16 RX ADMIN — INSULIN LISPRO 2 UNITS: 100 INJECTION, SOLUTION INTRAVENOUS; SUBCUTANEOUS at 11:55

## 2023-05-16 RX ADMIN — TOPIRAMATE 50 MG: 25 TABLET, FILM COATED ORAL at 08:46

## 2023-05-16 RX ADMIN — ALBUTEROL SULFATE 2.5 MG: 2.5 SOLUTION RESPIRATORY (INHALATION) at 19:47

## 2023-05-16 ASSESSMENT — PAIN SCALES - GENERAL
PAINLEVEL_OUTOF10: 0
PAINLEVEL_OUTOF10: 0

## 2023-05-16 NOTE — PLAN OF CARE
Problem: Discharge Planning  Goal: Discharge to home or other facility with appropriate resources  5/15/2023 2347 by Robe Waite RN  Outcome: Progressing  5/15/2023 1053 by May Martin RN  Outcome: Progressing     Problem: Pain  Goal: Verbalizes/displays adequate comfort level or baseline comfort level  5/15/2023 2347 by Robe Waite RN  Outcome: Progressing  5/15/2023 1053 by May Martin RN  Outcome: Progressing     Problem: Safety - Adult  Goal: Free from fall injury  5/15/2023 2347 by Robe Waite RN  Outcome: Progressing  5/15/2023 1053 by May Martin RN  Outcome: Progressing

## 2023-05-16 NOTE — TELEPHONE ENCOUNTER
----- Message from Diane Leary sent at 5/12/2023  4:56 PM EDT -----  Contact: [phone]  Per Dr. Kassandra Hutton- retest for covid. Left vm for pt to return call.  ----- Message -----  From: Diane Leary  Sent: 5/12/2023   4:10 PM EDT  To: Paolo Fernandez MD    Pt states she tested neg on Wednesday and her daughter was admitted Monday evening. Please advise.  ----- Message -----  From: Paolo Fernandez MD  Sent: 5/12/2023   3:27 PM EDT  To: Diane Leary    He should test and call with results   ----- Message -----  From: Justus Mccollum  Sent: 5/12/2023   2:58 PM EDT  To: Paolo Fernandez MD    Patient states daughter has tested positive for Covid and is currently admitted in [address] [place] [address]. Patient was told to call her PCP to request antibiotics and steroid for prevention due to exposure. Please advise.     [facility] [address] [place] [address], [address] [place] [id number]   Phone:  [phone]  Fax:  [phone]

## 2023-05-16 NOTE — FLOWSHEET NOTE
05/16/23 0745   Vital Signs   Temp 98.1 °F (36.7 °C)   Temp Source Oral   Pulse (!) 109   Heart Rate Source Monitor   Respirations 19   BP (!) 112/59   MAP (Calculated) 77   BP Location Right upper arm   BP Method Automatic   Patient Position Semi fowlers   Level of Consciousness 0   MEWS Score 2   Pain Assessment   Pain Assessment None - Denies Pain   Pain Level 0   Opioid-Induced Sedation   POSS Score 1   Oxygen Therapy   SpO2 96 %   O2 Device Nasal cannula   O2 Flow Rate (L/min) 2 L/min     AM assessment completed, see flow sheet. Pt is alert and oriented. Vital signs above. No complaints voiced. Pt denies needs at this time. SR up x 2, and bed in low position. Call light is within reach.

## 2023-05-16 NOTE — PLAN OF CARE
Problem: Discharge Planning  Goal: Discharge to home or other facility with appropriate resources  5/16/2023 1021 by Safia Tian RN  Outcome: Progressing  5/15/2023 2347 by Jonathan Bartlett RN  Outcome: Progressing     Problem: Pain  Goal: Verbalizes/displays adequate comfort level or baseline comfort level  5/16/2023 1021 by Safia Tian RN  Outcome: Progressing  5/15/2023 2347 by Jonathan Bartlett RN  Outcome: Progressing     Problem: ABCDS Injury Assessment  Goal: Absence of physical injury  Outcome: Progressing     Problem: Safety - Adult  Goal: Free from fall injury  5/16/2023 1021 by Safia Tian RN  Outcome: Progressing  5/15/2023 2347 by Jonathan Bartlett RN  Outcome: Progressing     Problem: Chronic Conditions and Co-morbidities  Goal: Patient's chronic conditions and co-morbidity symptoms are monitored and maintained or improved  Outcome: Progressing

## 2023-05-16 NOTE — FLOWSHEET NOTE
05/15/23 1951   Vitals   Temp 98.6 °F (37 °C)   Temp Source Oral   Pulse 91   Respirations 16   /69   MAP (Calculated) 88   MAP (mmHg) 83   BP Location Right upper arm   Patient Position Supine   Oxygen Therapy   SpO2 96 %   O2 Device Nasal cannula   O2 Flow Rate (L/min) 2 L/min     Shift assessment complete. Scheduled med's given. See MAR. Patients head-toe complete, VS are logged, pt denies pain reviewed pt home med's Epic updated , locked pt home med's in room . The bed is locked and is in the lowest position. Call light and bedside table are within reach.

## 2023-05-17 LAB
BACTERIA SPEC RESP CULT: NORMAL
GLUCOSE BLD-MCNC: 164 MG/DL (ref 70–99)
GLUCOSE BLD-MCNC: 212 MG/DL (ref 70–99)
GLUCOSE BLD-MCNC: 238 MG/DL (ref 70–99)
GLUCOSE BLD-MCNC: 263 MG/DL (ref 70–99)
GRAM STN SPEC: NORMAL
PERFORMED ON: ABNORMAL

## 2023-05-17 PROCEDURE — 2580000003 HC RX 258: Performed by: INTERNAL MEDICINE

## 2023-05-17 PROCEDURE — 6370000000 HC RX 637 (ALT 250 FOR IP): Performed by: INTERNAL MEDICINE

## 2023-05-17 PROCEDURE — 2700000000 HC OXYGEN THERAPY PER DAY

## 2023-05-17 PROCEDURE — 94669 MECHANICAL CHEST WALL OSCILL: CPT

## 2023-05-17 PROCEDURE — 94640 AIRWAY INHALATION TREATMENT: CPT

## 2023-05-17 PROCEDURE — 1200000000 HC SEMI PRIVATE

## 2023-05-17 PROCEDURE — 6360000002 HC RX W HCPCS: Performed by: INTERNAL MEDICINE

## 2023-05-17 PROCEDURE — 99232 SBSQ HOSP IP/OBS MODERATE 35: CPT | Performed by: INTERNAL MEDICINE

## 2023-05-17 PROCEDURE — 99233 SBSQ HOSP IP/OBS HIGH 50: CPT | Performed by: INTERNAL MEDICINE

## 2023-05-17 PROCEDURE — 94761 N-INVAS EAR/PLS OXIMETRY MLT: CPT

## 2023-05-17 RX ORDER — DILTIAZEM HYDROCHLORIDE 120 MG/1
120 CAPSULE, COATED, EXTENDED RELEASE ORAL DAILY
Status: DISCONTINUED | OUTPATIENT
Start: 2023-05-18 | End: 2023-05-18 | Stop reason: HOSPADM

## 2023-05-17 RX ADMIN — DILTIAZEM HYDROCHLORIDE 120 MG: 120 CAPSULE, EXTENDED RELEASE ORAL at 08:20

## 2023-05-17 RX ADMIN — FLUTICASONE PROPIONATE 1 SPRAY: 50 SPRAY, METERED NASAL at 08:24

## 2023-05-17 RX ADMIN — VENLAFAXINE HYDROCHLORIDE 75 MG: 75 CAPSULE, EXTENDED RELEASE ORAL at 08:20

## 2023-05-17 RX ADMIN — THEOPHYLLINE 300 MG: 300 TABLET, EXTENDED RELEASE ORAL at 08:20

## 2023-05-17 RX ADMIN — BUDESONIDE 500 MCG: 0.5 SUSPENSION RESPIRATORY (INHALATION) at 07:16

## 2023-05-17 RX ADMIN — GUAIFENESIN 600 MG: 600 TABLET, EXTENDED RELEASE ORAL at 08:20

## 2023-05-17 RX ADMIN — CALCIUM CARBONATE 1000 MG: 500 TABLET, CHEWABLE ORAL at 20:17

## 2023-05-17 RX ADMIN — AZITHROMYCIN 500 MG: 250 TABLET, FILM COATED ORAL at 20:17

## 2023-05-17 RX ADMIN — ALBUTEROL SULFATE 2.5 MG: 2.5 SOLUTION RESPIRATORY (INHALATION) at 20:04

## 2023-05-17 RX ADMIN — Medication 5000 UNITS: at 20:17

## 2023-05-17 RX ADMIN — ALOGLIPTIN 6.25 MG: 6.25 TABLET, FILM COATED ORAL at 08:20

## 2023-05-17 RX ADMIN — FUROSEMIDE 40 MG: 40 TABLET ORAL at 08:20

## 2023-05-17 RX ADMIN — Medication 2 PUFF: at 07:17

## 2023-05-17 RX ADMIN — PANTOPRAZOLE SODIUM 40 MG: 40 TABLET, DELAYED RELEASE ORAL at 05:47

## 2023-05-17 RX ADMIN — ENOXAPARIN SODIUM 30 MG: 100 INJECTION SUBCUTANEOUS at 08:20

## 2023-05-17 RX ADMIN — TOPIRAMATE 50 MG: 25 TABLET, FILM COATED ORAL at 20:18

## 2023-05-17 RX ADMIN — ALBUTEROL SULFATE 2.5 MG: 2.5 SOLUTION RESPIRATORY (INHALATION) at 07:16

## 2023-05-17 RX ADMIN — TROSPIUM CHLORIDE 20 MG: 20 TABLET, FILM COATED ORAL at 08:20

## 2023-05-17 RX ADMIN — INSULIN LISPRO 1 UNITS: 100 INJECTION, SOLUTION INTRAVENOUS; SUBCUTANEOUS at 11:27

## 2023-05-17 RX ADMIN — QUETIAPINE FUMARATE 25 MG: 25 TABLET ORAL at 20:18

## 2023-05-17 RX ADMIN — POTASSIUM CHLORIDE 20 MEQ: 1500 TABLET, EXTENDED RELEASE ORAL at 08:20

## 2023-05-17 RX ADMIN — ATORVASTATIN CALCIUM 10 MG: 10 TABLET, FILM COATED ORAL at 08:20

## 2023-05-17 RX ADMIN — SODIUM CHLORIDE, PRESERVATIVE FREE 10 ML: 5 INJECTION INTRAVENOUS at 08:21

## 2023-05-17 RX ADMIN — TOPIRAMATE 50 MG: 25 TABLET, FILM COATED ORAL at 08:20

## 2023-05-17 RX ADMIN — GUAIFENESIN 600 MG: 600 TABLET, EXTENDED RELEASE ORAL at 20:18

## 2023-05-17 RX ADMIN — SODIUM CHLORIDE, PRESERVATIVE FREE 10 ML: 5 INJECTION INTRAVENOUS at 20:18

## 2023-05-17 RX ADMIN — Medication 2 PUFF: at 20:03

## 2023-05-17 RX ADMIN — TIOTROPIUM BROMIDE INHALATION SPRAY 2 PUFF: 3.12 SPRAY, METERED RESPIRATORY (INHALATION) at 07:18

## 2023-05-17 RX ADMIN — INSULIN LISPRO 2 UNITS: 100 INJECTION, SOLUTION INTRAVENOUS; SUBCUTANEOUS at 16:49

## 2023-05-17 RX ADMIN — ALBUTEROL SULFATE 2.5 MG: 2.5 SOLUTION RESPIRATORY (INHALATION) at 14:47

## 2023-05-17 RX ADMIN — ALBUTEROL SULFATE 2.5 MG: 2.5 SOLUTION RESPIRATORY (INHALATION) at 11:14

## 2023-05-17 RX ADMIN — CEFTRIAXONE SODIUM 1000 MG: 1 INJECTION, POWDER, FOR SOLUTION INTRAMUSCULAR; INTRAVENOUS at 17:57

## 2023-05-17 RX ADMIN — TROSPIUM CHLORIDE 20 MG: 20 TABLET, FILM COATED ORAL at 16:49

## 2023-05-17 RX ADMIN — PREDNISONE 40 MG: 20 TABLET ORAL at 08:20

## 2023-05-17 ASSESSMENT — PAIN SCALES - GENERAL
PAINLEVEL_OUTOF10: 0

## 2023-05-17 NOTE — CARE COORDINATION
INTERDISCIPLINARY PLAN OF CARE CONFERENCE    Date/Time: 5/17/2023 12:21 PM  Completed by: Migel Bruno RN, Case Management      Patient Name:  Basia Ramon  YOB: 1950  Admitting Diagnosis: COPD exacerbation ([address] [J44.1]  Pneumonia due to infectious organism, unspecified laterality, unspecified part of lung [J18.9]  Community acquired pneumonia, unspecified laterality [J18.9]     Admit Date/Time:  5/14/2023  3:24 PM    Chart reviewed. Interdisciplinary team contacted or reviewed plan related to patient progress and discharge plans. Disciplines included Case Management, Nursing, and Dietitian. Current Status: IP 05/14/2023  PT/OT recommendation for discharge plan of care: [facility]  Discharge Recommendations: Home with PRN assistance  DME needs for discharge: Needs Met  Expected D/C Disposition:  Home    Discharge Plan Comments: Plans; Return home. IPTA. Home O2 ([employer] 2 liters baseline). CM following for potential DC needs. Home O2 in place on admit: Yes  Pt informed of need to bring portable home O2 tank on day of discharge for nursing to connect prior to leaving:  Yes  Verbalized agreement/Understanding:   Yes

## 2023-05-17 NOTE — PLAN OF CARE
Problem: Discharge Planning  Goal: Discharge to home or other facility with appropriate resources  5/16/2023 2107 by Violeta Hernandez RN  Outcome: Progressing  5/16/2023 1021 by Santino Brambila RN  Outcome: Progressing     Problem: Pain  Goal: Verbalizes/displays adequate comfort level or baseline comfort level  5/16/2023 2107 by Violeta Hernandez RN  Outcome: Progressing  5/16/2023 1021 by Santino Brambila RN  Outcome: Progressing     Problem: ABCDS Injury Assessment  Goal: Absence of physical injury  5/16/2023 2107 by Violeta Hernandez RN  Outcome: Progressing  5/16/2023 1021 by Santino Brambila RN  Outcome: Progressing     Problem: Safety - Adult  Goal: Free from fall injury  5/16/2023 2107 by Violeta Hernandez RN  Outcome: Progressing  5/16/2023 1021 by Santino Brambila RN  Outcome: Progressing     Problem: Chronic Conditions and Co-morbidities  Goal: Patient's chronic conditions and co-morbidity symptoms are monitored and maintained or improved  5/16/2023 2107 by Violeta Hernandez RN  Outcome: Progressing  5/16/2023 1021 by Santino Brambila RN  Outcome: Progressing     Problem: Nutrition Deficit:  Goal: Optimize nutritional status  Outcome: Progressing

## 2023-05-17 NOTE — FLOWSHEET NOTE
Shift assessment complete. See doc flow. Nightly medications given see MAR. A/O x4. Hx of COPD. O2 sats 98% on 2L via nc, which is pts baseline. Damion cough present. Expiratory wheezing present. Nightly snack given. Pt ambulates independently. Call light and bedside table within easy reach.     05/16/23 2056   Vital Signs   Temp 98.1 °F (36.7 °C)   Temp Source Oral   Pulse (!) 121   Heart Rate Source Monitor   Respirations 16   BP (!) 155/79   MAP (Calculated) 104   MAP (mmHg) 99   BP Location Right upper arm   BP Method Automatic   Patient Position Sitting   Level of Consciousness 0   MEWS Score 3   Oxygen Therapy   SpO2 98 %   O2 Device Nasal cannula   O2 Flow Rate (L/min) 2 L/min

## 2023-05-17 NOTE — FLOWSHEET NOTE
05/16/23 2321   Vital Signs   Temp 98.1 °F (36.7 °C)   Temp Source Oral   Pulse (!) 101   Heart Rate Source Monitor   Respirations 18   /61   MAP (Calculated) 77   BP Location Right upper arm   BP Method Automatic   Patient Position Turns self;Lying left side   Level of Consciousness 0   MEWS Score 2   Pain Assessment   Pain Assessment None - Denies Pain   Oxygen Therapy   SpO2 97 %   O2 Device Nasal cannula   O2 Flow Rate (L/min) 2 L/min     Pt awake at this time. Meds given per MAR. Pt denies any needs.  Call light within reach

## 2023-05-18 VITALS
TEMPERATURE: 98.2 F | DIASTOLIC BLOOD PRESSURE: 92 MMHG | HEIGHT: 60 IN | BODY MASS INDEX: 19.71 KG/M2 | WEIGHT: 100.4 LBS | SYSTOLIC BLOOD PRESSURE: 169 MMHG | RESPIRATION RATE: 20 BRPM | OXYGEN SATURATION: 97 % | HEART RATE: 116 BPM

## 2023-05-18 LAB
GLUCOSE BLD-MCNC: 109 MG/DL (ref 70–99)
GLUCOSE BLD-MCNC: 176 MG/DL (ref 70–99)
PERFORMED ON: ABNORMAL
PERFORMED ON: ABNORMAL

## 2023-05-18 PROCEDURE — 94669 MECHANICAL CHEST WALL OSCILL: CPT

## 2023-05-18 PROCEDURE — 2700000000 HC OXYGEN THERAPY PER DAY

## 2023-05-18 PROCEDURE — 94761 N-INVAS EAR/PLS OXIMETRY MLT: CPT

## 2023-05-18 PROCEDURE — 2580000003 HC RX 258: Performed by: INTERNAL MEDICINE

## 2023-05-18 PROCEDURE — 6360000002 HC RX W HCPCS: Performed by: INTERNAL MEDICINE

## 2023-05-18 PROCEDURE — 99232 SBSQ HOSP IP/OBS MODERATE 35: CPT | Performed by: INTERNAL MEDICINE

## 2023-05-18 PROCEDURE — 6370000000 HC RX 637 (ALT 250 FOR IP): Performed by: INTERNAL MEDICINE

## 2023-05-18 PROCEDURE — 94640 AIRWAY INHALATION TREATMENT: CPT

## 2023-05-18 RX ORDER — ALBUTEROL SULFATE 2.5 MG/3ML
2.5 SOLUTION RESPIRATORY (INHALATION) 3 TIMES DAILY
Status: DISCONTINUED | OUTPATIENT
Start: 2023-05-18 | End: 2023-05-18 | Stop reason: HOSPADM

## 2023-05-18 RX ORDER — PREDNISONE 20 MG/1
TABLET ORAL
Qty: 11 TABLET | Refills: 0 | Status: SHIPPED | OUTPATIENT
Start: 2023-05-18

## 2023-05-18 RX ORDER — CEFDINIR 300 MG/1
300 CAPSULE ORAL 2 TIMES DAILY
Qty: 6 CAPSULE | Refills: 0 | Status: SHIPPED | OUTPATIENT
Start: 2023-05-18 | End: 2023-05-21

## 2023-05-18 RX ADMIN — TIOTROPIUM BROMIDE INHALATION SPRAY 2 PUFF: 3.12 SPRAY, METERED RESPIRATORY (INHALATION) at 07:12

## 2023-05-18 RX ADMIN — SODIUM CHLORIDE, PRESERVATIVE FREE 10 ML: 5 INJECTION INTRAVENOUS at 08:07

## 2023-05-18 RX ADMIN — GUAIFENESIN 600 MG: 600 TABLET, EXTENDED RELEASE ORAL at 08:08

## 2023-05-18 RX ADMIN — DILTIAZEM HYDROCHLORIDE 120 MG: 120 CAPSULE, COATED, EXTENDED RELEASE ORAL at 08:08

## 2023-05-18 RX ADMIN — PREDNISONE 40 MG: 20 TABLET ORAL at 08:08

## 2023-05-18 RX ADMIN — TOPIRAMATE 50 MG: 25 TABLET, FILM COATED ORAL at 08:08

## 2023-05-18 RX ADMIN — FLUTICASONE PROPIONATE 1 SPRAY: 50 SPRAY, METERED NASAL at 08:11

## 2023-05-18 RX ADMIN — THEOPHYLLINE 300 MG: 300 TABLET, EXTENDED RELEASE ORAL at 08:08

## 2023-05-18 RX ADMIN — ENOXAPARIN SODIUM 30 MG: 100 INJECTION SUBCUTANEOUS at 08:08

## 2023-05-18 RX ADMIN — ALOGLIPTIN 6.25 MG: 6.25 TABLET, FILM COATED ORAL at 08:08

## 2023-05-18 RX ADMIN — VENLAFAXINE HYDROCHLORIDE 75 MG: 75 CAPSULE, EXTENDED RELEASE ORAL at 08:08

## 2023-05-18 RX ADMIN — ATORVASTATIN CALCIUM 10 MG: 10 TABLET, FILM COATED ORAL at 08:08

## 2023-05-18 RX ADMIN — POTASSIUM CHLORIDE 20 MEQ: 1500 TABLET, EXTENDED RELEASE ORAL at 08:08

## 2023-05-18 RX ADMIN — TROSPIUM CHLORIDE 20 MG: 20 TABLET, FILM COATED ORAL at 05:18

## 2023-05-18 RX ADMIN — FUROSEMIDE 40 MG: 40 TABLET ORAL at 08:08

## 2023-05-18 RX ADMIN — Medication 2 PUFF: at 07:13

## 2023-05-18 RX ADMIN — PANTOPRAZOLE SODIUM 40 MG: 40 TABLET, DELAYED RELEASE ORAL at 05:18

## 2023-05-18 RX ADMIN — ALBUTEROL SULFATE 2.5 MG: 2.5 SOLUTION RESPIRATORY (INHALATION) at 13:46

## 2023-05-18 RX ADMIN — ALBUTEROL SULFATE 2.5 MG: 2.5 SOLUTION RESPIRATORY (INHALATION) at 07:10

## 2023-05-18 RX ADMIN — BUDESONIDE 500 MCG: 0.5 SUSPENSION RESPIRATORY (INHALATION) at 07:10

## 2023-05-18 ASSESSMENT — PAIN SCALES - GENERAL: PAINLEVEL_OUTOF10: 0

## 2023-05-18 NOTE — PLAN OF CARE
Problem: Discharge Planning  Goal: Discharge to home or other facility with appropriate resources  Outcome: Progressing  Flowsheets (Taken 5/17/2023 2015)  Discharge to home or other facility with appropriate resources: Identify barriers to discharge with patient and caregiver     Problem: Pain  Goal: Verbalizes/displays adequate comfort level or baseline comfort level  Outcome: Progressing  Flowsheets (Taken 5/17/2023 2020)  Verbalizes/displays adequate comfort level or baseline comfort level: Encourage patient to monitor pain and request assistance     Problem: ABCDS Injury Assessment  Goal: Absence of physical injury  Outcome: Progressing     Problem: Safety - Adult  Goal: Free from fall injury  Outcome: Progressing     Problem: Chronic Conditions and Co-morbidities  Goal: Patient's chronic conditions and co-morbidity symptoms are monitored and maintained or improved  Outcome: Progressing  Flowsheets (Taken 5/17/2023 2015)  Care Plan - Patient's Chronic Conditions and Co-Morbidity Symptoms are Monitored and Maintained or Improved: Monitor and assess patient's chronic conditions and comorbid symptoms for stability, deterioration, or improvement     Problem: Nutrition Deficit:  Goal: Optimize nutritional status  Outcome: Progressing     Problem: Infection - Adult  Goal: Absence of infection at discharge  Outcome: Progressing     Problem: Metabolic/Fluid and Electrolytes - Adult  Goal: Electrolytes maintained within normal limits  Outcome: Progressing     Problem: Neurosensory - Adult  Goal: Achieves stable or improved neurological status  Outcome: Progressing

## 2023-05-18 NOTE — DISCHARGE INSTRUCTIONS
Your information:  Name: Alfred Jorgensen  : 1950    Your instructions: Follow instructions below. What to do after you leave the hospital:    Recommended diet: diabetic diet    Recommended activity: activity as tolerated        The following personal items were collected during your admission and were returned to you:    Belongings  Dental Appliances: None  Vision - Corrective Lenses: Eyeglasses, At bedside  Hearing Aid: None  Clothing: At bedside, Undergarments, Footwear, Pants, Shirt, Socks  Jewelry: None  Electronic Devices: Cell Phone, At bedside  Weapons (Notify Protective Services/Security): None  Other Valuables: Other (Comment) (oxygen, cup, purse)  Home Medications: None  Valuables Given To: Other (Comment) (kept at bedside)  Provide Name(s) of Who Valuable(s) Were Given To: n/a    Information obtained by:  By signing below, I understand that if any problems occur once I leave the hospital I am to contact MD.  I understand and acknowledge receipt of the instructions indicated above.

## 2023-05-18 NOTE — CARE COORDINATION
DISCHARGE ORDER  Date/Time 2023 9:27 AM  Completed by: Geetha Knapp RN, Case Management    Patient Name: Katia Lynn      : 1950  Admitting Diagnosis: COPD exacerbation ([address] [J44.1]  Pneumonia due to infectious organism, unspecified laterality, unspecified part of lung [J18.9]  Community acquired pneumonia, unspecified laterality [J18.9]      Admit order Date and Status: IP 2023  (verify MD's last order for status of admission)      Noted discharge order. If applicable PT/OT recommendation at Discharge: [facility]    Discharge Recommendations: Home with PRN assistance  DME needs for discharge: Needs Met    Confirmed discharge plan   Discharge Plan: Chart reviewed. Met with pt at bedside and explained the role of the CM. DC home today with spouse. IPTA. Spouse will pick patient up via private vehicle. Date of Last IMM Given: 2023    Has Home O2 in place on admit:  Home O2 w/ [employer] (2 liters baseline)  Informed of need to bring portable home O2 tank on day of discharge for nursing to connect prior to leaving:   Yes, pt has home portable tank at bedside  Verbalized agreement/Understanding:   Yes  Pt is being d/c'd to home today. Pt's O2 sats are 99% on 2 liters. Discharge timeout done with Olman Simon RN. All discharge needs and concerns addressed.

## 2023-05-18 NOTE — PROGRESS NOTES
4 Eyes Skin Assessment     The patient is being assess for   Admission    I agree that 2 RN's have performed a thorough Head to Toe Skin Assessment on the patient. ALL assessment sites listed below have been assessed. Areas assessed for pressure by both nurses:   [x]   Head, Face, and Ears   [x]   Shoulders, Back, and Chest, Abdomen  [x]   Arms, Elbows, and Hands   [x]   Coccyx, Sacrum, and Ischium  [x]   Legs, Feet, and Heels        Skin Assessed Under all Medical Devices by both nurses:  O2 device tubing              All Mepilex Borders were peeled back and area peeked at by both nurses:  No: n/a  Please list where Mepilex Borders are located:  n/a             **SHARE this note so that the co-signing nurse is able to place an eSignature**    Co-signer eSignature: Electronically signed by Everett Gan RN on 5/15/23 at 5:36 AM EDT    Does the Patient have Skin Breakdown related to pressure?   No     (Insert Photo here )         Johnny Prevention initiated:  No   Wound Care Orders initiated:  No      WOC nurse consulted for Pressure Injury (Stage 3,4, Unstageable, DTI, NWPT, Complex wounds)and New or Established Ostomies:  NA      Primary Nurse eSignature: Electronically signed by Karthik Goodman RN on 5/15/23 at 2:32 AM EDT
Bedside report given to [facility] pt in stable condition no needs at this time.  Call light within reach
Bedside report given to Vannesa/Keeley MCQUEEN  pt in stable condition no needs at this time.  Call light within reach
Care transferred from [facility] pt awake at this time. Meds given per MAR. Educated pt that per pharmacy it is recommended that she use her new medication bottles and to stop dumping new meds into old bottles. Pt agrees. Pt denies any other needs.  Call light within reach and bed alarm on
Care transferred from [facility]
Comprehensive Nutrition Assessment    Type and Reason for Visit:  Initial, Consult, Positive Nutrition Screen (consult for unintentional weight loss, poor appetite/po intake x 5+ days, and need for ONS; + screen for MST = 2)    Nutrition Recommendations/Plan:   Continue ADULT DIET; Regular; 4 carb choices per meal diet order. Continue Glucerna with meals. Monitor appetite, meal intake, and acceptance/intake of ONS. Monitor nutrition-related labs, bowel function, and weight trends. Malnutrition Assessment:  Malnutrition Status: At risk for malnutrition (05/16/23 1533)    Context:  Acute Illness     Findings of the 6 clinical characteristics of malnutrition:  Energy Intake:  50% or less of estimated energy requirements for 5 or more days  Weight Loss:  No significant weight loss     Body Fat Loss:  Unable to assess     Muscle Mass Loss:  Unable to assess    Fluid Accumulation:  No significant fluid accumulation     Strength:  Not Performed    Nutrition Assessment:    patient is nutritionally compromised AEB worsening respiratory dysfunction PTA and decreased appetite/po intake x 5+ days PTA and she is at risk for further compromise d/t decreased appetite/po intake, altered nutrition-related labs, and respiratory dysfunction; will continue ADULT DIET; Regular; 4 carb choices per meal diet order and Glucerna ONS with meals    Nutrition Related Findings:    patient is A & O; patient presented with c/o respiratory dysfunction PTA and upon admission; + rhinovirus positive this admission; no documented po intake data in flow sheets at this time; + BM on 5/16/23; BS were a little elevated this am; patient has low-dose SSI ordered at this time Wound Type: None       Current Nutrition Intake & Therapies:    Average Meal Intake: Unable to assess (no po intake data documented in flow sheets)  Average Supplements Intake: Unable to assess (no ONS intake data documented in flow sheets)  ADULT DIET;  Regular; 4 carb
D/c per wheelchair with full oxygen tank at 2 liters per nasal cannula in stable condition.
Inpatient Occupational Therapy Evaluation and Treatment    Unit: 2 [place]  Date:  5/15/2023  Patient Name:    Katia Lynn  Admitting diagnosis:  COPD exacerbation ([address] [J44.1]  Pneumonia due to infectious organism, unspecified laterality, unspecified part of lung [J18.9]  Community acquired pneumonia, unspecified laterality [J18.9]  Admit Date:  2023  Precautions/Restrictions/WB Status/ Lines/ Wounds/ Oxygen: Lines (Supplemental O2 (2L)) and Continuous pulse oximetry    Treatment Time:  13:10-13:36  Treatment Number:  1  Timed Code Treatment Minutes: 16 minutes  Total Treatment Minutes:  26  minutes    Patient Goals for Therapy: \"to go home \"          Discharge Recommendations: Home with PRN assist  DME needs for discharge: Needs Met         Therapy recommendations for staff: Independent for transfers with use of No AD within room    History of Present Illness: Per H&P: 14-year-old female that comes to the emergency room with increased shortness of breath, productive cough with greenish-brown mucus and left sided chest pain. Her last echo was  with estimated EF 55%     Home Health S4 Level Recommendation:  NA    AM-PAC Score:       Subjective:  Patient sitting EOB with no family present. Pt agreeable to this OT session.      Cognition:    A&O x4   Able to follow 2 step commands    Pain:   Yes  Location: R side flank pain - reports chronic  Ratin /10  Pain Medicine Status: Denies need    Preadmission Environment:   Pt. Lives with                                       with family (daughter, MARLENY, grandson - adult)  Home environment:                            one story home and plus basement   Steps to enter first floor:                     No steps and ramp  Steps to second floor/basement:        N/A  Laundry:                                              1st floor  Bathroom:                                           tub/shower unit, walk in shower, grab bars in shower, shower chair , and pt reports
Inpatient Physical Therapy Evaluation/Discharge Summary    Unit: 2 [place]  Date:  5/15/2023  Patient Name:    Srinivasa Mantilla  Admitting diagnosis:  COPD exacerbation ([facility] [address] [J44.1]  Pneumonia due to infectious organism, unspecified laterality, unspecified part of lung [J18.9]  Community acquired pneumonia, unspecified laterality [J18.9]  Admit Date:  2023  Precautions/Restrictions/WB Status/ Lines/ Wounds/ Oxygen: Lines (Supplemental O2 (2L)), Telemetry, and Continuous pulse oximetry    Treatment Time:    Treatment Number:  1  Timed Code Treatment Minutes: 16 minutes  Total Treatment Minutes:  26  minutes    Patient Stated Goals for Therapy: \" to go home \"          Discharge Recommendations: Home with PRN assistance  DME needs for discharge: Needs Met       Therapy recommendation for EMS Transport: can transport by wheelchair    Therapy recommendations for staff: Independent for ambulation with use of No AD within room    History of Present Illness:   Per H&P: 67-year-old female that comes to the emergency room with increased shortness of breath, productive cough with greenish-brown mucus and left sided chest pain. Her last echo was  with estimated EF 55%     Home Health S4 Level Recommendation:  NA    AM-PAC Mobility Score    AM-PAC Inpatient Mobility Raw Score : 23       Subjective  Patient sitting EOB with no family present. Pt agreeable to this PT session.      Cognition    A&O x4   Able to follow 2 step commands    Pain   Yes  Location: R side flank pain - reports chronic   Ratin /10  Pain Medicine Status: Denies need    Preadmission Environment:   Pt. Lives with    with family (daughter, MARLENY, grandson - adult)  Home environment:    one story home and plus basement   Steps to enter first floor:   No steps and ramp  Steps to second floor/basement: N/A  Laundry:     1st floor  Bathroom:     tub/shower unit, walk in shower, grab bars in shower, shower chair , and pt reports mostly using tub
[facility]   COPD PROGRAM      NAME:  Trevon Mi  AGE: 67 y.o. GENDER: female  : 1950  TODAY'S DATE:  2023    Subjective:     VISIT TYPE: Education    ADMIT DATE: 2023    PAST MEDICAL HISTORY:      Diagnosis Date    Acute on chronic respiratory failure with hypoxia ([facility] [address] 12/3/2009    Chronic airway obstruction, not elsewhere classified 3/4/08    oxygen 2L/min at HS and PRN through day    Chronic kidney disease     incontinent    COPD (chronic obstructive pulmonary disease) ([facility])     Depression     Displacement of lumbar intervertebral disc without myelopathy 07    Edema 07    Emphysema of lung ([facility])     Enthesopathy of hip region     Enthesopathy of hip region 3/5/07    Esophageal reflux 3/4/08    Hemorrhage of rectum and anus 07    Herpes zoster without complication     History of blood transfusion     Hyperlipidemia associated with type 2 diabetes mellitus ([address] 2017    Lumbar spondylosis 2018    Major depression, chronic 2015    Osteoporosis, unspecified 07    Other and unspecified hyperlipidemia 3/4/08    Pain in joint, shoulder region 3/4/08    Pneumonia     Pulmonary nodule     Recurrent major depressive disorder, in partial remission ([address] 2018    Rheumatic fever     S/P ileostomy ([address] 2011    Skin lesion of face 10/20/2021    Steroid-induced osteopenia 2016    Tension headache     Type 2 diabetes mellitus with hyperglycemia, without long-term current use of insulin ([facility])     Unspecified asthma(493.90) 07    Unspecified chronic bronchitis ([address]     Ventral hernia 2014     HOME MEDICATIONS:  Prior to Admission medications    Medication Sig Start Date End Date Taking?  Authorizing Provider   predniSONE (DELTASONE) 20 MG tablet 2 qd- 1 day, 1 1/2 qd- 3 days, 1 qd- 3 days, 1/2 qd- 3 days 23  Yes Walker Closs, MD   cefdinir (OMNICEF) 300 MG capsule Take 1 capsule by mouth 2 times daily for 3
Nurse attempted to contact patients daughter about bringing in home meds. Daughter didn't answer phone and no voice mail set up.
Nurse spoke with Calista Solorzano from pharmacy and  unable to complete med rec. Daughter to be contacted in the AM to bring in med bottles of current meds. Nurse to be made aware in report at shift change.
Patient currently resting in bed with eyes closed. Alert and oriented x 4. On oxygen NC 2 lpm and tolerating well. Patient up without assistance and tolerating well. In no acute distress. Bed in lowest position and patient wearing non-skid socks. Denies any pain or discomfort. Nurse will continue to monitor/reassess. Call light within reach.
Progress Note    Admit Date:  5/14/2023    Admitted with CAP, COPD AE    Subjective:  Ms. Helena Powers is resting in bed, on 2 liters. Feels much better     Objective:   Patient Vitals for the past 4 hrs:   BP Temp Temp src Pulse Resp SpO2 Weight   05/18/23 0736 (!) 169/92 98.2 °F (36.8 °C) Oral (!) 121 16 96 % --   05/18/23 0711 -- -- -- (!) 114 20 98 % --   05/18/23 0411 -- -- -- -- -- -- 100 lb 6.4 oz (45.5 kg)            Intake/Output Summary (Last 24 hours) at 5/18/2023 0742  Last data filed at 5/17/2023 2020  Gross per 24 hour   Intake 959.59 ml   Output 1400 ml   Net -440.41 ml         Physical Exam:    Gen: No distress. Alert. Appears chronically ill, thin and older than stated age  Eyes: PERRL. No sclera icterus. No conjunctival injection. ENT: No discharge. Pharynx clear. Neck: No JVD. Trachea midline. Resp: mild accessory muscle use. No crackles. Diminished throughout. noexpiratory wheezes. No rhonchi. CV: Regular rate. Regular rhythm. No murmur. No rub. BLE nonpitting edema. Capillary Refill: Brisk,< 3 seconds   Peripheral Pulses: +2 palpable, equal bilaterally   GI: Non-tender. Non-distended. Normal bowel sounds. Skin: Warm and dry. No nodule on exposed extremities. No rash on exposed extremities. M/S: No cyanosis. No joint deformity. No clubbing. Neuro: Awake. Grossly nonfocal    Psych: Oriented x 3. No anxiety or agitation.        Scheduled Meds:   albuterol  2.5 mg Nebulization TID    dilTIAZem  120 mg Oral Daily    budesonide  0.5 mg Nebulization BID    vitamin D  5,000 Units Oral Nightly    calcium carbonate  1,000 mg Oral Nightly    pantoprazole  40 mg Oral QAM AC    furosemide  40 mg Oral Daily    guaiFENesin  600 mg Oral BID    alogliptin  6.25 mg Oral Daily    trospium  20 mg Oral BID AC    potassium chloride  20 mEq Oral Daily with breakfast    QUEtiapine  25 mg Oral Nightly    tiotropium  2 puff Inhalation Daily    atorvastatin  10 mg Oral Daily    theophylline  300 mg Oral Daily
Progress Note    Admit Date:  5/14/2023    Admitted with CAP, COPD AE    Subjective:  Ms. Maria C Dooley is resting in bed, on 2 liters. Stated she is feeling better today. Remains with productive cough tan/green per patient. Denies any pain or discomfort at this time. Objective:   Patient Vitals for the past 4 hrs:   BP Temp Temp src Pulse Resp SpO2   05/15/23 0830 115/68 97.6 °F (36.4 °C) Oral (!) 107 19 98 %   05/15/23 0720 -- -- -- (!) 110 22 96 %        No intake or output data in the 24 hours ending 05/15/23 0932    Physical Exam:    Gen: No distress. Alert. Appears chronically ill, thin and older than stated age  Eyes: PERRL. No sclera icterus. No conjunctival injection. ENT: No discharge. Pharynx clear. Neck: No JVD. Trachea midline. Resp: mild accessory muscle use. No crackles. Diminished throughout. +expiratory wheezes. No rhonchi. CV: Regular rate. Regular rhythm. No murmur. No rub. BLE nonpitting edema. Capillary Refill: Brisk,< 3 seconds   Peripheral Pulses: +2 palpable, equal bilaterally   GI: Non-tender. Non-distended. Normal bowel sounds. Skin: Warm and dry. No nodule on exposed extremities. No rash on exposed extremities. M/S: No cyanosis. No joint deformity. No clubbing. Neuro: Awake. Grossly nonfocal    Psych: Oriented x 3. No anxiety or agitation.        Scheduled Meds:   calcium carbonate  2 tablet Oral Daily    dilTIAZem  120 mg Oral Daily    pantoprazole  40 mg Oral QAM AC    furosemide  40 mg Oral Daily    guaiFENesin  600 mg Oral BID    alogliptin  6.25 mg Oral Daily    trospium  20 mg Oral BID AC    potassium chloride  20 mEq Oral Daily with breakfast    QUEtiapine  25 mg Oral Nightly    tiotropium  2 puff Inhalation Daily    atorvastatin  10 mg Oral Daily    theophylline  300 mg Oral Daily    topiramate  50 mg Oral BID    venlafaxine  75 mg Oral Daily with breakfast    vitamin D  800 Units Oral Daily    sodium chloride flush  5-40 mL IntraVENous 2 times per day    enoxaparin  30
Progress Note    Admit Date:  5/14/2023    Admitted with CAP, COPD AE    Subjective:  Ms. Viki Buck is resting in bed, on 2 liters. Stated she is feeling a little worse today    Objective:   Patient Vitals for the past 4 hrs:   BP Temp Temp src Pulse Resp SpO2   05/16/23 0745 (!) 112/59 98.1 °F (36.7 °C) Oral (!) 109 19 96 %   05/16/23 0714 -- -- -- 94 18 96 %            Intake/Output Summary (Last 24 hours) at 5/16/2023 0806  Last data filed at 5/15/2023 1540  Gross per 24 hour   Intake --   Output 250 ml   Net -250 ml       Physical Exam:    Gen: No distress. Alert. Appears chronically ill, thin and older than stated age  Eyes: PERRL. No sclera icterus. No conjunctival injection. ENT: No discharge. Pharynx clear. Neck: No JVD. Trachea midline. Resp: mild accessory muscle use. No crackles. Diminished throughout. +expiratory wheezes. No rhonchi. CV: Regular rate. Regular rhythm. No murmur. No rub. BLE nonpitting edema. Capillary Refill: Brisk,< 3 seconds   Peripheral Pulses: +2 palpable, equal bilaterally   GI: Non-tender. Non-distended. Normal bowel sounds. Skin: Warm and dry. No nodule on exposed extremities. No rash on exposed extremities. M/S: No cyanosis. No joint deformity. No clubbing. Neuro: Awake. Grossly nonfocal    Psych: Oriented x 3. No anxiety or agitation.        Scheduled Meds:   budesonide  0.5 mg Nebulization BID    vitamin D  5,000 Units Oral Nightly    calcium carbonate  1,000 mg Oral Nightly    dilTIAZem  120 mg Oral Daily    pantoprazole  40 mg Oral QAM AC    furosemide  40 mg Oral Daily    guaiFENesin  600 mg Oral BID    alogliptin  6.25 mg Oral Daily    trospium  20 mg Oral BID AC    potassium chloride  20 mEq Oral Daily with breakfast    QUEtiapine  25 mg Oral Nightly    tiotropium  2 puff Inhalation Daily    atorvastatin  10 mg Oral Daily    theophylline  300 mg Oral Daily    topiramate  50 mg Oral BID    venlafaxine  75 mg Oral Daily with breakfast    sodium chloride flush
Pt awake at this time. AM meds given. Pt denies any needs at this time.  Call light within reach
Pt awake at this time. AM meds given. Pt denies any needs.  Call light within reach and bed alarm on
Pt called for ride home. D/c instructions given to pt with prescriptions sent to home pharmacy. Explanation of new medications and instructions given. Verbalized understanding. Waiting for ride to . Call light in reach.
Pt resting. Resp e/e. Shift assessment completed and charted. HR tachy, updated MD, telemetry to be added. No other needs. Will monitor.  Elizabeth Forde RN
RT Inhaler-Nebulizer Bronchodilator Protocol Note    There is a bronchodilator order in the chart from a provider indicating to follow the RT Bronchodilator Protocol and there is an Initiate RT Inhaler-Nebulizer Bronchodilator Protocol order as well (see protocol at bottom of note). CXR Findings:  No results found. The findings from the last RT Protocol Assessment were as follows:   History Pulmonary Disease: (P) Chronic pulmonary disease  Respiratory Pattern: (P) Dyspnea on exertion or RR 21-25 bpm  Breath Sounds: (P) Inspiratory and expiratory or bilateral wheezing and/or rhonchi  Cough: (P) Strong, productive  Indication for Bronchodilator Therapy: (P) Wheezing associated with pulm disorder  Bronchodilator Assessment Score: (P) 11    Aerosolized bronchodilator medication orders have been revised according to the RT Inhaler-Nebulizer Bronchodilator Protocol below. Respiratory Therapist to perform RT Therapy Protocol Assessment initially then follow the protocol. Repeat RT Therapy Protocol Assessment PRN for score 0-3 or on second treatment, BID, and PRN for scores above 3. No Indications - adjust the frequency to every 6 hours PRN wheezing or bronchospasm, if no treatments needed after 48 hours then discontinue using Per Protocol order mode. If indication present, adjust the RT bronchodilator orders based on the Bronchodilator Assessment Score as indicated below. Use Inhaler orders unless patient has one or more of the following: on home nebulizer, not able to hold breath for 10 seconds, is not alert and oriented, cannot activate and use MDI correctly, or respiratory rate 25 breaths per minute or more, then use the equivalent nebulizer order(s) with same Frequency and PRN reasons based on the score. If a patient is on this medication at home then do not decrease Frequency below that used at home.     0-3 - enter or revise RT bronchodilator order(s) to equivalent RT Bronchodilator order with
RT Inhaler-Nebulizer Bronchodilator Protocol Note    There is a bronchodilator order in the chart from a provider indicating to follow the RT Bronchodilator Protocol and there is an Initiate RT Inhaler-Nebulizer Bronchodilator Protocol order as well (see protocol at bottom of note). CXR Findings:  No results found. The findings from the last RT Protocol Assessment were as follows:   History Pulmonary Disease: (P) Chronic pulmonary disease  Respiratory Pattern: (P) Dyspnea on exertion or RR 21-25 bpm  Breath Sounds: (P) Intermittent or unilateral wheezes  Cough: (P) Strong, spontaneous, non-productive  Indication for Bronchodilator Therapy: (P) Wheezing associated with pulm disorder  Bronchodilator Assessment Score: (P) 8    Aerosolized bronchodilator medication orders have been revised according to the RT Inhaler-Nebulizer Bronchodilator Protocol below. Respiratory Therapist to perform RT Therapy Protocol Assessment initially then follow the protocol. Repeat RT Therapy Protocol Assessment PRN for score 0-3 or on second treatment, BID, and PRN for scores above 3. No Indications - adjust the frequency to every 6 hours PRN wheezing or bronchospasm, if no treatments needed after 48 hours then discontinue using Per Protocol order mode. If indication present, adjust the RT bronchodilator orders based on the Bronchodilator Assessment Score as indicated below. Use Inhaler orders unless patient has one or more of the following: on home nebulizer, not able to hold breath for 10 seconds, is not alert and oriented, cannot activate and use MDI correctly, or respiratory rate 25 breaths per minute or more, then use the equivalent nebulizer order(s) with same Frequency and PRN reasons based on the score. If a patient is on this medication at home then do not decrease Frequency below that used at home.     0-3 - enter or revise RT bronchodilator order(s) to equivalent RT Bronchodilator order with Frequency of
RT Inhaler-Nebulizer Bronchodilator Protocol Note    There is a bronchodilator order in the chart from a provider indicating to follow the RT Bronchodilator Protocol and there is an Initiate RT Inhaler-Nebulizer Bronchodilator Protocol order as well (see protocol at bottom of note). CXR Findings:  No results found. The findings from the last RT Protocol Assessment were as follows:   History Pulmonary Disease: (P) Chronic pulmonary disease  Respiratory Pattern: (P) Dyspnea on exertion or RR 21-25 bpm  Breath Sounds: (P) Slightly diminished and/or crackles  Cough: (P) Strong, productive  Indication for Bronchodilator Therapy: (P) Decreased or absent breath sounds  Bronchodilator Assessment Score: (P) 7    Aerosolized bronchodilator medication orders have been revised according to the RT Inhaler-Nebulizer Bronchodilator Protocol below. Respiratory Therapist to perform RT Therapy Protocol Assessment initially then follow the protocol. Repeat RT Therapy Protocol Assessment PRN for score 0-3 or on second treatment, BID, and PRN for scores above 3. No Indications - adjust the frequency to every 6 hours PRN wheezing or bronchospasm, if no treatments needed after 48 hours then discontinue using Per Protocol order mode. If indication present, adjust the RT bronchodilator orders based on the Bronchodilator Assessment Score as indicated below. Use Inhaler orders unless patient has one or more of the following: on home nebulizer, not able to hold breath for 10 seconds, is not alert and oriented, cannot activate and use MDI correctly, or respiratory rate 25 breaths per minute or more, then use the equivalent nebulizer order(s) with same Frequency and PRN reasons based on the score. If a patient is on this medication at home then do not decrease Frequency below that used at home.     0-3 - enter or revise RT bronchodilator order(s) to equivalent RT Bronchodilator order with Frequency of every 4 hours PRN for
RT Inhaler-Nebulizer Bronchodilator Protocol Note    There is a bronchodilator order in the chart from a provider indicating to follow the RT Bronchodilator Protocol and there is an Initiate RT Inhaler-Nebulizer Bronchodilator Protocol order as well (see protocol at bottom of note). CXR Findings:  No results found. The findings from the last RT Protocol Assessment were as follows:   History Pulmonary Disease: Chronic pulmonary disease  Respiratory Pattern: Dyspnea on exertion or RR 21-25 bpm  Breath Sounds: Intermittent or unilateral wheezes  Cough: Strong, productive  Indication for Bronchodilator Therapy: Decreased or absent breath sounds  Bronchodilator Assessment Score: 9    Aerosolized bronchodilator medication orders have been revised according to the RT Inhaler-Nebulizer Bronchodilator Protocol below. Respiratory Therapist to perform RT Therapy Protocol Assessment initially then follow the protocol. Repeat RT Therapy Protocol Assessment PRN for score 0-3 or on second treatment, BID, and PRN for scores above 3. No Indications - adjust the frequency to every 6 hours PRN wheezing or bronchospasm, if no treatments needed after 48 hours then discontinue using Per Protocol order mode. If indication present, adjust the RT bronchodilator orders based on the Bronchodilator Assessment Score as indicated below. Use Inhaler orders unless patient has one or more of the following: on home nebulizer, not able to hold breath for 10 seconds, is not alert and oriented, cannot activate and use MDI correctly, or respiratory rate 25 breaths per minute or more, then use the equivalent nebulizer order(s) with same Frequency and PRN reasons based on the score. If a patient is on this medication at home then do not decrease Frequency below that used at home.     0-3 - enter or revise RT bronchodilator order(s) to equivalent RT Bronchodilator order with Frequency of every 4 hours PRN for wheezing or increased work
RT Inhaler-Nebulizer Bronchodilator Protocol Note    There is a bronchodilator order in the chart from a provider indicating to follow the RT Bronchodilator Protocol and there is an Initiate RT Inhaler-Nebulizer Bronchodilator Protocol order as well (see protocol at bottom of note). CXR Findings:  XR CHEST PORTABLE    Result Date: 5/14/2023  Bibasilar airspace disease could represent pneumonia and there are small pleural effusions. The findings from the last RT Protocol Assessment were as follows:   History Pulmonary Disease: (P) Chronic pulmonary disease  Respiratory Pattern: (P) Dyspnea on exertion or RR 21-25 bpm  Breath Sounds: (P) Intermittent or unilateral wheezes  Cough: (P) Strong, spontaneous, non-productive  Indication for Bronchodilator Therapy: (P) Decreased or absent breath sounds  Bronchodilator Assessment Score: (P) 8    Aerosolized bronchodilator medication orders have been revised according to the RT Inhaler-Nebulizer Bronchodilator Protocol below. Respiratory Therapist to perform RT Therapy Protocol Assessment initially then follow the protocol. Repeat RT Therapy Protocol Assessment PRN for score 0-3 or on second treatment, BID, and PRN for scores above 3. No Indications - adjust the frequency to every 6 hours PRN wheezing or bronchospasm, if no treatments needed after 48 hours then discontinue using Per Protocol order mode. If indication present, adjust the RT bronchodilator orders based on the Bronchodilator Assessment Score as indicated below. Use Inhaler orders unless patient has one or more of the following: on home nebulizer, not able to hold breath for 10 seconds, is not alert and oriented, cannot activate and use MDI correctly, or respiratory rate 25 breaths per minute or more, then use the equivalent nebulizer order(s) with same Frequency and PRN reasons based on the score.   If a patient is on this medication at home then do not decrease Frequency below that used at
RT Inhaler-Nebulizer Bronchodilator Protocol Note    There is a bronchodilator order in the chart from a provider indicating to follow the RT Bronchodilator Protocol and there is an Initiate RT Inhaler-Nebulizer Bronchodilator Protocol order as well (see protocol at bottom of note). CXR Findings:  XR CHEST PORTABLE    Result Date: 5/14/2023  Bibasilar airspace disease could represent pneumonia and there are small pleural effusions. The findings from the last RT Protocol Assessment were as follows:   History Pulmonary Disease: Chronic pulmonary disease  Respiratory Pattern: Dyspnea on exertion or RR 21-25 bpm  Breath Sounds: Inspiratory and expiratory or bilateral wheezing and/or rhonchi  Cough: Strong, spontaneous, non-productive  Indication for Bronchodilator Therapy: Wheezing associated with pulm disorder  Bronchodilator Assessment Score: 10    Aerosolized bronchodilator medication orders have been revised according to the RT Inhaler-Nebulizer Bronchodilator Protocol below. Respiratory Therapist to perform RT Therapy Protocol Assessment initially then follow the protocol. Repeat RT Therapy Protocol Assessment PRN for score 0-3 or on second treatment, BID, and PRN for scores above 3. No Indications - adjust the frequency to every 6 hours PRN wheezing or bronchospasm, if no treatments needed after 48 hours then discontinue using Per Protocol order mode. If indication present, adjust the RT bronchodilator orders based on the Bronchodilator Assessment Score as indicated below. Use Inhaler orders unless patient has one or more of the following: on home nebulizer, not able to hold breath for 10 seconds, is not alert and oriented, cannot activate and use MDI correctly, or respiratory rate 25 breaths per minute or more, then use the equivalent nebulizer order(s) with same Frequency and PRN reasons based on the score.   If a patient is on this medication at home then do not decrease Frequency below
RT Inhaler-Nebulizer Bronchodilator Protocol Note    There is a bronchodilator order in the chart from a provider indicating to follow the RT Bronchodilator Protocol and there is an Initiate RT Inhaler-Nebulizer Bronchodilator Protocol order as well (see protocol at bottom of note). CXR Findings:  XR CHEST PORTABLE    Result Date: 5/14/2023  Bibasilar airspace disease could represent pneumonia and there are small pleural effusions. The findings from the last RT Protocol Assessment were as follows:   History Pulmonary Disease: Chronic pulmonary disease  Respiratory Pattern: Dyspnea on exertion or RR 21-25 bpm  Breath Sounds: Slightly diminished and/or crackles  Cough: Strong, spontaneous, non-productive  Indication for Bronchodilator Therapy: Decreased or absent breath sounds  Bronchodilator Assessment Score: 6    Aerosolized bronchodilator medication orders have been revised according to the RT Inhaler-Nebulizer Bronchodilator Protocol below. Respiratory Therapist to perform RT Therapy Protocol Assessment initially then follow the protocol. Repeat RT Therapy Protocol Assessment PRN for score 0-3 or on second treatment, BID, and PRN for scores above 3. No Indications - adjust the frequency to every 6 hours PRN wheezing or bronchospasm, if no treatments needed after 48 hours then discontinue using Per Protocol order mode. If indication present, adjust the RT bronchodilator orders based on the Bronchodilator Assessment Score as indicated below. Use Inhaler orders unless patient has one or more of the following: on home nebulizer, not able to hold breath for 10 seconds, is not alert and oriented, cannot activate and use MDI correctly, or respiratory rate 25 breaths per minute or more, then use the equivalent nebulizer order(s) with same Frequency and PRN reasons based on the score. If a patient is on this medication at home then do not decrease Frequency below that used at home.     0-3 - enter or
RT Inhaler-Nebulizer Bronchodilator Protocol Note    There is a bronchodilator order in the chart from a provider indicating to follow the RT Bronchodilator Protocol and there is an Initiate RT Inhaler-Nebulizer Bronchodilator Protocol order as well (see protocol at bottom of note). CXR Findings:  XR CHEST PORTABLE    Result Date: 5/14/2023  Bibasilar airspace disease could represent pneumonia and there are small pleural effusions. The findings from the last RT Protocol Assessment were as follows:   History Pulmonary Disease: Chronic pulmonary disease  Respiratory Pattern: Dyspnea on exertion or RR 21-25 bpm  Breath Sounds: Slightly diminished and/or crackles  Cough: Strong, spontaneous, non-productive  Indication for Bronchodilator Therapy: Decreased or absent breath sounds  Bronchodilator Assessment Score: 6    Aerosolized bronchodilator medication orders have been revised according to the RT Inhaler-Nebulizer Bronchodilator Protocol below. Respiratory Therapist to perform RT Therapy Protocol Assessment initially then follow the protocol. Repeat RT Therapy Protocol Assessment PRN for score 0-3 or on second treatment, BID, and PRN for scores above 3. No Indications - adjust the frequency to every 6 hours PRN wheezing or bronchospasm, if no treatments needed after 48 hours then discontinue using Per Protocol order mode. If indication present, adjust the RT bronchodilator orders based on the Bronchodilator Assessment Score as indicated below. Use Inhaler orders unless patient has one or more of the following: on home nebulizer, not able to hold breath for 10 seconds, is not alert and oriented, cannot activate and use MDI correctly, or respiratory rate 25 breaths per minute or more, then use the equivalent nebulizer order(s) with same Frequency and PRN reasons based on the score. If a patient is on this medication at home then do not decrease Frequency below that used at home.     0-3 - enter or
[facility], [facility] Special[facility]                                 Pulmonary Consult /Progress Note :                                                                  CHIEF COMPLAINT: SOB ,cough         HPI:   SOB has improved and she started feeling better  Still with chest tightness  Poor air entry  Denies any CP  No fever or chills  +Rakan ,H infl ,strep        Objective:   PHYSICAL EXAM:    Blood pressure (!) 184/99, pulse (!) 125, temperature 98.3 °F (36.8 °C), temperature source Oral, resp. rate 16, height 5' (1.524 m), weight 104 lb 9.6 oz (47.4 kg), SpO2 97 %, not currently breastfeeding.' on RA  Gen: No distress. Eyes: PERRL. No sclera icterus. No conjunctival injection. ENT: No discharge. Pharynx clear. Neck: Trachea midline. No obvious mass. Resp:scattered rhonchi/wheezing , bilateral   CV: Regular rate. Regular rhythm. No murmur or rub. No edema. GI: Non-tender. Non-distended. No hernia. Skin: Warm and dry. No nodule on exposed extremities. Lymph: No cervical LAD. No supraclavicular LAD. M/S: No cyanosis. No joint deformity. No clubbing. Neuro: Awake. Alert. Moves all four extremities. Psych: Oriented x 3. No anxiety.              LABS/IMAGING:    CBC:  Lab Results   Component Value Date    WBC 12.1 (H) 05/16/2023    HGB 11.3 (L) 05/16/2023    HCT 35.5 (L) 05/16/2023    MCV 85.6 05/16/2023     05/16/2023    LYMPHOPCT 4.5 05/16/2023    RBC 4.14 05/16/2023    MCH 27.3 05/16/2023    MCHC 31.9 05/16/2023    RDW 18.5 (H) 05/16/2023    NEUTOPHILPCT 91.7 05/16/2023    MONOPCT 3.7 05/16/2023    EOSPCT 1.0 05/03/2012    BASOPCT 0.1 05/16/2023    NEUTROABS 11.1 (H) 05/16/2023    LYMPHSABS 0.5 (L) 05/16/2023    MONOSABS 0.4 05/16/2023    EOSABS 0.0 05/16/2023    BASOSABS 0.0 05/16/2023       Recent Labs     05/16/23  0506 05/15/23  0820 05/14/23  1610   WBC 12.1* 15.9* 16.5*   HGB 11.3* 12.3 13.9   HCT 35.5* 39.9 43.2   MCV 85.6 87.4 85.0    312
Report given at bedside to [facility].  Barby Desai RN
Resting in bed awake, No complaints or needs at present time. Call light in reach.
Resting in bed awake. No complaints or needs at present time. AM assessment complete. Alert and oriented. Call light in reach. Patient is able to demonstrate the ability to move from a reclining position to an upright position within the recliner. Bedside Mobility Assessment Tool (BMAT):     Assessment Level 1- Sit and Shake    1. From a semi-reclined position, ask patient to sit up and rotate to a seated position at the side of the bed. Can use the bedrail. 2. Ask patient to reach out and grab your hand and shake making sure patient reaches across his/her midline. Pass- Patient is able to come to a seated position, maintain core strength. Maintains seated balance while reaching across midline. Move on to Assessment Level 2. Assessment Level 2- Stretch and Point   1. With patient in seated position at the side of the bed, have patient place both feet on the floor (or stool) with knees no higher than hips. 2. Ask patient to stretch one leg and straighten the knee, then bend the ankle/flex and point the toes. If appropriate, repeat with the other leg. Pass- Patient is able to demonstrate appropriate quad strength on intended weight bearing limb(s). Move onto Assessment Level 3. Assessment Level 3- Stand   1. Ask patient to elevate off the bed or chair (seated to standing) using an assistive device (cane, bedrail). 2. Patient should be able to raise buttocks off be and hold for a count of five. May repeat once. Pass- Patient maintains standing stability for at least 5 seconds, proceed to assessment level 4. Assessment Level 4- Walk   1. Ask patient to march in place at bedside. 2. Then ask patient to advance step and return each foot. Some medical conditions may render a patient from stepping backwards, use your best clinical judgement.    Pass- Patient demonstrates balance while shifting weight and ability to step, takes independent steps, does not use assistive device patient is
Resting in bed awake. No complaints or needs at present time. Am assessment complete. Alert and oriented. Pt steady on feet while walking back from bathroom. Call light in reach. Patient is able to demonstrate the ability to move from a reclining position to an upright position within the recliner. Bedside Mobility Assessment Tool (BMAT):     Assessment Level 1- Sit and Shake    1. From a semi-reclined position, ask patient to sit up and rotate to a seated position at the side of the bed. Can use the bedrail. 2. Ask patient to reach out and grab your hand and shake making sure patient reaches across his/her midline. Pass- Patient is able to come to a seated position, maintain core strength. Maintains seated balance while reaching across midline. Move on to Assessment Level 2. Assessment Level 2- Stretch and Point   1. With patient in seated position at the side of the bed, have patient place both feet on the floor (or stool) with knees no higher than hips. 2. Ask patient to stretch one leg and straighten the knee, then bend the ankle/flex and point the toes. If appropriate, repeat with the other leg. Pass- Patient is able to demonstrate appropriate quad strength on intended weight bearing limb(s). Move onto Assessment Level 3. Assessment Level 3- Stand   1. Ask patient to elevate off the bed or chair (seated to standing) using an assistive device (cane, bedrail). 2. Patient should be able to raise buttocks off be and hold for a count of five. May repeat once. Pass- Patient maintains standing stability for at least 5 seconds, proceed to assessment level 4. Assessment Level 4- Walk   1. Ask patient to march in place at bedside. 2. Then ask patient to advance step and return each foot. Some medical conditions may render a patient from stepping backwards, use your best clinical judgement.    Pass- Patient demonstrates balance while shifting weight and ability to step, takes independent
[facility] Pulmonary, Critical Care and Sleep Specialists                                 Pulmonary Consult /Progress Note :                                                                  CHIEF COMPLAINT: SOB ,cough         HPI:   Continue to improve   SOB has improved and she started feeling better  Mild chest tightness  Better air entry  Denies any CP  No fever or chills  +Rakan ,H infl ,strep        Objective:   PHYSICAL EXAM:    Blood pressure (!) 169/92, pulse (!) 121, temperature 98.2 °F (36.8 °C), temperature source Oral, resp. rate 16, height 5' (1.524 m), weight 100 lb 6.4 oz (45.5 kg), SpO2 96 %, not currently breastfeeding.' on RA  Gen: No distress. Eyes: PERRL. No sclera icterus. No conjunctival injection. ENT: No discharge. Pharynx clear. Neck: Trachea midline. No obvious mass. Resp:scattered rhonchi/wheezing , bilateral   CV: Regular rate. Regular rhythm. No murmur or rub. No edema. GI: Non-tender. Non-distended. No hernia. Skin: Warm and dry. No nodule on exposed extremities. Lymph: No cervical LAD. No supraclavicular LAD. M/S: No cyanosis. No joint deformity. No clubbing. Neuro: Awake. Alert. Moves all four extremities. Psych: Oriented x 3. No anxiety.              LABS/IMAGING:    CBC:  Lab Results   Component Value Date    WBC 12.1 (H) 05/16/2023    HGB 11.3 (L) 05/16/2023    HCT 35.5 (L) 05/16/2023    MCV 85.6 05/16/2023     05/16/2023    LYMPHOPCT 4.5 05/16/2023    RBC 4.14 05/16/2023    MCH 27.3 05/16/2023    MCHC 31.9 05/16/2023    RDW 18.5 (H) 05/16/2023    NEUTOPHILPCT 91.7 05/16/2023    MONOPCT 3.7 05/16/2023    EOSPCT 1.0 05/03/2012    BASOPCT 0.1 05/16/2023    NEUTROABS 11.1 (H) 05/16/2023    LYMPHSABS 0.5 (L) 05/16/2023    MONOSABS 0.4 05/16/2023    EOSABS 0.0 05/16/2023    BASOSABS 0.0 05/16/2023       Recent Labs     05/16/23  0506 05/15/23  0820 05/14/23  1610   WBC 12.1* 15.9* 16.5*   HGB 11.3* 12.3 13.9   HCT 35.5* 39.9 43.2   MCV 85.6 87.4
changes both lower lobes. No evidence of pleural effusion or   pneumothorax. RECOMMENDATIONS:   Fleischner Society guidelines for follow-up and management of incidentally   detected pulmonary nodules:      Single Solid Nodule:      Nodule size less than 6 mm   In a low-risk patient, no routine follow-up. In a high-risk patient, optional CT at 12 months. Nodule size equals 6-8 mm   In a low-risk patient, CT at 6-12 months, then consider CT at 18-24 months. In a high-risk patient, CT at 6-12 months, then CT at 18-24 months. Nodule size greater than 8 mm         In a low-risk patient, consider CT at 3 months, PET/CT, or tissue sampling. In a high-risk patient, consider CT at 3 months, PET/CT, or tissue sampling. Multiple Solid Nodules:      Nodule size less than 6 mm   In a low-risk patient, no routine follow-up. In a high-risk patient, optional CT at 12 months. Nodule size equals 6-8 mm   In a low-risk patient, CT at 3-6 months, then consider CT at 18-24 months. In a high-risk patient, CT at 3-6 months, then CT at 18-24 months. Nodule size greater than 8 mm   In a low-risk patient, CT at 3-6 months, then consider CT at 18-24 months. In a high-risk patient, CT at 3-6 months, then CT at 18-24 months. - Low risk patients include individuals with minimal or absent history of   smoking and other known risk factors. - High risk patients include individuals with a history or smoking or known   risk factors. Radiology 2017 http://pubs. rsna.org/doi/full/10.1148/radiol. 6882429081         XR CHEST PORTABLE   Final Result   Bibasilar airspace disease could represent pneumonia and there are small   pleural effusions.                Assessment/Plan:  Sepsis  - Source: PNA  - Criteria: (+) HR, (+) RR,  (+) WBC,   - Admit to 2W  - Blood & Resp cx ordered   - monitor vitals and labs  - mgmt as below     Pneumonia  - community acquired, suspect a gram positive organism    - CT
this pleasant patient , should you have any questions ,please do not hesitate to contact me      Jani Combs MD,[facility]  Pulmonary&Critical Care Medicine    [facility]    NOTE: This report was transcribed using voice recognition software. Every effort was made to ensure accuracy; however, inadvertent computerized transcription errors may be present.

## 2023-05-18 NOTE — DISCHARGE SUMMARY
elevated   - pulmonary following   - continuous pulse ox in place   Strep pneumo Ag positive  Resp panel- rhino and parainfluenza      COPD  AE  Chronic respiratory failure   - on 2 liters oxygen at home PRN  - solumedrol for now  - inhaled bronchodilators   - antibiotics as above  - Mucinex ordered   - continue Theophylline  Doing well today     Pulmonary Nodules  - follows with Dr. Maia Aiken      DM type 2  - holding Metformin, continue Januvia   - SSI ordered  - monitor      BLE edema - chronic  - continue Lasix and potassium  - monitor      GERD  - Continue PPI       Depression  - continue Effexor      HTN  - controlled continue home meds: Cardizem       HLD  - Continue statin       Tension Headache  - continue Topamax     Severe Protein Calorie Malnutrition  - stated she has lost about 7 lbs since  - dietician consulted      Steroid Induced osteopenia  - on Fosamax and Calcium at home       XR CHEST (2 VW)   Final Result   No interval change. Bibasilar infiltrates. CT CHEST PULMONARY EMBOLISM W CONTRAST   Final Result   1. No evidence of acute pulmonary embolism or acute aortic disease. 2. Underlying emphysema. Redemonstration of multiple pulmonary nodules also   previously noted which remain unchanged. 3. New pulmonary infiltrates which are partly nodular in both lower lobes,   not previously noted and likely represent pneumonia. Redemonstration of mild   bronchiectatic changes both lower lobes. No evidence of pleural effusion or   pneumothorax. RECOMMENDATIONS:   Fleischner Society guidelines for follow-up and management of incidentally   detected pulmonary nodules:      Single Solid Nodule:      Nodule size less than 6 mm   In a low-risk patient, no routine follow-up. In a high-risk patient, optional CT at 12 months. Nodule size equals 6-8 mm   In a low-risk patient, CT at 6-12 months, then consider CT at 18-24 months.    In a high-risk patient, CT at 6-12 months, then CT at 18-24

## 2023-05-19 LAB
BACTERIA BLD CULT ORG #2: NORMAL
BACTERIA BLD CULT: NORMAL

## 2023-05-24 ENCOUNTER — OFFICE VISIT (OUTPATIENT)
Dept: INTERNAL MEDICINE CLINIC | Age: 73
End: 2023-05-24

## 2023-05-24 VITALS
HEIGHT: 60 IN | WEIGHT: 94.6 LBS | HEART RATE: 113 BPM | BODY MASS INDEX: 18.57 KG/M2 | SYSTOLIC BLOOD PRESSURE: 130 MMHG | OXYGEN SATURATION: 92 % | RESPIRATION RATE: 14 BRPM | DIASTOLIC BLOOD PRESSURE: 80 MMHG

## 2023-05-24 DIAGNOSIS — J44.1 COPD EXACERBATION (HCC): ICD-10-CM

## 2023-05-24 DIAGNOSIS — Z09 HOSPITAL DISCHARGE FOLLOW-UP: ICD-10-CM

## 2023-05-24 DIAGNOSIS — J13 PNEUMONIA DUE TO STREPTOCOCCUS PNEUMONIAE, UNSPECIFIED LATERALITY, UNSPECIFIED PART OF LUNG (HCC): ICD-10-CM

## 2023-05-24 DIAGNOSIS — A40.3 SEPSIS DUE TO STREPTOCOCCUS PNEUMONIAE WITHOUT ACUTE ORGAN DYSFUNCTION (HCC): Primary | ICD-10-CM

## 2023-05-24 PROCEDURE — 1111F DSCHRG MED/CURRENT MED MERGE: CPT | Performed by: INTERNAL MEDICINE

## 2023-05-24 PROCEDURE — 99495 TRANSJ CARE MGMT MOD F2F 14D: CPT | Performed by: INTERNAL MEDICINE

## 2023-05-24 NOTE — PROGRESS NOTES
Post-Discharge Transitional Care  Follow Up      Jerrica Rubin   YOB: 1950    Date of Office Visit:  5/24/2023  Date of Hospital Admission: 5/14/23  Date of Hospital Discharge: 5/18/23  Risk of hospital readmission (high >=14%. Medium >=10%) :Readmission Risk Score: 13.2      Care management risk score Rising risk (score 2-5) and Complex Care (Scores >=6): No Risk Score On File     Non face to face  following discharge, date last encounter closed (first attempt may have been earlier): *No documented post hospital discharge outreach found in the last 14 days    Call initiated 2 business days of discharge: *No response recorded in the last 14 days    ASSESSMENT/PLAN:   Sepsis due to Streptococcus pneumoniae without acute organ dysfunction (HCC)  COPD exacerbation ([facility] [address]  Pneumonia due to Streptococcus pneumoniae, unspecified laterality, unspecified part of lung ([facility] [address]    Doing better  Reviewed hospital records. Medical Decision Making: moderate complexity  No follow-ups on file. Subjective:   HPI:  Follow up of Hospital problems/diagnosis(es): Sepsis/Pneumonia and COPD exacerbation. Inpatient course: Discharge summary reviewed- see chart. Interval history/Current status: she is doing better. She has some clear phlegm. She is on 2 L. No fever or chills. No diarrhea. Her Strep pneumonia antigen was positive. She is vaccinated for pneumococcus.      Patient Active Problem List   Diagnosis    Esophageal reflux    Pulmonary nodule    Knee pain, bilateral    Dizziness    Chronic tension headache    Drug induced headache    Major depression, chronic    Steroid-induced osteopenia    Primary insomnia    Mixed hyperlipidemia    Dysthymia    Type 2 diabetes mellitus without complication, without long-term current use of insulin (HCC)    Hyperlipidemia associated with type 2 diabetes mellitus (HCC)    Chronic tension-type headache, intractable    Pain of both hip joints    Lumbar spine pain

## 2023-05-25 RX ORDER — QUETIAPINE FUMARATE 25 MG/1
TABLET, FILM COATED ORAL
Qty: 180 TABLET | Refills: 0 | Status: SHIPPED | OUTPATIENT
Start: 2023-05-25

## 2023-06-08 RX ORDER — MIRABEGRON 50 MG/1
TABLET, FILM COATED, EXTENDED RELEASE ORAL
Qty: 30 TABLET | Refills: 1 | Status: SHIPPED | OUTPATIENT
Start: 2023-06-08 | End: 2023-07-17

## 2023-06-12 RX ORDER — MIRABEGRON 50 MG/1
TABLET, FILM COATED, EXTENDED RELEASE ORAL
Qty: 30 TABLET | Refills: 11 | OUTPATIENT
Start: 2023-06-12

## 2023-07-05 RX ORDER — TIOTROPIUM BROMIDE 18 UG/1
CAPSULE ORAL; RESPIRATORY (INHALATION)
Qty: 90 CAPSULE | Refills: 3 | Status: SHIPPED | OUTPATIENT
Start: 2023-07-05

## 2023-07-10 RX ORDER — THEOPHYLLINE 300 MG/1
TABLET, EXTENDED RELEASE ORAL
Qty: 90 TABLET | Refills: 3 | Status: SHIPPED | OUTPATIENT
Start: 2023-07-10

## 2023-07-10 RX ORDER — ROFLUMILAST 500 UG/1
TABLET ORAL
Qty: 90 TABLET | Refills: 3 | Status: SHIPPED | OUTPATIENT
Start: 2023-07-10

## 2023-07-10 RX ORDER — METFORMIN HYDROCHLORIDE 500 MG/1
TABLET, EXTENDED RELEASE ORAL
Qty: 360 TABLET | Refills: 3 | Status: SHIPPED | OUTPATIENT
Start: 2023-07-10

## 2023-07-10 RX ORDER — DILTIAZEM HYDROCHLORIDE 120 MG/1
120 CAPSULE, COATED, EXTENDED RELEASE ORAL DAILY
Qty: 90 CAPSULE | Refills: 3 | Status: SHIPPED | OUTPATIENT
Start: 2023-07-10

## 2023-07-10 RX ORDER — SITAGLIPTIN 100 MG/1
TABLET, FILM COATED ORAL
Qty: 90 TABLET | Refills: 3 | Status: SHIPPED | OUTPATIENT
Start: 2023-07-10

## 2023-07-10 RX ORDER — ESOMEPRAZOLE MAGNESIUM 40 MG/1
CAPSULE, DELAYED RELEASE ORAL
Qty: 90 CAPSULE | Refills: 3 | Status: SHIPPED | OUTPATIENT
Start: 2023-07-10

## 2023-07-17 RX ORDER — MIRABEGRON 50 MG/1
TABLET, FILM COATED, EXTENDED RELEASE ORAL
Qty: 60 TABLET | Refills: 0 | Status: SHIPPED | OUTPATIENT
Start: 2023-07-17 | End: 2023-09-14

## 2023-07-24 RX ORDER — ALENDRONATE SODIUM 35 MG/1
TABLET ORAL
Qty: 12 TABLET | Refills: 0 | Status: SHIPPED | OUTPATIENT
Start: 2023-07-24

## 2023-07-26 RX ORDER — QUETIAPINE FUMARATE 25 MG/1
TABLET, FILM COATED ORAL
Qty: 180 TABLET | Refills: 0 | Status: SHIPPED | OUTPATIENT
Start: 2023-07-26

## 2023-07-28 DIAGNOSIS — E11.65 TYPE 2 DIABETES MELLITUS WITH HYPERGLYCEMIA, WITHOUT LONG-TERM CURRENT USE OF INSULIN (HCC): ICD-10-CM

## 2023-07-31 RX ORDER — LANCETS 33 GAUGE
EACH MISCELLANEOUS
Qty: 200 EACH | Refills: 2 | Status: SHIPPED | OUTPATIENT
Start: 2023-07-31

## 2023-08-10 ENCOUNTER — OFFICE VISIT (OUTPATIENT)
Dept: INTERNAL MEDICINE CLINIC | Age: 73
End: 2023-08-10

## 2023-08-10 VITALS
DIASTOLIC BLOOD PRESSURE: 80 MMHG | RESPIRATION RATE: 12 BRPM | HEIGHT: 60 IN | WEIGHT: 100 LBS | SYSTOLIC BLOOD PRESSURE: 110 MMHG | HEART RATE: 70 BPM | BODY MASS INDEX: 19.63 KG/M2

## 2023-08-10 DIAGNOSIS — E11.69 HYPERLIPIDEMIA ASSOCIATED WITH TYPE 2 DIABETES MELLITUS (HCC): ICD-10-CM

## 2023-08-10 DIAGNOSIS — Z12.31 ENCOUNTER FOR SCREENING MAMMOGRAM FOR MALIGNANT NEOPLASM OF BREAST: ICD-10-CM

## 2023-08-10 DIAGNOSIS — K21.9 GASTROESOPHAGEAL REFLUX DISEASE, UNSPECIFIED WHETHER ESOPHAGITIS PRESENT: ICD-10-CM

## 2023-08-10 DIAGNOSIS — F51.01 PRIMARY INSOMNIA: ICD-10-CM

## 2023-08-10 DIAGNOSIS — E11.65 TYPE 2 DIABETES MELLITUS WITH HYPERGLYCEMIA, WITHOUT LONG-TERM CURRENT USE OF INSULIN (HCC): Primary | ICD-10-CM

## 2023-08-10 DIAGNOSIS — G44.229 CHRONIC TENSION-TYPE HEADACHE, NOT INTRACTABLE: ICD-10-CM

## 2023-08-10 DIAGNOSIS — F32.9 MAJOR DEPRESSION, CHRONIC: ICD-10-CM

## 2023-08-10 DIAGNOSIS — E78.5 HYPERLIPIDEMIA ASSOCIATED WITH TYPE 2 DIABETES MELLITUS (HCC): ICD-10-CM

## 2023-08-10 DIAGNOSIS — J43.8 OTHER EMPHYSEMA (HCC): ICD-10-CM

## 2023-08-10 DIAGNOSIS — N39.46 MIXED STRESS AND URGE URINARY INCONTINENCE: ICD-10-CM

## 2023-08-10 DIAGNOSIS — J96.11 CHRONIC RESPIRATORY FAILURE WITH HYPOXIA (HCC): ICD-10-CM

## 2023-08-10 DIAGNOSIS — E43 SEVERE PROTEIN-CALORIE MALNUTRITION (GOMEZ: LESS THAN 60% OF STANDARD WEIGHT) (HCC): ICD-10-CM

## 2023-08-10 PROCEDURE — G8420 CALC BMI NORM PARAMETERS: HCPCS | Performed by: INTERNAL MEDICINE

## 2023-08-10 PROCEDURE — 1090F PRES/ABSN URINE INCON ASSESS: CPT | Performed by: INTERNAL MEDICINE

## 2023-08-10 PROCEDURE — G8427 DOCREV CUR MEDS BY ELIG CLIN: HCPCS | Performed by: INTERNAL MEDICINE

## 2023-08-10 PROCEDURE — G8399 PT W/DXA RESULTS DOCUMENT: HCPCS | Performed by: INTERNAL MEDICINE

## 2023-08-10 PROCEDURE — 3044F HG A1C LEVEL LT 7.0%: CPT | Performed by: INTERNAL MEDICINE

## 2023-08-10 PROCEDURE — 99214 OFFICE O/P EST MOD 30 MIN: CPT | Performed by: INTERNAL MEDICINE

## 2023-08-10 PROCEDURE — 3023F SPIROM DOC REV: CPT | Performed by: INTERNAL MEDICINE

## 2023-08-10 PROCEDURE — 1036F TOBACCO NON-USER: CPT | Performed by: INTERNAL MEDICINE

## 2023-08-10 PROCEDURE — 3017F COLORECTAL CA SCREEN DOC REV: CPT | Performed by: INTERNAL MEDICINE

## 2023-08-10 PROCEDURE — 0509F URINE INCON PLAN DOCD: CPT | Performed by: INTERNAL MEDICINE

## 2023-08-10 PROCEDURE — 2022F DILAT RTA XM EVC RTNOPTHY: CPT | Performed by: INTERNAL MEDICINE

## 2023-08-10 PROCEDURE — 1123F ACP DISCUSS/DSCN MKR DOCD: CPT | Performed by: INTERNAL MEDICINE

## 2023-08-10 ASSESSMENT — ENCOUNTER SYMPTOMS
SHORTNESS OF BREATH: 1
WHEEZING: 0
VOMITING: 0
EYE DISCHARGE: 0
ABDOMINAL PAIN: 0
RHINORRHEA: 0
NAUSEA: 0
COUGH: 0
BACK PAIN: 1

## 2023-08-10 NOTE — PROGRESS NOTES
HEART/PERICARDIUM:  Cardiac size normal. No pericardial effusion. .   VESSELS:  Mild atherosclerotic calcification thoracic aorta without aneurysmal dilatation   CHEST WALL/LOWER NECK:  Unremarkable   UPPER ABDOMEN:  Unremarkable   BONES:  Unremarkable   OTHER:  None        Assessment:       Diagnosis Orders   1. Type 2 diabetes mellitus with hyperglycemia, without long-term current use of insulin ([address])        2. Hyperlipidemia associated with type 2 diabetes mellitus ([address])        3. Gastroesophageal reflux disease, unspecified whether esophagitis present        4. Severe protein-calorie malnutrition [facility]: less than 60% of standard weight) ([address])        5. Major depression, chronic        6. Primary insomnia        7. Chronic tension-type headache, not intractable        8. Chronic respiratory failure with hypoxia (HCC)        9. Other emphysema ([address])                 Plan:        DM type 2: well controlled. Check labs. COPD: she is using 2 L of oxygen at hs and prn during the day. Dyspnea likely related to her lungs. Refused covid vaccine. Chronic respiratory failure. On 2 l.  HA are stable. On topamax. Sees neurology. Depression is stable on meds. GERD is stale. Edema is stable. 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 Severe PCM. Doing better. Anemia. Stable. See Dr Alison Tran for urinary incontinence. The current medical regimen is effective;  continue present plan and medications. See orders.

## 2023-08-14 RX ORDER — FUROSEMIDE 40 MG/1
TABLET ORAL
Qty: 100 TABLET | Refills: 2 | Status: SHIPPED | OUTPATIENT
Start: 2023-08-14

## 2023-08-14 RX ORDER — POTASSIUM CHLORIDE 20 MEQ/1
TABLET, EXTENDED RELEASE ORAL
Qty: 100 TABLET | Refills: 2 | Status: SHIPPED | OUTPATIENT
Start: 2023-08-14

## 2023-08-29 ENCOUNTER — OFFICE VISIT (OUTPATIENT)
Dept: NEUROLOGY | Age: 73
End: 2023-08-29
Payer: MEDICARE

## 2023-08-29 VITALS
DIASTOLIC BLOOD PRESSURE: 73 MMHG | WEIGHT: 102.9 LBS | SYSTOLIC BLOOD PRESSURE: 141 MMHG | HEART RATE: 101 BPM | BODY MASS INDEX: 20.2 KG/M2 | HEIGHT: 60 IN

## 2023-08-29 DIAGNOSIS — G44.221 CHRONIC TENSION-TYPE HEADACHE, INTRACTABLE: Primary | ICD-10-CM

## 2023-08-29 DIAGNOSIS — G25.0 ESSENTIAL TREMOR: ICD-10-CM

## 2023-08-29 DIAGNOSIS — E11.65 TYPE 2 DIABETES MELLITUS WITH HYPERGLYCEMIA, WITHOUT LONG-TERM CURRENT USE OF INSULIN (HCC): ICD-10-CM

## 2023-08-29 DIAGNOSIS — E78.2 MIXED HYPERLIPIDEMIA: ICD-10-CM

## 2023-08-29 PROCEDURE — 1123F ACP DISCUSS/DSCN MKR DOCD: CPT | Performed by: PSYCHIATRY & NEUROLOGY

## 2023-08-29 PROCEDURE — 2022F DILAT RTA XM EVC RTNOPTHY: CPT | Performed by: PSYCHIATRY & NEUROLOGY

## 2023-08-29 PROCEDURE — 3017F COLORECTAL CA SCREEN DOC REV: CPT | Performed by: PSYCHIATRY & NEUROLOGY

## 2023-08-29 PROCEDURE — 3044F HG A1C LEVEL LT 7.0%: CPT | Performed by: PSYCHIATRY & NEUROLOGY

## 2023-08-29 PROCEDURE — 99213 OFFICE O/P EST LOW 20 MIN: CPT | Performed by: PSYCHIATRY & NEUROLOGY

## 2023-08-29 PROCEDURE — G8420 CALC BMI NORM PARAMETERS: HCPCS | Performed by: PSYCHIATRY & NEUROLOGY

## 2023-08-29 PROCEDURE — G8399 PT W/DXA RESULTS DOCUMENT: HCPCS | Performed by: PSYCHIATRY & NEUROLOGY

## 2023-08-29 PROCEDURE — 1090F PRES/ABSN URINE INCON ASSESS: CPT | Performed by: PSYCHIATRY & NEUROLOGY

## 2023-08-29 PROCEDURE — G8427 DOCREV CUR MEDS BY ELIG CLIN: HCPCS | Performed by: PSYCHIATRY & NEUROLOGY

## 2023-08-29 PROCEDURE — 1036F TOBACCO NON-USER: CPT | Performed by: PSYCHIATRY & NEUROLOGY

## 2023-08-29 NOTE — PROGRESS NOTES
14.4 oz (46.7 kg)   Height: 5' (1.524 m)       General appearance: well-nourished. Mental Status:   Oriented to person, place, problem, and time. Fluent speech. Good fund of knowledge. Normal attention span and concentration. Intact recent and remote memory  Cranial Nerves:   II: Pupils: equal, round, reactive to light  III,IV,VI: Extra Ocular Movements are intact. No nystagmus  V: Facial sensation is intact t  VII: Facial strength and movements: intact and symmetric  XII: Tongue movements are normal  Musculoskeletal: 5/5 in all 4 extremities. Normal tone. Mild postural hand tremors. No resting tremors. No changes  Coordination: No tremors or dysmetria  Sensation: normal to all modalities. Gait/Posture: steady        ROS : A 10-12 system review of constitutional, cardiovascular, respiratory, musculoskeletal, endocrine, hematological, skin, SHEENT, genitourinary, psychiatric and neurologic systems was obtained and updated today which is unremarkable except as mentioned in my HPI  The same. Medical decision making:   I personally reviewed and updated social history, past medical history, medications, allergy, surgical history, and family history as documented in the patient's electronic health records. A. Problems (any 1)    High:    [] Acute/Chronic Illness/injury posing threat to life or bodily function:    [] Severe exacerbation of chronic illness: Moderate:    []     1 or more chronic illness with exacerbation, progression or side effect of treatment or  []     2 or more stable chronic illnesses or  []     1 acute illness with systemic symptoms       Mild:  [x]     1 stable chronic illness     ---------------------------------------------------------------------  B.  Risk of Treatment (any 1)   [] Drugs/treatments that require intensive monitoring for toxicity include:      [x] Prescription drug management  ----------------------------------------------------------------------  []

## 2023-09-01 ENCOUNTER — TELEPHONE (OUTPATIENT)
Dept: INTERNAL MEDICINE CLINIC | Age: 73
End: 2023-09-01

## 2023-09-01 RX ORDER — AZITHROMYCIN 250 MG/1
TABLET, FILM COATED ORAL
Qty: 6 TABLET | Refills: 0 | Status: SHIPPED | OUTPATIENT
Start: 2023-09-01
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
## 2023-09-14 RX ORDER — MIRABEGRON 50 MG/1
TABLET, FILM COATED, EXTENDED RELEASE ORAL
Qty: 60 TABLET | Refills: 5 | Status: SHIPPED | OUTPATIENT
Start: 2023-09-14

## 2023-10-06 RX ORDER — ALENDRONATE SODIUM 35 MG/1
TABLET ORAL
Qty: 12 TABLET | Refills: 3 | Status: SHIPPED | OUTPATIENT
Start: 2023-10-06

## 2023-10-07 DIAGNOSIS — G44.221 CHRONIC TENSION-TYPE HEADACHE, INTRACTABLE: ICD-10-CM

## 2023-10-09 RX ORDER — TOPIRAMATE 50 MG/1
TABLET, FILM COATED ORAL
Qty: 200 TABLET | Refills: 0 | Status: SHIPPED | OUTPATIENT
Start: 2023-10-09 | End: 2023-11-17

## 2023-10-19 RX ORDER — VENLAFAXINE HYDROCHLORIDE 75 MG/1
CAPSULE, EXTENDED RELEASE ORAL
Qty: 90 CAPSULE | Refills: 3 | Status: SHIPPED | OUTPATIENT
Start: 2023-10-19

## 2023-10-24 RX ORDER — ALBUTEROL SULFATE 90 UG/1
AEROSOL, METERED RESPIRATORY (INHALATION)
Qty: 34 G | Refills: 0 | Status: SHIPPED | OUTPATIENT
Start: 2023-10-24

## 2023-11-01 ENCOUNTER — TELEPHONE (OUTPATIENT)
Dept: PULMONOLOGY | Age: 73
End: 2023-11-01

## 2023-11-01 NOTE — TELEPHONE ENCOUNTER
Patient did not show for 6 mof/u COPD appointment  with Dr. Saul Neri on 11/1/23    Same Day Cancellation: No    Patient rescheduled:  No    New appointment:     Patient was also no show on: n/a    LOV 4/24/23    Assessment:   Very severe COPD/Pulmonary emphysema  Pulmonary Bronchiectasis  Abnormal CT chest 1/8/2020 with right middle lobe new tree-in-bud nodules-favor inflammatory. Stable on repeat CT chest.    New tree-in-bud nodularity lingula, RLL, LLL on CT 7/8/2021- favor inflammatory/infection. Negative sputum AFB. Improvement/resolution on repeat CT. R pulmonary nodules. Stable over 2 years . Hypoxia on exertion  Off Daliresp given weight loss   Has 7 dogs at home   20 pack years. Quit smoking 1996                Plan:       Continue Wixella BID, Spiriva INH daily and Albuterol INH/Neb PRN  Continue Theophylline   Continue O2 1-2LPM on exertion.  Advised to titrate O2 using her pulse oximeter- target O2 sat 90-92%  Patient is up to date with pneumococcal vaccine, and influenza vaccine   Declined Covid vaccine   Acapella and Mucinex   Follow up in 6 months or sooner if needed

## 2023-11-03 ENCOUNTER — OFFICE VISIT (OUTPATIENT)
Dept: UROGYNECOLOGY | Age: 73
End: 2023-11-03

## 2023-11-03 VITALS
RESPIRATION RATE: 16 BRPM | OXYGEN SATURATION: 99 % | HEART RATE: 72 BPM | SYSTOLIC BLOOD PRESSURE: 120 MMHG | TEMPERATURE: 97.7 F | DIASTOLIC BLOOD PRESSURE: 58 MMHG

## 2023-11-03 DIAGNOSIS — N39.41 URGE INCONTINENCE: ICD-10-CM

## 2023-11-03 DIAGNOSIS — R39.15 URINARY URGENCY: Primary | ICD-10-CM

## 2023-11-03 DIAGNOSIS — N39.3 STRESS INCONTINENCE: ICD-10-CM

## 2023-11-03 ASSESSMENT — ENCOUNTER SYMPTOMS
SHORTNESS OF BREATH: 1
COUGH: 1
WHEEZING: 1
DIARRHEA: 1

## 2023-11-03 NOTE — PROGRESS NOTES
11/3/2023      HPI:     Name: Martin Zarate  YOB: 1950    CC: Patient is a 75 y.o. female who is seen in consultation from Noe Gonzalez   for evaluation of voiding dysfunction. HPI:  Patient presents with c/o urinary incontinence,. She is unable to make it to restroom prior to losing control    She sleeps through the night and is wet in the morning. She has tried and failed Trospium and is now on Myrbetriq with no improvement    She has had a hysterectomy and LSO. She denies prolapse    Bladder control problem: Yes   How many months have you had a bladder problem? 30 years  Do you use pads to absorb lost urine? Yes. If yes how many pads do you wear a day? 2-3    How many trips do you make to the bathroom during the day? 8-10  How many times do you wake at night to go to the bathroom? 1  Do you ever wet the bed while asleep? Yes  Are there times when you cannot make it to the bathroom on time? Yes  Does sound, sight, or feel of running water cause you to lose urine? Yes  How many ounces of liquid do you consume daily? 32+  How many drinks containing caffeine do you consume daily? 1  Which best describes urine loss: (Check all that apply)  [x] I lose urine during coughing, sneezing, running, lifting  [x] I lose urine with changes in posture, standing, walking  [x] I lose urine continuously such that I am constantly wet  [x] I have sudden, urgent needs without the ability to make it to the bathroom  Have you seen a physician for complaints of urine loss? No   Have you taken medication to prevent urine loss? No   Bladder emptying problems:  Yes  How long have you had bladder emptying problems? 30 years  Do you notice any dribbling of urine when you stand after passing urine? No  Do you usually have difficulty starting your urine stream? No  Do you have to assume abnormal positions to urinate? No   Do you have to strain to empty your bladder?  Yes  Do you feel as if your bladder is

## 2023-11-08 NOTE — TELEPHONE ENCOUNTER
Pt scheduled Pt stated she normally only sees Dr. Italo John once a year and she seen him in April so she didn't know she had that appointment.

## 2023-11-08 NOTE — TELEPHONE ENCOUNTER
Patient called with message left for patient to call back to office. Unable to reach patient to dayana appt.

## 2023-11-17 ENCOUNTER — PROCEDURE VISIT (OUTPATIENT)
Dept: UROGYNECOLOGY | Age: 73
End: 2023-11-17
Payer: MEDICARE

## 2023-11-17 VITALS
TEMPERATURE: 98.1 F | RESPIRATION RATE: 18 BRPM | SYSTOLIC BLOOD PRESSURE: 118 MMHG | DIASTOLIC BLOOD PRESSURE: 85 MMHG | OXYGEN SATURATION: 95 % | HEART RATE: 94 BPM

## 2023-11-17 DIAGNOSIS — R35.0 URINARY FREQUENCY: ICD-10-CM

## 2023-11-17 DIAGNOSIS — G44.221 CHRONIC TENSION-TYPE HEADACHE, INTRACTABLE: ICD-10-CM

## 2023-11-17 DIAGNOSIS — R39.15 URINARY URGENCY: Primary | ICD-10-CM

## 2023-11-17 DIAGNOSIS — N39.3 STRESS INCONTINENCE: ICD-10-CM

## 2023-11-17 DIAGNOSIS — N39.41 URGE INCONTINENCE: ICD-10-CM

## 2023-11-17 LAB
BILIRUBIN, POC: NORMAL
BLOOD URINE, POC: NORMAL
CLARITY, POC: CLEAR
COLOR, POC: YELLOW
GLUCOSE URINE, POC: NORMAL
KETONES, POC: NORMAL
LEUKOCYTE EST, POC: NORMAL
NITRITE, POC: NORMAL
PH, POC: 6.5
PROTEIN, POC: NORMAL
SPECIFIC GRAVITY, POC: 1.01
UROBILINOGEN, POC: 0.2

## 2023-11-17 PROCEDURE — 51797 INTRAABDOMINAL PRESSURE TEST: CPT | Performed by: OBSTETRICS & GYNECOLOGY

## 2023-11-17 PROCEDURE — 51741 ELECTRO-UROFLOWMETRY FIRST: CPT | Performed by: OBSTETRICS & GYNECOLOGY

## 2023-11-17 PROCEDURE — 51729 CYSTOMETROGRAM W/VP&UP: CPT | Performed by: OBSTETRICS & GYNECOLOGY

## 2023-11-17 PROCEDURE — 81002 URINALYSIS NONAUTO W/O SCOPE: CPT | Performed by: OBSTETRICS & GYNECOLOGY

## 2023-11-17 PROCEDURE — 51784 ANAL/URINARY MUSCLE STUDY: CPT | Performed by: OBSTETRICS & GYNECOLOGY

## 2023-11-17 RX ORDER — TOPIRAMATE 50 MG/1
TABLET, FILM COATED ORAL
Qty: 180 TABLET | Refills: 1 | Status: SHIPPED | OUTPATIENT
Start: 2023-11-17

## 2023-11-17 RX ORDER — BLOOD SUGAR DIAGNOSTIC
STRIP MISCELLANEOUS
COMMUNITY
Start: 2023-11-13

## 2023-11-17 NOTE — TELEPHONE ENCOUNTER
Medication:   Requested Prescriptions     Pending Prescriptions Disp Refills    topiramate (TOPAMAX) 50 MG tablet [Pharmacy Med Name: TOPIRAMATE 50MG TABLETS] 180 tablet      Sig: TAKE 1 TABLET BY MOUTH TWICE DAILY        Last Filled:      Patient Phone Number: [phone] (home)     Last appt: 8/29/2023   Next appt: Visit date not found    Last OARRS:       10/23/2015     6:10 PM   RX Monitoring   Periodic Controlled Substance Monitoring No signs of potential drug abuse or diversion identified.

## 2023-11-17 NOTE — PROGRESS NOTES
Urodynamic Procedure Note    Remy Aguilar  1950    Urodynamicist: Dr. Palmer Pulling    Equipment: [employer]    Brief History/Indication:    Patient is a 75 y.o. female with subjective complaints of  urinary urgency, frequency, urge incontinence, and stress incontinence  for a urodynamic evaluation. Cystometrogram   Nitrite, Urine   Date Value Ref Range Status   11/05/2018 Negative Negative Final       Pelvic Organ Prolapse Quantification  Anterior Wall (Aa): -3   Anterior Wall (Ba): -3   Cervix or Cuff (C): -5     Genital Hiatus (gh): 2   Perineal Body (pb): 3   Total Vaginal Length (tvl): 7     Posterior Wall (Ap): -3   Posterior Wall (Bp): -3   No data recorded     CMG:   ECST: neg  PVR: 0  1st: 100  Urge: 130  Max: 180  Spasm: No  FCST-supine: Pos    Procedure: The Patient was taken to the Urodynamics suite and a free urine flow was obtained followed by catheterization for residual urine. A double-lumen urodynamic catheter was introduced to the bladder for measuring bladder pressure, and for filling. EMG patch electrodes were placed perianally for recording activity of the anal sphincter. A vaginal catheter was inserted to record the abdominal pressure. The entire process was displayed and analyzed using the [email or web address] Urodynamic machine and software.     Findings:   Results for POC orders placed in visit on 11/17/23   POCT Urinalysis no Micro   Result Value Ref Range    Color, UA yellow     Clarity, UA clear     Glucose, UA POC neg     Bilirubin, UA neg     Ketones, UA neg     Spec Grav, UA 1.015     Blood, UA POC neg     pH, UA 6.5     Protein, UA POC neg     Urobilinogen, UA 0.2     Leukocytes, UA neg     Nitrite, UA neg        Uroflow:  Patient was able to void for Uroflow    Voided Volume: 27 ml  PVR: 3 ml  Max Flow: 12 ml/sec with an average flow rate of 2.8 ml/sec    CMG:  First sensation: 90 ml  First desire: 230 ml  Strong desire: 268 ml  Max capacity: 282 ml  Uninhibited detrusor contraction: Yes

## 2023-12-27 RX ORDER — THEOPHYLLINE 300 MG/1
300 TABLET, EXTENDED RELEASE ORAL DAILY
Qty: 90 TABLET | Refills: 3 | Status: SHIPPED | OUTPATIENT
Start: 2023-12-27

## 2024-01-02 RX ORDER — ALBUTEROL SULFATE 90 UG/1
AEROSOL, METERED RESPIRATORY (INHALATION)
Qty: 34 G | Refills: 2 | Status: SHIPPED | OUTPATIENT
Start: 2024-01-02

## 2024-01-11 RX ORDER — PREDNISONE 20 MG/1
20 TABLET ORAL DAILY
Qty: 5 TABLET | Refills: 0 | Status: SHIPPED | OUTPATIENT
Start: 2024-01-11 | End: 2024-01-16

## 2024-01-11 RX ORDER — AZITHROMYCIN 250 MG/1
250 TABLET, FILM COATED ORAL SEE ADMIN INSTRUCTIONS
Qty: 6 TABLET | Refills: 0 | Status: SHIPPED | OUTPATIENT
Start: 2024-01-11 | End: 2024-01-16

## 2024-01-18 RX ORDER — BLOOD SUGAR DIAGNOSTIC
STRIP MISCELLANEOUS
Qty: 100 STRIP | Refills: 6 | Status: SHIPPED | OUTPATIENT
Start: 2024-01-18

## 2024-01-29 RX ORDER — QUETIAPINE FUMARATE 25 MG/1
TABLET, FILM COATED ORAL
Qty: 180 TABLET | Refills: 0 | Status: SHIPPED | OUTPATIENT
Start: 2024-01-29

## 2024-02-06 ENCOUNTER — TELEPHONE (OUTPATIENT)
Dept: UROGYNECOLOGY | Age: 74
End: 2024-02-06

## 2024-02-06 NOTE — TELEPHONE ENCOUNTER
Left vm for patient to call us back to discuss pre meds for upcoming Botox appt. Electronically signed by Gris Hernandez RN on 2/6/2024 at 9:17 AM

## 2024-02-07 DIAGNOSIS — F41.9 ANXIETY: Primary | ICD-10-CM

## 2024-02-07 RX ORDER — DIAZEPAM 5 MG/1
5 TABLET ORAL ONCE
Qty: 1 TABLET | Refills: 0 | OUTPATIENT
Start: 2024-02-07 | End: 2024-02-07

## 2024-02-07 RX ORDER — NITROFURANTOIN 25; 75 MG/1; MG/1
100 CAPSULE ORAL 2 TIMES DAILY
Qty: 14 CAPSULE | Refills: 0 | Status: SHIPPED | OUTPATIENT
Start: 2024-02-07 | End: 2024-02-14

## 2024-02-07 RX ORDER — IBUPROFEN 600 MG/1
600 TABLET ORAL ONCE
Qty: 1 TABLET | Refills: 0 | Status: SHIPPED | OUTPATIENT
Start: 2024-02-07 | End: 2024-02-07

## 2024-02-07 NOTE — PROGRESS NOTES
Spoke with patient over the phone in regards to pre medications for upcoming Botox appointment. Medication allergies reviewed. Sent in Macrobid 100 mg to take twice a day for 7 days starting 3 days prior to the procedure. Also sent in one 600 mg tablet of ibuprofen to take one hour prior to the procedure. Called in one 5 mg tablet valium to take 1 hour before the procedure. Patient states her daughter is going to drive her to her appointment. Electronically signed by Gris Hernandez RN on 2/7/2024 at 9:10 AM

## 2024-02-13 ENCOUNTER — PROCEDURE VISIT (OUTPATIENT)
Dept: UROGYNECOLOGY | Age: 74
End: 2024-02-13
Payer: MEDICARE

## 2024-02-13 VITALS
RESPIRATION RATE: 16 BRPM | HEART RATE: 98 BPM | TEMPERATURE: 97.8 F | SYSTOLIC BLOOD PRESSURE: 124 MMHG | OXYGEN SATURATION: 96 % | DIASTOLIC BLOOD PRESSURE: 72 MMHG

## 2024-02-13 DIAGNOSIS — R39.15 URINARY URGENCY: Primary | ICD-10-CM

## 2024-02-13 DIAGNOSIS — R35.0 URINARY FREQUENCY: ICD-10-CM

## 2024-02-13 DIAGNOSIS — N39.41 URGE INCONTINENCE: ICD-10-CM

## 2024-02-13 PROCEDURE — 52287 CYSTOSCOPY CHEMODENERVATION: CPT | Performed by: OBSTETRICS & GYNECOLOGY

## 2024-02-13 PROCEDURE — 81002 URINALYSIS NONAUTO W/O SCOPE: CPT | Performed by: OBSTETRICS & GYNECOLOGY

## 2024-02-13 RX ORDER — DIAZEPAM 5 MG/1
TABLET ORAL
COMMUNITY
Start: 2024-02-07

## 2024-02-13 RX ORDER — LIDOCAINE HYDROCHLORIDE 20 MG/ML
50 INJECTION, SOLUTION INFILTRATION; PERINEURAL ONCE
Status: COMPLETED | OUTPATIENT
Start: 2024-02-13 | End: 2024-02-13

## 2024-02-13 RX ADMIN — LIDOCAINE HYDROCHLORIDE 50 ML: 20 INJECTION, SOLUTION INFILTRATION; PERINEURAL at 15:48

## 2024-02-13 NOTE — PROGRESS NOTES
2/13/2024       HPI:     Name: Kaya Robbins  YOB: 1950    CC: Patient with urinary urgency, frequency, overactive bladder.  HPI: Kaya Robbins is a 73 y.o. female who is here for intravesical botulinum toxin A injection. This is her first time having the procedure done.      Past Medical History:   Past Medical History:   Diagnosis Date    Acute on chronic respiratory failure with hypoxia ([facility]) 12/3/2009    Chronic airway obstruction, not elsewhere classified 3/4/08    oxygen 2L/min at HS and PRN through day    Chronic kidney disease     incontinent    COPD (chronic obstructive pulmonary disease) ([facility])     Depression     Displacement of lumbar intervertebral disc without myelopathy 8/13/07    Edema 9/13/07    Emphysema of lung ([facility])     Enthesopathy of hip region     Enthesopathy of hip region 3/5/07    Esophageal reflux 3/4/08    Hemorrhage of rectum and anus 11/14/07    Herpes zoster without complication     History of blood transfusion     Hyperlipidemia associated with type 2 diabetes mellitus ([facility]) 12/11/2017    Lumbar spondylosis 5/1/2018    Major depression, chronic 8/12/2015    Osteoporosis, unspecified 12/4/07    Other and unspecified hyperlipidemia 3/4/08    Pain in joint, shoulder region 3/4/08    Pneumonia     Pulmonary nodule     Recurrent major depressive disorder, in partial remission ([facility]) 5/4/2018    Rheumatic fever     S/P ileostomy ([facility]) 8/20/2011    Skin lesion of face 10/20/2021    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

## 2024-02-29 ENCOUNTER — OFFICE VISIT (OUTPATIENT)
Dept: UROGYNECOLOGY | Age: 74
End: 2024-02-29

## 2024-02-29 DIAGNOSIS — R39.15 URINARY URGENCY: Primary | ICD-10-CM

## 2024-02-29 DIAGNOSIS — N39.41 URGE INCONTINENCE: ICD-10-CM

## 2024-02-29 DIAGNOSIS — N39.3 STRESS INCONTINENCE: ICD-10-CM

## 2024-02-29 DIAGNOSIS — R35.0 URINARY FREQUENCY: ICD-10-CM

## 2024-02-29 LAB
BILIRUBIN, POC: NORMAL
BLOOD URINE, POC: NORMAL
CLARITY, POC: CLEAR
COLOR, POC: YELLOW
GLUCOSE URINE, POC: NORMAL
KETONES, POC: NORMAL
LEUKOCYTE EST, POC: NORMAL
NITRITE, POC: NORMAL
PH, POC: 6
PROTEIN, POC: NORMAL
SPECIFIC GRAVITY, POC: 1.02
UROBILINOGEN, POC: 0.2

## 2024-02-29 NOTE — PROGRESS NOTES
2024       HPI:     Name: Kaya Robbins  YOB: 1950    CC: Patient is a 73 y.o. presenting for evaluation of  PVR post Botox on 24 100 units  .      HPI: How long have you had this problem?  years  Please rate the severity of your problem: mild  Anything make it better? She reports the botox has not helped at all.    She continues to have leakage especially with cough/sneeze/bending  She does feel urgency but does not always make her leak    Ob/Gyn History:    OB History    Para Term  AB Living   3 3 3 0 0 3   SAB IAB Ectopic Molar Multiple Live Births   0 0 0   0        # Outcome Date GA Lbr Darrius/2nd Weight Sex Delivery Anes PTL Lv   3 Term            2 Term            1 Term              Past Medical History:   Past Medical History:   Diagnosis Date    Acute on chronic respiratory failure with hypoxia ([facility]) 12/3/2009    Chronic airway obstruction, not elsewhere classified 3/4/08    oxygen 2L/min at HS and PRN through day    Chronic kidney disease     incontinent    COPD (chronic obstructive pulmonary disease) ([facility])     Depression     Displacement of lumbar intervertebral disc without myelopathy 07    Edema 07    Emphysema of lung ([facility])     Enthesopathy of hip region     Enthesopathy of hip region 3/5/07    Esophageal reflux 3/4/08    Hemorrhage of rectum and anus 07    Herpes zoster without complication     History of blood transfusion     Hyperlipidemia associated with type 2 diabetes mellitus ([facility]) 2017    Lumbar spondylosis 2018    Major depression, chronic 2015    Osteoporosis, unspecified 07    Other and unspecified hyperlipidemia 3/4/08    Pain in joint, shoulder region 3/4/08    Pneumonia     Pulmonary nodule     Recurrent major depressive disorder, in partial remission ([facility]) 2018    Rheumatic fever     S/P ileostomy ([facility]) 2011    Skin lesion of face 10/20/2021    Steroid-induced osteopenia 2016    Tension headache

## 2024-03-01 DIAGNOSIS — J44.9 COPD, VERY SEVERE (HCC): Primary | ICD-10-CM

## 2024-03-04 RX ORDER — FLUTICASONE PROPIONATE AND SALMETEROL 250; 50 UG/1; UG/1
POWDER RESPIRATORY (INHALATION)
Qty: 180 EACH | Refills: 3 | Status: SHIPPED | OUTPATIENT
Start: 2024-03-04

## 2024-03-11 ENCOUNTER — TELEPHONE (OUTPATIENT)
Dept: PULMONOLOGY | Age: 74
End: 2024-03-11

## 2024-03-11 ENCOUNTER — OFFICE VISIT (OUTPATIENT)
Dept: INTERNAL MEDICINE CLINIC | Age: 74
End: 2024-03-11

## 2024-03-11 ENCOUNTER — HOSPITAL ENCOUNTER (OUTPATIENT)
Age: 74
Discharge: HOME | End: 2024-03-11
Payer: MEDICARE

## 2024-03-11 VITALS
RESPIRATION RATE: 16 BRPM | HEART RATE: 70 BPM | DIASTOLIC BLOOD PRESSURE: 80 MMHG | BODY MASS INDEX: 18.06 KG/M2 | SYSTOLIC BLOOD PRESSURE: 116 MMHG | WEIGHT: 92 LBS | HEIGHT: 60 IN

## 2024-03-11 DIAGNOSIS — E11.69 HYPERLIPIDEMIA ASSOCIATED WITH TYPE 2 DIABETES MELLITUS (HCC): ICD-10-CM

## 2024-03-11 DIAGNOSIS — E11.65 TYPE 2 DIABETES MELLITUS WITH HYPERGLYCEMIA, WITHOUT LONG-TERM CURRENT USE OF INSULIN (HCC): Primary | ICD-10-CM

## 2024-03-11 DIAGNOSIS — F32.9 MAJOR DEPRESSION, CHRONIC: ICD-10-CM

## 2024-03-11 DIAGNOSIS — J96.11 CHRONIC RESPIRATORY FAILURE WITH HYPOXIA (HCC): ICD-10-CM

## 2024-03-11 DIAGNOSIS — E78.5 HYPERLIPIDEMIA ASSOCIATED WITH TYPE 2 DIABETES MELLITUS (HCC): ICD-10-CM

## 2024-03-11 DIAGNOSIS — F51.01 PRIMARY INSOMNIA: ICD-10-CM

## 2024-03-11 DIAGNOSIS — J43.8 OTHER EMPHYSEMA (HCC): ICD-10-CM

## 2024-03-11 DIAGNOSIS — E11.65 TYPE 2 DIABETES MELLITUS WITH HYPERGLYCEMIA, WITHOUT LONG-TERM CURRENT USE OF INSULIN (HCC): ICD-10-CM

## 2024-03-11 DIAGNOSIS — J44.9 COPD, VERY SEVERE (HCC): Primary | ICD-10-CM

## 2024-03-11 DIAGNOSIS — K21.9 GASTROESOPHAGEAL REFLUX DISEASE, UNSPECIFIED WHETHER ESOPHAGITIS PRESENT: ICD-10-CM

## 2024-03-11 DIAGNOSIS — E43 SEVERE PROTEIN-CALORIE MALNUTRITION (GOMEZ: LESS THAN 60% OF STANDARD WEIGHT) (HCC): ICD-10-CM

## 2024-03-11 DIAGNOSIS — R10.11 RIGHT UPPER QUADRANT PAIN: ICD-10-CM

## 2024-03-11 DIAGNOSIS — G44.221 CHRONIC TENSION-TYPE HEADACHE, INTRACTABLE: ICD-10-CM

## 2024-03-11 DIAGNOSIS — N39.46 MIXED STRESS AND URGE URINARY INCONTINENCE: ICD-10-CM

## 2024-03-11 LAB
ALBUMIN SERPL-MCNC: 4.3 G/DL (ref 3.4–5)
ALBUMIN/GLOB SERPL: 1.8 {RATIO} (ref 1.1–2.2)
ALP SERPL-CCNC: 60 U/L (ref 40–129)
ALT SERPL-CCNC: 6 U/L (ref 10–40)
ANION GAP SERPL CALCULATED.3IONS-SCNC: 9 MMOL/L (ref 3–16)
AST SERPL-CCNC: 11 U/L (ref 15–37)
BASOPHILS # BLD: 0.1 K/UL (ref 0–0.2)
BASOPHILS NFR BLD: 0.7 %
BILIRUB SERPL-MCNC: <0.2 MG/DL (ref 0–1)
BUN SERPL-MCNC: 21 MG/DL (ref 7–20)
CALCIUM SERPL-MCNC: 10.2 MG/DL (ref 8.3–10.6)
CHLORIDE SERPL-SCNC: 108 MMOL/L (ref 99–110)
CHOLEST SERPL-MCNC: 135 MG/DL (ref 0–199)
CO2 SERPL-SCNC: 24 MMOL/L (ref 21–32)
CREAT SERPL-MCNC: 1 MG/DL (ref 0.6–1.2)
CREAT UR-MCNC: 224.8 MG/DL (ref 28–259)
DEPRECATED RDW RBC AUTO: 18 % (ref 12.4–15.4)
EOSINOPHIL # BLD: 0.1 K/UL (ref 0–0.6)
EOSINOPHIL NFR BLD: 1.5 %
GFR SERPLBLD CREATININE-BSD FMLA CKD-EPI: 59 ML/MIN/{1.73_M2}
GLUCOSE SERPL-MCNC: 108 MG/DL (ref 70–99)
HCT VFR BLD AUTO: 41 % (ref 36–48)
HDLC SERPL-MCNC: 66 MG/DL (ref 40–60)
HGB BLD-MCNC: 13.3 G/DL (ref 12–16)
LDLC SERPL CALC-MCNC: 57 MG/DL
LYMPHOCYTES # BLD: 1.1 K/UL (ref 1–5.1)
LYMPHOCYTES NFR BLD: 12.2 %
MCH RBC QN AUTO: 28.4 PG (ref 26–34)
MCHC RBC AUTO-ENTMCNC: 32.4 G/DL (ref 31–36)
MCV RBC AUTO: 87.6 FL (ref 80–100)
MICROALBUMIN UR DL<=1MG/L-MCNC: 2.6 MG/DL
MICROALBUMIN/CREAT UR: 11.6 MG/G (ref 0–30)
MONOCYTES # BLD: 1 K/UL (ref 0–1.3)
MONOCYTES NFR BLD: 10.3 %
NEUTROPHILS # BLD: 7 K/UL (ref 1.7–7.7)
NEUTROPHILS NFR BLD: 75.3 %
PLATELET # BLD AUTO: 265 K/UL (ref 135–450)
PMV BLD AUTO: 9.5 FL (ref 5–10.5)
POTASSIUM SERPL-SCNC: 5.4 MMOL/L (ref 3.5–5.1)
PROT SERPL-MCNC: 6.7 G/DL (ref 6.4–8.2)
RBC # BLD AUTO: 4.68 M/UL (ref 4–5.2)
SODIUM SERPL-SCNC: 141 MMOL/L (ref 136–145)
TRIGL SERPL-MCNC: 59 MG/DL (ref 0–150)
TSH SERPL DL<=0.005 MIU/L-ACNC: 1.3 UIU/ML (ref 0.27–4.2)
URATE SERPL-MCNC: 5.5 MG/DL (ref 2.6–6)
VLDLC SERPL CALC-MCNC: 12 MG/DL
WBC # BLD AUTO: 9.3 K/UL (ref 4–11)

## 2024-03-11 PROCEDURE — 1090F PRES/ABSN URINE INCON ASSESS: CPT | Performed by: NURSE PRACTITIONER

## 2024-03-11 PROCEDURE — 80053 COMPREHEN METABOLIC PANEL: CPT

## 2024-03-11 PROCEDURE — 85025 COMPLETE CBC W/AUTO DIFF WBC: CPT

## 2024-03-11 PROCEDURE — 82570 ASSAY OF URINE CREATININE: CPT

## 2024-03-11 PROCEDURE — 1036F TOBACCO NON-USER: CPT | Performed by: NURSE PRACTITIONER

## 2024-03-11 PROCEDURE — 1123F ACP DISCUSS/DSCN MKR DOCD: CPT | Performed by: NURSE PRACTITIONER

## 2024-03-11 PROCEDURE — 84443 ASSAY THYROID STIM HORMONE: CPT

## 2024-03-11 PROCEDURE — G8419 CALC BMI OUT NRM PARAM NOF/U: HCPCS | Performed by: NURSE PRACTITIONER

## 2024-03-11 PROCEDURE — 83036 HEMOGLOBIN GLYCOSYLATED A1C: CPT

## 2024-03-11 PROCEDURE — 0509F URINE INCON PLAN DOCD: CPT | Performed by: NURSE PRACTITIONER

## 2024-03-11 PROCEDURE — G8399 PT W/DXA RESULTS DOCUMENT: HCPCS | Performed by: NURSE PRACTITIONER

## 2024-03-11 PROCEDURE — 3023F SPIROM DOC REV: CPT | Performed by: NURSE PRACTITIONER

## 2024-03-11 PROCEDURE — 3046F HEMOGLOBIN A1C LEVEL >9.0%: CPT | Performed by: NURSE PRACTITIONER

## 2024-03-11 PROCEDURE — G8484 FLU IMMUNIZE NO ADMIN: HCPCS | Performed by: NURSE PRACTITIONER

## 2024-03-11 PROCEDURE — 99214 OFFICE O/P EST MOD 30 MIN: CPT | Performed by: NURSE PRACTITIONER

## 2024-03-11 PROCEDURE — 80061 LIPID PANEL: CPT

## 2024-03-11 PROCEDURE — 3017F COLORECTAL CA SCREEN DOC REV: CPT | Performed by: NURSE PRACTITIONER

## 2024-03-11 PROCEDURE — 82043 UR ALBUMIN QUANTITATIVE: CPT

## 2024-03-11 PROCEDURE — 84550 ASSAY OF BLOOD/URIC ACID: CPT

## 2024-03-11 PROCEDURE — G8427 DOCREV CUR MEDS BY ELIG CLIN: HCPCS | Performed by: NURSE PRACTITIONER

## 2024-03-11 PROCEDURE — 36415 COLL VENOUS BLD VENIPUNCTURE: CPT

## 2024-03-11 PROCEDURE — 2022F DILAT RTA XM EVC RTNOPTHY: CPT | Performed by: NURSE PRACTITIONER

## 2024-03-11 ASSESSMENT — PATIENT HEALTH QUESTIONNAIRE - PHQ9
10. IF YOU CHECKED OFF ANY PROBLEMS, HOW DIFFICULT HAVE THESE PROBLEMS MADE IT FOR YOU TO DO YOUR WORK, TAKE CARE OF THINGS AT HOME, OR GET ALONG WITH OTHER PEOPLE: 0
SUM OF ALL RESPONSES TO PHQ QUESTIONS 1-9: 3
9. THOUGHTS THAT YOU WOULD BE BETTER OFF DEAD, OR OF HURTING YOURSELF: 0
SUM OF ALL RESPONSES TO PHQ9 QUESTIONS 1 & 2: 0
6. FEELING BAD ABOUT YOURSELF - OR THAT YOU ARE A FAILURE OR HAVE LET YOURSELF OR YOUR FAMILY DOWN: 0
2. FEELING DOWN, DEPRESSED OR HOPELESS: 0
8. MOVING OR SPEAKING SO SLOWLY THAT OTHER PEOPLE COULD HAVE NOTICED. OR THE OPPOSITE, BEING SO FIGETY OR RESTLESS THAT YOU HAVE BEEN MOVING AROUND A LOT MORE THAN USUAL: 0
SUM OF ALL RESPONSES TO PHQ QUESTIONS 1-9: 3
7. TROUBLE CONCENTRATING ON THINGS, SUCH AS READING THE NEWSPAPER OR WATCHING TELEVISION: 0
4. FEELING TIRED OR HAVING LITTLE ENERGY: 3
SUM OF ALL RESPONSES TO PHQ QUESTIONS 1-9: 3
1. LITTLE INTEREST OR PLEASURE IN DOING THINGS: 0
3. TROUBLE FALLING OR STAYING ASLEEP: 0
5. POOR APPETITE OR OVEREATING: 0
SUM OF ALL RESPONSES TO PHQ QUESTIONS 1-9: 3

## 2024-03-11 ASSESSMENT — ENCOUNTER SYMPTOMS
RHINORRHEA: 0
ABDOMINAL PAIN: 1
NAUSEA: 0
SHORTNESS OF BREATH: 1
EYE DISCHARGE: 0
VOMITING: 0
WHEEZING: 0
BACK PAIN: 1
COUGH: 0

## 2024-03-11 NOTE — PROGRESS NOTES
insomnia        8. Chronic respiratory failure with hypoxia (HCC)        9. Other emphysema (HCC)        10. Mixed stress and urge urinary incontinence        11. Right upper quadrant pain  US GALLBLADDER RUQ               Plan:        DM type 2: well controlled. Check labs.   COPD: she is using 2 L of oxygen at hs and prn during the day. Dyspnea likely related to her lungs.  Chronic respiratory failure. On 2 l.  HA are stable. On topamax. Sees neurology.   Depression is stable on meds.  GERD is stale.  Edema is stable.  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  Severe PCM. She has lost weight.  Anemia. Stable.   RUQ abdominal pain, tenderness; labs, RUQ Ultrasound ordered.  See Dr Kelley for urinary incontinence.     The current medical regimen is effective;  continue present plan and medications.  See orders.

## 2024-03-11 NOTE — TELEPHONE ENCOUNTER
Jazmine from [facility] called checking status of forms sent for Patient, I informed her MA is working on this as the forms were just scanned into chart 3/4/24. She asked also for the LOV notes and any testing as well when we send back the signed forms.

## 2024-03-12 ENCOUNTER — TELEPHONE (OUTPATIENT)
Dept: INTERNAL MEDICINE CLINIC | Age: 74
End: 2024-03-12

## 2024-03-12 DIAGNOSIS — E78.5 HYPERLIPIDEMIA ASSOCIATED WITH TYPE 2 DIABETES MELLITUS (HCC): Primary | ICD-10-CM

## 2024-03-12 DIAGNOSIS — E11.69 HYPERLIPIDEMIA ASSOCIATED WITH TYPE 2 DIABETES MELLITUS (HCC): Primary | ICD-10-CM

## 2024-03-12 LAB
EST. AVERAGE GLUCOSE BLD GHB EST-MCNC: 119.8 MG/DL
HBA1C MFR BLD: 5.8 %

## 2024-03-12 NOTE — TELEPHONE ENCOUNTER
----- Message from SAUL Arechiga CNP sent at 3/12/2024  4:46 PM EDT -----  Labs with slight worsening of kidney numbers.  Order repeat BMP in 1 week.   She does have microalbumin in urine from diabetes.   A1C is good at 5.8  Other labs normal.

## 2024-03-13 ENCOUNTER — HOSPITAL ENCOUNTER (OUTPATIENT)
Dept: ULTRASOUND IMAGING | Age: 74
Discharge: HOME | End: 2024-03-13
Payer: MEDICARE

## 2024-03-13 DIAGNOSIS — R10.11 RIGHT UPPER QUADRANT PAIN: ICD-10-CM

## 2024-03-13 PROCEDURE — 76705 ECHO EXAM OF ABDOMEN: CPT

## 2024-03-13 NOTE — TELEPHONE ENCOUNTER
Per the [employer] order, pt needs to have new oxygen testing done as well.  Scheduled pt for 6MW 3/15/24 in a cancellation opening.  Order is needed-order pending.     Will need to print the order from media, send with 6MW and visit notes to [employer] as requested.

## 2024-03-15 ENCOUNTER — HOSPITAL ENCOUNTER (OUTPATIENT)
Dept: PULMONOLOGY | Age: 74
Discharge: HOME | End: 2024-03-15
Payer: MEDICARE

## 2024-03-15 PROCEDURE — 94618 PULMONARY STRESS TESTING: CPT

## 2024-03-15 NOTE — TELEPHONE ENCOUNTER
Pt completed 6MW.  Testing scanned.  [employer] form printed for Dr. Orellana to sign and will be faxed with visit note and 6MW result.

## 2024-03-16 PROBLEM — J44.9 CHRONIC OBSTRUCTIVE PULMONARY DISEASE (HCC): Status: ACTIVE | Noted: 2024-03-16

## 2024-03-16 NOTE — PROCEDURES
[address] [address]                           PULMONARY FUNCTION      PATIENT NAME: RAJENDRA HALL                : 1950  MED REC NO: [id number]                      ROOM:   ACCOUNT NO: [id number]                       ADMIT DATE: 03/15/2024  PROVIDER: Eagle Orellana MD      DATE OF PROCEDURE:  03/15/2024    STUDY:  Six-minute walk.    INDICATION:  COPD.    Six-minute walk was done per [facility] Protocol.  The patient was able to walk 280 feet.  Saturation room air at rest is 96% with a heart rate of 100.  Desaturation on exertion required 1 L O2 to keep sats above 88%.  Resting tachycardia, worse on exertion.  Max heart rate 130.    CONCLUSION:    1. Submaximal exercise on 6-minute walk due to shortness of breath and dizziness.  2. Hypoxia on exertion, required 1 L O2.  3. Resting tachycardia, worse on exertion.  Max heart rate 130.  Clinical correlation is warranted.          EAGLE ORELLANA MD      D:  03/15/2024 15:27:04     T:  03/15/2024 21:05:47     /GORDONS  Job #:  [id number]     Doc#:  [id number]

## 2024-03-17 DIAGNOSIS — G44.221 CHRONIC TENSION-TYPE HEADACHE, INTRACTABLE: ICD-10-CM

## 2024-03-18 ENCOUNTER — TELEPHONE (OUTPATIENT)
Dept: PULMONOLOGY | Age: 74
End: 2024-03-18

## 2024-03-18 DIAGNOSIS — R00.0 TACHYCARDIA: Primary | ICD-10-CM

## 2024-03-18 DIAGNOSIS — R06.02 SHORTNESS OF BREATH: ICD-10-CM

## 2024-03-18 RX ORDER — TOPIRAMATE 50 MG/1
TABLET, FILM COATED ORAL
Qty: 180 TABLET | Refills: 1 | Status: SHIPPED | OUTPATIENT
Start: 2024-03-18

## 2024-03-21 ENCOUNTER — HOSPITAL ENCOUNTER (OUTPATIENT)
Age: 74
Discharge: HOME | End: 2024-03-21
Payer: MEDICARE

## 2024-03-21 DIAGNOSIS — R06.02 SHORTNESS OF BREATH: ICD-10-CM

## 2024-03-21 DIAGNOSIS — R00.0 TACHYCARDIA: ICD-10-CM

## 2024-03-21 LAB
EKG ATRIAL RATE: 102 BPM
EKG DIAGNOSIS: NORMAL
EKG P AXIS: 83 DEGREES
EKG P-R INTERVAL: 186 MS
EKG Q-T INTERVAL: 342 MS
EKG QRS DURATION: 82 MS
EKG QTC CALCULATION (BAZETT): 445 MS
EKG R AXIS: 80 DEGREES
EKG T AXIS: 60 DEGREES
EKG VENTRICULAR RATE: 102 BPM

## 2024-03-21 PROCEDURE — 93005 ELECTROCARDIOGRAM TRACING: CPT

## 2024-04-01 ENCOUNTER — TRANSCRIBE ORDERS (OUTPATIENT)
Dept: ADMINISTRATIVE | Age: 74
End: 2024-04-01

## 2024-04-01 DIAGNOSIS — R00.0 TACHYCARDIA, UNSPECIFIED: Primary | ICD-10-CM

## 2024-04-01 DIAGNOSIS — R06.02 SHORTNESS OF BREATH: ICD-10-CM

## 2024-04-08 ENCOUNTER — TELEPHONE (OUTPATIENT)
Dept: PULMONOLOGY | Age: 74
End: 2024-04-08

## 2024-04-08 ENCOUNTER — OFFICE VISIT (OUTPATIENT)
Dept: PULMONOLOGY | Age: 74
End: 2024-04-08
Payer: MEDICARE

## 2024-04-08 VITALS
BODY MASS INDEX: 18.18 KG/M2 | DIASTOLIC BLOOD PRESSURE: 62 MMHG | OXYGEN SATURATION: 97 % | WEIGHT: 92.6 LBS | HEIGHT: 60 IN | HEART RATE: 119 BPM | RESPIRATION RATE: 24 BRPM | SYSTOLIC BLOOD PRESSURE: 110 MMHG

## 2024-04-08 DIAGNOSIS — J44.9 STAGE 4 VERY SEVERE COPD BY GOLD CLASSIFICATION (HCC): ICD-10-CM

## 2024-04-08 DIAGNOSIS — R09.02 HYPOXIA: Primary | ICD-10-CM

## 2024-04-08 DIAGNOSIS — J47.9 BRONCHIECTASIS WITHOUT COMPLICATION (HCC): ICD-10-CM

## 2024-04-08 DIAGNOSIS — R00.0 SINUS TACHYCARDIA: ICD-10-CM

## 2024-04-08 PROCEDURE — 3023F SPIROM DOC REV: CPT | Performed by: INTERNAL MEDICINE

## 2024-04-08 PROCEDURE — 1123F ACP DISCUSS/DSCN MKR DOCD: CPT | Performed by: INTERNAL MEDICINE

## 2024-04-08 PROCEDURE — 1036F TOBACCO NON-USER: CPT | Performed by: INTERNAL MEDICINE

## 2024-04-08 PROCEDURE — 3017F COLORECTAL CA SCREEN DOC REV: CPT | Performed by: INTERNAL MEDICINE

## 2024-04-08 PROCEDURE — G8427 DOCREV CUR MEDS BY ELIG CLIN: HCPCS | Performed by: INTERNAL MEDICINE

## 2024-04-08 PROCEDURE — 1090F PRES/ABSN URINE INCON ASSESS: CPT | Performed by: INTERNAL MEDICINE

## 2024-04-08 PROCEDURE — G8419 CALC BMI OUT NRM PARAM NOF/U: HCPCS | Performed by: INTERNAL MEDICINE

## 2024-04-08 PROCEDURE — 99214 OFFICE O/P EST MOD 30 MIN: CPT | Performed by: INTERNAL MEDICINE

## 2024-04-08 PROCEDURE — G8399 PT W/DXA RESULTS DOCUMENT: HCPCS | Performed by: INTERNAL MEDICINE

## 2024-04-08 NOTE — PROGRESS NOTES
03/29/2017 FEV1 0.55L(27%) TLC 5.21L(117%) DLCO 8.47(42%) 6MW 770 2L exertion   PFTs 07/07/2015 FEV1 0.63L(31%) TLC 5.12L(114%) DLCO 9.6(48%)   PFTs 12/06/2011 FEV1 0.83L(45%) TLC 5.14L(125%) DLCO 8.6 (49%)   PFTs 07/10/2008 FEV1 0.77L           TLC 5.41L            DLCO 7.62  PFTs 01/15/2007 FEV1 0.59L           TLC 5.15L            DLCO 10.82    6MW 3/15/24   1L on exertion          CT chest 7/8/2020   Stable CT chest  No change in tree-in-bud nodularity right middle lobe  Unchanged few additional noncalcified nodules back to 2018  Emphysema with mild bronchiectasis    CT chest 7/8/2021   Increased tree-in-bud nodularity lingula right lower lobe with new focal nodule left lower lobe and right lower lobe.  Stable right middle lobe nodularity    CT chest 10/13/2021   1. Stable right middle lobe pulmonary nodule.  2. Bronchiectasis with tree in bud nodularity in both lungs that would favor  a chronic history of bronchiolitis.  No interval detrimental change.  3. Increasing peripheral airspace opacification in the left lower lobe.  This  likely represents progressive interstitial change or atelectasis.        CT chest 4/18/2022   Stable benign granulomatous changes  No suspicious abnormalities    CT chest/20/23 imaging reviewed by me and showed  Lung Screening Specific (Lung-RADS):  6 x 6 mm nodule, right middle lobe, image 81, stable.  Several other smaller nodular densities are present in the bilateral lungs.  No new nodule.  Potentially Significant Incidentals (Lung-RADS Category S): None  Pulmonary incidentals:  Moderate centrilobular emphysema.  Stable biapical  fibrotic change.  Other incidentals:  None        Sputum AFB 10/26/2021 negative      Assessment:      Very severe COPD/Pulmonary emphysema  Pulmonary Bronchiectasis  Hypoxia on exertion   Sinu tachycardia   Abnormal CT chest 1/8/2020 with right middle lobe new tree-in-bud nodules-favor inflammatory.  Stable on repeat CT chest.    New tree-in-bud

## 2024-04-12 ENCOUNTER — TELEPHONE (OUTPATIENT)
Dept: CARDIOLOGY CLINIC | Age: 74
End: 2024-04-12

## 2024-04-12 ENCOUNTER — OFFICE VISIT (OUTPATIENT)
Dept: CARDIOLOGY CLINIC | Age: 74
End: 2024-04-12
Payer: MEDICARE

## 2024-04-12 VITALS
HEART RATE: 102 BPM | BODY MASS INDEX: 17.79 KG/M2 | HEIGHT: 60 IN | DIASTOLIC BLOOD PRESSURE: 64 MMHG | OXYGEN SATURATION: 97 % | SYSTOLIC BLOOD PRESSURE: 116 MMHG | WEIGHT: 90.6 LBS

## 2024-04-12 DIAGNOSIS — R00.0 TACHYCARDIA: Primary | ICD-10-CM

## 2024-04-12 DIAGNOSIS — R55 NEAR SYNCOPE: ICD-10-CM

## 2024-04-12 DIAGNOSIS — R42 DIZZINESS: ICD-10-CM

## 2024-04-12 DIAGNOSIS — R06.02 SOB (SHORTNESS OF BREATH): ICD-10-CM

## 2024-04-12 DIAGNOSIS — E78.2 MIXED HYPERLIPIDEMIA: ICD-10-CM

## 2024-04-12 DIAGNOSIS — I65.23 BILATERAL CAROTID ARTERY STENOSIS: ICD-10-CM

## 2024-04-12 PROCEDURE — 93228 REMOTE 30 DAY ECG REV/REPORT: CPT | Performed by: INTERNAL MEDICINE

## 2024-04-12 PROCEDURE — 99204 OFFICE O/P NEW MOD 45 MIN: CPT | Performed by: INTERNAL MEDICINE

## 2024-04-12 PROCEDURE — 1123F ACP DISCUSS/DSCN MKR DOCD: CPT | Performed by: INTERNAL MEDICINE

## 2024-04-12 PROCEDURE — G8419 CALC BMI OUT NRM PARAM NOF/U: HCPCS | Performed by: INTERNAL MEDICINE

## 2024-04-12 PROCEDURE — 3017F COLORECTAL CA SCREEN DOC REV: CPT | Performed by: INTERNAL MEDICINE

## 2024-04-12 PROCEDURE — G8427 DOCREV CUR MEDS BY ELIG CLIN: HCPCS | Performed by: INTERNAL MEDICINE

## 2024-04-12 PROCEDURE — 1036F TOBACCO NON-USER: CPT | Performed by: INTERNAL MEDICINE

## 2024-04-12 PROCEDURE — 1090F PRES/ABSN URINE INCON ASSESS: CPT | Performed by: INTERNAL MEDICINE

## 2024-04-12 PROCEDURE — G8399 PT W/DXA RESULTS DOCUMENT: HCPCS | Performed by: INTERNAL MEDICINE

## 2024-04-12 RX ORDER — QUETIAPINE FUMARATE 25 MG/1
TABLET, FILM COATED ORAL
Qty: 180 TABLET | Refills: 0 | Status: SHIPPED | OUTPATIENT
Start: 2024-04-12

## 2024-04-12 NOTE — PATIENT INSTRUCTIONS
Plan:  ~Recommend a stress test- Lexiscan Myoview to evaluate shortness of breath. Patient is not able to walk on a treadmill   ~Recommend an echocardiogram which is an ultrasound of your heart to evaluate heart function, structures and valves.   ~Carotid dopplers   Call [facility] scheduling at [phone] to schedule testing      ~Cardiac event monitor for 4 weeks- to be placed today   ~Recommend starting enteric coated Aspirin 81 mg daily.    ~Stop if this is making stomach pain worse     Cardiac medications reviewed including indications and pertinent side effects. Medication list updated at this visit.   Patient verbalizes understanding of the need for treatment and education has been provided at today's visit. Additional education material will be provided in after visit summary.    Check blood pressure and heart rate at home a few times per week- keep a log with dates and times and bring to office visit   Regular exercise and following a healthy diet encouraged   Follow up with me in 8 weeks

## 2024-04-12 NOTE — TELEPHONE ENCOUNTER
Monitor placed by Cynthia  Monitor company [employer]  Length of monitor 4 week  Monitor [place] [facility]  Serial number [id number]  Kit ID [id number]  Activation successful prior to pt leaving office? Yes

## 2024-04-17 ENCOUNTER — HOSPITAL ENCOUNTER (OUTPATIENT)
Dept: VASCULAR LAB | Age: 74
Discharge: HOME | End: 2024-04-17
Payer: MEDICARE

## 2024-04-17 DIAGNOSIS — I65.23 BILATERAL CAROTID ARTERY STENOSIS: ICD-10-CM

## 2024-04-17 DIAGNOSIS — R55 NEAR SYNCOPE: ICD-10-CM

## 2024-04-17 PROCEDURE — 93880 EXTRACRANIAL BILAT STUDY: CPT

## 2024-04-18 ENCOUNTER — TELEPHONE (OUTPATIENT)
Age: 74
End: 2024-04-18

## 2024-04-18 NOTE — TELEPHONE ENCOUNTER
----- Message from Eben Galo MD sent at 4/18/2024 12:09 PM EDT -----  Please notify patient that their scan shows mild plaque carotid arteries - stable  Repeat 1-2 years

## 2024-04-22 RX ORDER — ESOMEPRAZOLE MAGNESIUM 40 MG/1
CAPSULE, DELAYED RELEASE ORAL
Qty: 100 CAPSULE | Refills: 2 | Status: SHIPPED | OUTPATIENT
Start: 2024-04-22

## 2024-04-22 RX ORDER — ROFLUMILAST 500 UG/1
TABLET ORAL
Qty: 100 TABLET | Refills: 2 | Status: SHIPPED | OUTPATIENT
Start: 2024-04-22

## 2024-04-22 RX ORDER — SITAGLIPTIN 100 MG/1
TABLET, FILM COATED ORAL
Qty: 100 TABLET | Refills: 2 | Status: SHIPPED | OUTPATIENT
Start: 2024-04-22

## 2024-04-22 RX ORDER — METFORMIN HYDROCHLORIDE 500 MG/1
TABLET, EXTENDED RELEASE ORAL
Qty: 400 TABLET | Refills: 2 | Status: SHIPPED | OUTPATIENT
Start: 2024-04-22

## 2024-05-08 ENCOUNTER — CLINICAL DOCUMENTATION (OUTPATIENT)
Dept: CARDIOLOGY | Age: 74
End: 2024-05-08

## 2024-05-08 ENCOUNTER — TELEPHONE (OUTPATIENT)
Dept: PULMONOLOGY | Age: 74
End: 2024-05-08

## 2024-05-08 DIAGNOSIS — J44.9 COPD, VERY SEVERE (HCC): Primary | ICD-10-CM

## 2024-05-08 DIAGNOSIS — E11.65 TYPE 2 DIABETES MELLITUS WITH HYPERGLYCEMIA, WITHOUT LONG-TERM CURRENT USE OF INSULIN (HCC): ICD-10-CM

## 2024-05-08 NOTE — TELEPHONE ENCOUNTER
Patient called in requesting portable oxygen tank rx sent to [employer]. She states she gets her tanks through [employer], and her POC through [employer].

## 2024-05-09 ENCOUNTER — HOSPITAL ENCOUNTER (OUTPATIENT)
Dept: CARDIOLOGY | Age: 74
Discharge: HOME | End: 2024-05-09
Attending: INTERNAL MEDICINE

## 2024-05-09 RX ORDER — POTASSIUM CHLORIDE 20 MEQ/1
TABLET, EXTENDED RELEASE ORAL
Qty: 100 TABLET | Refills: 0 | Status: SHIPPED | OUTPATIENT
Start: 2024-05-09

## 2024-05-09 RX ORDER — LANCETS 33 GAUGE
EACH MISCELLANEOUS
Qty: 200 EACH | Refills: 2 | Status: SHIPPED | OUTPATIENT
Start: 2024-05-09

## 2024-05-09 RX ORDER — FUROSEMIDE 40 MG/1
TABLET ORAL
Qty: 100 TABLET | Refills: 0 | Status: SHIPPED | OUTPATIENT
Start: 2024-05-09

## 2024-05-09 NOTE — TELEPHONE ENCOUNTER
Order with testing and visit note faxed to [facility] via [employer] at [phone].

## 2024-05-20 ENCOUNTER — TELEPHONE (OUTPATIENT)
Dept: CARDIOLOGY CLINIC | Age: 74
End: 2024-05-20

## 2024-05-20 NOTE — TELEPHONE ENCOUNTER
Central scheduling contacted office requesting stress test order be placed in [facility], pt is trying to schedule test.

## 2024-05-29 RX ORDER — TIOTROPIUM BROMIDE 18 UG/1
CAPSULE ORAL; RESPIRATORY (INHALATION)
Qty: 90 CAPSULE | Refills: 1 | Status: SHIPPED | OUTPATIENT
Start: 2024-05-29

## 2024-06-03 DIAGNOSIS — R00.2 PALPITATIONS: ICD-10-CM

## 2024-06-03 DIAGNOSIS — R00.0 SINUS TACHYCARDIA: Primary | ICD-10-CM

## 2024-06-06 NOTE — PROGRESS NOTES
[facility]   Cardiac Follow up     Referring Provider:  Nayely García MD     Chief Complaint   Patient presents with    Follow-up    Hyperlipidemia    Shortness of Breath     COPD      Kaya Robbins   1950    History of Present Illness:   Kaya Robbins is a 73 y.o. female who is here today for follow up and to review testing. She was initially seen as a new patient at the request of Dr. Orellana for tachycardiac, SOB, and family HX. She has a past medical history of diabetes mellitus, COPD, hypertension, and hyperlipidemia.   Previous testing includes a carotid duplex in 2014 which showed minor plaque. Normal stress test 2014. Stress test in 2016 which was normal. Echocardiogram in 2016 showed an EF of 55%.   Last OV- she states she has a family history of heart diease in several family members. She states she has SOB that started years ago. She states her SOB is worse over the last 6 months. She has leg swelling that also started years ago. SOB comes on with exertion like walking. SOB is better at night when she lays down with her home oxygen. She feels SOB with walking a short distance. She states she has dizziness and feels like she may pass out at times, no actual syncope. She often has to get up slowly and hold onto something due to her dizziness. She attributes her SOB to her COPD. Patient currently denies any weight gain, edema, palpitations, chest pain, and syncope. She is a former smoker. She states she has stomach pain and will be having some testing next month.   Cardiac event monitor worn from 4/12-5/10/2024 showed an average heart rate of 98 (), NST, IST, low burden PSVT. Carotid dopplers showed mild plaque.   Today she states she has been feeling well since her last visit. No recent palpitations or fast heart rates. She states she has SOB that she attributes to her COPD, unchanged. She is scheduled for her echocardiogram on 6/26/2024. She has not been able to schedule her

## 2024-06-07 ENCOUNTER — OFFICE VISIT (OUTPATIENT)
Dept: CARDIOLOGY CLINIC | Age: 74
End: 2024-06-07
Payer: MEDICARE

## 2024-06-07 VITALS
HEART RATE: 71 BPM | WEIGHT: 95.6 LBS | BODY MASS INDEX: 18.77 KG/M2 | HEIGHT: 60 IN | SYSTOLIC BLOOD PRESSURE: 106 MMHG | OXYGEN SATURATION: 95 % | DIASTOLIC BLOOD PRESSURE: 58 MMHG

## 2024-06-07 DIAGNOSIS — R00.2 PALPITATIONS: ICD-10-CM

## 2024-06-07 DIAGNOSIS — E78.5 HYPERLIPIDEMIA ASSOCIATED WITH TYPE 2 DIABETES MELLITUS (HCC): Primary | ICD-10-CM

## 2024-06-07 DIAGNOSIS — R06.02 SHORTNESS OF BREATH: ICD-10-CM

## 2024-06-07 DIAGNOSIS — I65.23 BILATERAL CAROTID ARTERY STENOSIS: ICD-10-CM

## 2024-06-07 DIAGNOSIS — R55 NEAR SYNCOPE: ICD-10-CM

## 2024-06-07 DIAGNOSIS — E11.69 HYPERLIPIDEMIA ASSOCIATED WITH TYPE 2 DIABETES MELLITUS (HCC): Primary | ICD-10-CM

## 2024-06-07 PROCEDURE — 1123F ACP DISCUSS/DSCN MKR DOCD: CPT | Performed by: INTERNAL MEDICINE

## 2024-06-07 PROCEDURE — 3044F HG A1C LEVEL LT 7.0%: CPT | Performed by: INTERNAL MEDICINE

## 2024-06-07 PROCEDURE — 2022F DILAT RTA XM EVC RTNOPTHY: CPT | Performed by: INTERNAL MEDICINE

## 2024-06-07 PROCEDURE — 1036F TOBACCO NON-USER: CPT | Performed by: INTERNAL MEDICINE

## 2024-06-07 PROCEDURE — G8420 CALC BMI NORM PARAMETERS: HCPCS | Performed by: INTERNAL MEDICINE

## 2024-06-07 PROCEDURE — 1090F PRES/ABSN URINE INCON ASSESS: CPT | Performed by: INTERNAL MEDICINE

## 2024-06-07 PROCEDURE — 99214 OFFICE O/P EST MOD 30 MIN: CPT | Performed by: INTERNAL MEDICINE

## 2024-06-07 PROCEDURE — G8399 PT W/DXA RESULTS DOCUMENT: HCPCS | Performed by: INTERNAL MEDICINE

## 2024-06-07 PROCEDURE — G8427 DOCREV CUR MEDS BY ELIG CLIN: HCPCS | Performed by: INTERNAL MEDICINE

## 2024-06-07 PROCEDURE — 3017F COLORECTAL CA SCREEN DOC REV: CPT | Performed by: INTERNAL MEDICINE

## 2024-06-07 RX ORDER — ASPIRIN 81 MG/1
81 TABLET ORAL DAILY
Qty: 90 TABLET | Refills: 0 | COMMUNITY
Start: 2024-06-07

## 2024-06-07 NOTE — PATIENT INSTRUCTIONS
Plan:  ~Repeat carotid dopplers in 2 year   ~Echocardiogram as scheduled   ~Stress test- LexiScan Myoview- she is not able to walk on the treadmill    ~Call [facility] scheduling at [phone] to schedule testing      Cardiac medications reviewed including indications and pertinent side effects. Medication list updated at this visit.   Patient verbalizes understanding of the need for treatment and education has been provided at today's visit. Additional education material will be provided in after visit summary.    Check blood pressure and heart rate at home a few times per week- keep a log with dates and times and bring to office visit   Regular exercise and following a healthy diet encouraged   Follow up with me in 1 year

## 2024-06-13 DIAGNOSIS — G44.221 CHRONIC TENSION-TYPE HEADACHE, INTRACTABLE: ICD-10-CM

## 2024-06-14 RX ORDER — TOPIRAMATE 50 MG/1
TABLET, FILM COATED ORAL
Qty: 180 TABLET | Refills: 0 | Status: SHIPPED | OUTPATIENT
Start: 2024-06-14

## 2024-06-17 RX ORDER — QUETIAPINE FUMARATE 25 MG/1
TABLET, FILM COATED ORAL
Qty: 180 TABLET | Refills: 0 | Status: SHIPPED | OUTPATIENT
Start: 2024-06-17

## 2024-06-26 ENCOUNTER — HOSPITAL ENCOUNTER (OUTPATIENT)
Dept: CARDIOLOGY | Age: 74
Discharge: HOME | End: 2024-06-28
Attending: INTERNAL MEDICINE
Payer: MEDICARE

## 2024-06-26 VITALS
HEIGHT: 60 IN | SYSTOLIC BLOOD PRESSURE: 106 MMHG | DIASTOLIC BLOOD PRESSURE: 58 MMHG | WEIGHT: 90 LBS | BODY MASS INDEX: 17.67 KG/M2

## 2024-06-26 DIAGNOSIS — R55 NEAR SYNCOPE: ICD-10-CM

## 2024-06-26 DIAGNOSIS — R00.0 TACHYCARDIA: ICD-10-CM

## 2024-06-26 DIAGNOSIS — R42 DIZZINESS: ICD-10-CM

## 2024-06-26 DIAGNOSIS — R06.02 SOB (SHORTNESS OF BREATH): ICD-10-CM

## 2024-06-26 LAB
ECHO AO ROOT DIAM: 2.7 CM
ECHO AO ROOT INDEX: 2.03 CM/M2
ECHO AV AREA PEAK VELOCITY: 1.8 CM2
ECHO AV AREA/BSA PEAK VELOCITY: 1.4 CM2/M2
ECHO AV PEAK GRADIENT: 9 MMHG
ECHO AV PEAK VELOCITY: 1.5 M/S
ECHO AV VELOCITY RATIO: 0.53
ECHO BSA: 1.31 M2
ECHO EST RA PRESSURE: 3 MMHG
ECHO LA AREA 2C: 8.4 CM2
ECHO LA AREA 4C: 9.3 CM2
ECHO LA DIAMETER INDEX: 2.18 CM/M2
ECHO LA DIAMETER: 2.9 CM
ECHO LA MAJOR AXIS: 3.7 CM
ECHO LA MINOR AXIS: 3.3 CM
ECHO LA TO AORTIC ROOT RATIO: 1.07
ECHO LA VOL BP: 19 ML (ref 22–52)
ECHO LA VOL MOD A2C: 18 ML (ref 22–52)
ECHO LA VOL MOD A4C: 19 ML (ref 22–52)
ECHO LA VOL/BSA BIPLANE: 14 ML/M2 (ref 16–34)
ECHO LA VOLUME INDEX MOD A2C: 14 ML/M2 (ref 16–34)
ECHO LA VOLUME INDEX MOD A4C: 14 ML/M2 (ref 16–34)
ECHO LV E' LATERAL VELOCITY: 8 CM/S
ECHO LV E' SEPTAL VELOCITY: 7 CM/S
ECHO LV FRACTIONAL SHORTENING: 31 % (ref 28–44)
ECHO LV INTERNAL DIMENSION DIASTOLE INDEX: 2.71 CM/M2
ECHO LV INTERNAL DIMENSION DIASTOLIC: 3.6 CM (ref 3.9–5.3)
ECHO LV INTERNAL DIMENSION SYSTOLIC INDEX: 1.88 CM/M2
ECHO LV INTERNAL DIMENSION SYSTOLIC: 2.5 CM
ECHO LV ISOVOLUMETRIC RELAXATION TIME (IVRT): 99 MS
ECHO LV IVSD: 0.6 CM (ref 0.6–0.9)
ECHO LV MASS 2D: 53.8 G (ref 67–162)
ECHO LV MASS INDEX 2D: 40.4 G/M2 (ref 43–95)
ECHO LV POSTERIOR WALL DIASTOLIC: 0.6 CM (ref 0.6–0.9)
ECHO LV RELATIVE WALL THICKNESS RATIO: 0.33
ECHO LVOT AREA: 3.1 CM2
ECHO LVOT DIAM: 2 CM
ECHO LVOT PEAK GRADIENT: 3 MMHG
ECHO LVOT PEAK VELOCITY: 0.8 M/S
ECHO MV A VELOCITY: 1 M/S
ECHO MV E VELOCITY: 0.85 M/S
ECHO MV E/A RATIO: 0.85
ECHO MV E/E' LATERAL: 10.63
ECHO MV E/E' RATIO (AVERAGED): 11.38
ECHO MV E/E' SEPTAL: 12.14
ECHO RIGHT VENTRICULAR SYSTOLIC PRESSURE (RVSP): 29 MMHG
ECHO TV REGURGITANT MAX VELOCITY: 2.57 M/S
ECHO TV REGURGITANT PEAK GRADIENT: 26 MMHG

## 2024-06-26 PROCEDURE — 93306 TTE W/DOPPLER COMPLETE: CPT

## 2024-06-27 ENCOUNTER — TELEPHONE (OUTPATIENT)
Dept: CARDIOLOGY CLINIC | Age: 74
End: 2024-06-27

## 2024-06-27 NOTE — TELEPHONE ENCOUNTER
----- Message from Eben Galo MD sent at 6/27/2024  9:17 AM EDT -----  Please notify patient that their echo looks ok  Heart fxn normal

## 2024-07-03 RX ORDER — MIRABEGRON 50 MG/1
TABLET, FILM COATED, EXTENDED RELEASE ORAL
Qty: 90 TABLET | Refills: 3 | Status: SHIPPED | OUTPATIENT
Start: 2024-07-03

## 2024-07-03 RX ORDER — DILTIAZEM HYDROCHLORIDE 120 MG/1
120 CAPSULE, COATED, EXTENDED RELEASE ORAL DAILY
Qty: 90 CAPSULE | Refills: 3 | Status: SHIPPED | OUTPATIENT
Start: 2024-07-03

## 2024-07-09 ENCOUNTER — HOSPITAL ENCOUNTER (OUTPATIENT)
Dept: CARDIOLOGY | Age: 74
Discharge: HOME | End: 2024-07-11
Attending: INTERNAL MEDICINE
Payer: MEDICARE

## 2024-07-09 ENCOUNTER — TELEPHONE (OUTPATIENT)
Dept: CARDIOLOGY CLINIC | Age: 74
End: 2024-07-09

## 2024-07-09 DIAGNOSIS — R00.2 PALPITATIONS: ICD-10-CM

## 2024-07-09 DIAGNOSIS — R55 NEAR SYNCOPE: ICD-10-CM

## 2024-07-09 DIAGNOSIS — R06.02 SHORTNESS OF BREATH: ICD-10-CM

## 2024-07-09 LAB
NUC STRESS EJECTION FRACTION: 65 %
NUC STRESS LV EDV: 71 ML (ref 56–104)
NUC STRESS LV ESV: 25 ML (ref 19–49)
NUC STRESS LV MASS: 113 G
STRESS BASELINE DIAS BP: 76 MMHG
STRESS BASELINE HR: 77 BPM
STRESS BASELINE SYS BP: 131 MMHG
STRESS ESTIMATED WORKLOAD: 1 METS
STRESS PEAK DIAS BP: 76 MMHG
STRESS PEAK SYS BP: 138 MMHG
STRESS PERCENT HR ACHIEVED: 91 %
STRESS POST PEAK HR: 133 BPM
STRESS RATE PRESSURE PRODUCT: NORMAL BPM*MMHG
STRESS STAGE 1 BP: NORMAL MMHG
STRESS STAGE 1 COMMENTS: NORMAL
STRESS STAGE 1 DURATION: 1 MIN:SEC
STRESS STAGE 1 HR: 95 BPM
STRESS STAGE RECOVERY 1 BP: NORMAL MMHG
STRESS STAGE RECOVERY 1 COMMENTS: NORMAL
STRESS STAGE RECOVERY 1 DURATION: 1 MIN:SEC
STRESS STAGE RECOVERY 1 HR: 107 BPM
STRESS STAGE RECOVERY 2 BP: NORMAL MMHG
STRESS STAGE RECOVERY 2 DURATION: 2 MIN:SEC
STRESS STAGE RECOVERY 2 HR: 110 BPM
STRESS STAGE RECOVERY 3 BP: NORMAL MMHG
STRESS STAGE RECOVERY 3 DURATION: 6 MIN:SEC
STRESS STAGE RECOVERY 3 HR: 102 BPM
STRESS STAGE RECOVERY 4 BP: NORMAL MMHG
STRESS STAGE RECOVERY 4 DURATION: NORMAL MIN:SEC
STRESS STAGE RECOVERY 4 HR: 86 BPM
STRESS TARGET HR: 146 BPM
TID: 0.95

## 2024-07-09 PROCEDURE — 3430000000 HC RX DIAGNOSTIC RADIOPHARMACEUTICAL: Performed by: INTERNAL MEDICINE

## 2024-07-09 PROCEDURE — 93016 CV STRESS TEST SUPVJ ONLY: CPT | Performed by: STUDENT IN AN ORGANIZED HEALTH CARE EDUCATION/TRAINING PROGRAM

## 2024-07-09 PROCEDURE — 93017 CV STRESS TEST TRACING ONLY: CPT

## 2024-07-09 PROCEDURE — 6360000002 HC RX W HCPCS: Performed by: INTERNAL MEDICINE

## 2024-07-09 PROCEDURE — A9502 TC99M TETROFOSMIN: HCPCS | Performed by: INTERNAL MEDICINE

## 2024-07-09 PROCEDURE — 78452 HT MUSCLE IMAGE SPECT MULT: CPT | Performed by: STUDENT IN AN ORGANIZED HEALTH CARE EDUCATION/TRAINING PROGRAM

## 2024-07-09 PROCEDURE — 93018 CV STRESS TEST I&R ONLY: CPT | Performed by: STUDENT IN AN ORGANIZED HEALTH CARE EDUCATION/TRAINING PROGRAM

## 2024-07-09 RX ORDER — AMINOPHYLLINE 25 MG/ML
25 INJECTION, SOLUTION INTRAVENOUS ONCE
Status: COMPLETED | OUTPATIENT
Start: 2024-07-09 | End: 2024-07-09

## 2024-07-09 RX ORDER — REGADENOSON 0.08 MG/ML
0.4 INJECTION, SOLUTION INTRAVENOUS
Status: COMPLETED | OUTPATIENT
Start: 2024-07-09 | End: 2024-07-09

## 2024-07-09 RX ADMIN — TETROFOSMIN 31.6 MILLICURIE: 1.38 INJECTION, POWDER, LYOPHILIZED, FOR SOLUTION INTRAVENOUS at 14:05

## 2024-07-09 RX ADMIN — REGADENOSON 0.4 MG: 0.08 INJECTION, SOLUTION INTRAVENOUS at 14:05

## 2024-07-09 RX ADMIN — TETROFOSMIN 11.5 MILLICURIE: 1.38 INJECTION, POWDER, LYOPHILIZED, FOR SOLUTION INTRAVENOUS at 12:45

## 2024-07-09 RX ADMIN — AMINOPHYLLINE 125 MG: 25 INJECTION, SOLUTION INTRAVENOUS at 14:38

## 2024-07-09 SDOH — ECONOMIC STABILITY: FOOD INSECURITY: WITHIN THE PAST 12 MONTHS, THE FOOD YOU BOUGHT JUST DIDN'T LAST AND YOU DIDN'T HAVE MONEY TO GET MORE.: NEVER TRUE

## 2024-07-09 SDOH — ECONOMIC STABILITY: HOUSING INSECURITY
IN THE LAST 12 MONTHS, WAS THERE A TIME WHEN YOU DID NOT HAVE A STEADY PLACE TO SLEEP OR SLEPT IN A SHELTER (INCLUDING NOW)?: NO

## 2024-07-09 SDOH — ECONOMIC STABILITY: INCOME INSECURITY: HOW HARD IS IT FOR YOU TO PAY FOR THE VERY BASICS LIKE FOOD, HOUSING, MEDICAL CARE, AND HEATING?: NOT HARD AT ALL

## 2024-07-09 SDOH — ECONOMIC STABILITY: FOOD INSECURITY: WITHIN THE PAST 12 MONTHS, YOU WORRIED THAT YOUR FOOD WOULD RUN OUT BEFORE YOU GOT MONEY TO BUY MORE.: NEVER TRUE

## 2024-07-11 ENCOUNTER — OFFICE VISIT (OUTPATIENT)
Dept: INTERNAL MEDICINE CLINIC | Age: 74
End: 2024-07-11

## 2024-07-11 VITALS
DIASTOLIC BLOOD PRESSURE: 80 MMHG | BODY MASS INDEX: 18.85 KG/M2 | HEIGHT: 60 IN | RESPIRATION RATE: 12 BRPM | HEART RATE: 70 BPM | WEIGHT: 96 LBS | SYSTOLIC BLOOD PRESSURE: 110 MMHG

## 2024-07-11 DIAGNOSIS — E78.2 MIXED HYPERLIPIDEMIA: ICD-10-CM

## 2024-07-11 DIAGNOSIS — E11.69 HYPERLIPIDEMIA ASSOCIATED WITH TYPE 2 DIABETES MELLITUS (HCC): ICD-10-CM

## 2024-07-11 DIAGNOSIS — K21.9 GASTROESOPHAGEAL REFLUX DISEASE, UNSPECIFIED WHETHER ESOPHAGITIS PRESENT: ICD-10-CM

## 2024-07-11 DIAGNOSIS — J96.11 CHRONIC RESPIRATORY FAILURE WITH HYPOXIA (HCC): ICD-10-CM

## 2024-07-11 DIAGNOSIS — G25.0 ESSENTIAL TREMOR: ICD-10-CM

## 2024-07-11 DIAGNOSIS — E11.9 TYPE 2 DIABETES MELLITUS WITHOUT COMPLICATION, WITHOUT LONG-TERM CURRENT USE OF INSULIN (HCC): Primary | ICD-10-CM

## 2024-07-11 DIAGNOSIS — E78.5 HYPERLIPIDEMIA ASSOCIATED WITH TYPE 2 DIABETES MELLITUS (HCC): ICD-10-CM

## 2024-07-11 DIAGNOSIS — J44.9 CHRONIC OBSTRUCTIVE PULMONARY DISEASE, UNSPECIFIED COPD TYPE (HCC): ICD-10-CM

## 2024-07-11 DIAGNOSIS — E43 SEVERE PROTEIN-CALORIE MALNUTRITION (GOMEZ: LESS THAN 60% OF STANDARD WEIGHT) (HCC): ICD-10-CM

## 2024-07-11 PROBLEM — J44.1 COPD EXACERBATION (HCC): Status: RESOLVED | Noted: 2023-05-15 | Resolved: 2024-07-11

## 2024-07-11 PROBLEM — J18.9 PNEUMONIA DUE TO INFECTIOUS ORGANISM: Status: RESOLVED | Noted: 2023-05-14 | Resolved: 2024-07-11

## 2024-07-11 PROCEDURE — 99214 OFFICE O/P EST MOD 30 MIN: CPT | Performed by: INTERNAL MEDICINE

## 2024-07-11 PROCEDURE — 1036F TOBACCO NON-USER: CPT | Performed by: INTERNAL MEDICINE

## 2024-07-11 PROCEDURE — 3044F HG A1C LEVEL LT 7.0%: CPT | Performed by: INTERNAL MEDICINE

## 2024-07-11 PROCEDURE — 3023F SPIROM DOC REV: CPT | Performed by: INTERNAL MEDICINE

## 2024-07-11 PROCEDURE — G8427 DOCREV CUR MEDS BY ELIG CLIN: HCPCS | Performed by: INTERNAL MEDICINE

## 2024-07-11 PROCEDURE — 1123F ACP DISCUSS/DSCN MKR DOCD: CPT | Performed by: INTERNAL MEDICINE

## 2024-07-11 PROCEDURE — 3017F COLORECTAL CA SCREEN DOC REV: CPT | Performed by: INTERNAL MEDICINE

## 2024-07-11 PROCEDURE — 2022F DILAT RTA XM EVC RTNOPTHY: CPT | Performed by: INTERNAL MEDICINE

## 2024-07-11 PROCEDURE — G8420 CALC BMI NORM PARAMETERS: HCPCS | Performed by: INTERNAL MEDICINE

## 2024-07-11 PROCEDURE — G8399 PT W/DXA RESULTS DOCUMENT: HCPCS | Performed by: INTERNAL MEDICINE

## 2024-07-11 PROCEDURE — 1090F PRES/ABSN URINE INCON ASSESS: CPT | Performed by: INTERNAL MEDICINE

## 2024-07-11 ASSESSMENT — ENCOUNTER SYMPTOMS
EYE DISCHARGE: 0
NAUSEA: 0
ABDOMINAL PAIN: 0
SHORTNESS OF BREATH: 1
RHINORRHEA: 0
COUGH: 0
VOMITING: 0
WHEEZING: 0
BACK PAIN: 1

## 2024-07-11 NOTE — PROGRESS NOTES
image 40. No significant consolidative type changes   identified. Centrilobular emphysematous changes are present.   PLEURA: Unremarkable.  No pleural effusion or pneumothorax   MEDIASTINUM/CHANTELLE:  No mediastinal or hilar adenopathy.   HEART/PERICARDIUM:  Cardiac size normal. No pericardial effusion. .   VESSELS:  Mild atherosclerotic calcification thoracic aorta without aneurysmal dilatation   CHEST WALL/LOWER NECK:  Unremarkable   UPPER ABDOMEN:  Unremarkable   BONES:  Unremarkable   OTHER:  None      ECHO 6/26/2024    Image quality is adequate.    Left Ventricle: Normal left ventricular systolic function with a visually estimated EF of 55 - 60%. Left ventricle size is normal. Normal wall thickness. Normal wall motion. Indeterminate diastolic function due to E-A fusion.    Right Ventricle: Right ventricle size is normal. Normal systolic function.    Tricuspid Valve: Mild regurgitation. The estimated RVSP is 29 mmHg.    No significant valvular heart disease noted.    [facility] My[facility] 7/9/2024    Stress Combined Conclusion: The study is negative for myocardial ischemia. Findings suggest a low risk of cardiac events.    Perfusion Comments: LV perfusion is probably normal.    Perfusion Defect: There is a small perfusion defect involving the apex/apical anterior wall of mild intensity that improves with stress and with attenuation correction. This is most consistent with attenuation artifact. There is increased extracardiac uptake adjacent the apex. There is no definitive stress induced myocardial ischemia.    Stress Function: Left ventricular function post-stress is normal. Post-stress ejection fraction is 65%. The stress end diastolic cavity size is normal. Stress end diastolic volume: 71 mL. The stress end systolic cavity size is normal. Stress end systolic volume: 25 mL. LV mass: 113.0 g.    Perfusion Conclusion: There is no evidence of transient ischemic dilation (TID). TID ratio is 0.95.    Image quality is

## 2024-07-18 RX ORDER — FUROSEMIDE 40 MG/1
TABLET ORAL
Qty: 90 TABLET | Refills: 0 | Status: SHIPPED | OUTPATIENT
Start: 2024-07-18

## 2024-07-18 RX ORDER — POTASSIUM CHLORIDE 20 MEQ/1
TABLET, EXTENDED RELEASE ORAL
Qty: 90 TABLET | Refills: 0 | Status: SHIPPED | OUTPATIENT
Start: 2024-07-18

## 2024-08-08 DIAGNOSIS — G44.221 CHRONIC TENSION-TYPE HEADACHE, INTRACTABLE: ICD-10-CM

## 2024-08-09 RX ORDER — TOPIRAMATE 50 MG/1
TABLET, FILM COATED ORAL
Qty: 180 TABLET | Refills: 0 | Status: SHIPPED | OUTPATIENT
Start: 2024-08-09

## 2024-08-26 RX ORDER — ALENDRONATE SODIUM 35 MG/1
TABLET ORAL
Qty: 12 TABLET | Refills: 3 | Status: SHIPPED | OUTPATIENT
Start: 2024-08-26

## 2024-09-10 RX ORDER — QUETIAPINE FUMARATE 25 MG/1
TABLET, FILM COATED ORAL
Qty: 180 TABLET | Refills: 1 | Status: SHIPPED | OUTPATIENT
Start: 2024-09-10

## 2024-09-16 RX ORDER — POTASSIUM CHLORIDE 1500 MG/1
TABLET, EXTENDED RELEASE ORAL
Qty: 90 TABLET | Refills: 0 | Status: SHIPPED | OUTPATIENT
Start: 2024-09-16

## 2024-09-16 RX ORDER — FUROSEMIDE 40 MG
TABLET ORAL
Qty: 90 TABLET | Refills: 0 | Status: SHIPPED | OUTPATIENT
Start: 2024-09-16

## 2024-09-18 ENCOUNTER — OFFICE VISIT (OUTPATIENT)
Age: 74
End: 2024-09-18
Payer: MEDICARE

## 2024-09-18 VITALS
SYSTOLIC BLOOD PRESSURE: 133 MMHG | HEIGHT: 60 IN | BODY MASS INDEX: 19.16 KG/M2 | DIASTOLIC BLOOD PRESSURE: 72 MMHG | OXYGEN SATURATION: 96 % | HEART RATE: 67 BPM | WEIGHT: 97.6 LBS

## 2024-09-18 DIAGNOSIS — R25.9 MIXED ACTION AND RESTING TREMOR: Primary | ICD-10-CM

## 2024-09-18 DIAGNOSIS — G44.221 CHRONIC TENSION-TYPE HEADACHE, INTRACTABLE: ICD-10-CM

## 2024-09-18 DIAGNOSIS — R41.3 MEMORY CHANGES: ICD-10-CM

## 2024-09-18 DIAGNOSIS — R26.9 GAIT DISORDER: ICD-10-CM

## 2024-09-18 PROCEDURE — 99214 OFFICE O/P EST MOD 30 MIN: CPT | Performed by: STUDENT IN AN ORGANIZED HEALTH CARE EDUCATION/TRAINING PROGRAM

## 2024-09-18 PROCEDURE — 1123F ACP DISCUSS/DSCN MKR DOCD: CPT | Performed by: STUDENT IN AN ORGANIZED HEALTH CARE EDUCATION/TRAINING PROGRAM

## 2024-09-18 RX ORDER — ALBUTEROL SULFATE 90 UG/1
INHALANT RESPIRATORY (INHALATION)
Qty: 34 G | Refills: 2 | Status: SHIPPED | OUTPATIENT
Start: 2024-09-18

## 2024-09-18 RX ORDER — TOPIRAMATE 50 MG/1
50 TABLET, FILM COATED ORAL
Qty: 90 TABLET | Refills: 3 | Status: SHIPPED | OUTPATIENT
Start: 2024-09-18 | End: 2025-09-13

## 2024-10-01 RX ORDER — VENLAFAXINE HYDROCHLORIDE 75 MG/1
CAPSULE, EXTENDED RELEASE ORAL
Qty: 90 CAPSULE | Refills: 0 | Status: SHIPPED | OUTPATIENT
Start: 2024-10-01

## 2024-10-04 ENCOUNTER — HOSPITAL ENCOUNTER (OUTPATIENT)
Dept: PHYSICAL THERAPY | Age: 74
Setting detail: THERAPIES SERIES
Discharge: HOME | End: 2024-10-04
Payer: MEDICARE

## 2024-10-04 DIAGNOSIS — R68.89 DECREASED FUNCTIONAL ACTIVITY TOLERANCE: ICD-10-CM

## 2024-10-04 DIAGNOSIS — R26.81 GENERAL UNSTEADINESS: Primary | ICD-10-CM

## 2024-10-04 DIAGNOSIS — G25.2 ACTION TREMOR: ICD-10-CM

## 2024-10-04 PROCEDURE — 97163 PT EVAL HIGH COMPLEX 45 MIN: CPT

## 2024-10-04 PROCEDURE — 97110 THERAPEUTIC EXERCISES: CPT

## 2024-10-04 NOTE — PLAN OF CARE
History:  Comorbidities:  Hypertension  Osteoporosis/Osteopenia  Depression  Other: acute on chronic respiratory faliure on chronic, hld, essential tremor, hearing loss, copd,   Relevant Medical History: uses o2 NOC and PRN during the day                                         Precautions/ Contra-indications:           Latex allergy:  NO  Pacemaker:    NO  Contraindications for Manipulation: None  Other: USES OXYGEN PRN DURING THE DAY    Red Flags:  Unusual loss of strength  Fever/chills/sweats  N/t  Change in appetite  B/b probs  Sob  Dizziness/vertigo  BEING WORKED UP BY NEUROLOGIST ALREADY    Suicide Screening:   The patient did not verbalize a primary behavioral concern, suicidal ideation, suicidal intent, or demonstrate suicidal behaviors.    Preferred Language for Healthcare:  English    SUBJECTIVE EXAMINATION     Patient stated complaint/comments: Pt reports tremors all the time (resting and action), which varies from minimal to more. The doctor does not know if it is Parkinson's or other tremors. She is not on any PD medicine until she gets an MRI later this month on her brain. She reports ambulating very slow and hesitant, with her knees buckling. She feels very unsteady. She denies any falls. She LOB when she turns. She can walk for short distances before a break; walking in from the parking lot was pretty far. She is limited by her COPD. She gets back pain when she stands long periods. This has all been going on for the past ~6 months. Walking has increased in difficulty the last few years.        Test used Initial score  10/4/24 10/04/2024   Pain Summary VAS Does not rate- not here for pain    Functional questionnaire MFES 69% Higher score is better    Other:              Pain:  Pain location: abdominal pain (doc says it is old scar tissue from previous surgeries), hip pain (describes it as a \"dry bone\" that need lubrications. Used to get cortizone shots).  Tingling in bilateral hands and feet. Does not

## 2024-10-09 ENCOUNTER — HOSPITAL ENCOUNTER (OUTPATIENT)
Dept: PHYSICAL THERAPY | Age: 74
Setting detail: THERAPIES SERIES
Discharge: HOME | End: 2024-10-09
Payer: MEDICARE

## 2024-10-09 PROCEDURE — 97112 NEUROMUSCULAR REEDUCATION: CPT

## 2024-10-09 PROCEDURE — 97110 THERAPEUTIC EXERCISES: CPT

## 2024-10-09 NOTE — PLAN OF CARE
[facility]- Outpatient Rehabilitation and Therapy [address] [place] [address] [place] [address] office: [phone] fax: [phone]         Physical Therapy: TREATMENT/PROGRESS NOTE   Patient: Kaya Robbins (74 y.o. female)   Examination Date: 10/09/2024   :  1950 MRN: [id number]   Visit #: 2   Insurance Allowable Auth Needed   BMN- auth pending [x]Yes    []No    Insurance: Payor: [facility] MEDICARE / Plan: UNITEDHEALTHCARE DUAL COMPLETE / Product Type: *No Product type* /   Insurance ID: [id number] - (Medicare Managed)  Secondary Insurance (if applicable):    Treatment Diagnosis:     ICD-10-CM    1. General unsteadiness  R26.81       2. Action tremor  G25.2       3. Decreased functional activity tolerance  R68.89          Medical Diagnosis:  Unspecified abnormalities of gait and mobility [R26.9]   Referring Physician: Olegario Hester MD  PCP: Nayely García MD     Plan of care signed (Y/N): yes    Date of Patient follow up with Physician: F/GAGANDEEP boyle/ Mir after imaging.      Plan of Care Report: NO  POC update due: (10 visits /OR AUTH LIMITS, whichever is less)   PN due 24 or 10 visits  POC due in 24 visits or 90 days Recert                                           Medical History:  Comorbidities:  Hypertension  Osteoporosis/Osteopenia  Depression  Other: acute on chronic respiratory faliure on chronic, hld, essential tremor, hearing loss, copd,   Relevant Medical History: uses o2 NOC and PRN during the day                                         Precautions/ Contra-indications:           Latex allergy:  NO  Pacemaker:    NO  Contraindications for Manipulation: None  Other: USES OXYGEN PRN DURING THE DAY    Red Flags:  Unusual loss of strength  Fever/chills/sweats  N/t  Change in appetite  B/b probs  Sob  Dizziness/vertigo  BEING WORKED UP BY NEUROLOGIST ALREADY    Suicide Screening:   The patient did not verbalize a primary behavioral concern, suicidal ideation, suicidal intent, or

## 2024-10-16 ENCOUNTER — TELEPHONE (OUTPATIENT)
Dept: NEUROLOGY | Age: 74
End: 2024-10-16

## 2024-10-16 ENCOUNTER — HOSPITAL ENCOUNTER (OUTPATIENT)
Age: 74
Discharge: HOME | End: 2024-10-18
Attending: INTERNAL MEDICINE

## 2024-10-16 ENCOUNTER — TELEPHONE (OUTPATIENT)
Dept: PULMONOLOGY | Age: 74
End: 2024-10-16

## 2024-10-16 DIAGNOSIS — R25.9 MIXED ACTION AND RESTING TREMOR: Primary | ICD-10-CM

## 2024-10-16 DIAGNOSIS — R00.0 TACHYCARDIA: ICD-10-CM

## 2024-10-16 DIAGNOSIS — R06.02 SHORTNESS OF BREATH: ICD-10-CM

## 2024-10-16 NOTE — TELEPHONE ENCOUNTER
DaTscan order needs to have NM ( nuclear Medicine )     Please review pended order and sign if appropriate.

## 2024-10-16 NOTE — TELEPHONE ENCOUNTER
[facility] echo called and stated that Pt had echo done in June an it was normal was done by cardiologist and wasn't sure why Dr. Orellana ordered another one for her. Did not see any notation on why this was needed Dr. Orellana was busy in a room so I told echo to hold off on her order until I was able to speak with Dr. Orellana. They voiced understanding.

## 2024-10-16 NOTE — TELEPHONE ENCOUNTER
Central scheduling called and order for DaTscan was written incorrectly. It must have NM on order for nuclear medicine. Please correct and resend order so pt can be scheduled for test.     Please advise.

## 2024-10-17 RX ORDER — BLOOD SUGAR DIAGNOSTIC
STRIP MISCELLANEOUS
Qty: 200 STRIP | Refills: 2 | Status: SHIPPED | OUTPATIENT
Start: 2024-10-17

## 2024-10-17 RX ORDER — THEOPHYLLINE 300 MG/1
300 TABLET, EXTENDED RELEASE ORAL DAILY
Qty: 100 TABLET | Refills: 0 | Status: SHIPPED | OUTPATIENT
Start: 2024-10-17

## 2024-10-17 NOTE — TELEPHONE ENCOUNTER
Called Central scheduling to make sure that they had the right order and request that they reach out to patient to help her schedule the test as it seems like she was having a hard time getting that scheduled.    Also left message on patient's vm that central scheduling would be reaching out to her to get that scheduled.

## 2024-10-18 ENCOUNTER — HOSPITAL ENCOUNTER (OUTPATIENT)
Dept: PHYSICAL THERAPY | Age: 74
Setting detail: THERAPIES SERIES
Discharge: HOME | End: 2024-10-18
Payer: MEDICARE

## 2024-10-18 PROCEDURE — 97110 THERAPEUTIC EXERCISES: CPT

## 2024-10-18 PROCEDURE — 97112 NEUROMUSCULAR REEDUCATION: CPT

## 2024-10-18 NOTE — PLAN OF CARE
(0-20)            Ulnar Deviation (0-45)             LUMBAR Flexion            Extension            Sidebending            Rotation             HIP Flexion   4    4-     Extension   4-    3+     Abduction   4    4+     Adduction            External Rotation            Internal Rotation           KNEE Flexion   4+    4+     Extension   5    5      ANKLE Dorsiflexion   5    5     Plantarflexion            Inversion   4+    4+     Eversion   5    5      Functional Mobility/Balance:       Assistance Level Comments   Gait Device used: no AD Modified Independent Gait Deviations (firm surface/linoleum): decreased darline, Increased CHARBEL, and partial step through pattern. Difficulty with turns    Stairs Device used: no AD Supervision Pt able to negotiate up/down 4 stairs: reciprocal pattern and handrail bilaterally  Deficits noted: No overt lobs          Score Comments   Timed Up and Go (TUG)  18.5 seconds. Pushes off chair. No AD. Sba/cga. Difficulty on turn but no overt lob. Avg of two trials.  Device used: N/A   2mWT Started 6mwt and quickly realized it was too much 162' with 1 standing rest break. Very slowed darline. 1 standing rest break and REYEZ. Pt doing PLB. O2 during rest 100%, 106 bpm Device used: N/A   Sit to Stand 30 second test 9 STS   In brown chair without using armrests. \"That was harder than the walk test. It was more tiring on my leg muscles. But I think the walk was harder on my breathing\". 98 % O2, 109 bpm     Time to complete 5 reps NT    Gallardo Balance Scale      Low fall risk   Functional Gait Assessment    Age norms for No norms available   Tinetti Balance  Total Score   Risk of Falls:  nt    Balance        Gait       Disability Index        Falls Risk Assessment (30 days): Falls Risk assessed and no intervention required.      Exercises/Interventions     Observation: see above  Therapeutic Ex (19694)  Resistance Sets/time Reps Notes/Cues/Progressions   Lumbar swiss ball flexion  1 20    Walking program

## 2024-10-21 ENCOUNTER — HOSPITAL ENCOUNTER (OUTPATIENT)
Dept: PHYSICAL THERAPY | Age: 74
Setting detail: THERAPIES SERIES
Discharge: HOME | End: 2024-10-21
Payer: MEDICARE

## 2024-10-21 PROCEDURE — 97110 THERAPEUTIC EXERCISES: CPT

## 2024-10-21 NOTE — PLAN OF CARE
[facility]- Outpatient Rehabilitation and Therapy [address] [place] [address] [place] [address] office: [phone] fax: [phone]         Physical Therapy: TREATMENT/PROGRESS NOTE   Patient: Kaya Robbins (74 y.o. female)   Examination Date: 10/21/2024   :  1950 MRN: [id number]   Visit #: 4   Insurance Allowable Auth Needed   BMN- auth pending [x]Yes    []No    Insurance: Payor: [facility] MEDICARE / Plan: UNITEDHEALTHCARE DUAL COMPLETE / Product Type: *No Product type* /   Insurance ID: [id number] - (Medicare Managed)  Secondary Insurance (if applicable):    Treatment Diagnosis:     ICD-10-CM    1. General unsteadiness  R26.81       2. Action tremor  G25.2       3. Decreased functional activity tolerance  R68.89          Medical Diagnosis:  Unspecified abnormalities of gait and mobility [R26.9]   Referring Physician: Olegario Hester MD  PCP: Nayely García MD     Plan of care signed (Y/N): yes    Date of Patient follow up with Physician: JORGE/GAGANDEEP boyle/ Mir after imaging.      Plan of Care Report: NO  POC update due: (10 visits /OR AUTH LIMITS, whichever is less)   PN due 24 or 10 visits  POC due in 24 visits or 90 days Recert                                           Medical History:  Comorbidities:  Hypertension  Osteoporosis/Osteopenia  Depression  Other: acute on chronic respiratory faliure on chronic, hld, essential tremor, hearing loss, copd,   Relevant Medical History: uses o2 NOC and PRN during the day                                         Precautions/ Contra-indications:           Latex allergy:  NO  Pacemaker:    NO  Contraindications for Manipulation: None  Other: USES OXYGEN PRN DURING THE DAY    Red Flags:  Unusual loss of strength  Fever/chills/sweats  N/t  Change in appetite  B/b probs  Sob  Dizziness/vertigo  Seen by neurologist Dr. Hester    Suicide Screening:   The patient did not verbalize a primary behavioral concern, suicidal ideation, suicidal intent, or

## 2024-10-23 ENCOUNTER — HOSPITAL ENCOUNTER (OUTPATIENT)
Dept: PHYSICAL THERAPY | Age: 74
Setting detail: THERAPIES SERIES
Discharge: HOME | End: 2024-10-23
Payer: MEDICARE

## 2024-10-23 PROCEDURE — 97110 THERAPEUTIC EXERCISES: CPT

## 2024-10-23 PROCEDURE — 97112 NEUROMUSCULAR REEDUCATION: CPT

## 2024-10-23 NOTE — FLOWSHEET NOTE
Ther. Act (38285)    [facility]. Traction (56683)     Gait (04757)    Dry Needle 1-2 muscle (38504)     Aquatic Therex (10002)    Dry Needle 3+ muscle (20561)     Iontophoresis (42014)    VASO (91643)     Ultrasound (95171)    Group Therapy (63382)     Estim Attended (47852)    Canalith Repositioning (73359)     Physical Performance Test (35826)         Other:    Other:    Total Timed Code Tx Minutes 33 3       Total Treatment Minutes 33        Charge Justification:  (96857) THERAPEUTIC EXERCISE - Provided verbal/tactile cueing for activities related to strengthening, flexibility, endurance, ROM performed to prevent loss of range of motion, maintain or improve muscular strength or increase flexibility, following either an injury or surgery.   (34306) NEUROMUSCULAR RE-EDUCATION - Therapeutic procedure, 1 or more areas, each 15 minutes; neuromuscular reeducation of movement, balance, coordination, kinesthetic sense, posture, and/or proprioception for sitting and/or standing activities      GOALS     Patient stated goals:  Get her strength back  [] Progressing: [] Met: [] Not Met: [] Adjusted  Be able to do more  [] Progressing: [] Met: [] Not Met: [] Adjusted    Therapist goals for Patient:   Short Term Goals: To be achieved in: 2 weeks  Pt will demonstrate independence with HEP and progress per tolerance.   [] Progressing: [] Met: [] Not Met: [] Adjusted   Pt will score 17 sec or less on TUG  Current: 18.5 sec  Previous: N/A  [] Progressing: [] Met: [] Not Met: [] Adjusted     Long Term Goals: To be achieved in: 8 weeks  Pt will score 79% or greater on the modified falls efficacy scale in order to demonstrate improved function in daily life.  Current: 69% or 96/140  Previous: N/A  [] Progressing: [] Met: [] Not Met: [] Adjusted   Pt will score 15 seconds or less on the TUG in order to demo decr fall risk/improved IND with mobility.  Current: 18.5 seconds  Previous: N/A  [] Progressing: [] Met: [] Not Met: [] Adjusted

## 2024-10-28 ENCOUNTER — HOSPITAL ENCOUNTER (OUTPATIENT)
Dept: PHYSICAL THERAPY | Age: 74
Setting detail: THERAPIES SERIES
Discharge: HOME | End: 2024-10-28
Payer: MEDICARE

## 2024-10-28 PROCEDURE — 97112 NEUROMUSCULAR REEDUCATION: CPT

## 2024-10-28 PROCEDURE — 97110 THERAPEUTIC EXERCISES: CPT

## 2024-10-28 NOTE — FLOWSHEET NOTE
[facility]- Outpatient Rehabilitation and Therapy [address] [place] [address] [place] [address] office: [phone] fax: [phone]         Physical Therapy: TREATMENT/PROGRESS NOTE   Patient: Kaya Robbins (74 y.o. female)   Examination Date: 10/28/2024   :  1950 MRN: [id number]   Visit #: 6   Insurance Allowable Auth Needed   BMN- auth pending [x]Yes    []No    Insurance: Payor: [facility] MEDICARE / Plan: UNITEDHEALTHCARE DUAL COMPLETE / Product Type: *No Product type* /   Insurance ID: [id number] - (Medicare Managed)  Secondary Insurance (if applicable):    Treatment Diagnosis:     ICD-10-CM    1. General unsteadiness  R26.81       2. Action tremor  G25.2       3. Decreased functional activity tolerance  R68.89          Medical Diagnosis:  Unspecified abnormalities of gait and mobility [R26.9]   Referring Physician: Olegario Hester MD  PCP: Nayely García MD     Plan of care signed (Y/N): yes    Date of Patient follow up with Physician: JORGE/GAGANDEEP boyle/ Mir after imaging.      Plan of Care Report: NO  POC update due: (10 visits /OR AUTH LIMITS, whichever is less)   PN due 24 or 10 visits  POC due in 24 visits or 90 days Recert                                           Medical History:  Comorbidities:  Hypertension  Osteoporosis/Osteopenia  Depression  Other: acute on chronic respiratory faliure on chronic, hld, essential tremor, hearing loss, copd,   Relevant Medical History: uses o2 NOC and PRN during the day                                         Precautions/ Contra-indications:           Latex allergy:  NO  Pacemaker:    NO  Contraindications for Manipulation: None  Other: USES OXYGEN PRN DURING THE DAY    Red Flags:  Unusual loss of strength  Fever/chills/sweats  N/t  Change in appetite  B/b probs  Sob  Dizziness/vertigo  Seen by neurologist Dr. Hester    Suicide Screening:   The patient did not verbalize a primary behavioral concern, suicidal ideation, suicidal intent, or

## 2024-10-30 ENCOUNTER — HOSPITAL ENCOUNTER (OUTPATIENT)
Dept: NUCLEAR MEDICINE | Age: 74
Discharge: HOME | End: 2024-10-30
Payer: MEDICARE

## 2024-10-30 ENCOUNTER — APPOINTMENT (OUTPATIENT)
Dept: PHYSICAL THERAPY | Age: 74
End: 2024-10-30
Payer: MEDICARE

## 2024-10-30 DIAGNOSIS — R25.9 MIXED ACTION AND RESTING TREMOR: ICD-10-CM

## 2024-10-30 PROCEDURE — 3430000000 HC RX DIAGNOSTIC RADIOPHARMACEUTICAL: Performed by: STUDENT IN AN ORGANIZED HEALTH CARE EDUCATION/TRAINING PROGRAM

## 2024-10-30 PROCEDURE — A9584 IODINE I-123 IOFLUPANE: HCPCS | Performed by: STUDENT IN AN ORGANIZED HEALTH CARE EDUCATION/TRAINING PROGRAM

## 2024-10-30 PROCEDURE — 6370000000 HC RX 637 (ALT 250 FOR IP): Performed by: STUDENT IN AN ORGANIZED HEALTH CARE EDUCATION/TRAINING PROGRAM

## 2024-10-30 PROCEDURE — 78803 RP LOCLZJ TUM SPECT 1 AREA: CPT

## 2024-10-30 RX ORDER — IODINE SOLUTION STRONG 5% (LUGOL'S) 5 %
0.4 SOLUTION ORAL ONCE
Status: COMPLETED | OUTPATIENT
Start: 2024-10-30 | End: 2024-10-30

## 2024-10-30 RX ADMIN — IOFLUPANE I-123 4.9 MILLICURIE: 2 INJECTION, SOLUTION INTRAVENOUS at 10:48

## 2024-10-30 RX ADMIN — IODINE SOLUTION STRONG 5% (LUGOL'S) 0.4 ML: 5 SOLUTION at 09:00

## 2024-11-13 RX ORDER — TIOTROPIUM BROMIDE 18 UG/1
CAPSULE ORAL; RESPIRATORY (INHALATION)
Qty: 90 CAPSULE | Refills: 0 | Status: SHIPPED | OUTPATIENT
Start: 2024-11-13

## 2024-11-18 ENCOUNTER — APPOINTMENT (OUTPATIENT)
Dept: CT IMAGING | Age: 74
DRG: 543 | End: 2024-11-18
Payer: MEDICARE

## 2024-11-18 ENCOUNTER — HOSPITAL ENCOUNTER (INPATIENT)
Age: 74
LOS: 2 days | Discharge: NURSING, REHAB OR LONG-TERM CARE | DRG: 543 | End: 2024-11-20
Attending: EMERGENCY MEDICINE | Admitting: INTERNAL MEDICINE
Payer: MEDICARE

## 2024-11-18 ENCOUNTER — APPOINTMENT (OUTPATIENT)
Dept: GENERAL RADIOLOGY | Age: 74
DRG: 543 | End: 2024-11-18
Payer: MEDICARE

## 2024-11-18 DIAGNOSIS — S32.82XA MULTIPLE CLOSED FRACTURES OF PELVIS WITHOUT DISRUPTION OF PELVIC RING, INITIAL ENCOUNTER (HCC): Primary | ICD-10-CM
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
## 2024-11-18 PROBLEM — S32.810A PELVIC RING FRACTURE, CLOSED, INITIAL ENCOUNTER (HCC): Status: ACTIVE | Noted: 2024-11-18

## 2024-11-18 LAB
ANION GAP SERPL CALCULATED.3IONS-SCNC: 13 MMOL/L (ref 3–16)
BASOPHILS # BLD: 0.1 K/UL (ref 0–0.2)
BASOPHILS NFR BLD: 0.5 %
BUN SERPL-MCNC: 28 MG/DL (ref 7–20)
CALCIUM SERPL-MCNC: 8.5 MG/DL (ref 8.3–10.6)
CHLORIDE SERPL-SCNC: 102 MMOL/L (ref 99–110)
CO2 SERPL-SCNC: 26 MMOL/L (ref 21–32)
CREAT SERPL-MCNC: 0.8 MG/DL (ref 0.6–1.2)
DEPRECATED RDW RBC AUTO: 15.7 % (ref 12.4–15.4)
EOSINOPHIL # BLD: 0 K/UL (ref 0–0.6)
EOSINOPHIL NFR BLD: 0.1 %
GFR SERPLBLD CREATININE-BSD FMLA CKD-EPI: 77 ML/MIN/{1.73_M2}
GLUCOSE BLD-MCNC: 91 MG/DL (ref 70–99)
GLUCOSE SERPL-MCNC: 94 MG/DL (ref 70–99)
HCT VFR BLD AUTO: 36.7 % (ref 36–48)
HGB BLD-MCNC: 11.8 G/DL (ref 12–16)
LYMPHOCYTES # BLD: 0.7 K/UL (ref 1–5.1)
LYMPHOCYTES NFR BLD: 5.1 %
MCH RBC QN AUTO: 28.5 PG (ref 26–34)
MCHC RBC AUTO-ENTMCNC: 32.2 G/DL (ref 31–36)
MCV RBC AUTO: 88.3 FL (ref 80–100)
MONOCYTES # BLD: 1.2 K/UL (ref 0–1.3)
MONOCYTES NFR BLD: 8.2 %
NEUTROPHILS # BLD: 12.3 K/UL (ref 1.7–7.7)
NEUTROPHILS NFR BLD: 86.1 %
PERFORMED ON: NORMAL
PLATELET # BLD AUTO: 419 K/UL (ref 135–450)
PMV BLD AUTO: 8.4 FL (ref 5–10.5)
POTASSIUM SERPL-SCNC: 3.9 MMOL/L (ref 3.5–5.1)
RBC # BLD AUTO: 4.16 M/UL (ref 4–5.2)
SODIUM SERPL-SCNC: 141 MMOL/L (ref 136–145)
WBC # BLD AUTO: 14.3 K/UL (ref 4–11)

## 2024-11-18 PROCEDURE — G0378 HOSPITAL OBSERVATION PER HR: HCPCS

## 2024-11-18 PROCEDURE — 6370000000 HC RX 637 (ALT 250 FOR IP): Performed by: INTERNAL MEDICINE

## 2024-11-18 PROCEDURE — 93005 ELECTROCARDIOGRAM TRACING: CPT | Performed by: EMERGENCY MEDICINE

## 2024-11-18 PROCEDURE — 80048 BASIC METABOLIC PNL TOTAL CA: CPT

## 2024-11-18 PROCEDURE — 73552 X-RAY EXAM OF FEMUR 2/>: CPT

## 2024-11-18 PROCEDURE — 74176 CT ABD & PELVIS W/O CONTRAST: CPT

## 2024-11-18 PROCEDURE — 2580000003 HC RX 258: Performed by: INTERNAL MEDICINE

## 2024-11-18 PROCEDURE — 6370000000 HC RX 637 (ALT 250 FOR IP): Performed by: EMERGENCY MEDICINE

## 2024-11-18 PROCEDURE — 85025 COMPLETE CBC W/AUTO DIFF WBC: CPT

## 2024-11-18 PROCEDURE — 99285 EMERGENCY DEPT VISIT HI MDM: CPT

## 2024-11-18 PROCEDURE — 1200000000 HC SEMI PRIVATE

## 2024-11-18 PROCEDURE — 2700000000 HC OXYGEN THERAPY PER DAY

## 2024-11-18 PROCEDURE — 94640 AIRWAY INHALATION TREATMENT: CPT

## 2024-11-18 PROCEDURE — 94761 N-INVAS EAR/PLS OXIMETRY MLT: CPT

## 2024-11-18 RX ORDER — QUETIAPINE FUMARATE 25 MG/1
25 TABLET, FILM COATED ORAL NIGHTLY
Status: DISCONTINUED | OUTPATIENT
Start: 2024-11-18 | End: 2024-11-20 | Stop reason: HOSPADM

## 2024-11-18 RX ORDER — ENOXAPARIN SODIUM 100 MG/ML
30 INJECTION SUBCUTANEOUS DAILY
Status: DISCONTINUED | OUTPATIENT
Start: 2024-11-19 | End: 2024-11-20 | Stop reason: HOSPADM

## 2024-11-18 RX ORDER — ONDANSETRON 4 MG/1
4 TABLET, ORALLY DISINTEGRATING ORAL EVERY 8 HOURS PRN
Status: DISCONTINUED | OUTPATIENT
Start: 2024-11-18 | End: 2024-11-20 | Stop reason: HOSPADM

## 2024-11-18 RX ORDER — POLYETHYLENE GLYCOL 3350 17 G/17G
17 POWDER, FOR SOLUTION ORAL DAILY PRN
Status: DISCONTINUED | OUTPATIENT
Start: 2024-11-18 | End: 2024-11-20 | Stop reason: HOSPADM

## 2024-11-18 RX ORDER — ALBUTEROL SULFATE 0.83 MG/ML
2.5 SOLUTION RESPIRATORY (INHALATION) EVERY 4 HOURS PRN
Status: DISCONTINUED | OUTPATIENT
Start: 2024-11-18 | End: 2024-11-20 | Stop reason: HOSPADM

## 2024-11-18 RX ORDER — OXYCODONE AND ACETAMINOPHEN 5; 325 MG/1; MG/1
1 TABLET ORAL EVERY 4 HOURS PRN
Status: DISCONTINUED | OUTPATIENT
Start: 2024-11-18 | End: 2024-11-20 | Stop reason: HOSPADM

## 2024-11-18 RX ORDER — ASPIRIN 81 MG/1
81 TABLET ORAL DAILY
Status: DISCONTINUED | OUTPATIENT
Start: 2024-11-19 | End: 2024-11-20 | Stop reason: HOSPADM

## 2024-11-18 RX ORDER — SODIUM CHLORIDE 0.9 % (FLUSH) 0.9 %
5-40 SYRINGE (ML) INJECTION PRN
Status: DISCONTINUED | OUTPATIENT
Start: 2024-11-18 | End: 2024-11-20 | Stop reason: HOSPADM

## 2024-11-18 RX ORDER — GLUCAGON 1 MG/ML
1 KIT INJECTION PRN
Status: DISCONTINUED | OUTPATIENT
Start: 2024-11-18 | End: 2024-11-20 | Stop reason: HOSPADM

## 2024-11-18 RX ORDER — HYDROCODONE BITARTRATE AND ACETAMINOPHEN 5; 325 MG/1; MG/1
1 TABLET ORAL
Status: COMPLETED | OUTPATIENT
Start: 2024-11-18 | End: 2024-11-18

## 2024-11-18 RX ORDER — FLUTICASONE PROPIONATE 50 MCG
2 SPRAY, SUSPENSION (ML) NASAL DAILY PRN
Status: DISCONTINUED | OUTPATIENT
Start: 2024-11-18 | End: 2024-11-20 | Stop reason: HOSPADM

## 2024-11-18 RX ORDER — DILTIAZEM HYDROCHLORIDE 120 MG/1
120 CAPSULE, COATED, EXTENDED RELEASE ORAL DAILY
Status: DISCONTINUED | OUTPATIENT
Start: 2024-11-19 | End: 2024-11-20 | Stop reason: HOSPADM

## 2024-11-18 RX ORDER — SODIUM CHLORIDE 9 MG/ML
INJECTION, SOLUTION INTRAVENOUS PRN
Status: DISCONTINUED | OUTPATIENT
Start: 2024-11-18 | End: 2024-11-20 | Stop reason: HOSPADM

## 2024-11-18 RX ORDER — POTASSIUM CHLORIDE 1500 MG/1
40 TABLET, EXTENDED RELEASE ORAL PRN
Status: DISCONTINUED | OUTPATIENT
Start: 2024-11-18 | End: 2024-11-20 | Stop reason: HOSPADM

## 2024-11-18 RX ORDER — ACETAMINOPHEN 325 MG/1
650 TABLET ORAL EVERY 6 HOURS PRN
Status: DISCONTINUED | OUTPATIENT
Start: 2024-11-18 | End: 2024-11-20 | Stop reason: HOSPADM

## 2024-11-18 RX ORDER — POTASSIUM CHLORIDE 7.45 MG/ML
10 INJECTION INTRAVENOUS PRN
Status: DISCONTINUED | OUTPATIENT
Start: 2024-11-18 | End: 2024-11-20 | Stop reason: HOSPADM

## 2024-11-18 RX ORDER — PANTOPRAZOLE SODIUM 40 MG/1
40 TABLET, DELAYED RELEASE ORAL
Status: DISCONTINUED | OUTPATIENT
Start: 2024-11-19 | End: 2024-11-20 | Stop reason: HOSPADM

## 2024-11-18 RX ORDER — CALCIUM CARBONATE 500(1250)
2 TABLET ORAL DAILY
Status: DISCONTINUED | OUTPATIENT
Start: 2024-11-19 | End: 2024-11-20 | Stop reason: HOSPADM

## 2024-11-18 RX ORDER — SODIUM CHLORIDE 0.9 % (FLUSH) 0.9 %
5-40 SYRINGE (ML) INJECTION EVERY 12 HOURS SCHEDULED
Status: DISCONTINUED | OUTPATIENT
Start: 2024-11-18 | End: 2024-11-20 | Stop reason: HOSPADM

## 2024-11-18 RX ORDER — ONDANSETRON 2 MG/ML
4 INJECTION INTRAMUSCULAR; INTRAVENOUS EVERY 6 HOURS PRN
Status: DISCONTINUED | OUTPATIENT
Start: 2024-11-18 | End: 2024-11-20 | Stop reason: HOSPADM

## 2024-11-18 RX ORDER — UREA 10 %
1000 LOTION (ML) TOPICAL DAILY
Status: DISCONTINUED | OUTPATIENT
Start: 2024-11-19 | End: 2024-11-20 | Stop reason: HOSPADM

## 2024-11-18 RX ORDER — DEXTROSE MONOHYDRATE 100 MG/ML
INJECTION, SOLUTION INTRAVENOUS CONTINUOUS PRN
Status: DISCONTINUED | OUTPATIENT
Start: 2024-11-18 | End: 2024-11-20 | Stop reason: HOSPADM

## 2024-11-18 RX ORDER — MAGNESIUM SULFATE IN WATER 40 MG/ML
2000 INJECTION, SOLUTION INTRAVENOUS PRN
Status: DISCONTINUED | OUTPATIENT
Start: 2024-11-18 | End: 2024-11-20 | Stop reason: HOSPADM

## 2024-11-18 RX ORDER — SODIUM CHLORIDE 9 MG/ML
INJECTION, SOLUTION INTRAVENOUS CONTINUOUS
Status: ACTIVE | OUTPATIENT
Start: 2024-11-18 | End: 2024-11-19

## 2024-11-18 RX ORDER — ACETAMINOPHEN 650 MG/1
650 SUPPOSITORY RECTAL EVERY 6 HOURS PRN
Status: DISCONTINUED | OUTPATIENT
Start: 2024-11-18 | End: 2024-11-20 | Stop reason: HOSPADM

## 2024-11-18 RX ORDER — BUDESONIDE AND FORMOTEROL FUMARATE DIHYDRATE 160; 4.5 UG/1; UG/1
2 AEROSOL RESPIRATORY (INHALATION)
Status: DISCONTINUED | OUTPATIENT
Start: 2024-11-18 | End: 2024-11-20 | Stop reason: HOSPADM

## 2024-11-18 RX ORDER — INSULIN LISPRO 100 [IU]/ML
0-4 INJECTION, SOLUTION INTRAVENOUS; SUBCUTANEOUS
Status: DISCONTINUED | OUTPATIENT
Start: 2024-11-18 | End: 2024-11-20 | Stop reason: HOSPADM

## 2024-11-18 RX ADMIN — HYDROCODONE BITARTRATE AND ACETAMINOPHEN 1 TABLET: 5; 325 TABLET ORAL at 16:24

## 2024-11-18 RX ADMIN — OXYCODONE HYDROCHLORIDE AND ACETAMINOPHEN 1 TABLET: 5; 325 TABLET ORAL at 23:25

## 2024-11-18 RX ADMIN — SODIUM CHLORIDE: 9 INJECTION, SOLUTION INTRAVENOUS at 23:27

## 2024-11-18 RX ADMIN — BUDESONIDE AND FORMOTEROL FUMARATE DIHYDRATE 2 PUFF: 160; 4.5 AEROSOL RESPIRATORY (INHALATION) at 22:57

## 2024-11-18 RX ADMIN — QUETIAPINE FUMARATE 25 MG: 25 TABLET ORAL at 23:26

## 2024-11-18 ASSESSMENT — PAIN DESCRIPTION - ORIENTATION
ORIENTATION: LEFT;RIGHT;LOWER
ORIENTATION: RIGHT;LEFT

## 2024-11-18 ASSESSMENT — PAIN DESCRIPTION - LOCATION
LOCATION: ABDOMEN;PELVIS
LOCATION: BACK;ABDOMEN;HIP

## 2024-11-18 ASSESSMENT — PAIN - FUNCTIONAL ASSESSMENT
PAIN_FUNCTIONAL_ASSESSMENT: 0-10
PAIN_FUNCTIONAL_ASSESSMENT: PREVENTS OR INTERFERES SOME ACTIVE ACTIVITIES AND ADLS

## 2024-11-18 ASSESSMENT — PAIN DESCRIPTION - DESCRIPTORS: DESCRIPTORS: ACHING;DISCOMFORT

## 2024-11-18 ASSESSMENT — PAIN SCALES - GENERAL
PAINLEVEL_OUTOF10: 4
PAINLEVEL_OUTOF10: 4
PAINLEVEL_OUTOF10: 2
PAINLEVEL_OUTOF10: 2

## 2024-11-18 ASSESSMENT — PAIN DESCRIPTION - PAIN TYPE: TYPE: ACUTE PAIN

## 2024-11-18 NOTE — ED PROVIDER NOTES
EMERGENCY MEDICINE PROVIDER NOTE    Patient Identification  Pt Name: Kaya Robbins  MRN: [id number]  Birthdate 1950  Date of evaluation: 11/18/2024  Provider: Kenneth Oconnor MD  PCP: Nayely García MD    Chief Complaint  Fall and Hip Pain      HPI  (History provided by patient)  This is a 74 y.o. female PMH including COPD on home 2 L oxygen, HLD, HTN, DM2 who was brought in by EMS transportation for evaluation following a fall occurring earlier today.  Patient states she was walking outside and accidentally tripped over a log and fell impacting her right hip.  Patient states she has had pain to the right hip, abdomen, lower back since that time.  Hip pain worsened with movement.  Denies history of surgeries to the right hip.  Takes ASA 81 daily.  Denies impact to head or LOC.  Denies other symptoms including measured fevers, chest pain, shortness of breath increased from baseline.    I have reviewed the following nursing documentation:  Allergies: Patient has no known allergies.    Past medical history:   Past Medical History:   Diagnosis Date    Acute on chronic respiratory failure with hypoxia 12/03/2009    Chronic airway obstruction, not elsewhere classified 03/04/2008    oxygen 2L/min at HS and PRN through day    Chronic kidney disease     incontinent    COPD (chronic obstructive pulmonary disease) (HCC)     Depression     Displacement of lumbar intervertebral disc without myelopathy 08/13/2007    Edema 09/13/2007    Emphysema of lung (HCC)     Enthesopathy of hip region 03/05/2007    Esophageal reflux 03/04/2008    Essential tremor 2023    Hearing loss 2017    Hemorrhage of rectum and anus 11/14/2007    Herpes zoster without complication     History of blood transfusion     Hyperlipidemia associated with type 2 diabetes mellitus (HCC) 12/11/2017    Hypertension     Lumbar spondylosis 05/01/2018    Major depression, chronic 08/12/2015    Osteoporosis, unspecified 12/04/2007    Other and  oriented fractures of the bilateral pubic bodies  and medial aspects of the superior pubic rami and a nondisplaced fracture of  the left sacral ala.  2. No acute findings elsewhere in the abdomen or pelvis.    CT lumbar spine without contrast is without acute findings in the lumbar spine.  Received oral Norco in department for pain with improvement.  Discussed patient with Dr. Razo, orthopedics on-call, recommends pain control, no acute surgical intervention needed currently, potential admission if needed for PT OT. NWB on left, WBAT right.  At this time feel that patient will require admission for further pain control and PT OT as well given her multiple pelvic fractures.  Discussed findings and plan of care with patient and family at bedside who expressed understanding and agreement with plan of care.  Discussed patient with hospitalist service.  Patient admitted to hospitalist service.    Final Impression  1. Multiple closed fractures of pelvis without disruption of pelvic ring, initial encounter ([facility])    2. Fall from standing, initial encounter        Blood pressure 121/76, pulse 100, temperature 98.1 °F (36.7 °C), temperature source Oral, resp. rate 18, height 1.524 m (5'), weight 41.8 kg (92 lb 1.6 oz), SpO2 99%, not currently breastfeeding.     Disposition:  DISPOSITION Admitted 11/18/2024 06:57:49 PM           Patient Referrals:  Axel Razo MD  [address] [facility] [address] [phone]    Schedule an appointment as soon as possible for a visit in 2 week(s)  follwo up on pelvic fractures      Discharge Medications:  Current Discharge Medication List          This chart was generated using the Dragon dictation system. I created this record but it may contain dictation errors given the limitations of this technology.       Kenneth Oconnor MD  11/19/24 4979

## 2024-11-18 NOTE — ED NOTES
1759 - Perfect Serve sent to Dr. Razo for Orthopedic consult.    1800 - Dr. Razo called and spoke with Dr. Oconnor at this time.

## 2024-11-18 NOTE — ED TRIAGE NOTES
Patient presents to the ED via EMS from home with c/o trip and fall today. Patient was outside when she tripped over a log and fell backwards onto her right side. Patient reporting lower back pain, lower abdomen pain, and bilateral hip pain. EMS states patient was ambulatory to their cot. Not on thinners. Denies LOC. Denies head injury. Given 75mcg Fentanyl, 4mg Zofran by EMS.

## 2024-11-18 NOTE — PLAN OF CARE
Fall   Multiple pelvic fractures    Ortho consult     - per ER doc-> they spoke to ortho dr. Razo .  NWB left   WBAT R

## 2024-11-19 PROBLEM — S32.82XA MULTIPLE CLOSED PELVIC FRACTURES WITHOUT DISRUPTION OF PELVIC CIRCLE (HCC): Status: ACTIVE | Noted: 2024-11-19
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
## 2024-11-19 PROBLEM — E11.9 TYPE 2 DIABETES MELLITUS WITHOUT COMPLICATION (HCC): Status: ACTIVE | Noted: 2024-11-19

## 2024-11-19 LAB
ALBUMIN SERPL-MCNC: 3 G/DL (ref 3.4–5)
ALBUMIN/GLOB SERPL: 1.2 {RATIO} (ref 1.1–2.2)
ALP SERPL-CCNC: 125 U/L (ref 40–129)
ALT SERPL-CCNC: <5 U/L (ref 10–40)
ANION GAP SERPL CALCULATED.3IONS-SCNC: 10 MMOL/L (ref 3–16)
AST SERPL-CCNC: 10 U/L (ref 15–37)
BASOPHILS # BLD: 0 K/UL (ref 0–0.2)
BASOPHILS NFR BLD: 0.4 %
BILIRUB SERPL-MCNC: <0.2 MG/DL (ref 0–1)
BUN SERPL-MCNC: 20 MG/DL (ref 7–20)
CALCIUM SERPL-MCNC: 8.1 MG/DL (ref 8.3–10.6)
CHLORIDE SERPL-SCNC: 103 MMOL/L (ref 99–110)
CO2 SERPL-SCNC: 25 MMOL/L (ref 21–32)
CREAT SERPL-MCNC: 0.6 MG/DL (ref 0.6–1.2)
DEPRECATED RDW RBC AUTO: 15 % (ref 12.4–15.4)
EKG ATRIAL RATE: 118 BPM
EKG DIAGNOSIS: NORMAL
EKG P AXIS: 81 DEGREES
EKG P-R INTERVAL: 194 MS
EKG Q-T INTERVAL: 296 MS
EKG QRS DURATION: 88 MS
EKG QTC CALCULATION (BAZETT): 414 MS
EKG R AXIS: 77 DEGREES
EKG T AXIS: 23 DEGREES
EKG VENTRICULAR RATE: 118 BPM
EOSINOPHIL # BLD: 0 K/UL (ref 0–0.6)
EOSINOPHIL NFR BLD: 0.3 %
EST. AVERAGE GLUCOSE BLD GHB EST-MCNC: 116.9 MG/DL
GFR SERPLBLD CREATININE-BSD FMLA CKD-EPI: >90 ML/MIN/{1.73_M2}
GLUCOSE BLD-MCNC: 101 MG/DL (ref 70–99)
GLUCOSE BLD-MCNC: 126 MG/DL (ref 70–99)
GLUCOSE BLD-MCNC: 153 MG/DL (ref 70–99)
GLUCOSE BLD-MCNC: 80 MG/DL (ref 70–99)
GLUCOSE SERPL-MCNC: 89 MG/DL (ref 70–99)
HBA1C MFR BLD: 5.7 %
HCT VFR BLD AUTO: 33.7 % (ref 36–48)
HGB BLD-MCNC: 11 G/DL (ref 12–16)
LYMPHOCYTES # BLD: 0.9 K/UL (ref 1–5.1)
LYMPHOCYTES NFR BLD: 10.6 %
MCH RBC QN AUTO: 28.4 PG (ref 26–34)
MCHC RBC AUTO-ENTMCNC: 32.6 G/DL (ref 31–36)
MCV RBC AUTO: 87.1 FL (ref 80–100)
MONOCYTES # BLD: 1.1 K/UL (ref 0–1.3)
MONOCYTES NFR BLD: 12.1 %
NEUTROPHILS # BLD: 6.8 K/UL (ref 1.7–7.7)
NEUTROPHILS NFR BLD: 76.6 %
PERFORMED ON: ABNORMAL
PERFORMED ON: NORMAL
PLATELET # BLD AUTO: 355 K/UL (ref 135–450)
PMV BLD AUTO: 8.3 FL (ref 5–10.5)
POTASSIUM SERPL-SCNC: 3.9 MMOL/L (ref 3.5–5.1)
PROT SERPL-MCNC: 5.5 G/DL (ref 6.4–8.2)
RBC # BLD AUTO: 3.86 M/UL (ref 4–5.2)
SODIUM SERPL-SCNC: 138 MMOL/L (ref 136–145)
WBC # BLD AUTO: 8.9 K/UL (ref 4–11)

## 2024-11-19 PROCEDURE — 6370000000 HC RX 637 (ALT 250 FOR IP): Performed by: INTERNAL MEDICINE

## 2024-11-19 PROCEDURE — 6360000002 HC RX W HCPCS: Performed by: INTERNAL MEDICINE

## 2024-11-19 PROCEDURE — 2700000000 HC OXYGEN THERAPY PER DAY

## 2024-11-19 PROCEDURE — 80053 COMPREHEN METABOLIC PANEL: CPT

## 2024-11-19 PROCEDURE — 94640 AIRWAY INHALATION TREATMENT: CPT

## 2024-11-19 PROCEDURE — G0378 HOSPITAL OBSERVATION PER HR: HCPCS

## 2024-11-19 PROCEDURE — 97535 SELF CARE MNGMENT TRAINING: CPT

## 2024-11-19 PROCEDURE — 97162 PT EVAL MOD COMPLEX 30 MIN: CPT

## 2024-11-19 PROCEDURE — 36415 COLL VENOUS BLD VENIPUNCTURE: CPT

## 2024-11-19 PROCEDURE — 99223 1ST HOSP IP/OBS HIGH 75: CPT | Performed by: INTERNAL MEDICINE

## 2024-11-19 PROCEDURE — 97530 THERAPEUTIC ACTIVITIES: CPT

## 2024-11-19 PROCEDURE — 1200000000 HC SEMI PRIVATE

## 2024-11-19 PROCEDURE — 99221 1ST HOSP IP/OBS SF/LOW 40: CPT | Performed by: PHYSICIAN ASSISTANT

## 2024-11-19 PROCEDURE — 85025 COMPLETE CBC W/AUTO DIFF WBC: CPT

## 2024-11-19 PROCEDURE — 97166 OT EVAL MOD COMPLEX 45 MIN: CPT

## 2024-11-19 PROCEDURE — 96372 THER/PROPH/DIAG INJ SC/IM: CPT

## 2024-11-19 PROCEDURE — 83036 HEMOGLOBIN GLYCOSYLATED A1C: CPT

## 2024-11-19 PROCEDURE — 94761 N-INVAS EAR/PLS OXIMETRY MLT: CPT

## 2024-11-19 PROCEDURE — 2580000003 HC RX 258: Performed by: INTERNAL MEDICINE

## 2024-11-19 PROCEDURE — 93010 ELECTROCARDIOGRAM REPORT: CPT | Performed by: INTERNAL MEDICINE

## 2024-11-19 PROCEDURE — 6370000000 HC RX 637 (ALT 250 FOR IP): Performed by: NURSE PRACTITIONER

## 2024-11-19 RX ORDER — ROFLUMILAST 500 UG/1
500 TABLET ORAL DAILY
Status: DISCONTINUED | OUTPATIENT
Start: 2024-11-19 | End: 2024-11-20 | Stop reason: HOSPADM

## 2024-11-19 RX ORDER — POTASSIUM CHLORIDE 1500 MG/1
20 TABLET, EXTENDED RELEASE ORAL
Status: DISCONTINUED | OUTPATIENT
Start: 2024-11-19 | End: 2024-11-20 | Stop reason: HOSPADM

## 2024-11-19 RX ORDER — GUAIFENESIN 600 MG/1
600 TABLET, EXTENDED RELEASE ORAL 2 TIMES DAILY
Status: DISCONTINUED | OUTPATIENT
Start: 2024-11-19 | End: 2024-11-20 | Stop reason: HOSPADM

## 2024-11-19 RX ORDER — THEOPHYLLINE 300 MG/1
300 TABLET, EXTENDED RELEASE ORAL DAILY
Status: DISCONTINUED | OUTPATIENT
Start: 2024-11-19 | End: 2024-11-20 | Stop reason: HOSPADM

## 2024-11-19 RX ORDER — ATORVASTATIN CALCIUM 10 MG/1
10 TABLET, FILM COATED ORAL DAILY
Status: DISCONTINUED | OUTPATIENT
Start: 2024-11-19 | End: 2024-11-20 | Stop reason: HOSPADM

## 2024-11-19 RX ORDER — VENLAFAXINE HYDROCHLORIDE 75 MG/1
75 CAPSULE, EXTENDED RELEASE ORAL
Status: DISCONTINUED | OUTPATIENT
Start: 2024-11-19 | End: 2024-11-20 | Stop reason: HOSPADM

## 2024-11-19 RX ORDER — TROSPIUM CHLORIDE 20 MG/1
20 TABLET, FILM COATED ORAL
Status: DISCONTINUED | OUTPATIENT
Start: 2024-11-19 | End: 2024-11-20 | Stop reason: HOSPADM

## 2024-11-19 RX ORDER — TOPIRAMATE 25 MG/1
50 TABLET, FILM COATED ORAL
Status: DISCONTINUED | OUTPATIENT
Start: 2024-11-19 | End: 2024-11-20 | Stop reason: HOSPADM

## 2024-11-19 RX ORDER — FUROSEMIDE 40 MG/1
40 TABLET ORAL DAILY
Status: DISCONTINUED | OUTPATIENT
Start: 2024-11-19 | End: 2024-11-20 | Stop reason: HOSPADM

## 2024-11-19 RX ADMIN — SODIUM CHLORIDE, PRESERVATIVE FREE 10 ML: 5 INJECTION INTRAVENOUS at 22:40

## 2024-11-19 RX ADMIN — GUAIFENESIN 600 MG: 600 TABLET, EXTENDED RELEASE ORAL at 22:35

## 2024-11-19 RX ADMIN — TROSPIUM CHLORIDE 20 MG: 20 TABLET, FILM COATED ORAL at 16:39

## 2024-11-19 RX ADMIN — ROFLUMILAST 500 MCG: 500 TABLET ORAL at 10:06

## 2024-11-19 RX ADMIN — ENOXAPARIN SODIUM 30 MG: 100 INJECTION SUBCUTANEOUS at 09:14

## 2024-11-19 RX ADMIN — GUAIFENESIN 600 MG: 600 TABLET, EXTENDED RELEASE ORAL at 10:07

## 2024-11-19 RX ADMIN — TOPIRAMATE 50 MG: 25 TABLET, FILM COATED ORAL at 22:36

## 2024-11-19 RX ADMIN — BUDESONIDE AND FORMOTEROL FUMARATE DIHYDRATE 2 PUFF: 160; 4.5 AEROSOL RESPIRATORY (INHALATION) at 06:40

## 2024-11-19 RX ADMIN — VENLAFAXINE HYDROCHLORIDE 75 MG: 75 CAPSULE, EXTENDED RELEASE ORAL at 10:06

## 2024-11-19 RX ADMIN — FUROSEMIDE 40 MG: 40 TABLET ORAL at 10:07

## 2024-11-19 RX ADMIN — BUDESONIDE AND FORMOTEROL FUMARATE DIHYDRATE 2 PUFF: 160; 4.5 AEROSOL RESPIRATORY (INHALATION) at 18:58

## 2024-11-19 RX ADMIN — ASPIRIN 81 MG: 81 TABLET, COATED ORAL at 09:14

## 2024-11-19 RX ADMIN — CHOLECALCIFEROL TAB 125 MCG (5000 UNIT) 5000 UNITS: 125 TAB at 09:14

## 2024-11-19 RX ADMIN — Medication 1000 MG: at 09:14

## 2024-11-19 RX ADMIN — Medication 1000 MCG: at 09:14

## 2024-11-19 RX ADMIN — DILTIAZEM HYDROCHLORIDE 120 MG: 120 CAPSULE, COATED, EXTENDED RELEASE ORAL at 09:14

## 2024-11-19 RX ADMIN — OXYCODONE HYDROCHLORIDE AND ACETAMINOPHEN 1 TABLET: 5; 325 TABLET ORAL at 16:38

## 2024-11-19 RX ADMIN — ATORVASTATIN CALCIUM 10 MG: 10 TABLET, FILM COATED ORAL at 10:06

## 2024-11-19 RX ADMIN — POTASSIUM CHLORIDE 20 MEQ: 1500 TABLET, EXTENDED RELEASE ORAL at 10:07

## 2024-11-19 RX ADMIN — POLYETHYLENE GLYCOL (3350) 17 G: 17 POWDER, FOR SOLUTION ORAL at 22:35

## 2024-11-19 RX ADMIN — PANTOPRAZOLE SODIUM 40 MG: 40 TABLET, DELAYED RELEASE ORAL at 06:04

## 2024-11-19 RX ADMIN — THEOPHYLLINE 300 MG: 300 TABLET, EXTENDED RELEASE ORAL at 10:12

## 2024-11-19 RX ADMIN — QUETIAPINE FUMARATE 25 MG: 25 TABLET ORAL at 22:35

## 2024-11-19 ASSESSMENT — PAIN DESCRIPTION - ORIENTATION: ORIENTATION: RIGHT;LEFT

## 2024-11-19 ASSESSMENT — PAIN SCALES - GENERAL
PAINLEVEL_OUTOF10: 5
PAINLEVEL_OUTOF10: 0

## 2024-11-19 ASSESSMENT — PAIN DESCRIPTION - LOCATION: LOCATION: ABDOMEN

## 2024-11-19 ASSESSMENT — PAIN DESCRIPTION - DESCRIPTORS: DESCRIPTORS: STABBING

## 2024-11-19 ASSESSMENT — PAIN - FUNCTIONAL ASSESSMENT: PAIN_FUNCTIONAL_ASSESSMENT: ACTIVITIES ARE NOT PREVENTED

## 2024-11-19 NOTE — DISCHARGE INSTR - COC
Continuity of Care Form    Patient Name: Kaya Robbins   :  1950  MRN:  [id number]    Admit date:  2024  Discharge date:  24    Code Status Order: Full Code   Advance Directives:   Advance Care Flowsheet Documentation             Admitting Physician:  Yoana Goel MD  PCP: Nayely García MD    Discharging Nurse: Quita  Discharging Hospital Unit/Room#: 0205/0205-01  Discharging Unit Phone Number: [phone]    Emergency Contact:   Extended Emergency Contact Information  Primary Emergency Contact: Agata Huitron  Address: [address] [place] [address] [place] [address] [facility]  Home Phone: [phone]  Mobile Phone: [phone]  Relation: Child    Past Surgical History:  Past Surgical History:   Procedure Laterality Date    ABDOMEN SURGERY  11    total abdominal colectomy iliostomy    CARPAL TUNNEL RELEASE      right    CATARACT REMOVAL Bilateral     L 2013, R 2013    CATARACT REMOVAL WITH IMPLANT Right 13    COLECTOMY      and reversal     COLONOSCOPY  , 2011, 10/28/15    normal    CYST REMOVAL Right 14    Dr. Marks; right ear pressure point cyst removal    DILATATION, ESOPHAGUS      ENDOSCOPY, COLON, DIAGNOSTIC      EPIDURAL STEROID INJECTION Bilateral 2019    BILATERAL LUMBAR FOUR TRANSFORAMINAL EPIDURAL STEROID INJECTION SITE CONFIRMED BY FLUOROSCOPY performed by Susana Morton MD at [facility] OR    EYE SURGERY Left 13    cataract with lens implant    HERNIA REPAIR  14    Open Vental Hernia Repair    HIP SURGERY      HYSTERECTOMY (CERVIX STATUS UNKNOWN)  1973    subtotal    NECK SURGERY      incision of windpipe, repair of windpipe defect    OTHER SURGICAL HISTORY  2011    hand assisted laparoscopic ileostomy reversal    OVARY REMOVAL      TRACHEAL SURGERY      hx of trach     UMBILICAL HERNIA REPAIR  2014       Immunization History:   Immunization History   Administered Date(s) Administered    Influenza

## 2024-11-19 NOTE — FLOWSHEET NOTE
11/19/24 0330   Vital Signs   Temp 97.8 °F (36.6 °C)   Temp Source Oral   Pulse 88   Heart Rate Source Monitor   Respirations 18   /68   MAP (Calculated) 84   BP Location Left upper arm   Pain Assessment   Pain Assessment None - Denies Pain   Oxygen Therapy   SpO2 99 %   O2 Device Nasal cannula   O2 Flow Rate (L/min) 2 L/min   Height and Weight   Weight - Scale 41.8 kg (92 lb 1.6 oz)   Weight Method Actual;Bed scale   BMI (Calculated) 18     Pt resting in bed, denies pain. Resp easy/even. Radha Andrade, RN

## 2024-11-19 NOTE — PROGRESS NOTES
Patient is not able to demonstrated the ability to move from a reclining position to an upright position within the recliner. however patient is alert, oriented and able to provide informed consent    Bedside Mobility Assessment Tool (BMAT):     Assessment Level 1- Sit and Shake    1. From a semi-reclined position, ask patient to sit up and rotate to a seated position at the side of the bed. Can use the bedrail.    2. Ask patient to reach out and grab your hand and shake making sure patient reaches across his/her midline.   Pass- Patient is able to come to a seated position, maintain core strength. Maintains seated balance while reaching across midline. Move on to Assessment Level 2.     Assessment Level 2- Stretch and Point   1. With patient in seated position at the side of the bed, have patient place both feet on the floor (or stool) with knees no higher than hips.    2. Ask patient to stretch one leg and straighten the knee, then bend the ankle/flex and point the toes. If appropriate, repeat with the other leg.   Fail- Patient is unable to complete task. Patient is MOBILITY LEVEL 2.     Assessment Level 3- Stand   1. Ask patient to elevate off the bed or chair (seated to standing) using an assistive device (cane, bedrail).    2. Patient should be able to raise buttocks off be and hold for a count of five. May repeat once.   Fail- Patient unable to demonstrate standing stability. Patient is MOBILITY LEVEL 3.     Assessment Level 4- Walk   1. Ask patient to march in place at bedside.    2. Then ask patient to advance step and return each foot. Some medical conditions may render a patient from stepping backwards, use your best clinical judgement.   Fail- Patient not able to complete tasks OR requires use of assistive device. Patient is MOBILITY LEVEL 3.       Mobility Level- 3

## 2024-11-19 NOTE — DISCHARGE INSTRUCTIONS
Your information:  Name: Kaya Robbins  : 1950    Your instructions:    Follow up with family doctor in 1 week.    What to do after you leave the hospital:    Recommended diet: diabetic diet    Recommended activity: activity as tolerated        The following personal items were collected during your admission and were returned to you:    Belongings  Dental Appliances: None  Vision - Corrective Lenses: None  Hearing Aid: None  Clothing: Footwear, Jacket/Coat, Pants, Shirt, Socks, Undergarments, Sweater, At bedside  Jewelry: None  Body Piercings Removed: N/A  Electronic Devices: Cell Phone, At bedside  Weapons (Notify Protective Services/Security): None  Other Valuables: Wallet, At bedside  Home Medications: None  Valuables Given To: Patient  Provide Name(s) of Who Valuable(s) Were Given To: Kaya Robbins  Responsible person(s) in the waiting room: n/a  Patient approves for provider to speak to responsible person post operatively: Yes    Information obtained by:  By signing below, I understand that if any problems occur once I leave the hospital I am to contact family doctor.  I understand and acknowledge receipt of the instructions indicated above. Toe touch WB on the left leg   WBAT on the right leg for strand, pivot and transfer with use of walker  Follow up in 2 weeks for repeat xrays to ensure fracture stability with Dr. Razo

## 2024-11-19 NOTE — PROGRESS NOTES
Pt medicated for pain 5/10 in abdomen.  Medicated with PRN Percocet.  See MAR and pain flow sheet.  No further assistance needed at this time. Will continue to monitor patient

## 2024-11-19 NOTE — PROGRESS NOTES
Admission questions and screenings completed at this time. All medications reviewed, patient has had daily medications.

## 2024-11-19 NOTE — PROGRESS NOTES
bilateral pubic bodies   and medial aspects of the superior pubic rami and a nondisplaced fracture of   the left sacral ala.   2. No acute findings elsewhere in the abdomen or pelvis.      Per ortho consult:  1) no plans for surgical intervention with the pelvic fractures at this time will plan for conservative management of the fractures.   2) work with PT/OT to help with assessment for SNF WBAT on the right leg for stand, pivot and transfer and TTWB on the left leg.   3) plan for follow up with Dr. Razo in 2-3 weeks for repeat xrays of the pelvis to ensure no noted change in the fractures and to adjust WB status as appropriate.     Preadmission Environment:   Pt. Lives                                              with family (daughter, son-in-law, grandson); no one available 24/7 to physically assist  Home environment:                            one story home with basement; doesn't need to go to [facility]  Steps to enter first floor:                     Ramp  Steps to second floor/basement:        Full flight of 12-13  Laundry:                                              1st floor  Bathroom:                                           tub/shower unit, grab bars in shower, shower chair , and standard height toilet  Pt sleeps in a:                                     Flat bed  Equipment owned:                              RW, rollator, SPC, quad cane, electric scooter, and home O2 (2L) PRN during the day, continuous at night     Preadmission Status:  Pt. Able to drive:                                 Yes  Pt. Fully independent with ADLs:       Yes  Pt. Required assistance for:               Independent PTA  Pt. independent for functional transfers and utilized No Device for mobility in home and No Device out in community; uses cane if needed  History of falls:                                    Yes, 2 in the past month  Home Health Services:                       None    AM-PAC Mobility Score    AM-PAC Inpatient  achieving goals include:    Complexity of condition, Chronicity of condition, Pain, and Weakness    Plan    To be seen 3-5 x/ week while in acute care setting for therapeutic exercises/activities, bed mobility training, functional transfer training, family/patient education, balance training, and gait training.    Signature: Axel Sesay PT     If patient discharges from this facility prior to next visit, this note will serve as the Discharge Summary.    CHF Education  N/A

## 2024-11-19 NOTE — PROGRESS NOTES
4 Eyes Skin Assessment     NAME:  Kaya Robbins  YOB: 1950  MEDICAL RECORD NUMBER:  [id number]    The patient is being assessed for  Admission    I agree that at least one RN has performed a thorough Head to Toe Skin Assessment on the patient. ALL assessment sites listed below have been assessed.      Areas assessed by both nurses:    Head, Face, Ears, Shoulders, Back, Chest, Arms, Elbows, Hands, Sacrum. Buttock, Coccyx, Ischium, Legs. Feet and Heels, Under Medical Devices , and Other          Does the Patient have a Wound? No noted wound(s)       Johnny Prevention initiated by RN: No  Wound Care Orders initiated by RN: No    Pressure Injury (Stage 3,4, Unstageable, DTI, NWPT, and Complex wounds) if present, place Wound referral order by RN under : No    New Ostomies, if present place, Ostomy referral order under : No     Nurse 1 eSignature: Electronically signed by Radha Andrade RN on 11/19/24 at 3:18 AM EST    **SHARE this note so that the co-signing nurse can place an eSignature**    Nurse 2 eSignature: Electronically signed by Basia Barragan RN on 11/19/24 at 3:19 AM EST

## 2024-11-19 NOTE — CARE COORDINATION
Case Management Assessment  Initial Evaluation    Date/Time of Evaluation: 11/19/2024 8:54 AM  Assessment Completed by: Radha Bazan    If patient is discharged prior to next notation, then this note serves as note for discharge by case management.    Patient Name: Kaya Robbins                   YOB: 1950  Diagnosis: Fall from standing, initial encounter [W19.XXXA]  Pelvic ring fracture, closed, initial encounter ([facility]) [S32.810A]  Multiple closed fractures of pelvis without disruption of pelvic ring, initial encounter ([facility]) [S32.82XA]                   Date / Time: 11/18/2024  3:34 PM    Patient Admission Status: Inpatient   Readmission Risk (Low < 19, Mod (19-27), High > 27): Readmission Risk Score: 16.8    Current PCP: Nayely Wright MD  PCP verified by CM? Yes (Dr. wright)    Chart Reviewed: Yes      History Provided by: Patient  Patient Orientation: Alert and Oriented    Patient Cognition: Alert    Hospitalization in the last 30 days (Readmission):  No    If yes, Readmission Assessment in  Navigator will be completed.    Advance Directives:      Code Status: Full Code   Patient's Primary Decision Maker is: Patient Declined (Legal Next of Kin Remains as Decision Maker)      Discharge Planning:    Patient lives with: Children Type of Home: House  Primary Care Giver: Self  Patient Support Systems include: Children   Current Financial resources: Medicare  Current community resources: None  Current services prior to admission: None            Current DME:              Type of Home Care services:  None    ADLS  Prior functional level: Independent in ADLs/IADLs  Current functional level: Assistance with the following:, Shopping, Mobility, Housework, Cooking    PT AM-PAC:   /24  OT AM-PAC:   /24    Family can provide assistance at DC: Yes  Would you like Case Management to discuss the discharge plan with any other family members/significant others, and if so, who? No  Plans to Return to  Present Housing: Unknown at present  Other Identified Issues/Barriers to RETURNING to current housing: none  Potential Assistance needed at discharge: Skilled Nursing Facility, Home Care            Potential DME:    Patient expects to discharge to: Unknown  Plan for transportation at discharge:      Financial    Payor: [facility] MEDICARE / Plan: [employer] DUAL COMPLETE / Product Type: *No Product type* /     Does insurance require precert for SNF: Yes    Potential assistance Purchasing Medications: No  Meds-to-Beds request: Yes      [id number] - [place], [place] - [address] - P [phone] - F [phone]  [address]  [facility] [address]  Phone: [phone] Fax: [phone]    [facility] - [place], [place] - [address] - P [phone] - F [phone]  [address]  [address]  [facility] [address]  Phone: [phone] Fax: [phone]      Notes:    Factors facilitating achievement of predicted outcomes: Motivated, Cooperative, and Pleasant    Barriers to discharge: PT recs, surgery recs    Additional Case Management Notes: Reviewed chart and met with pt at beside. From house with daughter, plan to return at [place]. IPTA. + . No DME or HC PTA. No needs identified at this time. CM following, will need PT recs.       The Plan for Transition of Care is related to the following treatment goals of Fall from standing, initial encounter [W19.XXXA]  Pelvic ring fracture, closed, initial encounter (HCC) [S32.810A]  Multiple closed fractures of pelvis without disruption of pelvic ring, initial encounter (HCC) [S32.82XA]    IF APPLICABLE: The Patient and/or patient representative Kaya and her family were provided with a choice of provider and agrees with the discharge plan. Freedom of choice list with basic dialogue that supports the patient's individualized plan of care/goals and shares the quality data associated with the providers was provided to:

## 2024-11-19 NOTE — PROGRESS NOTES
Inpatient Occupational Therapy Evaluation & Treatment    Unit: 2 [place]  Date:  11/19/2024  Patient Name:    Kaya Robbins  Admitting diagnosis:  Fall from standing, initial encounter [W19.XXXA]  Pelvic ring fracture, closed, initial encounter ([facility]) [S32.810A]  Multiple closed fractures of pelvis without disruption of pelvic ring, initial encounter ([facility]) [S32.82XA]  Admit Date:  11/18/2024  Precautions/Restrictions/WB Status/ Lines/ Wounds/ Oxygen: Fall risk, Bed/chair alarm, Lines (IV, Supplemental O2 (2L), and external catheter), and Specific Ortho Precautions:   WBAT R LE, TTWB LLE    Pt seen for cotreatment this date due to patient safety, patient endurance, acute illness/injury, and limited functional status information    Treatment Time:  1931-2625  Treatment Number:  1  Timed Code Treatment Minutes: 28 minutes  Total Treatment Minutes:  38  minutes    Patient Goals for Therapy: none stated          Discharge Recommendations: SNF  DME needs for discharge: Defer to facility       Therapy recommendations for staff:   Assist of 1 for sitting EOB    History of Present Illness: Per Dr. Goel H&P 11/19/24:  \"74 y.o. female with COPD/asthma, DM type 2, osteopenia, hypertension, hyperlipidemia, Depression, GERD, Tremor, chronic respiratory failure who presents to [facility] with c/o fall and hip pain.   Patient states that she was walking backwards in her yard yesterday and tripped and fell on the driveway.  She hurt her right hip.  She was able to walk back into the home.   Continued to have persistent right hip pain back pain and right-sided abdominal pain, hence presented to the ER.  Pain was severe and an 8 on a scale of 1-10 in the ED\"    CT ABDOMEN PELVIS WO CONTRAST Additional Contrast? None   Final Result   1. Nondisplaced coronally oriented fractures of the bilateral pubic bodies   and medial aspects of the superior pubic rami and a nondisplaced fracture of   the left sacral ala.   2. No acute findings

## 2024-11-19 NOTE — FLOWSHEET NOTE
11/18/24 2203   Vital Signs   Temp 98.6 °F (37 °C)   Temp Source Oral   Pulse 95   Heart Rate Source Monitor   Respirations 18   /70   MAP (Calculated) 85   BP Location Left upper arm   BP Method Automatic   Patient Position Semi fowlers   Oxygen Therapy   SpO2 100 %   Pulse Oximeter Device Mode Continuous   O2 Device Nasal cannula   O2 Flow Rate (L/min) 2 L/min     Took over care of pt from Frederic MCQUEEN. Admission assessment completed. Pt c/o pelvic pain/abd pain. Percocet 1 po given. Radha Andrade, RN

## 2024-11-19 NOTE — H&P
Hospital Medicine History & Physical      PCP: Nayely García MD    Date of Admission: 11/18/2024    Date of Service: Pt seen/examined on 11/19/2024     Chief Complaint:    Chief Complaint   Patient presents with    Fall    Hip Pain       History Of Present Illness:      The patient is a 74 y.o. female with COPD/asthma, DM type 2, osteopenia, hypertension, hyperlipidemia, Depression, GERD, Tremor, chronic respiratory failure who presents to [facility] with c/o fall and hip pain.   Patient states that she was walking backwards in her yard yesterday and tripped and fell on the driveway.  She hurt her right hip.  She was able to walk back into the home.   Continued to have persistent right hip pain back pain and right-sided abdominal pain, hence presented to the ER.  Pain was severe and an 8 on a scale of 1-10 in the ED.   Workup in the ED  Vitals stable.  SpO2 stable on 2 L O2.  Labs initially with leukocytosis.  Imaging with multiple pubic rami fractures and nondisplaced fracture of left sacral ala.    Admitted to med-surg.  Orthopedic surgery consulted.      Patient seen.  She is ill and fatigued but in no distress.  Awake and well-oriented.   Complains of pain in the right hip and right lower quadrant abdominal area and pelvic area, pain is about a 3 now on a scale of 1-10.    Patient denies any head injury with the fall , denies any syncope, denies any dizziness, no chest pains or shortness of breath.   O2 sats are stable on 2 L she is on chronic home O2    Past Medical History:        Diagnosis Date    Acute on chronic respiratory failure with hypoxia 12/03/2009    Chronic airway obstruction, not elsewhere classified 03/04/2008    oxygen 2L/min at HS and PRN through day    Chronic kidney disease     incontinent    COPD (chronic obstructive pulmonary disease) (HCC)     Depression     Displacement of lumbar intervertebral disc without myelopathy 08/13/2007    Edema 09/13/2007    Emphysema of lung  exposed extremities.   M/S: No cyanosis. No joint deformity. No clubbing.   Right hip area tender  Neuro: Awake. Grossly nonfocal    Psych: Oriented x 3. No anxiety or agitation.     CBC:   Recent Labs     11/18/24  1630 11/19/24  0509   WBC 14.3* 8.9   HGB 11.8* 11.0*   HCT 36.7 33.7*   MCV 88.3 87.1    355     BMP:   Recent Labs     11/18/24  1630 11/19/24  0509    138   K 3.9 3.9    103   CO2 26 25   BUN 28* 20   CREATININE 0.8 0.6     LIVER PROFILE:   Recent Labs     11/19/24  0509   AST 10*   ALT <5*   BILITOT <0.2   ALKPHOS 125         U/A:    Lab Results   Component Value Date/Time    NITRITE neg 02/29/2024 01:36 PM    COLORU yellow 02/29/2024 01:36 PM    COLORU Straw 11/05/2018 01:53 PM    CLARITYU clear 02/29/2024 01:36 PM    CLARITYU Clear 11/05/2018 01:53 PM    SPECGRAV 1.020 02/29/2024 01:36 PM    LEUKOCYTESUR neg 02/29/2024 01:36 PM    LEUKOCYTESUR Negative 11/05/2018 01:53 PM    BLOODU neg 02/29/2024 01:36 PM    BLOODU Negative 11/05/2018 01:53 PM    GLUCOSEU neg 02/29/2024 01:36 PM    GLUCOSEU Negative 11/05/2018 01:53 PM    GLUCOSEU NEGATIVE 05/29/2011 01:15 PM       ABG    Lab Results   Component Value Date/Time    SFS8RFV 30.5 05/24/2011 04:05 PM    BEART 6.7 05/24/2011 04:05 PM    J2BOHWEN 95.4 05/24/2011 04:05 PM    PHART 7.493 05/24/2011 04:05 PM    THGBART 11.6 05/24/2011 04:05 PM    FEM3WNA 40.7 05/24/2011 04:05 PM    PO2ART 71.1 05/24/2011 04:05 PM    FZZ0WMH 31.8 05/24/2011 04:05 PM       CULTURES  Results       ** No results found for the last 336 hours. **            EKG:  I have reviewed the EKG with the following interpretation:   Sinus tachycardia, Nonspecific T wave abnormality     RADIOLOGY  CT LUMBAR SPINE BONY RECONSTRUCTION   Final Result   1. No acute lumbar spine fracture.   2. Nondisplaced fracture of the left sacral ala.   3. Mild multilevel degenerative disc disease and multilevel facet arthropathy   in the spine.         CT ABDOMEN PELVIS WO CONTRAST

## 2024-11-19 NOTE — PLAN OF CARE
Problem: Chronic Conditions and Co-morbidities  Goal: Patient's chronic conditions and co-morbidity symptoms are monitored and maintained or improved  11/19/2024 1228 by Quita Haines RN  Outcome: Progressing     Problem: Discharge Planning  Goal: Discharge to home or other facility with appropriate resources  11/19/2024 1228 by Quita Haines RN  Outcome: Progressing     Problem: Pain  Goal: Verbalizes/displays adequate comfort level or baseline comfort level  11/19/2024 1228 by Quita Haines RN  Outcome: Progressing     Problem: Safety - Adult  Goal: Free from fall injury  11/19/2024 1228 by Quita Haines RN  Outcome: Progressing     Problem: Musculoskeletal - Adult  Goal: Return mobility to safest level of function  11/19/2024 1228 by Quita Haines RN  Outcome: Progressing     Problem: Gastrointestinal - Adult  Goal: Maintains adequate nutritional intake  11/19/2024 1228 by Quita Haines RN  Outcome: Progressing     Problem: Genitourinary - Adult  Goal: Absence of urinary retention  11/19/2024 1228 by Quita Haines RN  Outcome: Progressing

## 2024-11-20 VITALS
BODY MASS INDEX: 19.61 KG/M2 | WEIGHT: 99.9 LBS | SYSTOLIC BLOOD PRESSURE: 133 MMHG | TEMPERATURE: 98.5 F | RESPIRATION RATE: 18 BRPM | HEIGHT: 60 IN | HEART RATE: 102 BPM | DIASTOLIC BLOOD PRESSURE: 71 MMHG | OXYGEN SATURATION: 100 %

## 2024-11-20 LAB
BASOPHILS # BLD: 0 K/UL (ref 0–0.2)
BASOPHILS NFR BLD: 0.5 %
DEPRECATED RDW RBC AUTO: 15.2 % (ref 12.4–15.4)
EOSINOPHIL # BLD: 0.1 K/UL (ref 0–0.6)
EOSINOPHIL NFR BLD: 0.9 %
GLUCOSE BLD-MCNC: 101 MG/DL (ref 70–99)
GLUCOSE BLD-MCNC: 123 MG/DL (ref 70–99)
HCT VFR BLD AUTO: 33.3 % (ref 36–48)
HGB BLD-MCNC: 10.9 G/DL (ref 12–16)
LYMPHOCYTES # BLD: 1.1 K/UL (ref 1–5.1)
LYMPHOCYTES NFR BLD: 12.1 %
MCH RBC QN AUTO: 28.3 PG (ref 26–34)
MCHC RBC AUTO-ENTMCNC: 32.9 G/DL (ref 31–36)
MCV RBC AUTO: 86 FL (ref 80–100)
MONOCYTES # BLD: 1.2 K/UL (ref 0–1.3)
MONOCYTES NFR BLD: 12.5 %
NEUTROPHILS # BLD: 6.9 K/UL (ref 1.7–7.7)
NEUTROPHILS NFR BLD: 74 %
PERFORMED ON: ABNORMAL
PERFORMED ON: ABNORMAL
PLATELET # BLD AUTO: 354 K/UL (ref 135–450)
PMV BLD AUTO: 8.6 FL (ref 5–10.5)
RBC # BLD AUTO: 3.87 M/UL (ref 4–5.2)
WBC # BLD AUTO: 9.3 K/UL (ref 4–11)

## 2024-11-20 PROCEDURE — 6360000002 HC RX W HCPCS: Performed by: INTERNAL MEDICINE

## 2024-11-20 PROCEDURE — G0378 HOSPITAL OBSERVATION PER HR: HCPCS

## 2024-11-20 PROCEDURE — 6370000000 HC RX 637 (ALT 250 FOR IP): Performed by: INTERNAL MEDICINE

## 2024-11-20 PROCEDURE — 36415 COLL VENOUS BLD VENIPUNCTURE: CPT

## 2024-11-20 PROCEDURE — 6370000000 HC RX 637 (ALT 250 FOR IP): Performed by: NURSE PRACTITIONER

## 2024-11-20 PROCEDURE — 2700000000 HC OXYGEN THERAPY PER DAY

## 2024-11-20 PROCEDURE — 94640 AIRWAY INHALATION TREATMENT: CPT

## 2024-11-20 PROCEDURE — 51798 US URINE CAPACITY MEASURE: CPT

## 2024-11-20 PROCEDURE — 85025 COMPLETE CBC W/AUTO DIFF WBC: CPT

## 2024-11-20 PROCEDURE — 94761 N-INVAS EAR/PLS OXIMETRY MLT: CPT

## 2024-11-20 PROCEDURE — 96372 THER/PROPH/DIAG INJ SC/IM: CPT

## 2024-11-20 PROCEDURE — 2580000003 HC RX 258: Performed by: INTERNAL MEDICINE

## 2024-11-20 RX ORDER — ENOXAPARIN SODIUM 100 MG/ML
30 INJECTION SUBCUTANEOUS DAILY
DISCHARGE
Start: 2024-11-21 | End: 2024-12-01

## 2024-11-20 RX ORDER — OXYCODONE AND ACETAMINOPHEN 5; 325 MG/1; MG/1
1 TABLET ORAL EVERY 4 HOURS PRN
Qty: 10 TABLET | Refills: 0 | Status: SHIPPED | OUTPATIENT
Start: 2024-11-20 | End: 2024-11-23

## 2024-11-20 RX ORDER — ALBUTEROL SULFATE 0.83 MG/ML
2.5 SOLUTION RESPIRATORY (INHALATION)
Status: DISCONTINUED | OUTPATIENT
Start: 2024-11-20 | End: 2024-11-20 | Stop reason: HOSPADM

## 2024-11-20 RX ADMIN — CHOLECALCIFEROL TAB 125 MCG (5000 UNIT) 5000 UNITS: 125 TAB at 08:46

## 2024-11-20 RX ADMIN — Medication 1000 MG: at 08:45

## 2024-11-20 RX ADMIN — BUDESONIDE AND FORMOTEROL FUMARATE DIHYDRATE 2 PUFF: 160; 4.5 AEROSOL RESPIRATORY (INHALATION) at 07:21

## 2024-11-20 RX ADMIN — VENLAFAXINE HYDROCHLORIDE 75 MG: 75 CAPSULE, EXTENDED RELEASE ORAL at 08:46

## 2024-11-20 RX ADMIN — DILTIAZEM HYDROCHLORIDE 120 MG: 120 CAPSULE, COATED, EXTENDED RELEASE ORAL at 08:46

## 2024-11-20 RX ADMIN — POTASSIUM CHLORIDE 20 MEQ: 1500 TABLET, EXTENDED RELEASE ORAL at 08:46

## 2024-11-20 RX ADMIN — ASPIRIN 81 MG: 81 TABLET, COATED ORAL at 08:45

## 2024-11-20 RX ADMIN — PANTOPRAZOLE SODIUM 40 MG: 40 TABLET, DELAYED RELEASE ORAL at 05:36

## 2024-11-20 RX ADMIN — ATORVASTATIN CALCIUM 10 MG: 10 TABLET, FILM COATED ORAL at 08:45

## 2024-11-20 RX ADMIN — SODIUM CHLORIDE, PRESERVATIVE FREE 10 ML: 5 INJECTION INTRAVENOUS at 08:46

## 2024-11-20 RX ADMIN — FUROSEMIDE 40 MG: 40 TABLET ORAL at 08:45

## 2024-11-20 RX ADMIN — ALBUTEROL SULFATE 2.5 MG: 2.5 SOLUTION RESPIRATORY (INHALATION) at 07:22

## 2024-11-20 RX ADMIN — THEOPHYLLINE 300 MG: 300 TABLET, EXTENDED RELEASE ORAL at 08:44

## 2024-11-20 RX ADMIN — GUAIFENESIN 600 MG: 600 TABLET, EXTENDED RELEASE ORAL at 08:45

## 2024-11-20 RX ADMIN — ENOXAPARIN SODIUM 30 MG: 100 INJECTION SUBCUTANEOUS at 08:47

## 2024-11-20 RX ADMIN — Medication 1000 MCG: at 08:45

## 2024-11-20 RX ADMIN — TROSPIUM CHLORIDE 20 MG: 20 TABLET, FILM COATED ORAL at 05:36

## 2024-11-20 RX ADMIN — TIOTROPIUM BROMIDE INHALATION SPRAY 2 PUFF: 3.12 SPRAY, METERED RESPIRATORY (INHALATION) at 07:21

## 2024-11-20 RX ADMIN — ROFLUMILAST 500 MCG: 500 TABLET ORAL at 08:45

## 2024-11-20 NOTE — DISCHARGE SUMMARY
Physician Discharge Summary     Patient ID:  Kaya CORNEJO Rosendo  [id number]  74 y.o.  1950    Admit date: 11/18/2024    Discharge date and time: 11/20/2024  2:19 PM     Admitting Physician: Yoana Goel MD     Discharge Physician: Yoana Goel MD    Admission Diagnoses: Fall from standing, initial encounter [W19.XXXA]  Pelvic ring fracture, closed, initial encounter ([facility]) [S32.810A]  Multiple closed fractures of pelvis without disruption of pelvic ring, initial encounter ([facility]) [S32.82XA]    Discharge Diagnoses: Principal Problem:    Pelvic ring fracture, closed, initial encounter ([facility])  Active Problems:    Multiple closed pelvic fractures without disruption of pelvic [place] (HCC)    Fall from standing    Type 2 diabetes mellitus without complication ([facility])  Resolved Problems:    * No resolved hospital problems. *      Discharged Condition: stable    Indication for Admission:    Per HPI     The patient is a 74 y.o. female with COPD/asthma, DM type 2, osteopenia, hypertension, hyperlipidemia, Depression, GERD, Tremor, chronic respiratory failure who presents to [facility] with c/o fall and hip pain.   Patient states that she was walking backwards in her yard yesterday and tripped and fell on the driveway.  She hurt her right hip.  She was able to walk back into the home.   Continued to have persistent right hip pain back pain and right-sided abdominal pain, hence presented to the ER.  Pain was severe and an 8 on a scale of 1-10 in the ED.   Workup in the ED  Vitals stable.  SpO2 stable on 2 L O2.  Labs initially with leukocytosis.  Imaging with multiple pubic rami fractures and nondisplaced fracture of left sacral ala.    Admitted to med-surg.  Orthopedic surgery consulted.       Patient seen.  She is ill and fatigued but in no distress.  Awake and well-oriented.   Complains of pain in the right hip and right lower quadrant abdominal area and pelvic area, pain is about a 3 now on a scale of 1-10.     Patient  you. Read the directions carefully, and ask your doctor or other care provider to review them with you.                   Where to Get Your Medications        You can get these medications from any pharmacy    Bring a paper prescription for each of these medications  oxyCODONE-acetaminophen 5-325 MG per tablet       Information about where to get these medications is not yet available    Ask your nurse or doctor about these medications  enoxaparin Sodium 30 MG/0.3ML injection       Activity: {discharge activity:48583}  Diet: {diet:14480}  Wound Care: {wound care:28046}    Follow-up with *** in {0-10:46561} {units:11}.    Signed:  Yoana Goel MD  11/20/2024  12:19 PM

## 2024-11-20 NOTE — PLAN OF CARE
Problem: Chronic Conditions and Co-morbidities  Goal: Patient's chronic conditions and co-morbidity symptoms are monitored and maintained or improved  11/20/2024 1223 by Quita Haines RN  Outcome: Adequate for Discharge     Problem: Discharge Planning  Goal: Discharge to home or other facility with appropriate resources  11/20/2024 1223 by Quita Haines RN  Outcome: Adequate for Discharge     Problem: Pain  Goal: Verbalizes/displays adequate comfort level or baseline comfort level  11/20/2024 1223 by Quita Haines RN  Outcome: Adequate for Discharge     Problem: Safety - Adult  Goal: Free from fall injury  11/20/2024 1223 by Quita Haines RN  Outcome: Adequate for Discharge     Problem: Musculoskeletal - Adult  Goal: Return mobility to safest level of function  11/20/2024 1223 by Quita Haines RN  Outcome: Adequate for Discharge     Problem: Gastrointestinal - Adult  Goal: Maintains adequate nutritional intake  Outcome: Completed  Flowsheets (Taken 11/19/2024 2223 by Ellen Payton, RN)  Maintains adequate nutritional intake: Monitor percentage of each meal consumed     Problem: Genitourinary - Adult  Goal: Absence of urinary retention  11/20/2024 1223 by Quita Haines RN  Outcome: Adequate for Discharge

## 2024-11-20 NOTE — PROGRESS NOTES
Transferred care to CHARISSE Simpson. Face to face bedside report given, no need voiced at this time. Pt in bed with eyes closed.  Pt awake in bed.   No signs of distress noted.  Call light within reach.   Denies needs.

## 2024-11-20 NOTE — PROGRESS NOTES
11/20/24 0700   RT Protocol   History Pulmonary Disease 2   Respiratory pattern 2   Breath sounds 2   Cough 1   Indications for Bronchodilator Therapy Decreased or absent breath sounds;On home bronchodilators   Bronchodilator Assessment Score 7

## 2024-11-20 NOTE — PROGRESS NOTES
4 Eyes Skin Assessment     NAME:  Kaya Robbins  YOB: 1950  MEDICAL RECORD NUMBER:  [id number]    The patient is being assessed for  Transfer to New Unit    I agree that at least one RN has performed a thorough Head to Toe Skin Assessment on the patient. ALL assessment sites listed below have been assessed.      Areas assessed by both nurses:bruising right ankle.Scattered bruising and abrasions    Head, Face, Ears, Shoulders, Back, Chest, Arms, Elbows, Hands, Sacrum. Buttock, Coccyx, Ischium, Legs. Feet and Heels, and Under Medical Devices         Does the Patient have a Wound? No noted wound(s)       Johnny Prevention initiated by RN: No  Wound Care Orders initiated by RN: No    Pressure Injury (Stage 3,4, Unstageable, DTI, NWPT, and Complex wounds) if present, place Wound referral order by RN under : No    New Ostomies, if present place, Ostomy referral order under : No     Nurse 1 eSignature: Electronically signed by Quita Haines RN on 11/20/24 at 12:51 PM EST    **SHARE this note so that the co-signing nurse can place an eSignature**    Nurse 2 eSignature: Electronically signed by Josefa Vieyra RN on 11/20/24 at 1:00 PM EST

## 2024-11-20 NOTE — PLAN OF CARE
Problem: Chronic Conditions and Co-morbidities  Goal: Patient's chronic conditions and co-morbidity symptoms are monitored and maintained or improved  11/20/2024 0948 by Quita Haines RN  Outcome: Progressing     Problem: Discharge Planning  Goal: Discharge to home or other facility with appropriate resources  11/20/2024 0948 by Quita Haines RN  Outcome: Progressing     Problem: Pain  Goal: Verbalizes/displays adequate comfort level or baseline comfort level  Outcome: Progressing  Flowsheets  Taken 11/20/2024 0330 by Ellen Payton RN  Verbalizes/displays adequate comfort level or baseline comfort level: Encourage patient to monitor pain and request assistance  Taken 11/19/2024 2245 by Ellen Payton RN  Verbalizes/displays adequate comfort level or baseline comfort level: Encourage patient to monitor pain and request assistance     Problem: Safety - Adult  Goal: Free from fall injury  Outcome: Progressing     Problem: Musculoskeletal - Adult  Goal: Return mobility to safest level of function  Outcome: Progressing  Flowsheets (Taken 11/19/2024 2223 by Ellen Payton RN)  Return Mobility to Safest Level of Function: Assess patient stability and activity tolerance for standing, transferring and ambulating with or without assistive devices     Problem: Gastrointestinal - Adult  Goal: Maintains adequate nutritional intake  Outcome: Completed  Flowsheets (Taken 11/19/2024 2223 by Ellen Payton RN)  Maintains adequate nutritional intake: Monitor percentage of each meal consumed

## 2024-11-20 NOTE — PLAN OF CARE
Problem: Chronic Conditions and Co-morbidities  Goal: Patient's chronic conditions and co-morbidity symptoms are monitored and maintained or improved  11/19/2024 2252 by Ellen Payton RN  Outcome: Progressing  Flowsheets (Taken 11/19/2024 2223)  Care Plan - Patient's Chronic Conditions and Co-Morbidity Symptoms are Monitored and Maintained or Improved: Monitor and assess patient's chronic conditions and comorbid symptoms for stability, deterioration, or improvement  11/19/2024 1228 by Quita Haines RN  Outcome: Progressing     Problem: Discharge Planning  Goal: Discharge to home or other facility with appropriate resources  11/19/2024 2252 by Ellen Payton RN  Outcome: Progressing  Flowsheets (Taken 11/19/2024 2223)  Discharge to home or other facility with appropriate resources: Identify barriers to discharge with patient and caregiver  11/19/2024 1228 by Quita Haines RN  Outcome: Progressing     Problem: Pain  Goal: Verbalizes/displays adequate comfort level or baseline comfort level  Recent Flowsheet Documentation  Taken 11/19/2024 2245 by Ellen Payton RN  Verbalizes/displays adequate comfort level or baseline comfort level: Encourage patient to monitor pain and request assistance  11/19/2024 1228 by Quita Haines RN  Outcome: Progressing     Problem: Safety - Adult  Goal: Free from fall injury  11/19/2024 1228 by Quita Haines RN  Outcome: Progressing     Problem: Musculoskeletal - Adult  Goal: Return mobility to safest level of function  Recent Flowsheet Documentation  Taken 11/19/2024 2223 by Ellen Payton RN  Return Mobility to Safest Level of Function: Assess patient stability and activity tolerance for standing, transferring and ambulating with or without assistive devices  11/19/2024 1228 by Quita Haines RN  Outcome: Progressing     Problem: Gastrointestinal - Adult  Goal: Maintains adequate nutritional intake  Recent Flowsheet Documentation  Taken 11/19/2024 2223 by Ellen Payton

## 2024-11-20 NOTE — CARE COORDINATION
Reviewed chart, met with pt bedside. Pt states wishes to stay in [place] to be close to family. Referral called to Martin at [facility]. Will need precert. CM following.    1040 - Spoke with [facility], they are able to accept. Mahnaz with CM to start precert now.     1100 - precert back, MD aware.

## 2024-11-20 NOTE — PROGRESS NOTES
PM Assessment completed. Pt does not express any pain or discomfort at this time. Respirations are even & easy. No complaints voiced. Pt denies needs at this time. SR up x 2, and bed in low position. Call light is within reach.    Bedside Mobility Assessment Tool (BMAT):     Assessment Level 1- Sit and Shake    1. From a semi-reclined position, ask patient to sit up and rotate to a seated position at the side of the bed. Can use the bedrail.    2. Ask patient to reach out and grab your hand and shake making sure patient reaches across his/her midline.   Pass- Patient is able to come to a seated position, maintain core strength. Maintains seated balance while reaching across midline. Move on to Assessment Level 2.     Assessment Level 2- Stretch and Point   1. With patient in seated position at the side of the bed, have patient place both feet on the floor (or stool) with knees no higher than hips.    2. Ask patient to stretch one leg and straighten the knee, then bend the ankle/flex and point the toes. If appropriate, repeat with the other leg.   Pass- Patient is able to demonstrate appropriate quad strength on intended weight bearing limb(s). Move onto Assessment Level 3.     Assessment Level 3- Stand   1. Ask patient to elevate off the bed or chair (seated to standing) using an assistive device (cane, bedrail).    2. Patient should be able to raise buttocks off be and hold for a count of five. May repeat once.   Pass- Patient maintains standing stability for at least 5 seconds, proceed to assessment level 4.    Assessment Level 4- Walk   1. Ask patient to march in place at bedside.    2. Then ask patient to advance step and return each foot. Some medical conditions may render a patient from stepping backwards, use your best clinical judgement.   Pass- Patient demonstrates balance while shifting weight and ability to step, takes independent steps, does not use assistive device patient is MOBILITY LEVEL  4.      Mobility Level- 4

## 2024-11-22 NOTE — PROGRESS NOTES
Physician Progress Note      PATIENT:               RAJENDRA HALL  [facility] #:                  [id number]  :                       1950  ADMIT DATE:       2024 3:34 PM  DISCH DATE:        2024 2:19 PM  RESPONDING  PROVIDER #:        Yoana Goel MD          QUERY TEXT:    Patient admitted with bilateral pelvic fractures. Noted documentation of   severe protein calorie malnutrition in H&P dated . Albumin3.0. In order   to support the diagnosis of severe protein calorie malnutrition, please   include additional clinical indicators in your documentation.  Or please   document if the diagnosis of severe protein calorie malnutrition has been   ruled out after further study.    The medical record reflects the following:  Risk Factors: CKD, DM, Fracture of bilateral pelvis  Clinical Indicators:   In H&P dated  severe protein calorie malnutrition.  BMI17.99  Weight-88 lb.  Albumin-3.0  Protein-5.5  Treatment: IVF, Dietitian consult ordered, Serial labs-albumin, protein   monitoring.      Thank you  ALAN Sethi, CDS.  Options provided:  -- Severe protein calorie malnutrition presents as evidenced by, Please   document evidence.  -- Severe protein calorie malnutrition was ruled out  -- Other - I will add my own diagnosis  -- Disagree - Not applicable / Not valid  -- Disagree - Clinically unable to determine / Unknown  -- Refer to Clinical Documentation Reviewer    PROVIDER RESPONSE TEXT:    Severe protein calorie malnutrition was ruled out after study.    Query created by: Ariadne Greene on 2024 8:27 AM      Electronically signed by:  Yoana Goel MD 2024 3:13 PM

## 2024-11-22 NOTE — PROGRESS NOTES
Physician Progress Note      PATIENT:               RAJENDRA HALL  [facility] #:                  [id number]  :                       1950  ADMIT DATE:       2024 3:34 PM  DISCH DATE:        2024 2:19 PM  RESPONDING  PROVIDER #:        SRAVANI GUARDADO          QUERY TEXT:    Pt admitted with bilateral pelvic fractures. Pt noted to have osteoporosis,   steroid induced osteopenia documented in Orthopedic surgery note dated .   If possible, please document in progress notes and discharge summary if you   are evaluating and/or treating any of the following:    The medical record reflects the following:  Risk Factors: Age-74yrs, Gender-Female, Osteopenia, osteoporosis, Fall  Clinical Indicators: In H&P dated  documented as Pelvic fracture,   multiple pubic rami fracture, Osteopenia, Osteoporosis.  In orthopedic surgery CN dated  documentation of Bilateral pelvic   fractures. No plans for surgical intervention with the pelvic fractures at   this time will plan for conservative management of the fractures. Steroid   induced osteopenia.  CT Lumbar spine -Nondisplaced fracture of the left sacral ala.  Treatment: Conservative management, Calcium element orally, Vitamin D3 orally,   Vitamin b-12 orally, IVF, XR femur, CT lumbar spine, Orthopedic surgery   consultation.    Thank you  ALAN Sethi, CDS  Options provided:  -- Pathological bilateral pelvic fracture due to osteopenia following fall   which would not usually break a normal, healthy bone  -- Osteoporotic bilateral pelvic fracture following fall which would not   usually break a normal, healthy bone  -- Traumatic bilateral pelvic fracture  -- Other - I will add my own diagnosis  -- Disagree - Not applicable / Not valid  -- Disagree - Clinically unable to determine / Unknown  -- Refer to Clinical Documentation Reviewer    PROVIDER RESPONSE TEXT:    This patient has an osteoporotic bilateral pelvic fracture following fall

## 2024-11-27 ENCOUNTER — TELEPHONE (OUTPATIENT)
Dept: ORTHOPEDIC SURGERY | Age: 74
End: 2024-11-27

## 2024-11-27 NOTE — TELEPHONE ENCOUNTER
Attemtped to call and speak with Mariella. I was on hold for 4 1/2 minutes. I then called back spoke with with the . I informed them that I was unable to reach Mariella. She states she would have to take a message and have her reach back out . I gave her the number for central scheduling and informed her to ask for either Vannesa or myself directly.

## 2024-11-27 NOTE — TELEPHONE ENCOUNTER
General Question     Subject: WEIGHT BEARING STATUS  Patient and /or Facility Request: [facility]  Contact Number: [phone]    NURSE JASMIN FROM [facility] CALLING NEEDING THE WEIGHT BEARING STAUS OF PATIENT. PLEASE CALL JASMIN AT THE NUMBER ABOVE.

## 2024-12-02 ENCOUNTER — OFFICE VISIT (OUTPATIENT)
Dept: ORTHOPEDIC SURGERY | Age: 74
End: 2024-12-02
Payer: MEDICARE

## 2024-12-02 VITALS — WEIGHT: 99 LBS | HEIGHT: 60 IN | BODY MASS INDEX: 19.44 KG/M2

## 2024-12-02 DIAGNOSIS — S32.110A CLOSED NONDISPLACED ZONE I FRACTURE OF SACRUM, INITIAL ENCOUNTER (HCC): ICD-10-CM

## 2024-12-02 DIAGNOSIS — S32.592A CLOSED BILATERAL FRACTURE OF PUBIC RAMI, INITIAL ENCOUNTER (HCC): Primary | ICD-10-CM

## 2024-12-02 DIAGNOSIS — S32.591A CLOSED BILATERAL FRACTURE OF PUBIC RAMI, INITIAL ENCOUNTER (HCC): Primary | ICD-10-CM

## 2024-12-02 DIAGNOSIS — M25.552 PAIN OF LEFT HIP: ICD-10-CM

## 2024-12-02 DIAGNOSIS — S32.810A PELVIC RING FRACTURE, CLOSED, INITIAL ENCOUNTER (HCC): ICD-10-CM

## 2024-12-02 PROCEDURE — 1124F ACP DISCUSS-NO DSCNMKR DOCD: CPT | Performed by: ORTHOPAEDIC SURGERY

## 2024-12-02 PROCEDURE — 3017F COLORECTAL CA SCREEN DOC REV: CPT | Performed by: ORTHOPAEDIC SURGERY

## 2024-12-02 PROCEDURE — G8420 CALC BMI NORM PARAMETERS: HCPCS | Performed by: ORTHOPAEDIC SURGERY

## 2024-12-02 PROCEDURE — 99204 OFFICE O/P NEW MOD 45 MIN: CPT | Performed by: ORTHOPAEDIC SURGERY

## 2024-12-02 PROCEDURE — 1111F DSCHRG MED/CURRENT MED MERGE: CPT | Performed by: ORTHOPAEDIC SURGERY

## 2024-12-02 PROCEDURE — G8427 DOCREV CUR MEDS BY ELIG CLIN: HCPCS | Performed by: ORTHOPAEDIC SURGERY

## 2024-12-02 PROCEDURE — 1159F MED LIST DOCD IN RCRD: CPT | Performed by: ORTHOPAEDIC SURGERY

## 2024-12-02 PROCEDURE — 1090F PRES/ABSN URINE INCON ASSESS: CPT | Performed by: ORTHOPAEDIC SURGERY

## 2024-12-02 PROCEDURE — G8399 PT W/DXA RESULTS DOCUMENT: HCPCS | Performed by: ORTHOPAEDIC SURGERY

## 2024-12-02 PROCEDURE — 1036F TOBACCO NON-USER: CPT | Performed by: ORTHOPAEDIC SURGERY

## 2024-12-02 PROCEDURE — G8484 FLU IMMUNIZE NO ADMIN: HCPCS | Performed by: ORTHOPAEDIC SURGERY

## 2024-12-02 NOTE — PROGRESS NOTES
ORTHOPAEDIC SURGERY H&P / CONSULTATION NOTE    Chief complaint:   Chief Complaint   Patient presents with    Hip Pain     NP L HIP      History of present illness: The patient is a 74 y.o. female with subjective symptoms of pelvis pain. The chief complaint is located at right greater than left midline groin. Duration of symptoms has been for 2 weeks. The severity of symptoms is rated at 1/10 pain currently on intake form.  Patient is accompanied by care assistant from her nursing facility.  She had fallen on 11/18/2024.  She was found to have ultimately pelvic ring injury and had been made weightbearing as tolerated on the right lower extremity and partial weightbearing on the left given anterior and posterior ring related component on the left.  These are nondisplaced.  Please see ER/notes for details.  Ultimately patient has been transitioning out of bed to a wheelchair.  She is feeling well.  She states slight discomfort on the right greater than the left.    The patient has tried the below listed items prior to today's consultation for above listed chief complaint.     -   Over-the-counter anti-inflammatories/prescription medication anti-inflammatory.     -   Physical therapy / guided home exercise program -     -   Previous corticosteroid injections    Past medical history:    Past Medical History:   Diagnosis Date    Acute on chronic respiratory failure with hypoxia 12/03/2009    Chronic airway obstruction, not elsewhere classified 03/04/2008    oxygen 2L/min at HS and PRN through day    Chronic kidney disease     incontinent    COPD (chronic obstructive pulmonary disease) ([facility])     Depression     Displacement of lumbar intervertebral disc without myelopathy 08/13/2007    Edema 09/13/2007    Emphysema of lung ([facility])     Enthesopathy of hip region 03/05/2007    Esophageal reflux 03/04/2008    Essential tremor 2023    Hearing loss 2017    Hemorrhage of rectum and anus 11/14/2007    Herpes zoster without

## 2024-12-12 RX ORDER — FUROSEMIDE 40 MG/1
TABLET ORAL
Qty: 90 TABLET | Refills: 0 | Status: SHIPPED | OUTPATIENT
Start: 2024-12-12

## 2024-12-12 RX ORDER — POTASSIUM CHLORIDE 1500 MG/1
TABLET, EXTENDED RELEASE ORAL
Qty: 90 TABLET | Refills: 0 | Status: SHIPPED | OUTPATIENT
Start: 2024-12-12

## 2024-12-16 ENCOUNTER — TELEPHONE (OUTPATIENT)
Dept: INTERNAL MEDICINE CLINIC | Age: 74
End: 2024-12-16

## 2024-12-16 NOTE — TELEPHONE ENCOUNTER
----- Message from Dr. Nayely García MD sent at 12/16/2024  2:08 PM EST -----  Contact: [phone]  Can see Kiana next available  ----- Message -----  From: Kinjal Bloom  Sent: 12/10/2024  10:51 AM EST  To: Nayely García MD    Pt needs a rehab follow up scheduled. Pt will be discharged 12-12. Please advise

## 2024-12-23 RX ORDER — THEOPHYLLINE 300 MG/1
300 TABLET, EXTENDED RELEASE ORAL DAILY
Qty: 100 TABLET | Refills: 0 | Status: SHIPPED | OUTPATIENT
Start: 2024-12-23

## 2025-01-07 RX ORDER — METFORMIN HYDROCHLORIDE 500 MG/1
TABLET, EXTENDED RELEASE ORAL
Qty: 360 TABLET | Refills: 0 | Status: SHIPPED | OUTPATIENT
Start: 2025-01-07

## 2025-01-07 RX ORDER — SITAGLIPTIN 100 MG/1
TABLET, FILM COATED ORAL
Qty: 90 TABLET | Refills: 0 | Status: SHIPPED | OUTPATIENT
Start: 2025-01-07

## 2025-01-07 RX ORDER — ESOMEPRAZOLE MAGNESIUM 40 MG/1
CAPSULE, DELAYED RELEASE ORAL
Qty: 90 CAPSULE | Refills: 0 | Status: SHIPPED | OUTPATIENT
Start: 2025-01-07

## 2025-01-07 RX ORDER — ROFLUMILAST 500 UG/1
TABLET ORAL
Qty: 90 TABLET | Refills: 0 | Status: SHIPPED | OUTPATIENT
Start: 2025-01-07

## 2025-01-08 RX ORDER — VENLAFAXINE HYDROCHLORIDE 75 MG/1
CAPSULE, EXTENDED RELEASE ORAL
Qty: 90 CAPSULE | Refills: 0 | Status: SHIPPED | OUTPATIENT
Start: 2025-01-08

## 2025-01-13 ENCOUNTER — TELEPHONE (OUTPATIENT)
Dept: INTERNAL MEDICINE CLINIC | Age: 75
End: 2025-01-13

## 2025-01-13 NOTE — TELEPHONE ENCOUNTER
----- Message from Dr. Nayely García MD sent at 1/13/2025  2:50 PM EST -----  Contact: patient [phone]  ok  ----- Message -----  From: Ladi Norris  Sent: 1/13/2025   2:24 PM EST  To: Nayely García MD    Patient requesting a script for a shower chair be sent to pharmacy below.  Patient states she recently had a walk-in shower put in, and a shower chair would be beneficial for her.  Please advise        [id number] [place] [address] [address] - P [phone] - F [phone]

## 2025-01-15 DIAGNOSIS — J44.9 COPD, VERY SEVERE (HCC): ICD-10-CM

## 2025-01-15 RX ORDER — TIOTROPIUM BROMIDE 18 UG/1
CAPSULE ORAL; RESPIRATORY (INHALATION)
Qty: 90 CAPSULE | Refills: 3 | Status: SHIPPED | OUTPATIENT
Start: 2025-01-15

## 2025-01-15 RX ORDER — FLUTICASONE PROPIONATE AND SALMETEROL 250; 50 UG/1; UG/1
POWDER RESPIRATORY (INHALATION)
Qty: 180 EACH | Refills: 3 | Status: SHIPPED | OUTPATIENT
Start: 2025-01-15

## 2025-01-22 ENCOUNTER — OFFICE VISIT (OUTPATIENT)
Dept: PULMONOLOGY | Age: 75
End: 2025-01-22

## 2025-01-22 VITALS
WEIGHT: 95.4 LBS | DIASTOLIC BLOOD PRESSURE: 77 MMHG | HEART RATE: 104 BPM | RESPIRATION RATE: 16 BRPM | BODY MASS INDEX: 18.73 KG/M2 | OXYGEN SATURATION: 96 % | SYSTOLIC BLOOD PRESSURE: 112 MMHG | HEIGHT: 60 IN

## 2025-01-22 DIAGNOSIS — J44.9 COPD, VERY SEVERE (HCC): Primary | ICD-10-CM

## 2025-01-22 DIAGNOSIS — R09.02 HYPOXIA: ICD-10-CM

## 2025-01-22 DIAGNOSIS — J47.9 BRONCHIECTASIS WITHOUT COMPLICATION (HCC): ICD-10-CM

## 2025-01-22 NOTE — PROGRESS NOTES
[facility]                                                            Outpatient Follow Up Note    CHIEF COMPLAINT: Follow up COPD          HPI:  Doing good  Compliant with inhaled bronchodilators.   Compliant with O2 and feels better/benefiting when using it   No cough   No hemoptysis   No smoking   Uses Albuterol once a day     Past Medical History:   Diagnosis Date    Acute on chronic respiratory failure with hypoxia 12/03/2009    Chronic airway obstruction, not elsewhere classified 03/04/2008    oxygen 2L/min at HS and PRN through day    Chronic kidney disease     incontinent    COPD (chronic obstructive pulmonary disease) ([facility])     Depression     Displacement of lumbar intervertebral disc without myelopathy 08/13/2007    Edema 09/13/2007    Emphysema of lung ([facility])     Enthesopathy of hip region 03/05/2007    Esophageal reflux 03/04/2008    Essential tremor 2023    Hearing loss 2017    Hemorrhage of rectum and anus 11/14/2007    Herpes zoster without complication     History of blood transfusion     Hyperlipidemia associated with type 2 diabetes mellitus ([facility]) 12/11/2017    Hypertension     Lumbar spondylosis 05/01/2018    Major depression, chronic 08/12/2015    Osteoporosis, unspecified 12/04/2007    Other and unspecified hyperlipidemia 03/04/2008    Pain in joint, shoulder region 03/04/2008    Pneumonia     Pulmonary nodule     Recurrent major depressive disorder, in partial remission ([facility]) 05/04/2018    Rheumatic fever     S/P ileostomy ([facility]) 08/20/2011    Skin lesion of face 10/20/2021    Steroid-induced osteopenia 05/20/2016    Tension headache     Type 2 diabetes mellitus with hyperglycemia, without long-term current use of insulin ([facility])     Unspecified asthma(493.90) 08/13/2007    Unspecified chronic bronchitis ([facility])     Ventral hernia 06/29/2014       Past Surgical History:        Procedure Laterality Date    ABDOMEN SURGERY

## 2025-01-24 DIAGNOSIS — E11.65 TYPE 2 DIABETES MELLITUS WITH HYPERGLYCEMIA, WITHOUT LONG-TERM CURRENT USE OF INSULIN (HCC): ICD-10-CM

## 2025-01-27 RX ORDER — ROFLUMILAST 500 UG/1
TABLET ORAL
Qty: 30 TABLET | Refills: 1 | Status: SHIPPED | OUTPATIENT
Start: 2025-01-27

## 2025-01-27 RX ORDER — LANCETS 33 GAUGE
EACH MISCELLANEOUS
Qty: 200 EACH | Refills: 0 | Status: SHIPPED | OUTPATIENT
Start: 2025-01-27

## 2025-02-06 RX ORDER — QUETIAPINE FUMARATE 25 MG/1
TABLET, FILM COATED ORAL
Qty: 180 TABLET | Refills: 0 | Status: SHIPPED | OUTPATIENT
Start: 2025-02-06

## 2025-02-13 RX ORDER — FUROSEMIDE 40 MG/1
TABLET ORAL
Qty: 90 TABLET | Refills: 0 | Status: SHIPPED | OUTPATIENT
Start: 2025-02-13

## 2025-02-13 RX ORDER — POTASSIUM CHLORIDE 1500 MG/1
TABLET, EXTENDED RELEASE ORAL
Qty: 90 TABLET | Refills: 1 | OUTPATIENT
Start: 2025-02-13

## 2025-02-17 RX ORDER — ROFLUMILAST 500 UG/1
TABLET ORAL
Qty: 90 TABLET | Refills: 0 | Status: SHIPPED | OUTPATIENT
Start: 2025-02-17

## 2025-03-05 RX ORDER — VENLAFAXINE HYDROCHLORIDE 75 MG/1
CAPSULE, EXTENDED RELEASE ORAL
Qty: 90 CAPSULE | Refills: 0 | Status: SHIPPED | OUTPATIENT
Start: 2025-03-05

## 2025-03-06 RX ORDER — METFORMIN HYDROCHLORIDE 500 MG/1
TABLET, EXTENDED RELEASE ORAL
Qty: 360 TABLET | Refills: 0 | Status: SHIPPED | OUTPATIENT
Start: 2025-03-06

## 2025-03-06 RX ORDER — SITAGLIPTIN 100 MG/1
TABLET, FILM COATED ORAL
Qty: 90 TABLET | Refills: 0 | Status: SHIPPED | OUTPATIENT
Start: 2025-03-06

## 2025-03-06 RX ORDER — ESOMEPRAZOLE MAGNESIUM 40 MG/1
CAPSULE, DELAYED RELEASE ORAL
Qty: 90 CAPSULE | Refills: 0 | Status: SHIPPED | OUTPATIENT
Start: 2025-03-06

## 2025-03-07 ENCOUNTER — OFFICE VISIT (OUTPATIENT)
Dept: INTERNAL MEDICINE CLINIC | Age: 75
End: 2025-03-07

## 2025-03-07 VITALS
RESPIRATION RATE: 12 BRPM | WEIGHT: 97 LBS | BODY MASS INDEX: 19.04 KG/M2 | HEART RATE: 70 BPM | HEIGHT: 60 IN | DIASTOLIC BLOOD PRESSURE: 80 MMHG | SYSTOLIC BLOOD PRESSURE: 128 MMHG

## 2025-03-07 DIAGNOSIS — I65.23 BILATERAL CAROTID ARTERY STENOSIS: ICD-10-CM

## 2025-03-07 DIAGNOSIS — Z00.00 MEDICARE ANNUAL WELLNESS VISIT, SUBSEQUENT: Primary | ICD-10-CM

## 2025-03-07 DIAGNOSIS — F32.9 MAJOR DEPRESSION, CHRONIC: ICD-10-CM

## 2025-03-07 DIAGNOSIS — E43 SEVERE PROTEIN-CALORIE MALNUTRITION (GOMEZ: LESS THAN 60% OF STANDARD WEIGHT): ICD-10-CM

## 2025-03-07 DIAGNOSIS — K21.9 GASTROESOPHAGEAL REFLUX DISEASE, UNSPECIFIED WHETHER ESOPHAGITIS PRESENT: ICD-10-CM

## 2025-03-07 DIAGNOSIS — E78.5 HYPERLIPIDEMIA ASSOCIATED WITH TYPE 2 DIABETES MELLITUS (HCC): ICD-10-CM

## 2025-03-07 DIAGNOSIS — J96.11 CHRONIC RESPIRATORY FAILURE WITH HYPOXIA (HCC): ICD-10-CM

## 2025-03-07 DIAGNOSIS — E11.69 HYPERLIPIDEMIA ASSOCIATED WITH TYPE 2 DIABETES MELLITUS (HCC): ICD-10-CM

## 2025-03-07 DIAGNOSIS — J44.9 CHRONIC OBSTRUCTIVE PULMONARY DISEASE, UNSPECIFIED COPD TYPE (HCC): ICD-10-CM

## 2025-03-07 DIAGNOSIS — E11.9 TYPE 2 DIABETES MELLITUS WITHOUT COMPLICATION, WITHOUT LONG-TERM CURRENT USE OF INSULIN (HCC): ICD-10-CM

## 2025-03-07 LAB
BILIRUBIN, POC: NORMAL
BLOOD URINE, POC: NORMAL
CLARITY, POC: NORMAL
COLOR, POC: NORMAL
GLUCOSE URINE, POC: NORMAL MG/DL
KETONES, POC: NORMAL MG/DL
LEUKOCYTE EST, POC: NORMAL
NITRITE, POC: NORMAL
PH, POC: NORMAL
PROTEIN, POC: NORMAL MG/DL
SPECIFIC GRAVITY, POC: NORMAL
UROBILINOGEN, POC: NORMAL MG/DL

## 2025-03-07 PROCEDURE — 81002 URINALYSIS NONAUTO W/O SCOPE: CPT | Performed by: INTERNAL MEDICINE

## 2025-03-07 PROCEDURE — 1159F MED LIST DOCD IN RCRD: CPT | Performed by: INTERNAL MEDICINE

## 2025-03-07 PROCEDURE — 3046F HEMOGLOBIN A1C LEVEL >9.0%: CPT | Performed by: INTERNAL MEDICINE

## 2025-03-07 PROCEDURE — G0439 PPPS, SUBSEQ VISIT: HCPCS | Performed by: INTERNAL MEDICINE

## 2025-03-07 PROCEDURE — 1124F ACP DISCUSS-NO DSCNMKR DOCD: CPT | Performed by: INTERNAL MEDICINE

## 2025-03-07 PROCEDURE — 3017F COLORECTAL CA SCREEN DOC REV: CPT | Performed by: INTERNAL MEDICINE

## 2025-03-07 PROCEDURE — 1160F RVW MEDS BY RX/DR IN RCRD: CPT | Performed by: INTERNAL MEDICINE

## 2025-03-07 ASSESSMENT — PATIENT HEALTH QUESTIONNAIRE - PHQ9
2. FEELING DOWN, DEPRESSED OR HOPELESS: SEVERAL DAYS
6. FEELING BAD ABOUT YOURSELF - OR THAT YOU ARE A FAILURE OR HAVE LET YOURSELF OR YOUR FAMILY DOWN: NOT AT ALL
3. TROUBLE FALLING OR STAYING ASLEEP: NOT AT ALL
8. MOVING OR SPEAKING SO SLOWLY THAT OTHER PEOPLE COULD HAVE NOTICED. OR THE OPPOSITE, BEING SO FIGETY OR RESTLESS THAT YOU HAVE BEEN MOVING AROUND A LOT MORE THAN USUAL: NOT AT ALL
1. LITTLE INTEREST OR PLEASURE IN DOING THINGS: SEVERAL DAYS
7. TROUBLE CONCENTRATING ON THINGS, SUCH AS READING THE NEWSPAPER OR WATCHING TELEVISION: NOT AT ALL
4. FEELING TIRED OR HAVING LITTLE ENERGY: SEVERAL DAYS
SUM OF ALL RESPONSES TO PHQ QUESTIONS 1-9: 3
10. IF YOU CHECKED OFF ANY PROBLEMS, HOW DIFFICULT HAVE THESE PROBLEMS MADE IT FOR YOU TO DO YOUR WORK, TAKE CARE OF THINGS AT HOME, OR GET ALONG WITH OTHER PEOPLE: SOMEWHAT DIFFICULT
SUM OF ALL RESPONSES TO PHQ QUESTIONS 1-9: 3
9. THOUGHTS THAT YOU WOULD BE BETTER OFF DEAD, OR OF HURTING YOURSELF: NOT AT ALL
5. POOR APPETITE OR OVEREATING: NOT AT ALL
SUM OF ALL RESPONSES TO PHQ QUESTIONS 1-9: 3
SUM OF ALL RESPONSES TO PHQ QUESTIONS 1-9: 3

## 2025-03-07 NOTE — PROGRESS NOTES
nodule is stable.Chest CT done 1/8/2020, showing some new tree in bud group of micronodules. Emphysema is unchanged from the previous CT in 2018. She sees pulmonary regularly.     Her hyperlipidemia is controlled. No myalgias.    Her arthritis has been stable.    Her tension HA are controlled. She is on Topamax. It has helped a lot.     She has GERD. She is on Nexium. No heartburn.    She has depression. She is on Effexor. It is stable.    She is sleeping ok.    She has urinary incontinence. She takes myrbetriq. It helps some    Patient's complete Health Risk Assessment and screening values have been reviewed and are found in Flowsheets. The following problems were reviewed today and where indicated follow up appointments were made and/or referrals ordered.    Positive Risk Factor Screenings with Interventions:    Fall Risk:  Do you feel unsteady or are you worried about falling? : (!) yes  2 or more falls in past year?: (!) yes  Fall with injury in past year?: (!) yes     Interventions:    Reviewed medications, home hazards, visual acuity, and co-morbidities that can increase risk for falls  Safety tips given           Self-assessment of health:  In general, how would you say your health is?: (!) Poor    Interventions:  Due to multiple med issues.    General HRA Questions:  Select all that apply: (!) New or Increased Fatigue  Interventions Fatigue:  Due to breathing issues      Inactivity:  On average, how many days per week do you engage in moderate to strenuous exercise (like a brisk walk)?: 0 days (!) Abnormal  On average, how many minutes do you engage in exercise at this level?: 0 min  Interventions:  Patient declined any further interventions or treatment    Poor Eating Habits/Diet:  Do you eat balanced/healthy meals regularly?: (!) No  Interventions:  Eat more protein     Dentist Screen:  Have you seen the dentist within the past year?: (!) No    Intervention:  Patient comments: no teeth

## 2025-03-07 NOTE — PATIENT INSTRUCTIONS
finished, help the person rinse the remaining toothpaste from their mouth.  Where can you learn more?  Go to https://www.healthPiictu.net/patientEd and enter F944 to learn more about \"Learning About Dental Care for Older Adults.\"  Current as of: July 31, 2024  Content Version: 14.3  © 2024 [employer].   Care instructions adapted under license by [employer]. If you have questions about a medical condition or this instruction, always ask your healthcare professional. [employer] disclaims any warranty or liability for your use of this information.         Eating Healthy Foods: Care Instructions  With every meal, you can make healthy food choices. Try to eat a variety of fruits, vegetables, whole grains, lean proteins, and low-fat dairy products. This can help you get the right balance of nutrients, including vitamins and minerals. Small changes add up over time. You can start by adding one healthy food to your meals each day.    Try to make half your plate fruits and vegetables, one-fourth whole grains, and one-fourth lean proteins. Try including dairy with your meals.   Eat more fruits and vegetables. Try to have them with most meals and snacks.   Foods for healthy eating        Fruits   These can be fresh, frozen, canned, or dried.  Try to choose whole fruit rather than fruit juice.  Eat a variety of colors.        Vegetables   These can be fresh, frozen, canned, or dried.  Beans, peas, and lentils count too.        Whole grains   Choose whole-grain breads, cereals, and noodles.  Try brown rice.        Lean proteins   These can include lean meat, poultry, fish, and eggs.  You can also have tofu, beans, peas, lentils, nuts, and seeds.        Dairy   Try milk, yogurt, and cheese.  Choose low-fat or fat-free when you can.  If you need to, use lactose-free milk or fortified plant-based milk products, such as soy milk.        Water   Drink water when you're thirsty.  Limit sugar-sweetened drinks,

## 2025-03-08 LAB
CREAT UR-MCNC: 36.5 MG/DL (ref 28–259)
MICROALBUMIN UR DL<=1MG/L-MCNC: <1.2 MG/DL
MICROALBUMIN/CREAT UR: NORMAL MG/G (ref 0–30)

## 2025-03-10 RX ORDER — ROFLUMILAST 500 UG/1
TABLET ORAL
Qty: 90 TABLET | Refills: 1 | Status: SHIPPED | OUTPATIENT
Start: 2025-03-10

## 2025-03-11 ENCOUNTER — RESULTS FOLLOW-UP (OUTPATIENT)
Dept: INTERNAL MEDICINE CLINIC | Age: 75
End: 2025-03-11

## 2025-03-11 RX ORDER — ATORVASTATIN CALCIUM 20 MG/1
20 TABLET, FILM COATED ORAL DAILY
Qty: 30 TABLET | Refills: 1 | Status: SHIPPED | OUTPATIENT
Start: 2025-03-11

## 2025-03-11 RX ORDER — ATORVASTATIN CALCIUM 20 MG/1
20 TABLET, FILM COATED ORAL DAILY
Qty: 90 TABLET | OUTPATIENT
Start: 2025-03-11

## 2025-03-11 NOTE — TELEPHONE ENCOUNTER
----- Message from Dr. Nayely García MD sent at 3/11/2025  7:55 AM EDT -----  Cholesterol high  Start Lipitor 20 mg po daily #30 1 refill and check liver and lipids in six week  Hydrate more  Sugars great

## 2025-03-17 RX ORDER — MIRABEGRON 50 MG/1
50 TABLET, FILM COATED, EXTENDED RELEASE ORAL DAILY
Qty: 100 TABLET | Refills: 2 | Status: SHIPPED | OUTPATIENT
Start: 2025-03-17

## 2025-03-17 RX ORDER — DILTIAZEM HYDROCHLORIDE 120 MG/1
120 CAPSULE, COATED, EXTENDED RELEASE ORAL DAILY
Qty: 100 CAPSULE | Refills: 2 | Status: SHIPPED | OUTPATIENT
Start: 2025-03-17

## 2025-03-18 ENCOUNTER — APPOINTMENT (OUTPATIENT)
Dept: GENERAL RADIOLOGY | Age: 75
End: 2025-03-18
Payer: MEDICARE

## 2025-03-18 ENCOUNTER — HOSPITAL ENCOUNTER (EMERGENCY)
Age: 75
Discharge: HOME | End: 2025-03-18
Payer: MEDICARE

## 2025-03-18 ENCOUNTER — APPOINTMENT (OUTPATIENT)
Dept: CT IMAGING | Age: 75
End: 2025-03-18
Payer: MEDICARE

## 2025-03-18 VITALS
RESPIRATION RATE: 29 BRPM | DIASTOLIC BLOOD PRESSURE: 80 MMHG | TEMPERATURE: 98 F | HEART RATE: 105 BPM | WEIGHT: 89 LBS | HEIGHT: 60 IN | SYSTOLIC BLOOD PRESSURE: 136 MMHG | OXYGEN SATURATION: 98 % | BODY MASS INDEX: 17.47 KG/M2

## 2025-03-18 DIAGNOSIS — J44.1 COPD EXACERBATION (HCC): Primary | ICD-10-CM

## 2025-03-18 LAB
ALBUMIN SERPL-MCNC: 3.9 G/DL (ref 3.4–5)
ALBUMIN/GLOB SERPL: 1.4 {RATIO} (ref 1.1–2.2)
ALP SERPL-CCNC: 76 U/L (ref 40–129)
ALT SERPL-CCNC: 11 U/L (ref 10–40)
ANION GAP SERPL CALCULATED.3IONS-SCNC: 10 MMOL/L (ref 3–16)
AST SERPL-CCNC: 16 U/L (ref 15–37)
BASE EXCESS BLDV CALC-SCNC: -2.4 MMOL/L (ref -3–3)
BASOPHILS # BLD: 0 K/UL (ref 0–0.2)
BASOPHILS NFR BLD: 0.4 %
BILIRUB SERPL-MCNC: <0.2 MG/DL (ref 0–1)
BUN SERPL-MCNC: 23 MG/DL (ref 7–20)
CALCIUM SERPL-MCNC: 8.8 MG/DL (ref 8.3–10.6)
CHLORIDE SERPL-SCNC: 104 MMOL/L (ref 99–110)
CO2 BLDV-SCNC: 27 MMOL/L
CO2 SERPL-SCNC: 28 MMOL/L (ref 21–32)
COHGB MFR BLDV: 1.2 % (ref 0–1.5)
CREAT SERPL-MCNC: 0.9 MG/DL (ref 0.6–1.2)
DEPRECATED RDW RBC AUTO: 17.5 % (ref 12.4–15.4)
EKG ATRIAL RATE: 105 BPM
EKG DIAGNOSIS: NORMAL
EKG P AXIS: 87 DEGREES
EKG P-R INTERVAL: 192 MS
EKG Q-T INTERVAL: 334 MS
EKG QRS DURATION: 88 MS
EKG QTC CALCULATION (BAZETT): 441 MS
EKG R AXIS: 80 DEGREES
EKG T AXIS: 43 DEGREES
EKG VENTRICULAR RATE: 105 BPM
EOSINOPHIL # BLD: 0.1 K/UL (ref 0–0.6)
EOSINOPHIL NFR BLD: 0.7 %
FLUAV RNA RESP QL NAA+PROBE: NOT DETECTED
FLUBV RNA RESP QL NAA+PROBE: NOT DETECTED
GFR SERPLBLD CREATININE-BSD FMLA CKD-EPI: 66 ML/MIN/{1.73_M2}
GLUCOSE SERPL-MCNC: 104 MG/DL (ref 70–99)
HCO3 BLDV-SCNC: 25.1 MMOL/L (ref 23–29)
HCT VFR BLD AUTO: 39.3 % (ref 36–48)
HGB BLD-MCNC: 12.5 G/DL (ref 12–16)
LACTATE BLDV-SCNC: 0.8 MMOL/L (ref 0.4–1.9)
LACTATE BLDV-SCNC: 2 MMOL/L (ref 0.4–1.9)
LYMPHOCYTES # BLD: 1.1 K/UL (ref 1–5.1)
LYMPHOCYTES NFR BLD: 9.7 %
MCH RBC QN AUTO: 26.7 PG (ref 26–34)
MCHC RBC AUTO-ENTMCNC: 31.8 G/DL (ref 31–36)
MCV RBC AUTO: 84 FL (ref 80–100)
METHGB MFR BLDV: 0.3 %
MONOCYTES # BLD: 0.8 K/UL (ref 0–1.3)
MONOCYTES NFR BLD: 7.7 %
NEUTROPHILS # BLD: 8.9 K/UL (ref 1.7–7.7)
NEUTROPHILS NFR BLD: 81.5 %
NT-PROBNP SERPL-MCNC: 120 PG/ML (ref 0–449)
O2 CT VFR BLDV CALC: 14 VOL %
O2 THERAPY: ABNORMAL
PCO2 BLDV: 55.2 MMHG (ref 40–50)
PH BLDV: 7.28 [PH] (ref 7.35–7.45)
PLATELET # BLD AUTO: 304 K/UL (ref 135–450)
PMV BLD AUTO: 9 FL (ref 5–10.5)
PO2 BLDV: 43.1 MMHG (ref 25–40)
POTASSIUM SERPL-SCNC: 3.7 MMOL/L (ref 3.5–5.1)
PROT SERPL-MCNC: 6.7 G/DL (ref 6.4–8.2)
RBC # BLD AUTO: 4.68 M/UL (ref 4–5.2)
SAO2 % BLDV: 72 %
SARS-COV-2 RNA RESP QL NAA+PROBE: NOT DETECTED
SODIUM SERPL-SCNC: 142 MMOL/L (ref 136–145)
TROPONIN, HIGH SENSITIVITY: 18 NG/L (ref 0–14)
TROPONIN, HIGH SENSITIVITY: 21 NG/L (ref 0–14)
WBC # BLD AUTO: 10.9 K/UL (ref 4–11)

## 2025-03-18 PROCEDURE — 87636 SARSCOV2 & INF A&B AMP PRB: CPT

## 2025-03-18 PROCEDURE — 71260 CT THORAX DX C+: CPT

## 2025-03-18 PROCEDURE — 99285 EMERGENCY DEPT VISIT HI MDM: CPT

## 2025-03-18 PROCEDURE — 80053 COMPREHEN METABOLIC PANEL: CPT

## 2025-03-18 PROCEDURE — 93005 ELECTROCARDIOGRAM TRACING: CPT | Performed by: STUDENT IN AN ORGANIZED HEALTH CARE EDUCATION/TRAINING PROGRAM

## 2025-03-18 PROCEDURE — 83605 ASSAY OF LACTIC ACID: CPT

## 2025-03-18 PROCEDURE — 84484 ASSAY OF TROPONIN QUANT: CPT

## 2025-03-18 PROCEDURE — 6360000002 HC RX W HCPCS: Performed by: NURSE PRACTITIONER

## 2025-03-18 PROCEDURE — 93010 ELECTROCARDIOGRAM REPORT: CPT | Performed by: INTERNAL MEDICINE

## 2025-03-18 PROCEDURE — 2500000003 HC RX 250 WO HCPCS: Performed by: NURSE PRACTITIONER

## 2025-03-18 PROCEDURE — 82803 BLOOD GASES ANY COMBINATION: CPT

## 2025-03-18 PROCEDURE — 71045 X-RAY EXAM CHEST 1 VIEW: CPT

## 2025-03-18 PROCEDURE — 6370000000 HC RX 637 (ALT 250 FOR IP): Performed by: NURSE PRACTITIONER

## 2025-03-18 PROCEDURE — 85025 COMPLETE CBC W/AUTO DIFF WBC: CPT

## 2025-03-18 PROCEDURE — 83880 ASSAY OF NATRIURETIC PEPTIDE: CPT

## 2025-03-18 PROCEDURE — 36415 COLL VENOUS BLD VENIPUNCTURE: CPT

## 2025-03-18 PROCEDURE — 6360000004 HC RX CONTRAST MEDICATION: Performed by: NURSE PRACTITIONER

## 2025-03-18 PROCEDURE — 96374 THER/PROPH/DIAG INJ IV PUSH: CPT

## 2025-03-18 RX ORDER — DOXYCYCLINE HYCLATE 100 MG
100 TABLET ORAL 2 TIMES DAILY
Qty: 20 TABLET | Refills: 0 | Status: SHIPPED | OUTPATIENT
Start: 2025-03-18 | End: 2025-03-27

## 2025-03-18 RX ORDER — PREDNISONE 20 MG/1
20 TABLET ORAL 2 TIMES DAILY
Qty: 10 TABLET | Refills: 0 | Status: SHIPPED | OUTPATIENT
Start: 2025-03-18 | End: 2025-03-23

## 2025-03-18 RX ORDER — IPRATROPIUM BROMIDE AND ALBUTEROL SULFATE 2.5; .5 MG/3ML; MG/3ML
1 SOLUTION RESPIRATORY (INHALATION) ONCE
Status: COMPLETED | OUTPATIENT
Start: 2025-03-18 | End: 2025-03-18

## 2025-03-18 RX ORDER — IOPAMIDOL 755 MG/ML
75 INJECTION, SOLUTION INTRAVASCULAR
Status: COMPLETED | OUTPATIENT
Start: 2025-03-18 | End: 2025-03-18

## 2025-03-18 RX ADMIN — IPRATROPIUM BROMIDE AND ALBUTEROL SULFATE 1 DOSE: 2.5; .5 SOLUTION RESPIRATORY (INHALATION) at 14:49

## 2025-03-18 RX ADMIN — IOPAMIDOL 75 ML: 755 INJECTION, SOLUTION INTRAVENOUS at 12:24

## 2025-03-18 RX ADMIN — METHYLPREDNISOLONE SODIUM SUCCINATE 125 MG: 125 INJECTION, POWDER, LYOPHILIZED, FOR SOLUTION INTRAMUSCULAR; INTRAVENOUS at 14:49

## 2025-03-18 ASSESSMENT — PAIN - FUNCTIONAL ASSESSMENT: PAIN_FUNCTIONAL_ASSESSMENT: NONE - DENIES PAIN

## 2025-03-18 NOTE — ED PROVIDER NOTES
ECG    The Ekg interpreted by me shows sinus rhythm with a rate of 105 bpm.  Normal axis.  No acute injury pattern.  , QRS 88, QTc 441.    No significant change from prior EKG dated 11/18/2024     Parveen Schwarz DO  03/18/25 1450

## 2025-03-18 NOTE — ED PROVIDER NOTES
[facility] EMERGENCY DEPARTMENT  EMERGENCY DEPARTMENT ENCOUNTER        Pt Name: Kaya Robbins  MRN: [id number]  Birthdate 1950  Date of evaluation: 3/18/2025  Provider: SAUL Kelsey - CNP  PCP: Nayely García MD  Note Started: 11:37 AM EDT 3/18/25      MARIA ISABEL. I have evaluated this patient.        CHIEF COMPLAINT       Chief Complaint   Patient presents with    Shortness of Breath       HISTORY OF PRESENT ILLNESS: 1 or more Elements     History From: Patient     Limitations to history : None    Social Determinants Significantly Affecting Health : None    Chief Complaint: Shortness of breath     Kaya Robbins is a 74 y.o. female who presents to the emergency department today with symptoms of shortness of breath.  Patient states that she has been feeling short of breath for approximately 1 month.  Has a long history of COPD.  She also has a history of chronic respiratory failure wears supplemental oxygen at home.  States he usually needs it just as needed but over the last month has had to wear the oxygen 24/7.  She denies any pain.  No pleuritic pain.  No abdominal discomfort.  Has reported productive cough but no hemoptysis or fever.    Nursing Notes were all reviewed and agreed with or any disagreements were addressed in the HPI.    REVIEW OF SYSTEMS :      Review of Systems    Positives and Pertinent negatives as per HPI.     SURGICAL HISTORY     Past Surgical History:   Procedure Laterality Date    ABDOMEN SURGERY  5-19-11    total abdominal colectomy iliostomy    CARPAL TUNNEL RELEASE      right    CATARACT REMOVAL Bilateral     L 6/18/2013, R 07/01/2013    CATARACT REMOVAL WITH IMPLANT Right 7/1/13    COLECTOMY      and reversal     COLONOSCOPY  2009, 11/4/2011, 10/28/15    normal    CYST REMOVAL Right 6/20/14    Dr. Marks; right ear pressure point cyst removal    DILATATION, ESOPHAGUS      ENDOSCOPY, COLON, DIAGNOSTIC      EPIDURAL STEROID INJECTION Bilateral 7/9/2019    BILATERAL  Disp-90 tablet, R-0Please send a replace/new response with 100-Day Supply if appropriate to maximize member benefit. Requesting 1 year supply.Normal      ONETOUCH ULTRA strip TEST ONCE DAILY AND AS NEEDED  FOR SYMPTOMS OF IRREGULAR BLOOD  GLUCOSE, Disp-200 strip, R-2Please send a replace/new response with 100-Day Supply if appropriate to maximize member benefit. Requesting 1 year supply.Normal      albuterol sulfate HFA (PROVENTIL;VENTOLIN;PROAIR) 108 (90 Base) MCG/ACT inhaler USE 2 INHALATIONS BY MOUTH EVERY 6 HOURS AS NEEDED FOR WHEEZING  OR SHORTNESS OF BREATH, Disp-34 g, R-2Please send a replace/new response with 100-Day Supply if appropriate to maximize member benefit. Requesting 1 year supply.Normal      topiramate (TOPAMAX) 50 MG tablet Take 1 tablet by mouth nightly, Disp-90 tablet, R-3Please send a replace/new response with 100-Day Supply if appropriate to maximize member benefit. Requesting 1 year supply.Normal      alendronate (FOSAMAX) 35 MG tablet TAKE 1 TABLET BY MOUTH WEEKLY  WITH 8 OZ OF PLAIN WATER 30  MINUTES BEFORE FIRST FOOD, DRINK OR MEDS. STAY UPRIGHT FOR 30  MINS, Disp-12 tablet, R-3Requesting 1 year supplyNormal      aspirin 81 MG EC tablet Take 1 tablet by mouth daily, Disp-90 tablet, R-0OTC      Cholecalciferol (VITAMIN D3) 125 MCG (5000 UT) TABS Take 1 tablet by mouth 1  MCG IN [facility]      albuterol (PROVENTIL) (2.5 MG/3ML) 0.083% nebulizer solution INHALE 3ML VIA NEBULIZATION ROUTE EVERY 6 HOURS AS NEEDED FOR WHEEZING OR SHORTNESS OF BREATH, Disp-1080 mL, R-0Normal      fluticasone (FLONASE) 50 MCG/ACT nasal spray 2 sprays by Nasal route daily, Disp-3 each, R-0Normal      simvastatin (ZOCOR) 20 MG tablet TAKE 1 TABLET EVERY EVENING, Disp-90 tablet, R-0Normal      guaiFENesin (MUCINEX) 600 MG extended release tablet Take 1 tablet by mouth 2 times daily, Disp-60 tablet, R-1Print      Cyanocobalamin (VITAMIN B-12 PO) Take 1,000 mcg by mouth 1 tab at [facility]

## 2025-03-18 NOTE — ED TRIAGE NOTES
Patient presents to the ED from home with c/o shortness of breath on exertion x 1 month. Patient has a history of COPD and is on 2L NC at baseline. Patient denies any chest pain. States she has been to PCP and Urgent care, given abx and steroids but nothing is helping.

## 2025-03-27 ENCOUNTER — OFFICE VISIT (OUTPATIENT)
Dept: PULMONOLOGY | Age: 75
End: 2025-03-27
Payer: MEDICARE

## 2025-03-27 VITALS
OXYGEN SATURATION: 94 % | HEIGHT: 60 IN | HEART RATE: 120 BPM | SYSTOLIC BLOOD PRESSURE: 124 MMHG | WEIGHT: 89 LBS | RESPIRATION RATE: 18 BRPM | DIASTOLIC BLOOD PRESSURE: 82 MMHG | BODY MASS INDEX: 17.47 KG/M2

## 2025-03-27 DIAGNOSIS — J47.9 BRONCHIECTASIS WITHOUT COMPLICATION (HCC): ICD-10-CM

## 2025-03-27 DIAGNOSIS — J44.9 COPD, VERY SEVERE (HCC): Primary | ICD-10-CM

## 2025-03-27 DIAGNOSIS — R09.02 HYPOXIA: ICD-10-CM

## 2025-03-27 DIAGNOSIS — J44.1 COPD EXACERBATION (HCC): ICD-10-CM

## 2025-03-27 DIAGNOSIS — R00.0 SINUS TACHYCARDIA: ICD-10-CM

## 2025-03-27 PROCEDURE — G8427 DOCREV CUR MEDS BY ELIG CLIN: HCPCS | Performed by: INTERNAL MEDICINE

## 2025-03-27 PROCEDURE — G8419 CALC BMI OUT NRM PARAM NOF/U: HCPCS | Performed by: INTERNAL MEDICINE

## 2025-03-27 PROCEDURE — 1036F TOBACCO NON-USER: CPT | Performed by: INTERNAL MEDICINE

## 2025-03-27 PROCEDURE — 1090F PRES/ABSN URINE INCON ASSESS: CPT | Performed by: INTERNAL MEDICINE

## 2025-03-27 PROCEDURE — G2211 COMPLEX E/M VISIT ADD ON: HCPCS | Performed by: INTERNAL MEDICINE

## 2025-03-27 PROCEDURE — 1124F ACP DISCUSS-NO DSCNMKR DOCD: CPT | Performed by: INTERNAL MEDICINE

## 2025-03-27 PROCEDURE — 3017F COLORECTAL CA SCREEN DOC REV: CPT | Performed by: INTERNAL MEDICINE

## 2025-03-27 PROCEDURE — 1159F MED LIST DOCD IN RCRD: CPT | Performed by: INTERNAL MEDICINE

## 2025-03-27 PROCEDURE — G8399 PT W/DXA RESULTS DOCUMENT: HCPCS | Performed by: INTERNAL MEDICINE

## 2025-03-27 PROCEDURE — 99214 OFFICE O/P EST MOD 30 MIN: CPT | Performed by: INTERNAL MEDICINE

## 2025-03-27 PROCEDURE — 3023F SPIROM DOC REV: CPT | Performed by: INTERNAL MEDICINE

## 2025-03-27 RX ORDER — DOXYCYCLINE HYCLATE 100 MG
100 TABLET ORAL 2 TIMES DAILY
Qty: 10 TABLET | Refills: 0 | Status: SHIPPED | OUTPATIENT
Start: 2025-03-27 | End: 2025-04-01

## 2025-03-27 RX ORDER — PREDNISONE 10 MG/1
TABLET ORAL
Qty: 30 TABLET | Refills: 0 | Status: SHIPPED | OUTPATIENT
Start: 2025-03-27

## 2025-03-27 NOTE — PROGRESS NOTES
[facility]                                                            Outpatient Follow Up Note    CHIEF COMPLAINT: Follow up COPD          HPI:  Not doing well for 1 month   SOB cough wheezes   Cough with milky white and yellow green   No hemoptysis   No chest pain   No syncope   Compliant with inhaled bronchodilators.   Uses Albuterol 2-3 times/day   Compliant with O2 and feels better/benefiting when using it   No smoking       Past Medical History:   Diagnosis Date    Acute on chronic respiratory failure with hypoxia ([facility]) 12/03/2009    Chronic airway obstruction, not elsewhere classified 03/04/2008    oxygen 2L/min at HS and PRN through day    Chronic kidney disease     incontinent    COPD (chronic obstructive pulmonary disease) ([facility])     Depression     Displacement of lumbar intervertebral disc without myelopathy 08/13/2007    Edema 09/13/2007    Emphysema of lung ([facility])     Enthesopathy of hip region 03/05/2007    Esophageal reflux 03/04/2008    Essential tremor 2023    Hearing loss 2017    Hemorrhage of rectum and anus 11/14/2007    Herpes zoster without complication     History of blood transfusion     Hyperlipidemia associated with type 2 diabetes mellitus ([facility]) 12/11/2017    Hypertension     Lumbar spondylosis 05/01/2018    Major depression, chronic 08/12/2015    Osteoporosis, unspecified 12/04/2007    Other and unspecified hyperlipidemia 03/04/2008    Pain in joint, shoulder region 03/04/2008    Pneumonia     Pulmonary nodule     Recurrent major depressive disorder, in partial remission 05/04/2018    Rheumatic fever     S/P ileostomy ([facility]) 08/20/2011    Skin lesion of face 10/20/2021    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

## 2025-03-28 ENCOUNTER — TELEPHONE (OUTPATIENT)
Dept: PULMONOLOGY | Age: 75
End: 2025-03-28

## 2025-03-28 NOTE — TELEPHONE ENCOUNTER
Spoke with patient to call insurance company to figure out which DME would work best to send an order for an AVAPS to per Dr. Orellana for severe COPD.

## 2025-03-28 NOTE — TELEPHONE ENCOUNTER
Patient called back and stated she usually uses [employer]  to  get her stuff through.   She said the others that are in Network at [facility] , [facility] and [facility] if for some reason [employer] doesn't  work.

## 2025-03-31 RX ORDER — THEOPHYLLINE 300 MG/1
300 TABLET, EXTENDED RELEASE ORAL DAILY
Qty: 100 TABLET | Refills: 2 | Status: SHIPPED | OUTPATIENT
Start: 2025-03-31

## 2025-04-04 DIAGNOSIS — E11.65 TYPE 2 DIABETES MELLITUS WITH HYPERGLYCEMIA, WITHOUT LONG-TERM CURRENT USE OF INSULIN (HCC): ICD-10-CM

## 2025-04-07 RX ORDER — LANCETS 33 GAUGE
EACH MISCELLANEOUS
Qty: 200 EACH | Refills: 2 | Status: SHIPPED | OUTPATIENT
Start: 2025-04-07

## 2025-04-18 RX ORDER — FUROSEMIDE 40 MG/1
40 TABLET ORAL DAILY
Qty: 90 TABLET | Refills: 0 | Status: SHIPPED | OUTPATIENT
Start: 2025-04-18

## 2025-04-24 ENCOUNTER — TELEPHONE (OUTPATIENT)
Dept: PULMONOLOGY | Age: 75
End: 2025-04-24

## 2025-04-24 NOTE — TELEPHONE ENCOUNTER
Carmella with [employer] reached out to let us know they need an order for O2 bleed in for patient's vent specifying the Liter flow.

## 2025-04-25 DIAGNOSIS — J44.9 COPD, VERY SEVERE (HCC): Primary | ICD-10-CM

## 2025-04-25 DIAGNOSIS — R09.02 HYPOXIA: ICD-10-CM

## 2025-05-01 ENCOUNTER — OFFICE VISIT (OUTPATIENT)
Dept: NEUROLOGY | Age: 75
End: 2025-05-01
Payer: MEDICARE

## 2025-05-01 VITALS
WEIGHT: 83.3 LBS | BODY MASS INDEX: 16.35 KG/M2 | HEIGHT: 60 IN | HEART RATE: 131 BPM | OXYGEN SATURATION: 96 % | SYSTOLIC BLOOD PRESSURE: 124 MMHG | DIASTOLIC BLOOD PRESSURE: 80 MMHG

## 2025-05-01 DIAGNOSIS — G24.8 SEGMENTAL DYSTONIA: ICD-10-CM

## 2025-05-01 DIAGNOSIS — G25.2 ACTION TREMOR: ICD-10-CM

## 2025-05-01 DIAGNOSIS — G47.00 INSOMNIA, UNSPECIFIED TYPE: ICD-10-CM

## 2025-05-01 DIAGNOSIS — G24.4 ORAL DYSKINESIA: Primary | ICD-10-CM

## 2025-05-01 DIAGNOSIS — R41.3 MEMORY CHANGES: ICD-10-CM

## 2025-05-01 DIAGNOSIS — G44.219 EPISODIC TENSION-TYPE HEADACHE, NOT INTRACTABLE: ICD-10-CM

## 2025-05-01 PROCEDURE — 3017F COLORECTAL CA SCREEN DOC REV: CPT | Performed by: STUDENT IN AN ORGANIZED HEALTH CARE EDUCATION/TRAINING PROGRAM

## 2025-05-01 PROCEDURE — G8419 CALC BMI OUT NRM PARAM NOF/U: HCPCS | Performed by: STUDENT IN AN ORGANIZED HEALTH CARE EDUCATION/TRAINING PROGRAM

## 2025-05-01 PROCEDURE — 99214 OFFICE O/P EST MOD 30 MIN: CPT | Performed by: STUDENT IN AN ORGANIZED HEALTH CARE EDUCATION/TRAINING PROGRAM

## 2025-05-01 PROCEDURE — 1036F TOBACCO NON-USER: CPT | Performed by: STUDENT IN AN ORGANIZED HEALTH CARE EDUCATION/TRAINING PROGRAM

## 2025-05-01 PROCEDURE — 1090F PRES/ABSN URINE INCON ASSESS: CPT | Performed by: STUDENT IN AN ORGANIZED HEALTH CARE EDUCATION/TRAINING PROGRAM

## 2025-05-01 PROCEDURE — 1124F ACP DISCUSS-NO DSCNMKR DOCD: CPT | Performed by: STUDENT IN AN ORGANIZED HEALTH CARE EDUCATION/TRAINING PROGRAM

## 2025-05-01 PROCEDURE — G8399 PT W/DXA RESULTS DOCUMENT: HCPCS | Performed by: STUDENT IN AN ORGANIZED HEALTH CARE EDUCATION/TRAINING PROGRAM

## 2025-05-01 PROCEDURE — G8427 DOCREV CUR MEDS BY ELIG CLIN: HCPCS | Performed by: STUDENT IN AN ORGANIZED HEALTH CARE EDUCATION/TRAINING PROGRAM

## 2025-05-01 PROCEDURE — 1159F MED LIST DOCD IN RCRD: CPT | Performed by: STUDENT IN AN ORGANIZED HEALTH CARE EDUCATION/TRAINING PROGRAM

## 2025-05-01 RX ORDER — QUETIAPINE FUMARATE 25 MG/1
TABLET, FILM COATED ORAL
Qty: 35 TABLET | Refills: 0 | Status: SHIPPED | OUTPATIENT
Start: 2025-05-01 | End: 2025-06-12

## 2025-05-01 RX ORDER — TOPIRAMATE 25 MG/1
25 TABLET, FILM COATED ORAL NIGHTLY
Qty: 14 TABLET | Refills: 0 | Status: SHIPPED | OUTPATIENT
Start: 2025-05-01 | End: 2025-05-15

## 2025-05-01 NOTE — PROGRESS NOTES
History of Present Illness  Follow up visit.     Intermittent memory issues continue without improvement or deterioration. Tremor persists but does not interfere with daily activities such as feeding, dressing, oral hygiene, or grooming. Fatigue, particularly late at night, exacerbates the tremor, affecting writing. No difficulty in eating due to excessive tongue movement is reported, and dentures are not used. There are no sleep disturbances or hallucinations. Currently, a low dose of Topamax is being taken.    Mobility issues have been attributed to age-related arthritis by Dr. Smith. Physical therapy was completed at [place]. In 11/2024, a fall resulted in a pelvic fracture in three locations, necessitating hospitalization and subsequent rehabilitation. No recent falls have occurred within the past six months.    No neck stiffness or pain is reported, but occasional tension headaches originating from the back of the neck are noted. Headaches have resolved.    Albuterol is used as a rescue inhaler multiple times daily.    Seroquel is currently being taken.    INTERVAL: Since last visit in 09/2024, memory problems remain unchanged, headaches have resolved, and physical therapy was completed. A fall in 11/2024 resulted in a pelvic fracture and subsequent rehabilitation.    SOCIAL HISTORY  Hobbies: Bathing birds and squirrels  Sleep: No difficulty sleeping    /80 (BP Site: Right Upper Arm)   Pulse (!) 131   Ht 1.524 m (5')   Wt 37.8 kg (83 lb 4.8 oz)   SpO2 96%   BMI 16.27 kg/m²       Neurological Exam  Mental Status  Awake and alert. Speech is normal. Fund of knowledge is appropriate for level of education.    Cranial Nerves  CN III, IV, VI: Normal lids and orbits bilaterally.  CN VII:  Right: There is no facial weakness.  Left: There is no facial weakness.  CN VIII:  Right: Hearing is normal.  Left: Hearing is normal.    Motor   The following abnormal movements were seen: No rest tremor  Postural and

## 2025-05-01 NOTE — PATIENT INSTRUCTIONS
YOU MUST CONFIRM YOUR APPOINTMENT 1 DAY PRIOR OR IT WILL BE CANCELLED!!   Our office will call you 3 times the day prior to your appointment in an attempt to confirm.  Please return our call ASAP or confirm your appt through [facility] no later than 3 pm the day before your appointment.  If we do not hear back from you by 3 pm to confirm, your appointment will be cancelled & someone will be added into that slot from our wait list.         Stop Topamax 50mg at bedtime     Start Topamax 25mg at bedtime for 2 weeks then stop     Wait an additional 2 weeks, then reduce quetiapine (Seroquel) 25mg from 1 tablet at bedtime to 1/2 tablet at bedtime for 2 weeks then stop taking Seroquel.

## 2025-05-02 ENCOUNTER — TELEPHONE (OUTPATIENT)
Dept: PULMONOLOGY | Age: 75
End: 2025-05-02

## 2025-05-06 RX ORDER — ATORVASTATIN CALCIUM 20 MG/1
20 TABLET, FILM COATED ORAL DAILY
Qty: 90 TABLET | Refills: 0 | Status: SHIPPED | OUTPATIENT
Start: 2025-05-06

## 2025-05-09 RX ORDER — ESOMEPRAZOLE MAGNESIUM 40 MG/1
CAPSULE, DELAYED RELEASE ORAL
Qty: 90 CAPSULE | Refills: 0 | Status: SHIPPED | OUTPATIENT
Start: 2025-05-09

## 2025-05-09 RX ORDER — METFORMIN HYDROCHLORIDE 500 MG/1
1000 TABLET, EXTENDED RELEASE ORAL 2 TIMES DAILY
Qty: 360 TABLET | Refills: 0 | Status: SHIPPED | OUTPATIENT
Start: 2025-05-09

## 2025-05-09 RX ORDER — SITAGLIPTIN 100 MG/1
100 TABLET, FILM COATED ORAL DAILY
Qty: 90 TABLET | Refills: 0 | Status: SHIPPED | OUTPATIENT
Start: 2025-05-09

## 2025-05-27 RX ORDER — ALBUTEROL SULFATE 90 UG/1
INHALANT RESPIRATORY (INHALATION)
Qty: 34 G | Refills: 0 | Status: SHIPPED | OUTPATIENT
Start: 2025-05-27

## 2025-05-27 RX ORDER — POTASSIUM CHLORIDE 1500 MG/1
20 TABLET, EXTENDED RELEASE ORAL DAILY
Qty: 90 TABLET | Refills: 1 | Status: SHIPPED | OUTPATIENT
Start: 2025-05-27

## 2025-05-30 RX ORDER — VENLAFAXINE HYDROCHLORIDE 75 MG/1
75 CAPSULE, EXTENDED RELEASE ORAL DAILY
Qty: 90 CAPSULE | Refills: 1 | Status: SHIPPED | OUTPATIENT
Start: 2025-05-30

## 2025-06-06 RX ORDER — ALENDRONATE SODIUM 35 MG/1
TABLET ORAL
Qty: 12 TABLET | Refills: 3 | Status: SHIPPED | OUTPATIENT
Start: 2025-06-06

## 2025-07-02 RX ORDER — BLOOD SUGAR DIAGNOSTIC
STRIP MISCELLANEOUS
Qty: 200 STRIP | Refills: 2 | Status: SHIPPED | OUTPATIENT
Start: 2025-07-02

## 2025-07-14 ENCOUNTER — OFFICE VISIT (OUTPATIENT)
Dept: INTERNAL MEDICINE CLINIC | Age: 75
End: 2025-07-14

## 2025-07-14 VITALS
BODY MASS INDEX: 16.69 KG/M2 | HEIGHT: 60 IN | HEART RATE: 70 BPM | DIASTOLIC BLOOD PRESSURE: 60 MMHG | WEIGHT: 85 LBS | RESPIRATION RATE: 12 BRPM | SYSTOLIC BLOOD PRESSURE: 130 MMHG

## 2025-07-14 DIAGNOSIS — E11.9 TYPE 2 DIABETES MELLITUS WITHOUT COMPLICATION, WITHOUT LONG-TERM CURRENT USE OF INSULIN (HCC): Primary | ICD-10-CM

## 2025-07-14 DIAGNOSIS — K21.9 GASTROESOPHAGEAL REFLUX DISEASE, UNSPECIFIED WHETHER ESOPHAGITIS PRESENT: ICD-10-CM

## 2025-07-14 DIAGNOSIS — E78.5 HYPERLIPIDEMIA ASSOCIATED WITH TYPE 2 DIABETES MELLITUS (HCC): ICD-10-CM

## 2025-07-14 DIAGNOSIS — F51.01 PRIMARY INSOMNIA: ICD-10-CM

## 2025-07-14 DIAGNOSIS — E78.2 MIXED HYPERLIPIDEMIA: ICD-10-CM

## 2025-07-14 DIAGNOSIS — J43.8 OTHER EMPHYSEMA (HCC): ICD-10-CM

## 2025-07-14 DIAGNOSIS — E11.69 HYPERLIPIDEMIA ASSOCIATED WITH TYPE 2 DIABETES MELLITUS (HCC): ICD-10-CM

## 2025-07-14 DIAGNOSIS — R06.02 SOB (SHORTNESS OF BREATH): ICD-10-CM

## 2025-07-14 DIAGNOSIS — E43 SEVERE PROTEIN-CALORIE MALNUTRITION (GOMEZ: LESS THAN 60% OF STANDARD WEIGHT): ICD-10-CM

## 2025-07-14 PROCEDURE — 99214 OFFICE O/P EST MOD 30 MIN: CPT | Performed by: INTERNAL MEDICINE

## 2025-07-14 PROCEDURE — 1090F PRES/ABSN URINE INCON ASSESS: CPT | Performed by: INTERNAL MEDICINE

## 2025-07-14 PROCEDURE — G8419 CALC BMI OUT NRM PARAM NOF/U: HCPCS | Performed by: INTERNAL MEDICINE

## 2025-07-14 PROCEDURE — 3023F SPIROM DOC REV: CPT | Performed by: INTERNAL MEDICINE

## 2025-07-14 PROCEDURE — 1160F RVW MEDS BY RX/DR IN RCRD: CPT | Performed by: INTERNAL MEDICINE

## 2025-07-14 PROCEDURE — 1124F ACP DISCUSS-NO DSCNMKR DOCD: CPT | Performed by: INTERNAL MEDICINE

## 2025-07-14 PROCEDURE — 1036F TOBACCO NON-USER: CPT | Performed by: INTERNAL MEDICINE

## 2025-07-14 PROCEDURE — 3044F HG A1C LEVEL LT 7.0%: CPT | Performed by: INTERNAL MEDICINE

## 2025-07-14 PROCEDURE — 2022F DILAT RTA XM EVC RTNOPTHY: CPT | Performed by: INTERNAL MEDICINE

## 2025-07-14 PROCEDURE — G8399 PT W/DXA RESULTS DOCUMENT: HCPCS | Performed by: INTERNAL MEDICINE

## 2025-07-14 PROCEDURE — 1159F MED LIST DOCD IN RCRD: CPT | Performed by: INTERNAL MEDICINE

## 2025-07-14 PROCEDURE — 3017F COLORECTAL CA SCREEN DOC REV: CPT | Performed by: INTERNAL MEDICINE

## 2025-07-14 PROCEDURE — G8427 DOCREV CUR MEDS BY ELIG CLIN: HCPCS | Performed by: INTERNAL MEDICINE

## 2025-07-14 RX ORDER — FUROSEMIDE 40 MG/1
40 TABLET ORAL DAILY
Qty: 90 TABLET | Refills: 0 | Status: SHIPPED | OUTPATIENT
Start: 2025-07-14

## 2025-07-14 ASSESSMENT — ENCOUNTER SYMPTOMS
WHEEZING: 0
EYE DISCHARGE: 0
ABDOMINAL PAIN: 0
NAUSEA: 0
RHINORRHEA: 0
COUGH: 0
VOMITING: 0
SHORTNESS OF BREATH: 1
BACK PAIN: 1

## 2025-07-14 NOTE — PROGRESS NOTES
Subjective:      Patient ID: Kaya Robbins is a 74 y.o. female.    HPI  Patient is here for follow up on her diabetes, asthma, chronic respiratory failure, hyperlipidemia and arthritis. She is on Oxygen 2L at night and as needed during the day.     She saw cardiology for exertional dyspnea. ECHO and stress test normal. Dyspnea likely related to severe lung disease. This is slowly progressive.    Her diabetes is well controlled. It is between 90s-100s. Her weight is improved.     She has severe COPD and asthma. Recent testing showed low oxygen.She does get some dyspnea off and on. Has intermittent productive coughing.   She has chronic respiratory failure. She uses 2 L at HS and prn in the day.     Her pulmonary nodule is stable.Chest CT done 1/8/2020, showing some new tree in bud group of micronodules. Emphysema is unchanged from the previous CT in 2018. She sees pulmonary regularly.     Her hyperlipidemia is controlled. No myalgias.    Her arthritis has been stable.    Her tension HA are controlled. She is on Topamax. It has helped a lot.     She has GERD. She is on Nexium. No heartburn.    She has depression. She is on Effexor. It is stable.    She is sleeping ok.    She has urinary incontinence. She takes myrbetriq. It helps.        Review of Systems   Constitutional:  Negative for appetite change, chills, fatigue and fever.   HENT:  Negative for ear pain, postnasal drip and rhinorrhea.    Eyes:  Negative for discharge.   Respiratory:  Positive for shortness of breath. Negative for cough and wheezing.    Cardiovascular:  Negative for chest pain and palpitations.   Gastrointestinal:  Negative for abdominal pain, nausea and vomiting.   Endocrine: Negative for polydipsia and polyphagia.   Musculoskeletal:  Positive for back pain.        Left hip pain     Skin:  Negative for rash.   Neurological:  Positive for numbness.   Psychiatric/Behavioral:  Positive for sleep disturbance. The patient is nervous/anxious.

## 2025-07-22 RX ORDER — SITAGLIPTIN 100 MG/1
100 TABLET, FILM COATED ORAL DAILY
Qty: 90 TABLET | Refills: 1 | Status: SHIPPED | OUTPATIENT
Start: 2025-07-22

## 2025-07-22 RX ORDER — ESOMEPRAZOLE MAGNESIUM 40 MG/1
CAPSULE, DELAYED RELEASE ORAL
Qty: 90 CAPSULE | Refills: 1 | Status: SHIPPED | OUTPATIENT
Start: 2025-07-22

## 2025-07-22 RX ORDER — METFORMIN HYDROCHLORIDE 500 MG/1
1000 TABLET, EXTENDED RELEASE ORAL 2 TIMES DAILY
Qty: 360 TABLET | Refills: 1 | Status: SHIPPED | OUTPATIENT
Start: 2025-07-22

## 2025-07-25 DIAGNOSIS — J44.9 COPD, VERY SEVERE (HCC): Primary | ICD-10-CM

## 2025-07-25 NOTE — TELEPHONE ENCOUNTER
Patient is going through new pharmacy and need a new prescription sent over to [facility].      Please sign if appropriate  PT request refill  Requested Prescriptions     Pending Prescriptions Disp Refills    fluticasone-salmeterol (ADVAIR) 250-50 MCG/ACT AEPB diskus inhaler 60 each 3     Sig: Inhale 1 puff into the lungs in the morning and 1 puff in the evening.

## 2025-07-26 RX ORDER — FLUTICASONE PROPIONATE AND SALMETEROL 250; 50 UG/1; UG/1
1 POWDER RESPIRATORY (INHALATION) EVERY 12 HOURS
Qty: 60 EACH | Refills: 3 | Status: SHIPPED | OUTPATIENT
Start: 2025-07-26

## 2025-07-28 DIAGNOSIS — J44.9 COPD, VERY SEVERE (HCC): ICD-10-CM

## 2025-07-28 RX ORDER — ATORVASTATIN CALCIUM 20 MG/1
20 TABLET, FILM COATED ORAL DAILY
Qty: 90 TABLET | Refills: 1 | Status: SHIPPED | OUTPATIENT
Start: 2025-07-28

## 2025-07-28 RX ORDER — POTASSIUM CHLORIDE 1500 MG/1
20 TABLET, EXTENDED RELEASE ORAL DAILY
Qty: 90 TABLET | Refills: 1 | Status: SHIPPED | OUTPATIENT
Start: 2025-07-28

## 2025-07-28 RX ORDER — ALBUTEROL SULFATE 90 UG/1
INHALANT RESPIRATORY (INHALATION)
Qty: 34 G | Refills: 1 | Status: SHIPPED | OUTPATIENT
Start: 2025-07-28

## 2025-07-28 RX ORDER — PREDNISONE 10 MG/1
TABLET ORAL
Qty: 90 TABLET | Refills: 1 | Status: SHIPPED | OUTPATIENT
Start: 2025-07-28

## 2025-07-28 RX ORDER — FUROSEMIDE 40 MG/1
40 TABLET ORAL DAILY
Qty: 90 TABLET | Refills: 1 | Status: SHIPPED | OUTPATIENT
Start: 2025-07-28

## 2025-07-28 RX ORDER — DILTIAZEM HYDROCHLORIDE 120 MG/1
120 CAPSULE, COATED, EXTENDED RELEASE ORAL DAILY
Qty: 100 CAPSULE | Refills: 2 | Status: SHIPPED | OUTPATIENT
Start: 2025-07-28

## 2025-07-28 RX ORDER — METFORMIN HYDROCHLORIDE 500 MG/1
1000 TABLET, EXTENDED RELEASE ORAL 2 TIMES DAILY
Qty: 360 TABLET | Refills: 1 | Status: SHIPPED | OUTPATIENT
Start: 2025-07-28

## 2025-07-28 RX ORDER — TIOTROPIUM BROMIDE 18 UG/1
18 CAPSULE ORAL; RESPIRATORY (INHALATION) DAILY
Qty: 90 CAPSULE | Refills: 1 | Status: SHIPPED | OUTPATIENT
Start: 2025-07-28

## 2025-07-28 RX ORDER — VENLAFAXINE HYDROCHLORIDE 75 MG/1
75 CAPSULE, EXTENDED RELEASE ORAL DAILY
Qty: 90 CAPSULE | Refills: 1 | Status: SHIPPED | OUTPATIENT
Start: 2025-07-28

## 2025-07-28 RX ORDER — MIRABEGRON 50 MG/1
50 TABLET, FILM COATED, EXTENDED RELEASE ORAL DAILY
Qty: 100 TABLET | Refills: 2 | Status: SHIPPED | OUTPATIENT
Start: 2025-07-28

## 2025-07-28 RX ORDER — THEOPHYLLINE 300 MG/1
300 TABLET, EXTENDED RELEASE ORAL DAILY
Qty: 100 TABLET | Refills: 2 | Status: SHIPPED | OUTPATIENT
Start: 2025-07-28

## 2025-07-28 RX ORDER — ALENDRONATE SODIUM 35 MG/1
TABLET ORAL
Qty: 12 TABLET | Refills: 3 | Status: SHIPPED | OUTPATIENT
Start: 2025-07-28

## 2025-07-28 RX ORDER — ESOMEPRAZOLE MAGNESIUM 40 MG/1
CAPSULE, DELAYED RELEASE ORAL
Qty: 90 CAPSULE | Refills: 1 | Status: SHIPPED | OUTPATIENT
Start: 2025-07-28

## 2025-07-28 NOTE — TELEPHONE ENCOUNTER
----- Message from Concepcion SETH sent at 7/28/2025 11:08 AM EDT -----  Contact: KEMI [phone]  Kemi with [facility] requesting all below script are transferred to [facility] pharmacy. Please advise     esomeprazole (NEXIUM) 40 MG delayed release capsule    JANUVIA 100 MG tablet     metFORMIN (GLUCOPHAGE-XR) 500 MG extended release tablet     furosemide (LASIX) 40 MG tablet     venlafaxine (EFFEXOR XR) 75 MG extended release capsule     potassium chloride (KLOR-CON M) 20 MEQ extended release tablet    albuterol sulfate HFA (PROVENTIL;VENTOLIN;PROAIR) 108 (90 Base) MCG/ACT inhaler      atorvastatin (LIPITOR) 20 MG tablet    alendronate (FOSAMAX) 35 MG tablet     MYRBETRIQ 50 MG TB24    predniSONE (DELTASONE) 10 MG tablet    theophylline (THEODUR) 300 MG extended release tablet     SPIRIVA HANDIHALER 18 MCG inhalation capsule     dilTIAZem (CARDIZEM CD) 120 MG extended release capsule    [facility]  [address] [place] [address]  Phone: [phone]  Fax:[phone]

## 2025-08-11 ENCOUNTER — OFFICE VISIT (OUTPATIENT)
Dept: PULMONOLOGY | Age: 75
End: 2025-08-11
Payer: MEDICARE

## 2025-08-11 VITALS
SYSTOLIC BLOOD PRESSURE: 102 MMHG | HEART RATE: 96 BPM | BODY MASS INDEX: 17.87 KG/M2 | RESPIRATION RATE: 16 BRPM | WEIGHT: 91 LBS | DIASTOLIC BLOOD PRESSURE: 64 MMHG | HEIGHT: 60 IN | OXYGEN SATURATION: 96 %

## 2025-08-11 DIAGNOSIS — J47.9 BRONCHIECTASIS WITHOUT COMPLICATION (HCC): ICD-10-CM

## 2025-08-11 DIAGNOSIS — J96.11 CHRONIC RESPIRATORY FAILURE WITH HYPOXIA AND HYPERCAPNIA (HCC): ICD-10-CM

## 2025-08-11 DIAGNOSIS — J96.12 CHRONIC RESPIRATORY FAILURE WITH HYPOXIA AND HYPERCAPNIA (HCC): ICD-10-CM

## 2025-08-11 DIAGNOSIS — J44.9 COPD, VERY SEVERE (HCC): Primary | ICD-10-CM

## 2025-08-11 PROCEDURE — 99214 OFFICE O/P EST MOD 30 MIN: CPT | Performed by: INTERNAL MEDICINE

## 2025-08-11 PROCEDURE — G8427 DOCREV CUR MEDS BY ELIG CLIN: HCPCS | Performed by: INTERNAL MEDICINE

## 2025-08-11 PROCEDURE — 1124F ACP DISCUSS-NO DSCNMKR DOCD: CPT | Performed by: INTERNAL MEDICINE

## 2025-08-11 PROCEDURE — 1036F TOBACCO NON-USER: CPT | Performed by: INTERNAL MEDICINE

## 2025-08-11 PROCEDURE — G8399 PT W/DXA RESULTS DOCUMENT: HCPCS | Performed by: INTERNAL MEDICINE

## 2025-08-11 PROCEDURE — G8419 CALC BMI OUT NRM PARAM NOF/U: HCPCS | Performed by: INTERNAL MEDICINE

## 2025-08-11 PROCEDURE — 3023F SPIROM DOC REV: CPT | Performed by: INTERNAL MEDICINE

## 2025-08-11 PROCEDURE — 1159F MED LIST DOCD IN RCRD: CPT | Performed by: INTERNAL MEDICINE

## 2025-08-11 PROCEDURE — 1090F PRES/ABSN URINE INCON ASSESS: CPT | Performed by: INTERNAL MEDICINE

## 2025-08-11 RX ORDER — ALBUTEROL SULFATE 90 UG/1
2 INHALANT RESPIRATORY (INHALATION) EVERY 4 HOURS PRN
Qty: 36 G | Refills: 5 | Status: SHIPPED | OUTPATIENT
Start: 2025-08-11 | End: 2026-08-11

## 2025-08-11 RX ORDER — OXYCODONE AND ACETAMINOPHEN 5; 325 MG/1; MG/1
TABLET ORAL
COMMUNITY
Start: 2024-11-20

## 2025-08-13 ENCOUNTER — TELEPHONE (OUTPATIENT)
Dept: INTERNAL MEDICINE CLINIC | Age: 75
End: 2025-08-13

## 2025-08-15 DIAGNOSIS — J44.9 COPD, VERY SEVERE (HCC): Primary | ICD-10-CM

## 2025-08-18 RX ORDER — ALBUTEROL SULFATE 90 UG/1
INHALANT RESPIRATORY (INHALATION)
Qty: 13.4 G | Refills: 5 | Status: SHIPPED | OUTPATIENT
Start: 2025-08-18

## 2025-08-29 ENCOUNTER — TELEPHONE (OUTPATIENT)
Dept: INTERNAL MEDICINE CLINIC | Age: 75
End: 2025-08-29

## (undated) DEVICE — UNIVERSAL BLOCK TRAY: Brand: MEDLINE INDUSTRIES, INC.

## (undated) DEVICE — STERILE POLYISOPRENE POWDER-FREE SURGICAL GLOVES: Brand: PROTEXIS

## (undated) DEVICE — GAUZE,SPONGE,4"X4",16PLY,STRL,LF,10/TRAY: Brand: MEDLINE

## (undated) DEVICE — TOWEL,OR,DSP,ST,BLUE,STD,4/PK,20PK/CS: Brand: MEDLINE

## (undated) DEVICE — CHLORAPREP 26ML ORANGE

## (undated) DEVICE — ALCOHOL RUBBING 16OZ 70% ISO